# Patient Record
Sex: MALE | Race: WHITE | Employment: OTHER | URBAN - METROPOLITAN AREA
[De-identification: names, ages, dates, MRNs, and addresses within clinical notes are randomized per-mention and may not be internally consistent; named-entity substitution may affect disease eponyms.]

---

## 2017-03-27 ENCOUNTER — HOSPITAL ENCOUNTER (OUTPATIENT)
Dept: PULMONOLOGY | Age: 82
Discharge: HOME | End: 2017-03-27
Payer: MEDICARE

## 2017-03-27 PROCEDURE — 94060 EVALUATION OF WHEEZING: CPT

## 2017-03-27 PROCEDURE — 6360000002 HC RX W HCPCS: Performed by: FAMILY MEDICINE

## 2017-03-27 RX ORDER — ALBUTEROL SULFATE 2.5 MG/3ML
2.5 SOLUTION RESPIRATORY (INHALATION) ONCE
Status: COMPLETED | OUTPATIENT
Start: 2017-03-27 | End: 2017-03-27

## 2017-03-27 RX ADMIN — ALBUTEROL SULFATE 2.5 MG: 2.5 SOLUTION RESPIRATORY (INHALATION) at 16:45

## 2018-06-21 ENCOUNTER — OFFICE VISIT (OUTPATIENT)
Dept: FAMILY MEDICINE CLINIC | Age: 83
End: 2018-06-21
Payer: MEDICARE

## 2018-06-21 VITALS
WEIGHT: 183.4 LBS | OXYGEN SATURATION: 98 % | SYSTOLIC BLOOD PRESSURE: 136 MMHG | DIASTOLIC BLOOD PRESSURE: 70 MMHG | BODY MASS INDEX: 28.79 KG/M2 | HEIGHT: 67 IN | HEART RATE: 65 BPM

## 2018-06-21 DIAGNOSIS — I10 ESSENTIAL HYPERTENSION: ICD-10-CM

## 2018-06-21 DIAGNOSIS — E78.5 HYPERLIPIDEMIA, UNSPECIFIED HYPERLIPIDEMIA TYPE: ICD-10-CM

## 2018-06-21 DIAGNOSIS — J44.9 CHRONIC OBSTRUCTIVE PULMONARY DISEASE, UNSPECIFIED COPD TYPE (HCC): ICD-10-CM

## 2018-06-21 DIAGNOSIS — F41.9 ANXIETY: ICD-10-CM

## 2018-06-21 PROCEDURE — 4004F PT TOBACCO SCREEN RCVD TLK: CPT | Performed by: FAMILY MEDICINE

## 2018-06-21 PROCEDURE — G8926 SPIRO NO PERF OR DOC: HCPCS | Performed by: FAMILY MEDICINE

## 2018-06-21 PROCEDURE — 1123F ACP DISCUSS/DSCN MKR DOCD: CPT | Performed by: FAMILY MEDICINE

## 2018-06-21 PROCEDURE — 3023F SPIROM DOC REV: CPT | Performed by: FAMILY MEDICINE

## 2018-06-21 PROCEDURE — G8427 DOCREV CUR MEDS BY ELIG CLIN: HCPCS | Performed by: FAMILY MEDICINE

## 2018-06-21 PROCEDURE — 99203 OFFICE O/P NEW LOW 30 MIN: CPT | Performed by: FAMILY MEDICINE

## 2018-06-21 PROCEDURE — G8419 CALC BMI OUT NRM PARAM NOF/U: HCPCS | Performed by: FAMILY MEDICINE

## 2018-06-21 PROCEDURE — 4040F PNEUMOC VAC/ADMIN/RCVD: CPT | Performed by: FAMILY MEDICINE

## 2018-06-21 RX ORDER — SIMVASTATIN 20 MG
20 TABLET ORAL NIGHTLY
COMMUNITY
End: 2018-10-11 | Stop reason: SDUPTHER

## 2018-06-21 RX ORDER — ALPRAZOLAM 0.5 MG/1
0.5 TABLET ORAL 2 TIMES DAILY PRN
COMMUNITY
End: 2020-07-24

## 2018-06-21 RX ORDER — ATENOLOL 50 MG/1
50 TABLET ORAL DAILY
COMMUNITY
End: 2019-04-11 | Stop reason: SDUPTHER

## 2018-06-21 RX ORDER — TRAZODONE HYDROCHLORIDE 150 MG/1
150 TABLET ORAL NIGHTLY
COMMUNITY
End: 2019-06-20 | Stop reason: SDUPTHER

## 2018-06-21 ASSESSMENT — PATIENT HEALTH QUESTIONNAIRE - PHQ9
SUM OF ALL RESPONSES TO PHQ QUESTIONS 1-9: 1
1. LITTLE INTEREST OR PLEASURE IN DOING THINGS: 0
SUM OF ALL RESPONSES TO PHQ9 QUESTIONS 1 & 2: 1
2. FEELING DOWN, DEPRESSED OR HOPELESS: 1

## 2018-10-11 ENCOUNTER — OFFICE VISIT (OUTPATIENT)
Dept: FAMILY MEDICINE CLINIC | Age: 83
End: 2018-10-11
Payer: MEDICARE

## 2018-10-11 VITALS
HEART RATE: 70 BPM | OXYGEN SATURATION: 96 % | SYSTOLIC BLOOD PRESSURE: 130 MMHG | WEIGHT: 183 LBS | HEIGHT: 67 IN | BODY MASS INDEX: 28.72 KG/M2 | DIASTOLIC BLOOD PRESSURE: 72 MMHG

## 2018-10-11 DIAGNOSIS — J44.9 CHRONIC OBSTRUCTIVE PULMONARY DISEASE, UNSPECIFIED COPD TYPE (HCC): ICD-10-CM

## 2018-10-11 DIAGNOSIS — R10.9 RIGHT FLANK PAIN: ICD-10-CM

## 2018-10-11 DIAGNOSIS — I10 ESSENTIAL HYPERTENSION: ICD-10-CM

## 2018-10-11 DIAGNOSIS — M54.50 ACUTE LOW BACK PAIN WITHOUT SCIATICA, UNSPECIFIED BACK PAIN LATERALITY: Primary | ICD-10-CM

## 2018-10-11 DIAGNOSIS — E78.5 HYPERLIPIDEMIA, UNSPECIFIED HYPERLIPIDEMIA TYPE: ICD-10-CM

## 2018-10-11 DIAGNOSIS — N20.0 KIDNEY STONE: ICD-10-CM

## 2018-10-11 DIAGNOSIS — F41.9 ANXIETY: ICD-10-CM

## 2018-10-11 DIAGNOSIS — R31.9 HEMATURIA, UNSPECIFIED TYPE: ICD-10-CM

## 2018-10-11 LAB
ALBUMIN SERPL-MCNC: 4.6 G/DL (ref 3.9–4.9)
ALP BLD-CCNC: 74 U/L (ref 35–104)
ALT SERPL-CCNC: 15 U/L (ref 0–41)
ANION GAP SERPL CALCULATED.3IONS-SCNC: 12 MEQ/L (ref 7–13)
AST SERPL-CCNC: 21 U/L (ref 0–40)
BILIRUB SERPL-MCNC: 0.3 MG/DL (ref 0–1.2)
BILIRUBIN, POC: ABNORMAL
BLOOD URINE, POC: 80
BUN BLDV-MCNC: 19 MG/DL (ref 8–23)
CALCIUM SERPL-MCNC: 9.8 MG/DL (ref 8.6–10.2)
CHLORIDE BLD-SCNC: 103 MEQ/L (ref 98–107)
CHOLESTEROL, TOTAL: 128 MG/DL (ref 0–199)
CLARITY, POC: CLEAR
CO2: 29 MEQ/L (ref 22–29)
COLOR, POC: YELLOW
CREAT SERPL-MCNC: 0.81 MG/DL (ref 0.7–1.2)
GFR AFRICAN AMERICAN: >60
GFR NON-AFRICAN AMERICAN: >60
GLOBULIN: 2.7 G/DL (ref 2.3–3.5)
GLUCOSE BLD-MCNC: 117 MG/DL (ref 74–109)
GLUCOSE URINE, POC: ABNORMAL
HCT VFR BLD CALC: 44.6 % (ref 42–52)
HDLC SERPL-MCNC: 43 MG/DL (ref 40–59)
HEMOGLOBIN: 15.1 G/DL (ref 14–18)
KETONES, POC: ABNORMAL
LDL CHOLESTEROL CALCULATED: 69 MG/DL (ref 0–129)
LEUKOCYTE EST, POC: ABNORMAL
MCH RBC QN AUTO: 33 PG (ref 27–31.3)
MCHC RBC AUTO-ENTMCNC: 33.9 % (ref 33–37)
MCV RBC AUTO: 97.2 FL (ref 80–100)
NITRITE, POC: ABNORMAL
PDW BLD-RTO: 15.5 % (ref 11.5–14.5)
PH, POC: 5.5
PLATELET # BLD: 244 K/UL (ref 130–400)
POTASSIUM SERPL-SCNC: 4.6 MEQ/L (ref 3.5–5.1)
PROTEIN, POC: 100
RBC # BLD: 4.59 M/UL (ref 4.7–6.1)
SODIUM BLD-SCNC: 144 MEQ/L (ref 132–144)
SPECIFIC GRAVITY, POC: 1.03
TOTAL PROTEIN: 7.3 G/DL (ref 6.4–8.1)
TRIGL SERPL-MCNC: 81 MG/DL (ref 0–200)
UROBILINOGEN, POC: 0.2
WBC # BLD: 9.4 K/UL (ref 4.8–10.8)

## 2018-10-11 PROCEDURE — 99213 OFFICE O/P EST LOW 20 MIN: CPT | Performed by: FAMILY MEDICINE

## 2018-10-11 PROCEDURE — G8926 SPIRO NO PERF OR DOC: HCPCS | Performed by: FAMILY MEDICINE

## 2018-10-11 PROCEDURE — G8427 DOCREV CUR MEDS BY ELIG CLIN: HCPCS | Performed by: FAMILY MEDICINE

## 2018-10-11 PROCEDURE — 81002 URINALYSIS NONAUTO W/O SCOPE: CPT | Performed by: FAMILY MEDICINE

## 2018-10-11 PROCEDURE — 4040F PNEUMOC VAC/ADMIN/RCVD: CPT | Performed by: FAMILY MEDICINE

## 2018-10-11 PROCEDURE — 4004F PT TOBACCO SCREEN RCVD TLK: CPT | Performed by: FAMILY MEDICINE

## 2018-10-11 PROCEDURE — G8419 CALC BMI OUT NRM PARAM NOF/U: HCPCS | Performed by: FAMILY MEDICINE

## 2018-10-11 PROCEDURE — 3023F SPIROM DOC REV: CPT | Performed by: FAMILY MEDICINE

## 2018-10-11 PROCEDURE — 1101F PT FALLS ASSESS-DOCD LE1/YR: CPT | Performed by: FAMILY MEDICINE

## 2018-10-11 PROCEDURE — G8484 FLU IMMUNIZE NO ADMIN: HCPCS | Performed by: FAMILY MEDICINE

## 2018-10-11 PROCEDURE — 1123F ACP DISCUSS/DSCN MKR DOCD: CPT | Performed by: FAMILY MEDICINE

## 2018-10-11 RX ORDER — SIMVASTATIN 20 MG
20 TABLET ORAL NIGHTLY
Qty: 90 TABLET | Refills: 3 | Status: SHIPPED | OUTPATIENT
Start: 2018-10-11 | End: 2019-09-24 | Stop reason: SDUPTHER

## 2018-10-11 RX ORDER — CIPROFLOXACIN 500 MG/1
500 TABLET, FILM COATED ORAL 2 TIMES DAILY
Qty: 20 TABLET | Refills: 0 | Status: SHIPPED | OUTPATIENT
Start: 2018-10-11 | End: 2018-10-21

## 2018-10-11 NOTE — PROGRESS NOTES
Chief Complaint   Patient presents with    Back Pain     pulled muscle but getting better, brought urine     Other     wife getting worse, loosing eye sight, has to keep eye on her       HPI:  Royal Elkins is a 81 y.o. male     Follow up    In [place] the majority of the year  6-7 months  Does have PCP in [place]    Will be heading down soon, if wife able  Gets bloodwork there    Wife with macular degeneration  Losing sight, stressful    Reports pain in right flank  Finally easing up  Actually has had stone years ago    Trazodone for sleep/anxiety  Rare use of xanax for anxiety    PFTs from 2017 did show COPD, but he denies any significant SOB    Still smokes    Patient Active Problem List   Diagnosis    Hypertension    Hyperlipidemia    [facility]) [facility] [address]    Anxiety    Kidney stone       Current Outpatient Prescriptions   Medication Sig Dispense Refill    ciprofloxacin (CIPRO) 500 MG tablet Take 1 tablet by mouth 2 times daily for 10 days 20 tablet 0    ALPRAZolam (XANAX) 0.5 MG tablet Take 0.5 mg by mouth 2 times daily as needed for Sleep. Gagandeep Hernandez simvastatin (ZOCOR) 20 MG tablet Take 20 mg by mouth nightly      traZODone (DESYREL) 150 MG tablet Take 150 mg by mouth nightly      aspirin 81 MG tablet Take 81 mg by mouth daily      atenolol (TENORMIN) 50 MG tablet Take 50 mg by mouth daily      Alum Hydroxide-Mag Carbonate (GAVISCON EXTRA STRENGTH PO) Take by mouth       No current facility-administered medications for this visit. Past Medical History:   Diagnosis Date    Anxiety     COPD [facility]chronic obstructive pulmonary disease) ([facility] [address]     Hyperlipidemia     Hypertension     Kidney stone      History reviewed. No pertinent surgical history. History reviewed. No pertinent family history.   Social History     Social History    Marital status:      Spouse name: N/A    Number of children: N/A    Years of education: N/A     Social History Main Topics    Smoking status: Current Every Day Smoker     Packs/day: 0.50     Years: 60.00     Types: Cigarettes    Smokeless tobacco: Never Used    Alcohol use No    Drug use: No    Sexual activity: Not Asked     Other Topics Concern    None     Social History Narrative    None     No Known Allergies    Review of Systems:   General ROS: negative for - chills, fatigue, fever, malaise, weight gain or weight loss  Respiratory ROS: no cough, shortness of breath, or wheezing  Cardiovascular ROS: no chest pain or dyspnea on exertion  Gastrointestinal ROS: no abdominal pain, change in bowel habits, or black or bloody stools  Genito-Urinary ROS: no dysuria, trouble voiding  Musculoskeletal ROS: right flank pain  Neurological ROS: negative for - behavioral changes, memory loss, numbness/tingling, tremors or weakness    In general patient otherwise reports feeling well. Physical Exam:  /72 (Site: Right Upper Arm)   Pulse 70   Ht 5' 6.5\" (1.689 m)   Wt 183 lb (83 kg)   SpO2 96%   BMI 29.09 kg/m²     Gen: Well, NAD, Alert, Oriented x 3   HEENT: EOMI, eyes clear, MMM  Skin: without rash or jaundice  Neck: no significant lymphadenopathy or thyromegaly  Lungs: expiratory wheeze   Heart: RRR, S1S2, w/out M/R/G, non-displaced PMI   Ext: No C/C/E Bilaterally. Neuro: Neurovascularly intact w/ Sensory/Motor intact UE/LE Bilaterally. No results found for: WBC, HGB, HCT, PLT, CHOL, TRIG, HDL, LDLDIRECT, ALT, AST, NA, K, CL, CREATININE, BUN, CO2, TSH, PSA, INR, GLUF, LABA1C, LABMICR    Results for POC orders placed in visit on 10/11/18   POCT Urinalysis no Micro   Result Value Ref Range    Color, UA yellow     Clarity, UA clear     Glucose, UA POC neg     Bilirubin, UA neg     Ketones, UA neg     Spec Grav, UA 1.030     Blood, UA POC 80     pH, UA 5.5     Protein, UA      Urobilinogen, UA 0.2     Leukocytes, UA neg     Nitrite, UA neg          A&P   Diagnosis Orders   1.  Acute low back pain without sciatica, unspecified back pain laterality  POCT Urinalysis no Micro   2. Hematuria, unspecified type     3. Chronic obstructive pulmonary disease, unspecified COPD type ([facility] [address]     4. Essential hypertension     5. Hyperlipidemia, unspecified hyperlipidemia type  Lipid Panel   6. Anxiety     7. Kidney stone     8.  Right flank pain  Urine Culture    ciprofloxacin (CIPRO) 500 MG tablet    CBC    Comprehensive Metabolic Panel     Chronic conditions are stable  Continue current regimen  Follow up with appropriate specialists and here routinely for ongoing monitoring of chronic conditions    He really doesn't want to go ahead with full workup due to leaving for [place]  I had advised  Avi Mayen MD

## 2018-10-13 LAB — URINE CULTURE, ROUTINE: NORMAL

## 2019-04-08 ENCOUNTER — OFFICE VISIT (OUTPATIENT)
Dept: FAMILY MEDICINE CLINIC | Age: 84
End: 2019-04-08
Payer: MEDICARE

## 2019-04-08 VITALS
TEMPERATURE: 97.7 F | BODY MASS INDEX: 29.25 KG/M2 | WEIGHT: 182 LBS | SYSTOLIC BLOOD PRESSURE: 132 MMHG | DIASTOLIC BLOOD PRESSURE: 70 MMHG | HEART RATE: 72 BPM | OXYGEN SATURATION: 95 % | HEIGHT: 66 IN

## 2019-04-08 DIAGNOSIS — R31.9 HEMATURIA, UNSPECIFIED TYPE: ICD-10-CM

## 2019-04-08 DIAGNOSIS — R31.9 HEMATURIA, UNSPECIFIED TYPE: Primary | ICD-10-CM

## 2019-04-08 DIAGNOSIS — J44.9 CHRONIC OBSTRUCTIVE PULMONARY DISEASE, UNSPECIFIED COPD TYPE (HCC): ICD-10-CM

## 2019-04-08 DIAGNOSIS — N30.90 CYSTITIS: ICD-10-CM

## 2019-04-08 LAB
ALBUMIN SERPL-MCNC: 4.9 G/DL (ref 3.5–4.6)
ALP BLD-CCNC: 72 U/L (ref 35–104)
ALT SERPL-CCNC: 24 U/L (ref 0–41)
ANION GAP SERPL CALCULATED.3IONS-SCNC: 15 MEQ/L (ref 9–15)
AST SERPL-CCNC: 27 U/L (ref 0–40)
BILIRUB SERPL-MCNC: 0.4 MG/DL (ref 0.2–0.7)
BUN BLDV-MCNC: 17 MG/DL (ref 8–23)
CALCIUM SERPL-MCNC: 9.8 MG/DL (ref 8.5–9.9)
CHLORIDE BLD-SCNC: 99 MEQ/L (ref 95–107)
CO2: 29 MEQ/L (ref 20–31)
CREAT SERPL-MCNC: 0.85 MG/DL (ref 0.7–1.2)
GFR AFRICAN AMERICAN: >60
GFR NON-AFRICAN AMERICAN: >60
GLOBULIN: 2.6 G/DL (ref 2.3–3.5)
GLUCOSE BLD-MCNC: 67 MG/DL (ref 70–99)
HCT VFR BLD CALC: 46.3 % (ref 42–52)
HEMOGLOBIN: 15.4 G/DL (ref 14–18)
MCH RBC QN AUTO: 32.6 PG (ref 27–31.3)
MCHC RBC AUTO-ENTMCNC: 33.3 % (ref 33–37)
MCV RBC AUTO: 97.9 FL (ref 80–100)
PDW BLD-RTO: 15.1 % (ref 11.5–14.5)
PLATELET # BLD: 256 K/UL (ref 130–400)
POTASSIUM SERPL-SCNC: 4.7 MEQ/L (ref 3.4–4.9)
RBC # BLD: 4.73 M/UL (ref 4.7–6.1)
SODIUM BLD-SCNC: 143 MEQ/L (ref 135–144)
TOTAL PROTEIN: 7.5 G/DL (ref 6.3–8)
WBC # BLD: 10.1 K/UL (ref 4.8–10.8)

## 2019-04-08 PROCEDURE — G8427 DOCREV CUR MEDS BY ELIG CLIN: HCPCS | Performed by: FAMILY MEDICINE

## 2019-04-08 PROCEDURE — 99213 OFFICE O/P EST LOW 20 MIN: CPT | Performed by: FAMILY MEDICINE

## 2019-04-08 PROCEDURE — 4040F PNEUMOC VAC/ADMIN/RCVD: CPT | Performed by: FAMILY MEDICINE

## 2019-04-08 PROCEDURE — G8926 SPIRO NO PERF OR DOC: HCPCS | Performed by: FAMILY MEDICINE

## 2019-04-08 PROCEDURE — 1123F ACP DISCUSS/DSCN MKR DOCD: CPT | Performed by: FAMILY MEDICINE

## 2019-04-08 PROCEDURE — G8419 CALC BMI OUT NRM PARAM NOF/U: HCPCS | Performed by: FAMILY MEDICINE

## 2019-04-08 PROCEDURE — 4004F PT TOBACCO SCREEN RCVD TLK: CPT | Performed by: FAMILY MEDICINE

## 2019-04-08 PROCEDURE — 3023F SPIROM DOC REV: CPT | Performed by: FAMILY MEDICINE

## 2019-04-08 RX ORDER — CIPROFLOXACIN 500 MG/1
500 TABLET, FILM COATED ORAL 2 TIMES DAILY
Qty: 20 TABLET | Refills: 0 | Status: SHIPPED | OUTPATIENT
Start: 2019-04-08 | End: 2019-04-18

## 2019-04-08 NOTE — PROGRESS NOTES
None     Non-medical: None   Tobacco Use    Smoking status: Current Every Day Smoker     Packs/day: 0.50     Years: 60.00     Pack years: 30.00     Types: Cigarettes    Smokeless tobacco: Never Used   Substance and Sexual Activity    Alcohol use: No    Drug use: No    Sexual activity: None   Lifestyle    Physical activity:     Days per week: None     Minutes per session: None    Stress: None   Relationships    Social connections:     Talks on phone: None     Gets together: None     Attends Alevism service: None     Active member of club or organization: None     Attends meetings of clubs or organizations: None     Relationship status: None    Intimate partner violence:     Fear of current or ex partner: None     Emotionally abused: None     Physically abused: None     Forced sexual activity: None   Other Topics Concern    None   Social History Narrative    None     No Known Allergies    Review of Systems:   General ROS: negative for - chills, fatigue, fever, malaise, weight gain or weight loss  Respiratory ROS: no cough, shortness of breath, or wheezing  Cardiovascular ROS: no chest pain or dyspnea on exertion  Gastrointestinal ROS: no abdominal pain, change in bowel habits, or black or bloody stools  Genito-Urinary ROS: no dysuria, trouble voiding  Musculoskeletal ROS: right flank pain  Neurological ROS: negative for - behavioral changes, memory loss, numbness/tingling, tremors or weakness    In general patient otherwise reports feeling well. Physical Exam:  /70   Pulse 72   Temp 97.7 °F (36.5 °C)   Ht 5' 6\" (1.676 m)   Wt 182 lb (82.6 kg)   SpO2 95%   BMI 29.38 kg/m²     Gen: Well, NAD, Alert, Oriented x 3   HEENT: EOMI, eyes clear, MMM  Skin: without rash or jaundice  Neck: no significant lymphadenopathy or thyromegaly  Lungs: expiratory wheeze   Heart: RRR, S1S2, w/out M/R/G, non-displaced PMI   Ext: No C/C/E Bilaterally.    Neuro: Neurovascularly intact w/ Sensory/Motor intact UE/LE Bilaterally. Lab Results   Component Value Date    WBC 9.4 10/11/2018    HGB 15.1 10/11/2018    HCT 44.6 10/11/2018     10/11/2018    CHOL 128 10/11/2018    TRIG 81 10/11/2018    HDL 43 10/11/2018    ALT 15 10/11/2018    AST 21 10/11/2018     10/11/2018    K 4.6 10/11/2018     10/11/2018    CREATININE 0.81 10/11/2018    BUN 19 10/11/2018    CO2 29 10/11/2018       No results found for this visit on 04/08/19. U/a with blood and protein      A&P   Diagnosis Orders   1. Hematuria, unspecified type  CT ABDOMEN PELVIS WO CONTRAST Additional Contrast? None    Comprehensive Metabolic Panel    CBC   2. Cystitis  ciprofloxacin (CIPRO) 500 MG tablet    CT ABDOMEN PELVIS WO CONTRAST Additional Contrast? None   3.  Chronic obstructive pulmonary disease, unspecified COPD type ([facility] [address]       Chronic conditions are stable  Continue current regimen  Follow up with appropriate specialists and here routinely for ongoing monitoring of chronic conditions    He has to go back to [place] this week    Would like labs and CT    Wife is deteriorating  Her son is with her down in [place] MD

## 2019-04-11 RX ORDER — ATENOLOL 50 MG/1
50 TABLET ORAL DAILY
Qty: 30 TABLET | Refills: 5 | Status: SHIPPED | OUTPATIENT
Start: 2019-04-11 | End: 2019-06-20 | Stop reason: SDUPTHER

## 2019-06-20 ENCOUNTER — OFFICE VISIT (OUTPATIENT)
Dept: FAMILY MEDICINE CLINIC | Age: 84
End: 2019-06-20
Payer: MEDICARE

## 2019-06-20 VITALS
WEIGHT: 181 LBS | OXYGEN SATURATION: 97 % | BODY MASS INDEX: 29.09 KG/M2 | HEART RATE: 48 BPM | DIASTOLIC BLOOD PRESSURE: 78 MMHG | SYSTOLIC BLOOD PRESSURE: 122 MMHG | HEIGHT: 66 IN

## 2019-06-20 DIAGNOSIS — J44.9 CHRONIC OBSTRUCTIVE PULMONARY DISEASE, UNSPECIFIED COPD TYPE (HCC): ICD-10-CM

## 2019-06-20 DIAGNOSIS — I10 ESSENTIAL HYPERTENSION: Primary | ICD-10-CM

## 2019-06-20 DIAGNOSIS — E78.5 HYPERLIPIDEMIA, UNSPECIFIED HYPERLIPIDEMIA TYPE: ICD-10-CM

## 2019-06-20 DIAGNOSIS — F41.9 ANXIETY: ICD-10-CM

## 2019-06-20 PROCEDURE — 1123F ACP DISCUSS/DSCN MKR DOCD: CPT | Performed by: FAMILY MEDICINE

## 2019-06-20 PROCEDURE — G8510 SCR DEP NEG, NO PLAN REQD: HCPCS | Performed by: FAMILY MEDICINE

## 2019-06-20 PROCEDURE — 4004F PT TOBACCO SCREEN RCVD TLK: CPT | Performed by: FAMILY MEDICINE

## 2019-06-20 PROCEDURE — G8427 DOCREV CUR MEDS BY ELIG CLIN: HCPCS | Performed by: FAMILY MEDICINE

## 2019-06-20 PROCEDURE — G8419 CALC BMI OUT NRM PARAM NOF/U: HCPCS | Performed by: FAMILY MEDICINE

## 2019-06-20 PROCEDURE — 99213 OFFICE O/P EST LOW 20 MIN: CPT | Performed by: FAMILY MEDICINE

## 2019-06-20 PROCEDURE — G8926 SPIRO NO PERF OR DOC: HCPCS | Performed by: FAMILY MEDICINE

## 2019-06-20 PROCEDURE — 3023F SPIROM DOC REV: CPT | Performed by: FAMILY MEDICINE

## 2019-06-20 PROCEDURE — 4040F PNEUMOC VAC/ADMIN/RCVD: CPT | Performed by: FAMILY MEDICINE

## 2019-06-20 RX ORDER — TRAZODONE HYDROCHLORIDE 150 MG/1
150 TABLET ORAL NIGHTLY
Qty: 90 TABLET | Refills: 2 | Status: SHIPPED | OUTPATIENT
Start: 2019-06-20 | End: 2020-07-24 | Stop reason: SDUPTHER

## 2019-06-20 RX ORDER — ATENOLOL 100 MG/1
100 TABLET ORAL DAILY
Qty: 90 TABLET | Refills: 2 | Status: SHIPPED | OUTPATIENT
Start: 2019-06-20 | End: 2020-03-05 | Stop reason: SDUPTHER

## 2019-06-20 ASSESSMENT — PATIENT HEALTH QUESTIONNAIRE - PHQ9
SUM OF ALL RESPONSES TO PHQ QUESTIONS 1-9: 2
SUM OF ALL RESPONSES TO PHQ QUESTIONS 1-9: 2
2. FEELING DOWN, DEPRESSED OR HOPELESS: 1
SUM OF ALL RESPONSES TO PHQ9 QUESTIONS 1 & 2: 2
1. LITTLE INTEREST OR PLEASURE IN DOING THINGS: 1

## 2019-06-20 NOTE — PROGRESS NOTES
Chief Complaint   Patient presents with    Anxiety     bp has went up, a lot of anxiety moving up here from [place] for the summer, wife isn't doing well 140/100's    Discuss Medications       HPI:  Albania Palmer is a 81 y.o. male     Follow up    In [place] the majority of the year  6-7 months  Does have PCP in [address] bloodwork there    Wife with macular degeneration  Losing sight, stressful ongoing  Does bring this up at each visit, but understandable      Trazodone for sleep/anxiety  Rare use of xanax for anxiety    PFTs from 2017 did show COPD, but he denies any significant SOB    Still smokes    Patient Active Problem List   Diagnosis    Hypertension    Hyperlipidemia    COPD [facility]chronic obstructive [facility] disease) ([facility] [address]    Anxiety    Kidney stone       Current Outpatient Medications   Medication Sig Dispense Refill    traZODone (DESYREL) 150 MG tablet Take 1 tablet by mouth nightly 90 tablet 2    atenolol (TENORMIN) 100 MG tablet Take 1 tablet by mouth daily 90 tablet 2    simvastatin (ZOCOR) 20 MG tablet Take 1 tablet by mouth nightly 90 tablet 3    ALPRAZolam (XANAX) 0.5 MG tablet Take 0.5 mg by mouth 2 times daily as needed for Sleep. Omer Jackson aspirin 81 MG tablet Take 81 mg by mouth daily      Alum Hydroxide-Mag Carbonate (GAVISCON EXTRA STRENGTH PO) Take by mouth       No current facility-administered medications for this visit. Past Medical History:   Diagnosis Date    Anxiety     COPD (chronic obstructive pulmonary disease) ([facility] [address]     Hyperlipidemia     Hypertension     Kidney stone      History reviewed. No pertinent surgical history. History reviewed. No pertinent family history.   Social History     Socioeconomic History    Marital status:      Spouse name: None    Number of children: None    Years of education: None    Highest education level: None   Occupational History    None   Social Needs    Financial resource strain: None    Food insecurity:     Worry: None     Inability: None    Transportation needs:     Medical: None     Non-medical: None   Tobacco Use    Smoking status: Current Every Day Smoker     Packs/day: 0.50     Years: 60.00     Pack years: 30.00     Types: Cigarettes    Smokeless tobacco: Never Used   Substance and Sexual Activity    Alcohol use: No    Drug use: No    Sexual activity: None   Lifestyle    Physical activity:     Days per week: None     Minutes per session: None    Stress: None   Relationships    Social connections:     Talks on phone: None     Gets together: None     Attends Sikh service: None     Active member of club or organization: None     Attends meetings of clubs or organizations: None     Relationship status: None    Intimate partner violence:     Fear of current or ex partner: None     Emotionally abused: None     Physically abused: None     Forced sexual activity: None   Other Topics Concern    None   Social History Narrative    None     No Known Allergies    Review of Systems:   General ROS: negative for - chills, fatigue, fever, malaise, weight gain or weight loss  Respiratory ROS: no cough, shortness of breath, or wheezing  Cardiovascular ROS: no chest pain or dyspnea on exertion  Gastrointestinal ROS: no abdominal pain, change in bowel habits, or black or bloody stools  Genito-Urinary ROS: no dysuria, trouble voiding  Musculoskeletal ROS: right flank pain  Neurological ROS: negative for - behavioral changes, memory loss, numbness/tingling, tremors or weakness    In general patient otherwise reports feeling well.      Physical Exam:  /78 (Site: Left Upper Arm)   Pulse (!) 48   Ht 5' 6\" (1.676 m)   Wt 181 lb (82.1 kg)   SpO2 97%   BMI 29.21 kg/m²     Gen: Well, NAD, Alert, Oriented x 3   HEENT: EOMI, eyes clear, MMM  Skin: without rash or jaundice  Neck: no significant lymphadenopathy or thyromegaly  Lungs: expiratory wheeze   Heart: RRR, S1S2, w/out M/R/G, non-displaced PMI   Ext: No C/C/E Bilaterally. Neuro: Neurovascularly intact w/ Sensory/Motor intact UE/LE Bilaterally. Lab Results   Component Value Date    WBC 10.1 04/08/2019    HGB 15.4 04/08/2019    HCT 46.3 04/08/2019     04/08/2019    CHOL 128 10/11/2018    TRIG 81 10/11/2018    HDL 43 10/11/2018    ALT 24 04/08/2019    AST 27 04/08/2019     04/08/2019    K 4.7 04/08/2019    CL 99 04/08/2019    CREATININE 0.85 04/08/2019    BUN 17 04/08/2019    CO2 29 04/08/2019       No results found for this visit on 06/20/19. A&P   Diagnosis Orders   1. Essential hypertension  atenolol (TENORMIN) 100 MG tablet   2. Anxiety  traZODone (DESYREL) 150 MG tablet   3. Hyperlipidemia, unspecified hyperlipidemia type     4.  Chronic obstructive pulmonary disease, unspecified COPD type ([facility] [address]         Nerves/anxiety biggest issue    Atenolol to 100mg    Chronic conditions are stable  Continue current regimen  Follow up with appropriate specialists and here routinely for ongoing monitoring of chronic conditions        Mann Cummings MD

## 2019-09-24 ENCOUNTER — OFFICE VISIT (OUTPATIENT)
Dept: FAMILY MEDICINE CLINIC | Age: 84
End: 2019-09-24
Payer: MEDICARE

## 2019-09-24 VITALS
SYSTOLIC BLOOD PRESSURE: 138 MMHG | WEIGHT: 177.8 LBS | HEART RATE: 53 BPM | DIASTOLIC BLOOD PRESSURE: 78 MMHG | OXYGEN SATURATION: 96 % | BODY MASS INDEX: 28.57 KG/M2 | HEIGHT: 66 IN

## 2019-09-24 DIAGNOSIS — F41.9 ANXIETY: ICD-10-CM

## 2019-09-24 DIAGNOSIS — I10 ESSENTIAL HYPERTENSION: ICD-10-CM

## 2019-09-24 DIAGNOSIS — E78.5 HYPERLIPIDEMIA, UNSPECIFIED HYPERLIPIDEMIA TYPE: ICD-10-CM

## 2019-09-24 DIAGNOSIS — J44.9 CHRONIC OBSTRUCTIVE PULMONARY DISEASE, UNSPECIFIED COPD TYPE (HCC): Primary | ICD-10-CM

## 2019-09-24 LAB
ALBUMIN SERPL-MCNC: 4.7 G/DL (ref 3.5–4.6)
ALP BLD-CCNC: 65 U/L (ref 35–104)
ALT SERPL-CCNC: 21 U/L (ref 0–41)
ANION GAP SERPL CALCULATED.3IONS-SCNC: 13 MEQ/L (ref 9–15)
AST SERPL-CCNC: 25 U/L (ref 0–40)
BILIRUB SERPL-MCNC: 0.5 MG/DL (ref 0.2–0.7)
BUN BLDV-MCNC: 20 MG/DL (ref 8–23)
CALCIUM SERPL-MCNC: 9.7 MG/DL (ref 8.5–9.9)
CHLORIDE BLD-SCNC: 100 MEQ/L (ref 95–107)
CHOLESTEROL, TOTAL: 152 MG/DL (ref 0–199)
CO2: 27 MEQ/L (ref 20–31)
CREAT SERPL-MCNC: 0.95 MG/DL (ref 0.7–1.2)
GFR AFRICAN AMERICAN: >60
GFR NON-AFRICAN AMERICAN: >60
GLOBULIN: 3.1 G/DL (ref 2.3–3.5)
GLUCOSE BLD-MCNC: 88 MG/DL (ref 70–99)
HCT VFR BLD CALC: 45.5 % (ref 42–52)
HDLC SERPL-MCNC: 47 MG/DL (ref 40–59)
HEMOGLOBIN: 15.3 G/DL (ref 14–18)
LDL CHOLESTEROL CALCULATED: 79 MG/DL (ref 0–129)
MCH RBC QN AUTO: 32.6 PG (ref 27–31.3)
MCHC RBC AUTO-ENTMCNC: 33.7 % (ref 33–37)
MCV RBC AUTO: 96.7 FL (ref 80–100)
PDW BLD-RTO: 14.5 % (ref 11.5–14.5)
PLATELET # BLD: 245 K/UL (ref 130–400)
POTASSIUM SERPL-SCNC: 4.3 MEQ/L (ref 3.4–4.9)
RBC # BLD: 4.7 M/UL (ref 4.7–6.1)
SODIUM BLD-SCNC: 140 MEQ/L (ref 135–144)
TOTAL PROTEIN: 7.8 G/DL (ref 6.3–8)
TRIGL SERPL-MCNC: 128 MG/DL (ref 0–150)
TSH SERPL DL<=0.05 MIU/L-ACNC: 2.23 UIU/ML (ref 0.44–3.86)
WBC # BLD: 11.5 K/UL (ref 4.8–10.8)

## 2019-09-24 PROCEDURE — G8419 CALC BMI OUT NRM PARAM NOF/U: HCPCS | Performed by: FAMILY MEDICINE

## 2019-09-24 PROCEDURE — 3023F SPIROM DOC REV: CPT | Performed by: FAMILY MEDICINE

## 2019-09-24 PROCEDURE — G8427 DOCREV CUR MEDS BY ELIG CLIN: HCPCS | Performed by: FAMILY MEDICINE

## 2019-09-24 PROCEDURE — 3288F FALL RISK ASSESSMENT DOCD: CPT | Performed by: FAMILY MEDICINE

## 2019-09-24 PROCEDURE — 4004F PT TOBACCO SCREEN RCVD TLK: CPT | Performed by: FAMILY MEDICINE

## 2019-09-24 PROCEDURE — 99213 OFFICE O/P EST LOW 20 MIN: CPT | Performed by: FAMILY MEDICINE

## 2019-09-24 PROCEDURE — 4040F PNEUMOC VAC/ADMIN/RCVD: CPT | Performed by: FAMILY MEDICINE

## 2019-09-24 PROCEDURE — G8510 SCR DEP NEG, NO PLAN REQD: HCPCS | Performed by: FAMILY MEDICINE

## 2019-09-24 PROCEDURE — 1123F ACP DISCUSS/DSCN MKR DOCD: CPT | Performed by: FAMILY MEDICINE

## 2019-09-24 PROCEDURE — G8926 SPIRO NO PERF OR DOC: HCPCS | Performed by: FAMILY MEDICINE

## 2019-09-24 RX ORDER — ALPRAZOLAM 0.5 MG/1
0.5 TABLET ORAL 2 TIMES DAILY PRN
Qty: 60 TABLET | Refills: 0 | Status: SHIPPED | OUTPATIENT
Start: 2019-09-24 | End: 2020-07-24 | Stop reason: SDUPTHER

## 2019-09-24 RX ORDER — SIMVASTATIN 20 MG
20 TABLET ORAL NIGHTLY
Qty: 90 TABLET | Refills: 3 | Status: SHIPPED | OUTPATIENT
Start: 2019-09-24 | End: 2020-07-24 | Stop reason: SDUPTHER

## 2019-09-24 RX ORDER — MULTIVITAMIN WITH IRON
100 TABLET ORAL DAILY
COMMUNITY
End: 2021-01-19

## 2019-09-24 RX ORDER — ALPRAZOLAM 0.5 MG/1
0.5 TABLET ORAL 2 TIMES DAILY PRN
Status: CANCELLED | OUTPATIENT
Start: 2019-09-24

## 2019-09-24 ASSESSMENT — PATIENT HEALTH QUESTIONNAIRE - PHQ9
SUM OF ALL RESPONSES TO PHQ QUESTIONS 1-9: 2
SUM OF ALL RESPONSES TO PHQ9 QUESTIONS 1 & 2: 2
2. FEELING DOWN, DEPRESSED OR HOPELESS: 1
SUM OF ALL RESPONSES TO PHQ QUESTIONS 1-9: 2
1. LITTLE INTEREST OR PLEASURE IN DOING THINGS: 1

## 2019-09-24 NOTE — PROGRESS NOTES
Chief Complaint   Patient presents with    Hypertension     6 month, needs labs    Fatigue     tired and run down       HPI:  Aden Naik is a 81 y.o. male     Follow up    In [place] the majority of the year  6-7 months  Does have PCP in [address] bloodwork there    Needs refill of xanax for rare use    Wife with macular degeneration  Losing sight, stressful ongoing  Does bring this up at each visit, but understandable    Trazodone for sleep/anxiety    PFTs from 2017 did show COPD, but he denies any significant SOB    Still smokes    Tired, low motivation, low stamina    Wt Readings from Last 3 Encounters:   09/24/19 177 lb 12.8 oz (80.6 kg)   06/20/19 181 lb (82.1 kg)   04/08/19 182 lb (82.6 kg)         Patient Active Problem List   Diagnosis    Hypertension    Hyperlipidemia    COPD (chronic obstructive pulmonary disease) ([facility] [address]    Anxiety    Kidney stone       Current Outpatient Medications   Medication Sig Dispense Refill    vitamin B-6 (PYRIDOXINE) 100 MG tablet Take 100 mg by mouth daily      simvastatin (ZOCOR) 20 MG tablet Take 1 tablet by mouth nightly 90 tablet 3    ALPRAZolam (XANAX) 0.5 MG tablet Take 1 tablet by mouth 2 times daily as needed for Anxiety for up to 30 days. 60 tablet 0    traZODone (DESYREL) 150 MG tablet Take 1 tablet by mouth nightly 90 tablet 2    atenolol (TENORMIN) 100 MG tablet Take 1 tablet by mouth daily 90 tablet 2    ALPRAZolam (XANAX) 0.5 MG tablet Take 0.5 mg by mouth 2 times daily as needed for Sleep. Bassem Obando Alum Hydroxide-Mag Carbonate (GAVISCON EXTRA STRENGTH PO) Take by mouth      aspirin 81 MG tablet Take 81 mg by mouth daily       No current facility-administered medications for this visit. Past Medical History:   Diagnosis Date    Anxiety     COPD (chronic obstructive pulmonary disease) ([facility] [address]     Hyperlipidemia     Hypertension     Kidney stone      History reviewed. No pertinent surgical history. History reviewed.  No pertinent family history. Social History     Socioeconomic History    Marital status:      Spouse name: None    Number of children: None    Years of education: None    Highest education level: None   Occupational History    None   Social Needs    Financial resource strain: None    Food insecurity:     Worry: None     Inability: None    Transportation needs:     Medical: None     Non-medical: None   Tobacco Use    Smoking status: Current Every Day Smoker     Packs/day: 0.50     Years: 60.00     Pack years: 30.00     Types: Cigarettes    Smokeless tobacco: Never Used   Substance and Sexual Activity    Alcohol use: No    Drug use: No    Sexual activity: None   Lifestyle    Physical activity:     Days per week: None     Minutes per session: None    Stress: None   Relationships    Social connections:     Talks on phone: None     Gets together: None     Attends Scientologist service: None     Active member of club or organization: None     Attends meetings of clubs or organizations: None     Relationship status: None    Intimate partner violence:     Fear of current or ex partner: None     Emotionally abused: None     Physically abused: None     Forced sexual activity: None   Other Topics Concern    None   Social History Narrative    None     No Known Allergies    Review of Systems:   General ROS: fatigue  Respiratory ROS: no cough, shortness of breath, or wheezing  Cardiovascular ROS: no chest pain or dyspnea on exertion  Gastrointestinal ROS: no abdominal pain, change in bowel habits, or black or bloody stools  Genito-Urinary ROS: no dysuria, trouble voiding  Musculoskeletal ROS: right flank pain  Neurological ROS: negative for - behavioral changes, memory loss, numbness/tingling, tremors or weakness    In general patient otherwise reports feeling well.      Physical Exam:  /78 (Site: Right Upper Arm)   Pulse 53   Ht 5' 6\" (1.676 m)   Wt 177 lb 12.8 oz (80.6 kg)   SpO2 96%   BMI 28.70 kg/m²     Gen:

## 2020-03-05 RX ORDER — ATENOLOL 100 MG/1
100 TABLET ORAL DAILY
Qty: 90 TABLET | Refills: 2 | Status: SHIPPED | OUTPATIENT
Start: 2020-03-05 | End: 2020-07-24 | Stop reason: SDUPTHER

## 2020-06-05 ENCOUNTER — VIRTUAL VISIT (OUTPATIENT)
Dept: PRIMARY CARE CLINIC | Age: 85
End: 2020-06-05
Payer: MEDICARE

## 2020-06-05 PROCEDURE — 1123F ACP DISCUSS/DSCN MKR DOCD: CPT | Performed by: NURSE PRACTITIONER

## 2020-06-05 PROCEDURE — 4040F PNEUMOC VAC/ADMIN/RCVD: CPT | Performed by: NURSE PRACTITIONER

## 2020-06-05 PROCEDURE — G0438 PPPS, INITIAL VISIT: HCPCS | Performed by: NURSE PRACTITIONER

## 2020-06-05 ASSESSMENT — LIFESTYLE VARIABLES: HOW OFTEN DO YOU HAVE A DRINK CONTAINING ALCOHOL: 0

## 2020-06-05 ASSESSMENT — PATIENT HEALTH QUESTIONNAIRE - PHQ9
SUM OF ALL RESPONSES TO PHQ QUESTIONS 1-9: 0
SUM OF ALL RESPONSES TO PHQ QUESTIONS 1-9: 0

## 2020-06-05 NOTE — PROGRESS NOTES
Interventions:  · Patient declines any further evaluation/treatment for this issue    Personalized Preventive Plan   Current Health Maintenance Status    There is no immunization history on file for this patient. Health Maintenance   Topic Date Due    DTaP/Tdap/Td vaccine (1 - Tdap) 05/05/1954    Shingles Vaccine (1 of 2) 05/05/1986    Pneumococcal 65+ years Vaccine (1 of 1 - PPSV23) 05/05/2001    Annual Wellness Visit (AWV)  05/29/2019    Flu vaccine (Season Ended) 09/01/2020    Lipid screen  09/24/2020    Hepatitis A vaccine  Aged Out    Hepatitis B vaccine  Aged Out    Hib vaccine  Aged Out    Meningococcal (ACWY) vaccine  Aged Out     Recommendations for [place] Due: see orders and patient instructions/AVS.  . Recommended screening schedule for the next 5-10 years is provided to the patient in written form: see Patient Instructions/AVS.    Eliseo Ramirez was seen today for medicare awv. Diagnoses and all orders for this visit:    Routine general medical examination at a health care facility              Laura Funk is a 81 y.o. male being evaluated by a Virtual Visit (phone) encounter to address concerns as mentioned above. A caregiver was present when appropriate. Due to this being a TeleHealth encounter (During CJRIS-14 public health emergency), evaluation of the following organ systems was limited: Vitals/Constitutional/EENT/Resp/CV/GI//MS/Neuro/Skin/Heme-Lymph-Imm. Pursuant to the emergency declaration under the [facility], [address] [place] [address] authority and the [employer]it and [employer] Act, this Virtual Visit was conducted with patient's (and/or legal guardian's) consent, to reduce the patient's risk of exposure to COVID-19 and provide necessary medical care.   The patient (and/or legal guardian) has also been advised to contact this office for worsening conditions or problems, and seek emergency medical treatment

## 2020-07-24 ENCOUNTER — OFFICE VISIT (OUTPATIENT)
Dept: FAMILY MEDICINE CLINIC | Age: 85
End: 2020-07-24
Payer: MEDICARE

## 2020-07-24 VITALS
TEMPERATURE: 97.5 F | DIASTOLIC BLOOD PRESSURE: 82 MMHG | WEIGHT: 173.2 LBS | HEIGHT: 66 IN | SYSTOLIC BLOOD PRESSURE: 136 MMHG | OXYGEN SATURATION: 99 % | BODY MASS INDEX: 27.83 KG/M2 | HEART RATE: 66 BPM

## 2020-07-24 DIAGNOSIS — E55.9 VITAMIN D DEFICIENCY: ICD-10-CM

## 2020-07-24 DIAGNOSIS — I10 ESSENTIAL HYPERTENSION: ICD-10-CM

## 2020-07-24 DIAGNOSIS — F41.9 ANXIETY: ICD-10-CM

## 2020-07-24 LAB
ALBUMIN SERPL-MCNC: 4.7 G/DL (ref 3.5–4.6)
ALP BLD-CCNC: 69 U/L (ref 35–104)
ALT SERPL-CCNC: 15 U/L (ref 0–41)
ANION GAP SERPL CALCULATED.3IONS-SCNC: 13 MEQ/L (ref 9–15)
AST SERPL-CCNC: 18 U/L (ref 0–40)
BILIRUB SERPL-MCNC: 0.5 MG/DL (ref 0.2–0.7)
BUN BLDV-MCNC: 18 MG/DL (ref 8–23)
CALCIUM SERPL-MCNC: 9.8 MG/DL (ref 8.5–9.9)
CHLORIDE BLD-SCNC: 103 MEQ/L (ref 95–107)
CHOLESTEROL, TOTAL: 138 MG/DL (ref 0–199)
CO2: 26 MEQ/L (ref 20–31)
CREAT SERPL-MCNC: 0.86 MG/DL (ref 0.7–1.2)
GFR AFRICAN AMERICAN: >60
GFR NON-AFRICAN AMERICAN: >60
GLOBULIN: 2.6 G/DL (ref 2.3–3.5)
GLUCOSE BLD-MCNC: 106 MG/DL (ref 70–99)
HCT VFR BLD CALC: 45.9 % (ref 42–52)
HDLC SERPL-MCNC: 44 MG/DL (ref 40–59)
HEMOGLOBIN: 15.7 G/DL (ref 14–18)
LDL CHOLESTEROL CALCULATED: 70 MG/DL (ref 0–129)
MCH RBC QN AUTO: 33.2 PG (ref 27–31.3)
MCHC RBC AUTO-ENTMCNC: 34.1 % (ref 33–37)
MCV RBC AUTO: 97.2 FL (ref 80–100)
PDW BLD-RTO: 15 % (ref 11.5–14.5)
PLATELET # BLD: 229 K/UL (ref 130–400)
POTASSIUM SERPL-SCNC: 4.8 MEQ/L (ref 3.4–4.9)
RBC # BLD: 4.72 M/UL (ref 4.7–6.1)
SODIUM BLD-SCNC: 142 MEQ/L (ref 135–144)
TOTAL PROTEIN: 7.3 G/DL (ref 6.3–8)
TRIGL SERPL-MCNC: 122 MG/DL (ref 0–150)
TSH SERPL DL<=0.05 MIU/L-ACNC: 1.99 UIU/ML (ref 0.44–3.86)
VITAMIN D 25-HYDROXY: 41.6 NG/ML (ref 30–100)
WBC # BLD: 10.4 K/UL (ref 4.8–10.8)

## 2020-07-24 PROCEDURE — G8427 DOCREV CUR MEDS BY ELIG CLIN: HCPCS | Performed by: FAMILY MEDICINE

## 2020-07-24 PROCEDURE — G8926 SPIRO NO PERF OR DOC: HCPCS | Performed by: FAMILY MEDICINE

## 2020-07-24 PROCEDURE — 3023F SPIROM DOC REV: CPT | Performed by: FAMILY MEDICINE

## 2020-07-24 PROCEDURE — 99214 OFFICE O/P EST MOD 30 MIN: CPT | Performed by: FAMILY MEDICINE

## 2020-07-24 PROCEDURE — G8417 CALC BMI ABV UP PARAM F/U: HCPCS | Performed by: FAMILY MEDICINE

## 2020-07-24 PROCEDURE — 1123F ACP DISCUSS/DSCN MKR DOCD: CPT | Performed by: FAMILY MEDICINE

## 2020-07-24 PROCEDURE — 4040F PNEUMOC VAC/ADMIN/RCVD: CPT | Performed by: FAMILY MEDICINE

## 2020-07-24 PROCEDURE — 4004F PT TOBACCO SCREEN RCVD TLK: CPT | Performed by: FAMILY MEDICINE

## 2020-07-24 RX ORDER — SIMVASTATIN 20 MG
20 TABLET ORAL NIGHTLY
Qty: 90 TABLET | Refills: 3 | Status: SHIPPED | OUTPATIENT
Start: 2020-07-24 | End: 2021-07-19 | Stop reason: SDUPTHER

## 2020-07-24 RX ORDER — TRAZODONE HYDROCHLORIDE 150 MG/1
150 TABLET ORAL NIGHTLY
Qty: 90 TABLET | Refills: 2 | Status: SHIPPED | OUTPATIENT
Start: 2020-07-24 | End: 2021-07-19 | Stop reason: SDUPTHER

## 2020-07-24 RX ORDER — ALPRAZOLAM 0.5 MG/1
0.5 TABLET ORAL 2 TIMES DAILY PRN
Qty: 60 TABLET | Refills: 0 | Status: SHIPPED | OUTPATIENT
Start: 2020-07-24 | End: 2020-08-23

## 2020-07-24 RX ORDER — ALPRAZOLAM 0.5 MG/1
0.5 TABLET ORAL 2 TIMES DAILY PRN
Refills: 0 | Status: CANCELLED | OUTPATIENT
Start: 2020-07-24 | End: 2020-08-23

## 2020-07-24 RX ORDER — ATENOLOL 100 MG/1
100 TABLET ORAL DAILY
Qty: 90 TABLET | Refills: 2 | Status: SHIPPED | OUTPATIENT
Start: 2020-07-24 | End: 2021-07-19 | Stop reason: SDUPTHER

## 2020-07-24 NOTE — PROGRESS NOTES
Chief Complaint   Patient presents with    6 Month Follow-Up    Hypertension    Hyperlipidemia    COPD    Blood Work     would like blood work ordered.  Medication Refill     needs xanax refilled please advise. HPI:  Nelson Simeon is a 81 y.o. male     Follow up    In [place] the majority of the year  6-7 months  Does have PCP in [address] bloodwork there    Needs refill of xanax for rare use    Controlled Substance Monitoring:    Acute and Chronic Pain Monitoring:   RX Monitoring 7/24/2020   Periodic Controlled Substance Monitoring Possible medication side effects, risk of tolerance/dependence & alternative treatments discussed. ;No signs of potential drug abuse or diversion identified. Wife with macular degeneration  Losing sight, stressful ongoing  Does bring this up at each visit, but understandable    Trazodone for sleep/anxiety    PFTs from 2017 did show COPD, but he denies any significant SOB    Still smokes    Tired, low motivation, low stamina    Wt Readings from Last 3 Encounters:   07/24/20 173 lb 3.2 oz (78.6 kg)   09/24/19 177 lb 12.8 oz (80.6 kg)   06/20/19 181 lb (82.1 kg)         Patient Active Problem List   Diagnosis    Hypertension    Hyperlipidemia    COPD (chronic obstructive pulmonary disease) (HCC)    Anxiety    Kidney stone       Current Outpatient Medications   Medication Sig Dispense Refill    atenolol (TENORMIN) 100 MG tablet Take 1 tablet by mouth daily 90 tablet 2    simvastatin (ZOCOR) 20 MG tablet Take 1 tablet by mouth nightly 90 tablet 3    traZODone (DESYREL) 150 MG tablet Take 1 tablet by mouth nightly 90 tablet 2    ALPRAZolam (XANAX) 0.5 MG tablet Take 1 tablet by mouth 2 times daily as needed for Anxiety for up to 30 days. 60 tablet 0    vitamin B-6 (PYRIDOXINE) 100 MG tablet Take 100 mg by mouth daily      aspirin 81 MG tablet Take 81 mg by mouth daily       No current facility-administered medications for this visit.           Past Medical History:   Diagnosis Date    Anxiety     [facility] obstructive [facility] disease) [address] [address]     Hyperlipidemia     Hypertension     Kidney stone      No past surgical history on file. No family history on file.   Social History     Socioeconomic History    Marital status:      Spouse name: None    Number of children: None    Years of education: None    Highest education level: None   Occupational History    None   Social Needs    Financial resource strain: None    Food insecurity     Worry: None     Inability: None    Transportation needs     Medical: None     Non-medical: None   Tobacco Use    Smoking status: Current Every Day Smoker     Packs/day: 0.50     Years: 60.00     Pack years: 30.00     Types: Cigarettes    Smokeless tobacco: Never Used   Substance and Sexual Activity    Alcohol use: No    Drug use: No    Sexual activity: None   Lifestyle    Physical activity     Days per week: None     Minutes per session: None    Stress: None   Relationships    Social connections     Talks on phone: None     Gets together: None     Attends Restoration service: None     Active member of club or organization: None     Attends meetings of clubs or organizations: None     Relationship status: None    Intimate partner violence     Fear of current or ex partner: None     Emotionally abused: None     Physically abused: None     Forced sexual activity: None   Other Topics Concern    None   Social History Narrative    None     No Known Allergies    Review of Systems:   General ROS: fatigue  Respiratory ROS: no cough, shortness of breath, or wheezing  Cardiovascular ROS: no chest pain or dyspnea on exertion  Gastrointestinal ROS: no abdominal pain, change in bowel habits, or black or bloody stools  Genito-Urinary ROS: no dysuria, trouble voiding  Musculoskeletal ROS: right flank pain  Neurological ROS: negative for - behavioral changes, memory loss, numbness/tingling, tremors or weakness    In general patient otherwise reports feeling well. Physical Exam:  /82   Pulse 66   Temp 97.5 °F (36.4 °C)   Ht 5' 6\" (1.676 m)   Wt 173 lb 3.2 oz (78.6 kg)   SpO2 99%   BMI 27.96 kg/m²     Gen: Well, NAD, Alert, Oriented x 3   HEENT: EOMI, eyes clear, MMM  Skin: without rash or jaundice  Neck: no significant lymphadenopathy or thyromegaly  Lungs: expiratory wheeze   Heart: RRR, S1S2, w/out M/R/G, non-displaced PMI   Ext: No C/C/E Bilaterally. Neuro: Neurovascularly intact w/ Sensory/Motor intact UE/LE Bilaterally. Psych: troubled/dysthymic about wife's behavior/dementia    Lab Results   Component Value Date    WBC 11.5 (H) 09/24/2019    HGB 15.3 09/24/2019    HCT 45.5 09/24/2019     09/24/2019    CHOL 152 09/24/2019    TRIG 128 09/24/2019    HDL 47 09/24/2019    ALT 21 09/24/2019    AST 25 09/24/2019     09/24/2019    K 4.3 09/24/2019     09/24/2019    CREATININE 0.95 09/24/2019    BUN 20 09/24/2019    CO2 27 09/24/2019    TSH 2.230 09/24/2019       No results found for this visit on 07/24/20. A&P   Diagnosis Orders   1. Vitamin D deficiency  Vitamin D 25 Hydroxy   2. Essential hypertension  atenolol (TENORMIN) 100 MG tablet    CBC    Comprehensive Metabolic Panel    Lipid Panel   3. Hyperlipidemia, unspecified hyperlipidemia type  simvastatin (ZOCOR) 20 MG tablet   4. Anxiety  traZODone (DESYREL) 150 MG tablet    ALPRAZolam (XANAX) 0.5 MG tablet    TSH without Reflex   5.  Chronic obstructive pulmonary disease, unspecified COPD type ([address]       Declines immunizations    Nerves/anxiety biggest issue    I don't really have any further advice I can give him about his wife  She refuses to be helped with her conditions    He can only control his behavior/response  Trazodone for sleep    Chronic conditions are stable  Continue current regimen  Follow up with appropriate specialists and here routinely for ongoing monitoring of chronic conditions        Thee Chairez MD

## 2021-01-19 ENCOUNTER — OFFICE VISIT (OUTPATIENT)
Dept: FAMILY MEDICINE CLINIC | Age: 86
End: 2021-01-19
Payer: COMMERCIAL

## 2021-01-19 VITALS
DIASTOLIC BLOOD PRESSURE: 88 MMHG | HEIGHT: 66 IN | SYSTOLIC BLOOD PRESSURE: 122 MMHG | WEIGHT: 174 LBS | TEMPERATURE: 98.2 F | OXYGEN SATURATION: 97 % | BODY MASS INDEX: 27.97 KG/M2 | HEART RATE: 50 BPM

## 2021-01-19 DIAGNOSIS — F41.9 ANXIETY: ICD-10-CM

## 2021-01-19 DIAGNOSIS — E78.5 HYPERLIPIDEMIA, UNSPECIFIED HYPERLIPIDEMIA TYPE: ICD-10-CM

## 2021-01-19 DIAGNOSIS — E55.9 VITAMIN D DEFICIENCY: ICD-10-CM

## 2021-01-19 DIAGNOSIS — Z13.31 POSITIVE DEPRESSION SCREENING: ICD-10-CM

## 2021-01-19 DIAGNOSIS — I10 ESSENTIAL HYPERTENSION: Primary | ICD-10-CM

## 2021-01-19 DIAGNOSIS — J44.9 CHRONIC OBSTRUCTIVE PULMONARY DISEASE, UNSPECIFIED COPD TYPE (HCC): ICD-10-CM

## 2021-01-19 PROCEDURE — G8427 DOCREV CUR MEDS BY ELIG CLIN: HCPCS | Performed by: FAMILY MEDICINE

## 2021-01-19 PROCEDURE — 4004F PT TOBACCO SCREEN RCVD TLK: CPT | Performed by: FAMILY MEDICINE

## 2021-01-19 PROCEDURE — G8484 FLU IMMUNIZE NO ADMIN: HCPCS | Performed by: FAMILY MEDICINE

## 2021-01-19 PROCEDURE — 3023F SPIROM DOC REV: CPT | Performed by: FAMILY MEDICINE

## 2021-01-19 PROCEDURE — 4040F PNEUMOC VAC/ADMIN/RCVD: CPT | Performed by: FAMILY MEDICINE

## 2021-01-19 PROCEDURE — G8926 SPIRO NO PERF OR DOC: HCPCS | Performed by: FAMILY MEDICINE

## 2021-01-19 PROCEDURE — G8431 POS CLIN DEPRES SCRN F/U DOC: HCPCS | Performed by: FAMILY MEDICINE

## 2021-01-19 PROCEDURE — G8417 CALC BMI ABV UP PARAM F/U: HCPCS | Performed by: FAMILY MEDICINE

## 2021-01-19 PROCEDURE — 1123F ACP DISCUSS/DSCN MKR DOCD: CPT | Performed by: FAMILY MEDICINE

## 2021-01-19 PROCEDURE — 99213 OFFICE O/P EST LOW 20 MIN: CPT | Performed by: FAMILY MEDICINE

## 2021-01-19 ASSESSMENT — COLUMBIA-SUICIDE SEVERITY RATING SCALE - C-SSRS
6. HAVE YOU EVER DONE ANYTHING, STARTED TO DO ANYTHING, OR PREPARED TO DO ANYTHING TO END YOUR LIFE?: NO
2. HAVE YOU ACTUALLY HAD ANY THOUGHTS OF KILLING YOURSELF?: NO

## 2021-01-19 ASSESSMENT — PATIENT HEALTH QUESTIONNAIRE - PHQ9
5. POOR APPETITE OR OVEREATING: 0
SUM OF ALL RESPONSES TO PHQ QUESTIONS 1-9: 15
3. TROUBLE FALLING OR STAYING ASLEEP: 3
SUM OF ALL RESPONSES TO PHQ QUESTIONS 1-9: 15
9. THOUGHTS THAT YOU WOULD BE BETTER OFF DEAD, OR OF HURTING YOURSELF: 0
4. FEELING TIRED OR HAVING LITTLE ENERGY: 3

## 2021-01-19 NOTE — PROGRESS NOTES
Chief Complaint   Patient presents with    Hypertension     6 month, positive depression       HPI:  Sheila Allen is a 81 y.o. male     Follow up  6 mos    Answers positive to depression screening, mainly caregiver strain related to his wife  She is in the nursing home  Has been there for a month  Overwhelming for him    Needs refill of xanax for rare use    Controlled Substance Monitoring:    Acute and Chronic Pain Monitoring:   RX Monitoring 7/24/2020   Periodic Controlled Substance Monitoring Possible medication side effects, risk of tolerance/dependence & alternative treatments discussed. ;No signs of potential drug abuse or diversion identified. Wife with macular degeneration  Losing sight, stressful ongoing  Does bring this up at each visit, but understandable    Trazodone for sleep/anxiety    PFTs from 2017 did show COPD, but he denies any significant SOB    Still smokes    Tired, low motivation, low stamina    Wt Readings from Last 3 Encounters:   01/19/21 174 lb (78.9 kg)   07/24/20 173 lb 3.2 oz (78.6 kg)   09/24/19 177 lb 12.8 oz (80.6 kg)         Patient Active Problem List   Diagnosis    Hypertension    Hyperlipidemia    [facility]    Anxiety    Kidney stone       Current Outpatient Medications   Medication Sig Dispense Refill    atenolol (TENORMIN) 100 MG tablet Take 1 tablet by mouth daily 90 tablet 2    simvastatin (ZOCOR) 20 MG tablet Take 1 tablet by mouth nightly 90 tablet 3    traZODone (DESYREL) 150 MG tablet Take 1 tablet by mouth nightly 90 tablet 2     No current facility-administered medications for this visit. Past Medical History:   Diagnosis Date    Anxiety     [facility] (chronic obstructive pulmonary disease) [address] [address]     Hyperlipidemia     Hypertension     Kidney stone      History reviewed. No pertinent surgical history. History reviewed. No pertinent family history.   Social History     Socioeconomic History    Marital status:      Spouse name: None    Number of children: None    Years of education: None    Highest education level: None   Occupational History    None   Social Needs    Financial resource strain: None    Food insecurity     Worry: None     Inability: None    Transportation needs     Medical: None     Non-medical: None   Tobacco Use    Smoking status: Current Every Day Smoker     Packs/day: 0.50     Years: 60.00     Pack years: 30.00     Types: Cigarettes    Smokeless tobacco: Never Used   Substance and Sexual Activity    Alcohol use: No    Drug use: No    Sexual activity: None   Lifestyle    Physical activity     Days per week: None     Minutes per session: None    Stress: None   Relationships    Social connections     Talks on phone: None     Gets together: None     Attends Shinto service: None     Active member of club or organization: None     Attends meetings of clubs or organizations: None     Relationship status: None    Intimate partner violence     Fear of current or ex partner: None     Emotionally abused: None     Physically abused: None     Forced sexual activity: None   Other Topics Concern    None   Social History Narrative    None     No Known Allergies    Review of Systems:   General ROS: fatigue  Respiratory ROS: no cough, shortness of breath, or wheezing  Cardiovascular ROS: no chest pain or dyspnea on exertion  Gastrointestinal ROS: no abdominal pain, change in bowel habits, or black or bloody stools  Genito-Urinary ROS: no dysuria, trouble voiding  Musculoskeletal ROS: right flank pain  Neurological ROS: negative for - behavioral changes, memory loss, numbness/tingling, tremors or weakness    In general patient otherwise reports feeling well.      Physical Exam:  /88 (Site: Right Upper Arm)   Pulse 50   Temp 98.2 °F (36.8 °C)   Ht 5' 6\" (1.676 m)   Wt 174 lb (78.9 kg)   SpO2 97%   BMI 28.08 kg/m²     Gen: Well, NAD, Alert, Oriented x 3   HEENT: EOMI, eyes

## 2021-07-19 ENCOUNTER — OFFICE VISIT (OUTPATIENT)
Dept: FAMILY MEDICINE CLINIC | Age: 86
End: 2021-07-19
Payer: MEDICARE

## 2021-07-19 VITALS
OXYGEN SATURATION: 96 % | DIASTOLIC BLOOD PRESSURE: 80 MMHG | WEIGHT: 176.2 LBS | BODY MASS INDEX: 28.32 KG/M2 | HEART RATE: 52 BPM | SYSTOLIC BLOOD PRESSURE: 134 MMHG | TEMPERATURE: 98.4 F | HEIGHT: 66 IN

## 2021-07-19 DIAGNOSIS — Z63.6 CAREGIVER STRESS: ICD-10-CM

## 2021-07-19 DIAGNOSIS — E55.9 VITAMIN D DEFICIENCY: ICD-10-CM

## 2021-07-19 DIAGNOSIS — E78.5 HYPERLIPIDEMIA, UNSPECIFIED HYPERLIPIDEMIA TYPE: ICD-10-CM

## 2021-07-19 DIAGNOSIS — R53.83 FATIGUE, UNSPECIFIED TYPE: Primary | ICD-10-CM

## 2021-07-19 DIAGNOSIS — J44.9 CHRONIC OBSTRUCTIVE PULMONARY DISEASE, UNSPECIFIED COPD TYPE (HCC): ICD-10-CM

## 2021-07-19 DIAGNOSIS — F41.9 ANXIETY: ICD-10-CM

## 2021-07-19 DIAGNOSIS — I10 ESSENTIAL HYPERTENSION: ICD-10-CM

## 2021-07-19 DIAGNOSIS — R53.83 FATIGUE, UNSPECIFIED TYPE: ICD-10-CM

## 2021-07-19 LAB
ALBUMIN SERPL-MCNC: 4.7 G/DL (ref 3.5–4.6)
ALP BLD-CCNC: 81 U/L (ref 35–104)
ALT SERPL-CCNC: 15 U/L (ref 0–41)
ANION GAP SERPL CALCULATED.3IONS-SCNC: 16 MEQ/L (ref 9–15)
AST SERPL-CCNC: 21 U/L (ref 0–40)
BILIRUB SERPL-MCNC: 0.3 MG/DL (ref 0.2–0.7)
BUN BLDV-MCNC: 23 MG/DL (ref 8–23)
CALCIUM SERPL-MCNC: 9.9 MG/DL (ref 8.5–9.9)
CHLORIDE BLD-SCNC: 103 MEQ/L (ref 95–107)
CHOLESTEROL, TOTAL: 161 MG/DL (ref 0–199)
CO2: 25 MEQ/L (ref 20–31)
CREAT SERPL-MCNC: 0.92 MG/DL (ref 0.7–1.2)
GFR AFRICAN AMERICAN: >60
GFR NON-AFRICAN AMERICAN: >60
GLOBULIN: 2.6 G/DL (ref 2.3–3.5)
GLUCOSE BLD-MCNC: 108 MG/DL (ref 70–99)
HCT VFR BLD CALC: 44.6 % (ref 42–52)
HDLC SERPL-MCNC: 46 MG/DL (ref 40–59)
HEMOGLOBIN: 15 G/DL (ref 14–18)
LDL CHOLESTEROL CALCULATED: 94 MG/DL (ref 0–129)
MCH RBC QN AUTO: 32.3 PG (ref 27–31.3)
MCHC RBC AUTO-ENTMCNC: 33.8 % (ref 33–37)
MCV RBC AUTO: 95.7 FL (ref 80–100)
PDW BLD-RTO: 14.8 % (ref 11.5–14.5)
PLATELET # BLD: 254 K/UL (ref 130–400)
POTASSIUM SERPL-SCNC: 4.8 MEQ/L (ref 3.4–4.9)
RBC # BLD: 4.66 M/UL (ref 4.7–6.1)
SODIUM BLD-SCNC: 144 MEQ/L (ref 135–144)
TOTAL PROTEIN: 7.3 G/DL (ref 6.3–8)
TRIGL SERPL-MCNC: 104 MG/DL (ref 0–150)
TSH SERPL DL<=0.05 MIU/L-ACNC: 2.27 UIU/ML (ref 0.44–3.86)
VITAMIN D 25-HYDROXY: 43 NG/ML (ref 30–100)
WBC # BLD: 9.4 K/UL (ref 4.8–10.8)

## 2021-07-19 PROCEDURE — G8427 DOCREV CUR MEDS BY ELIG CLIN: HCPCS | Performed by: FAMILY MEDICINE

## 2021-07-19 PROCEDURE — 99214 OFFICE O/P EST MOD 30 MIN: CPT | Performed by: FAMILY MEDICINE

## 2021-07-19 PROCEDURE — 4004F PT TOBACCO SCREEN RCVD TLK: CPT | Performed by: FAMILY MEDICINE

## 2021-07-19 PROCEDURE — G8926 SPIRO NO PERF OR DOC: HCPCS | Performed by: FAMILY MEDICINE

## 2021-07-19 PROCEDURE — 3023F SPIROM DOC REV: CPT | Performed by: FAMILY MEDICINE

## 2021-07-19 PROCEDURE — G8417 CALC BMI ABV UP PARAM F/U: HCPCS | Performed by: FAMILY MEDICINE

## 2021-07-19 PROCEDURE — 1123F ACP DISCUSS/DSCN MKR DOCD: CPT | Performed by: FAMILY MEDICINE

## 2021-07-19 PROCEDURE — 4040F PNEUMOC VAC/ADMIN/RCVD: CPT | Performed by: FAMILY MEDICINE

## 2021-07-19 RX ORDER — ALPRAZOLAM 0.5 MG/1
0.5 TABLET ORAL 2 TIMES DAILY PRN
COMMUNITY

## 2021-07-19 RX ORDER — TRAZODONE HYDROCHLORIDE 150 MG/1
150 TABLET ORAL NIGHTLY
Qty: 90 TABLET | Refills: 3 | Status: SHIPPED | OUTPATIENT
Start: 2021-07-19 | End: 2022-09-06

## 2021-07-19 RX ORDER — SIMVASTATIN 20 MG
20 TABLET ORAL NIGHTLY
Qty: 90 TABLET | Refills: 3 | Status: SHIPPED | OUTPATIENT
Start: 2021-07-19 | End: 2022-09-06

## 2021-07-19 RX ORDER — ATENOLOL 100 MG/1
100 TABLET ORAL DAILY
Qty: 90 TABLET | Refills: 3 | Status: SHIPPED | OUTPATIENT
Start: 2021-07-19 | End: 2022-09-06

## 2021-07-19 SDOH — ECONOMIC STABILITY: TRANSPORTATION INSECURITY
IN THE PAST 12 MONTHS, HAS LACK OF TRANSPORTATION KEPT YOU FROM MEETINGS, WORK, OR FROM GETTING THINGS NEEDED FOR DAILY LIVING?: NO

## 2021-07-19 SDOH — SOCIAL STABILITY - SOCIAL INSECURITY: DEPENDENT RELATIVE NEEDING CARE AT HOME: Z63.6

## 2021-07-19 SDOH — ECONOMIC STABILITY: TRANSPORTATION INSECURITY
IN THE PAST 12 MONTHS, HAS THE LACK OF TRANSPORTATION KEPT YOU FROM MEDICAL APPOINTMENTS OR FROM GETTING MEDICATIONS?: NO

## 2021-07-19 SDOH — ECONOMIC STABILITY: FOOD INSECURITY: WITHIN THE PAST 12 MONTHS, YOU WORRIED THAT YOUR FOOD WOULD RUN OUT BEFORE YOU GOT MONEY TO BUY MORE.: NEVER TRUE

## 2021-07-19 SDOH — ECONOMIC STABILITY: FOOD INSECURITY: WITHIN THE PAST 12 MONTHS, THE FOOD YOU BOUGHT JUST DIDN'T LAST AND YOU DIDN'T HAVE MONEY TO GET MORE.: NEVER TRUE

## 2021-07-19 ASSESSMENT — SOCIAL DETERMINANTS OF HEALTH (SDOH): HOW HARD IS IT FOR YOU TO PAY FOR THE VERY BASICS LIKE FOOD, HOUSING, MEDICAL CARE, AND HEATING?: NOT HARD AT ALL

## 2021-07-19 NOTE — PROGRESS NOTES
Chief Complaint   Patient presents with    Hypertension     6 month     Fatigue     having low energy, taking a couple naps a day        HPI:  Isabel Patel is a 81 y.o. male     Follow up  6 mos    Answers positive to depression screening, mainly caregiver strain related to his wife  [facility] is in the memory care unit at [facility]         Controlled Substance Monitoring:    Acute and Chronic Pain Monitoring:   RX Monitoring 7/24/2020   Periodic Controlled Substance Monitoring Possible medication side effects, risk of tolerance/dependence & alternative treatments discussed. ;No signs of potential drug abuse or diversion identified. Trazodone for sleep/anxiety    PFTs from 2017 did show COPD, but he denies any significant SOB    Still smokes    Tired, low motivation, low stamina    Wt Readings from Last 3 Encounters:   07/19/21 176 lb 3.2 oz (79.9 kg)   01/19/21 174 lb (78.9 kg)   07/24/20 173 lb 3.2 oz (78.6 kg)         Patient Active Problem List   Diagnosis    Hypertension    Hyperlipidemia    [facility]    Anxiety    Kidney stone       Current Outpatient Medications   Medication Sig Dispense Refill    ALPRAZolam (XANAX) 0.5 MG tablet Take 0.5 mg by mouth 2 times daily as needed for Sleep.  atenolol (TENORMIN) 100 MG tablet Take 1 tablet by mouth daily 90 tablet 3    simvastatin (ZOCOR) 20 MG tablet Take 1 tablet by mouth nightly 90 tablet 3    traZODone (DESYREL) 150 MG tablet Take 1 tablet by mouth nightly 90 tablet 3     No current facility-administered medications for this visit. Past Medical History:   Diagnosis Date    Anxiety     [facility]chronic obstructive pulmonary disease) ([facility] [address]     Hyperlipidemia     Hypertension     Kidney stone      No past surgical history on file. No family history on file.   Social History     Socioeconomic History    Marital status:      Spouse name: None    Number of children: None    Years of education: None    Highest education level: None   Occupational History    None   Tobacco Use    Smoking status: Current Every Day Smoker     Packs/day: 0.50     Years: 60.00     Pack years: 30.00     Types: Cigarettes    Smokeless tobacco: Never Used   Substance and Sexual Activity    Alcohol use: No    Drug use: No    Sexual activity: None   Other Topics Concern    None   Social History Narrative    None     Social Determinants of Health     Financial Resource Strain: Low Risk     Difficulty of Paying Living Expenses: Not hard at all   Food Insecurity: No Food Insecurity    Worried About Running Out of Food in the Last Year: Never true    Brielle of Food in the Last Year: Never true   Transportation Needs: No Transportation Needs    Lack of Transportation (Medical): No    Lack of Transportation (Non-Medical): No   Physical Activity:     Days of Exercise per Week:     Minutes of Exercise per Session:    Stress:     Feeling of Stress :    Social Connections:     Frequency of Communication with Friends and Family:     Frequency of Social Gatherings with Friends and Family:     Attends [employer] Services:     Active Member of Clubs or Organizations:     Attends Club or Organization Meetings:     Marital Status:    Intimate Partner Violence:     Fear of Current or Ex-Partner:     Emotionally Abused:     Physically Abused:     Sexually Abused:      No Known Allergies    Review of Systems:   General ROS: fatigue  Respiratory ROS: no cough, shortness of breath, or wheezing  Cardiovascular ROS: no chest pain or dyspnea on exertion  Gastrointestinal ROS: no abdominal pain, change in bowel habits, or black or bloody stools  Genito-Urinary ROS: no dysuria, trouble voiding  Musculoskeletal ROS: right flank pain  Neurological ROS: negative for - behavioral changes, memory loss, numbness/tingling, tremors or weakness    In general patient otherwise reports feeling well.      Physical Exam:  /80 (Site: Left Upper Arm)   Pulse 52   Temp 98.4 °F (36.9 °C)   Ht 5' 6\" (1.676 m)   Wt 176 lb 3.2 oz (79.9 kg)   SpO2 96%   BMI 28.44 kg/m²     Gen: Well, NAD, Alert, Oriented x 3   HEENT: EOMI, eyes clear, MMM  Skin: without rash or jaundice  Neck: no significant lymphadenopathy or thyromegaly  Lungs: expiratory wheeze   Heart: RRR, S1S2, w/out M/R/G, non-displaced PMI   Ext: No C/C/E Bilaterally. Neuro: Neurovascularly intact w/ Sensory/Motor intact UE/LE Bilaterally. Psych: troubled/dysthymic about wife's behavior/dementia    Lab Results   Component Value Date    WBC 10.4 07/24/2020    HGB 15.7 07/24/2020    HCT 45.9 07/24/2020     07/24/2020    CHOL 138 07/24/2020    TRIG 122 07/24/2020    HDL 44 07/24/2020    ALT 15 07/24/2020    AST 18 07/24/2020     07/24/2020    K 4.8 07/24/2020     07/24/2020    CREATININE 0.86 07/24/2020    BUN 18 07/24/2020    CO2 26 07/24/2020    TSH 1.990 07/24/2020       No results found for this visit on 07/19/21. A&P   Diagnosis Orders   1. Fatigue, unspecified type  CBC    Comprehensive Metabolic Panel    TSH without Reflex   2. Essential hypertension  atenolol (TENORMIN) 100 MG tablet   3. Hyperlipidemia, unspecified hyperlipidemia type  simvastatin (ZOCOR) 20 MG tablet    Lipid Panel   4. Anxiety  traZODone (DESYREL) 150 MG tablet   5. Vitamin D deficiency  Vitamin D 25 Hydroxy   6.  Chronic obstructive pulmonary disease, unspecified COPD type ([facility] [address]     7. Caregiver stress         Nerves/anxiety biggest issue    Sees wife twice/week     Check labs as ordered     Trazodone for sleep    Chronic conditions are stable  Continue current regimen  Follow up with appropriate specialists and here routinely for ongoing monitoring of chronic conditions        MD Guero Mackay MD

## 2022-01-20 ENCOUNTER — OFFICE VISIT (OUTPATIENT)
Dept: FAMILY MEDICINE CLINIC | Age: 87
End: 2022-01-20
Payer: MEDICARE

## 2022-01-20 VITALS
TEMPERATURE: 97.8 F | HEART RATE: 50 BPM | WEIGHT: 181 LBS | HEIGHT: 66 IN | OXYGEN SATURATION: 95 % | BODY MASS INDEX: 29.09 KG/M2 | SYSTOLIC BLOOD PRESSURE: 120 MMHG | DIASTOLIC BLOOD PRESSURE: 76 MMHG

## 2022-01-20 DIAGNOSIS — Z63.6 CAREGIVER STRESS: ICD-10-CM

## 2022-01-20 DIAGNOSIS — E55.9 VITAMIN D DEFICIENCY: ICD-10-CM

## 2022-01-20 DIAGNOSIS — J44.9 CHRONIC OBSTRUCTIVE PULMONARY DISEASE, UNSPECIFIED COPD TYPE (HCC): ICD-10-CM

## 2022-01-20 DIAGNOSIS — F41.9 ANXIETY: ICD-10-CM

## 2022-01-20 DIAGNOSIS — E78.5 HYPERLIPIDEMIA, UNSPECIFIED HYPERLIPIDEMIA TYPE: ICD-10-CM

## 2022-01-20 DIAGNOSIS — Z00.00 ROUTINE GENERAL MEDICAL EXAMINATION AT A HEALTH CARE FACILITY: Primary | ICD-10-CM

## 2022-01-20 DIAGNOSIS — Z13.31 POSITIVE DEPRESSION SCREENING: ICD-10-CM

## 2022-01-20 PROCEDURE — 4040F PNEUMOC VAC/ADMIN/RCVD: CPT | Performed by: FAMILY MEDICINE

## 2022-01-20 PROCEDURE — G8484 FLU IMMUNIZE NO ADMIN: HCPCS | Performed by: FAMILY MEDICINE

## 2022-01-20 PROCEDURE — G0439 PPPS, SUBSEQ VISIT: HCPCS | Performed by: FAMILY MEDICINE

## 2022-01-20 PROCEDURE — 1123F ACP DISCUSS/DSCN MKR DOCD: CPT | Performed by: FAMILY MEDICINE

## 2022-01-20 RX ORDER — ALBUTEROL SULFATE 90 UG/1
2 AEROSOL, METERED RESPIRATORY (INHALATION) 4 TIMES DAILY PRN
Qty: 18 G | Refills: 2 | Status: SHIPPED | OUTPATIENT
Start: 2022-01-20 | End: 2022-10-25 | Stop reason: ALTCHOICE

## 2022-01-20 SDOH — SOCIAL STABILITY - SOCIAL INSECURITY: DEPENDENT RELATIVE NEEDING CARE AT HOME: Z63.6

## 2022-01-20 ASSESSMENT — PATIENT HEALTH QUESTIONNAIRE - PHQ9
SUM OF ALL RESPONSES TO PHQ QUESTIONS 1-9: 10
9. THOUGHTS THAT YOU WOULD BE BETTER OFF DEAD, OR OF HURTING YOURSELF: 0
SUM OF ALL RESPONSES TO PHQ9 QUESTIONS 1 & 2: 5
SUM OF ALL RESPONSES TO PHQ QUESTIONS 1-9: 10
4. FEELING TIRED OR HAVING LITTLE ENERGY: 3
7. TROUBLE CONCENTRATING ON THINGS, SUCH AS READING THE NEWSPAPER OR WATCHING TELEVISION: 0
SUM OF ALL RESPONSES TO PHQ QUESTIONS 1-9: 10
SUM OF ALL RESPONSES TO PHQ QUESTIONS 1-9: 10
5. POOR APPETITE OR OVEREATING: 0
6. FEELING BAD ABOUT YOURSELF - OR THAT YOU ARE A FAILURE OR HAVE LET YOURSELF OR YOUR FAMILY DOWN: 1
8. MOVING OR SPEAKING SO SLOWLY THAT OTHER PEOPLE COULD HAVE NOTICED. OR THE OPPOSITE, BEING SO FIGETY OR RESTLESS THAT YOU HAVE BEEN MOVING AROUND A LOT MORE THAN USUAL: 1
3. TROUBLE FALLING OR STAYING ASLEEP: 0
10. IF YOU CHECKED OFF ANY PROBLEMS, HOW DIFFICULT HAVE THESE PROBLEMS MADE IT FOR YOU TO DO YOUR WORK, TAKE CARE OF THINGS AT HOME, OR GET ALONG WITH OTHER PEOPLE: 1
1. LITTLE INTEREST OR PLEASURE IN DOING THINGS: 3
2. FEELING DOWN, DEPRESSED OR HOPELESS: 2

## 2022-01-20 ASSESSMENT — LIFESTYLE VARIABLES: HOW OFTEN DO YOU HAVE A DRINK CONTAINING ALCOHOL: 0

## 2022-01-20 NOTE — PATIENT INSTRUCTIONS
Personalized Preventive Plan for Kayleigh Schneider - 1/20/2022  Medicare offers a range of preventive health benefits. Some of the tests and screenings are paid in full while other may be subject to a deductible, co-insurance, and/or copay. Some of these benefits include a comprehensive review of your medical history including lifestyle, illnesses that may run in your family, and various assessments and screenings as appropriate. After reviewing your medical record and screening and assessments performed today your provider may have ordered immunizations, labs, imaging, and/or referrals for you. A list of these orders (if applicable) as well as your Preventive Care list are included within your After Visit Summary for your review. Other Preventive Recommendations:    · A preventive eye exam performed by an eye specialist is recommended every 1-2 years to screen for glaucoma; cataracts, macular degeneration, and other eye disorders. · A preventive dental visit is recommended every 6 months. · Try to get at least 150 minutes of exercise per week or 10,000 steps per day on a pedometer . · Order or download the FREE \"Exercise & Physical Activity: Your Everyday Guide\" from [employer]. Call 1-223.770.2160 or search [employer] online. · You need 7471-0369 mg of calcium and 3642-5585 IU of vitamin D per day. It is possible to meet your calcium requirement with diet alone, but a vitamin D supplement is usually necessary to meet this goal.  · When exposed to the sun, use a sunscreen that protects against both UVA and UVB radiation with an SPF of 30 or greater. Reapply every 2 to 3 hours or after sweating, drying off with a towel, or swimming. · Always wear a seat belt when traveling in a car. Always wear a helmet when riding a bicycle or motorcycle.

## 2022-01-20 NOTE — PROGRESS NOTES
Medicare Annual Wellness Visit  Name: Diego Parham Date: 2022   MRN: [id number] Sex: Male   Age: 81 y.o. Ethnicity: Non- / Non    : 1935 Race: White (non-)      Omid Leahy is here for Medicare AWV    Screenings for behavioral, psychosocial and functional/safety risks, and cognitive dysfunction are all negative except as indicated below. These results, as well as other patient data from the [address] [address] form, are documented in Flowsheets linked to this Encounter. Wife has been in [place] for 13 months     Visits her a lot     States she's physically healthy, but conversationally she's about \"50/50\"    Having some financial strains because of wife's cost of staying in the SNF    Selling condo in [place]     No Known Allergies      Prior to Visit Medications    Medication Sig Taking? Authorizing Provider   albuterol sulfate HFA (VENTOLIN HFA) 108 (90 Base) MCG/ACT inhaler Inhale 2 puffs into the lungs 4 times daily as needed for Wheezing Yes Jhon Ayala MD   ALPRAZolam Cyndi Bethea) 0.5 MG tablet Take 0.5 mg by mouth 2 times daily as needed for Sleep. Yes Historical Provider, MD   atenolol (TENORMIN) 100 MG tablet Take 1 tablet by mouth daily Yes Jhon Ayala MD   simvastatin (ZOCOR) 20 MG tablet Take 1 tablet by mouth nightly Yes Jhon Ayala MD   traZODone (DESYREL) 150 MG tablet Take 1 tablet by mouth nightly Yes Jhon Ayala MD         Past Medical History:   Diagnosis Date    Anxiety     COPD [facility]ch[facility] obstructive [facility] disease) [facility] [address]     Hyperlipidemia     Hypertension     Kidney stone        History reviewed. No pertinent surgical history. History reviewed. No pertinent family history.     CareTeam (Including outside providers/suppliers regularly involved in providing care):   Patient Care Team:  Jhon Ayala MD as PCP - General (Family Medicine)  Jhon Ayala MD as PCP - [place] [facility] [place] [facility]    Wt Readings from Last 3 Encounters:   22 181 lb (82.1 kg)   07/19/21 176 lb 3.2 oz (79.9 kg)   01/19/21 174 lb (78.9 kg)     Vitals:    01/20/22 1057   BP: 120/76   Site: Left Upper Arm   Pulse: 50   Temp: 97.8 °F (36.6 °C)   SpO2: 95%   Weight: 181 lb (82.1 kg)   Height: 5' 6\" (1.676 m)     Body mass index is 29.21 kg/m². Based upon direct observation of the patient, evaluation of cognition reveals recent and remote memory intact. Physical Exam:  /76 (Site: Left Upper Arm)   Pulse 50   Temp 97.8 °F (36.6 °C)   Ht 5' 6\" (1.676 m)   Wt 181 lb (82.1 kg)   SpO2 95%   BMI 29.21 kg/m²     Gen: Well, NAD, Alert, Oriented x 3   HEENT: EOMI, eyes clear, MMM  Skin: without rash or jaundice  Neck: no significant lymphadenopathy or thyromegaly  Lungs: end exp wheeze    Heart: RRR, S1S2, w/out M/R/G, non-displaced PMI   Ext: No C/C/E Bilaterally. Neuro: Neurovascularly intact w/ Sensory/Motor intact UE/LE Bilaterally. Patient's complete Health Risk Assessment and screening values have been reviewed and are found in Flowsheets. The following problems were reviewed today and where indicated follow up appointments were made and/or referrals ordered.       Positive Risk Factor Screenings with Interventions:       Depression:  PHQ-2 Score: 5  PHQ-9 Total Score: 10    Severity:1-4 = minimal depression, 5-9 = mild depression, 10-14 = moderate depression, 15-19 = moderately severe depression, 20-27 = severe depression  Depression Interventions:  · Patient declines any further evaluation/treatment for this issue     Substance History:  Social History     Tobacco History     Smoking Status  Current Every Day Smoker Smoking Frequency  0.5 packs/day for 60 years (30 pk yrs) Smoking Tobacco Type  Cigarettes    Smokeless Tobacco Use  Never Used          Alcohol History     Alcohol Use Status  No          Drug Use     Drug Use Status  No          Sexual Activity     Sexually Active  Not Asked               Alcohol Screening:       A score of 8 or more is associated with harmful or hazardous drinking. A score of 13 or more in women, and 15 or more in men, is likely to indicate alcohol dependence. Substance Abuse Interventions:  · encouraged to quit smoking        General Health and ACP:  General  In general, how would you say your health is?: Good  In the past 7 days, have you experienced any of the following?  New or Increased Pain, New or Increased Fatigue, Loneliness, Social Isolation, Stress or Anger?: (!) Loneliness,Stress  Do you get the social and emotional support that you need?: Yes  Do you have a Living Will?: (!) No  Advance Directives     Power of  Living Will ACP-Advance Directive ACP-Power of     Not on File Not on File Not on File Not on File      General Health Risk Interventions:  · No Living Will: Advance Care Planning addressed with patient today    Health Habits/Nutrition:  Health Habits/Nutrition  Do you exercise for at least 20 minutes 2-3 times per week?: (!) No  Have you lost any weight without trying in the past 3 months?: No  Do you eat only one meal per day?: No  Have you seen the dentist within the past year?: Yes  Body mass index: (!) 29.21  Health Habits/Nutrition Interventions:  · Inadequate physical activity:  educational materials provided to promote increased physical activity    Hearing/Vision:  No exam data present  Hearing/Vision  Do you or your family notice any trouble with your hearing that hasn't been managed with hearing aids?: (!) Yes  Do you have difficulty driving, watching TV, or doing any of your daily activities because of your eyesight?: No  Have you had an eye exam within the past year?: (!) No  Hearing/Vision Interventions:  · Hearing/vision evals suggested     Safety:  Safety  Do you have working smoke detectors?: Yes  Have all throw rugs been removed or fastened?: Yes  Do you have non-slip mats or surfaces in all bathtubs/showers?: (!) No  Do all of your stairways have a railing or banister?: Yes  Are your doorways, halls and stairs free of clutter?: Yes  Do you always fasten your seatbelt when you are in a car?: Yes  Safety Interventions:  · Home safety tips provided       Personalized Preventive Plan   Current Health Maintenance Status  Immunization History   Administered Date(s) Administered    COVID-19, [place], Primary or Immunocompromised, PF, 100mcg/0.5mL 01/24/2021, 02/21/2021, 11/16/2021        Health Maintenance   Topic Date Due    Depression Monitoring  Never done    DTaP/Tdap/Td vaccine (1 - Tdap) Never done    Shingles Vaccine (1 of 2) Never done    Flu vaccine (1) Never done    Pneumococcal 65+ years Vaccine (1 of 1 - PPSV23) 01/31/2022 (Originally 5/5/2001)    Lipid screen  07/19/2022    COVID-19 Vaccine  Completed    Hepatitis A vaccine  Aged Out    Hepatitis B vaccine  Aged Out    Hib vaccine  Aged Out    Meningococcal (ACWY) vaccine  Aged Out     Recommendations for [employer] Due: see orders and patient instructions/AVS.  . Recommended screening schedule for the next 5-10 years is provided to the patient in written form: see Patient Instructions/AVS.    Diana Manzanares was seen today for medicare awv. Diagnoses and all orders for this visit:    Routine general medical examination at a health care facility    Chronic obstructive pulmonary disease, unspecified COPD type ([address]  -     albuterol sulfate HFA (VENTOLIN HFA) 108 (90 Base) MCG/ACT inhaler;  Inhale 2 puffs into the lungs 4 times daily as needed for Wheezing    Anxiety    Caregiver stress    Positive depression screening    Hyperlipidemia, unspecified hyperlipidemia type    Vitamin D deficiency             Ongoing concern regarding wife's wellbeing  His motivation/energy are low overall    Albuterol inhaler     Diana Prescott MD

## 2022-07-20 ENCOUNTER — OFFICE VISIT (OUTPATIENT)
Dept: FAMILY MEDICINE CLINIC | Age: 87
End: 2022-07-20
Payer: MEDICARE

## 2022-07-20 VITALS
DIASTOLIC BLOOD PRESSURE: 74 MMHG | TEMPERATURE: 97.5 F | BODY MASS INDEX: 28.41 KG/M2 | OXYGEN SATURATION: 96 % | HEART RATE: 60 BPM | WEIGHT: 176 LBS | SYSTOLIC BLOOD PRESSURE: 122 MMHG

## 2022-07-20 DIAGNOSIS — E55.9 VITAMIN D DEFICIENCY: ICD-10-CM

## 2022-07-20 DIAGNOSIS — I10 ESSENTIAL HYPERTENSION: ICD-10-CM

## 2022-07-20 DIAGNOSIS — R53.83 FATIGUE, UNSPECIFIED TYPE: ICD-10-CM

## 2022-07-20 DIAGNOSIS — F41.9 ANXIETY: ICD-10-CM

## 2022-07-20 DIAGNOSIS — J30.9 ALLERGIC RHINITIS, UNSPECIFIED SEASONALITY, UNSPECIFIED TRIGGER: ICD-10-CM

## 2022-07-20 DIAGNOSIS — J44.9 CHRONIC OBSTRUCTIVE PULMONARY DISEASE, UNSPECIFIED COPD TYPE (HCC): Primary | ICD-10-CM

## 2022-07-20 DIAGNOSIS — E78.5 HYPERLIPIDEMIA, UNSPECIFIED HYPERLIPIDEMIA TYPE: ICD-10-CM

## 2022-07-20 LAB
ALBUMIN SERPL-MCNC: 4.5 G/DL (ref 3.5–4.6)
ALP BLD-CCNC: 76 U/L (ref 35–104)
ALT SERPL-CCNC: 15 U/L (ref 0–41)
ANION GAP SERPL CALCULATED.3IONS-SCNC: 10 MEQ/L (ref 9–15)
AST SERPL-CCNC: 18 U/L (ref 0–40)
BILIRUB SERPL-MCNC: 0.3 MG/DL (ref 0.2–0.7)
BUN BLDV-MCNC: 17 MG/DL (ref 8–23)
CALCIUM SERPL-MCNC: 9.9 MG/DL (ref 8.5–9.9)
CHLORIDE BLD-SCNC: 103 MEQ/L (ref 95–107)
CHOLESTEROL, TOTAL: 135 MG/DL (ref 0–199)
CO2: 28 MEQ/L (ref 20–31)
CREAT SERPL-MCNC: 0.97 MG/DL (ref 0.7–1.2)
GFR AFRICAN AMERICAN: >60
GFR NON-AFRICAN AMERICAN: >60
GLOBULIN: 3 G/DL (ref 2.3–3.5)
GLUCOSE BLD-MCNC: 101 MG/DL (ref 70–99)
HCT VFR BLD CALC: 41.5 % (ref 42–52)
HDLC SERPL-MCNC: 41 MG/DL (ref 40–59)
HEMOGLOBIN: 14.1 G/DL (ref 14–18)
LDL CHOLESTEROL CALCULATED: 68 MG/DL (ref 0–129)
MCH RBC QN AUTO: 32.2 PG (ref 27–31.3)
MCHC RBC AUTO-ENTMCNC: 34 % (ref 33–37)
MCV RBC AUTO: 94.8 FL (ref 80–100)
PDW BLD-RTO: 15 % (ref 11.5–14.5)
PLATELET # BLD: 241 K/UL (ref 130–400)
POTASSIUM SERPL-SCNC: 4.9 MEQ/L (ref 3.4–4.9)
RBC # BLD: 4.37 M/UL (ref 4.7–6.1)
SODIUM BLD-SCNC: 141 MEQ/L (ref 135–144)
TOTAL PROTEIN: 7.5 G/DL (ref 6.3–8)
TRIGL SERPL-MCNC: 132 MG/DL (ref 0–150)
TSH SERPL DL<=0.05 MIU/L-ACNC: 2.18 UIU/ML (ref 0.44–3.86)
WBC # BLD: 8.8 K/UL (ref 4.8–10.8)

## 2022-07-20 PROCEDURE — 1123F ACP DISCUSS/DSCN MKR DOCD: CPT | Performed by: FAMILY MEDICINE

## 2022-07-20 PROCEDURE — G8427 DOCREV CUR MEDS BY ELIG CLIN: HCPCS | Performed by: FAMILY MEDICINE

## 2022-07-20 PROCEDURE — G8417 CALC BMI ABV UP PARAM F/U: HCPCS | Performed by: FAMILY MEDICINE

## 2022-07-20 PROCEDURE — 3023F SPIROM DOC REV: CPT | Performed by: FAMILY MEDICINE

## 2022-07-20 PROCEDURE — 99214 OFFICE O/P EST MOD 30 MIN: CPT | Performed by: FAMILY MEDICINE

## 2022-07-20 PROCEDURE — 4004F PT TOBACCO SCREEN RCVD TLK: CPT | Performed by: FAMILY MEDICINE

## 2022-07-20 RX ORDER — FLUTICASONE PROPIONATE 50 MCG
2 SPRAY, SUSPENSION (ML) NASAL DAILY
Qty: 16 G | Refills: 5 | Status: SHIPPED | OUTPATIENT
Start: 2022-07-20 | End: 2022-10-25 | Stop reason: ALTCHOICE

## 2022-07-20 SDOH — ECONOMIC STABILITY: FOOD INSECURITY: WITHIN THE PAST 12 MONTHS, THE FOOD YOU BOUGHT JUST DIDN'T LAST AND YOU DIDN'T HAVE MONEY TO GET MORE.: NEVER TRUE

## 2022-07-20 SDOH — ECONOMIC STABILITY: FOOD INSECURITY: WITHIN THE PAST 12 MONTHS, YOU WORRIED THAT YOUR FOOD WOULD RUN OUT BEFORE YOU GOT MONEY TO BUY MORE.: NEVER TRUE

## 2022-07-20 ASSESSMENT — SOCIAL DETERMINANTS OF HEALTH (SDOH): HOW HARD IS IT FOR YOU TO PAY FOR THE VERY BASICS LIKE FOOD, HOUSING, MEDICAL CARE, AND HEATING?: NOT HARD AT ALL

## 2022-07-20 NOTE — PROGRESS NOTES
Chief Complaint   Patient presents with    Hypertension     6 month follow up       HPI:  Essie Martins is a 81 y.o. male     Follow up  6 mos    wife is in the memory care unit at [facility]     Visits her. Was previously twice/week, but drive to [facility] [address] [place] because of cataracts    He is \"slowing down\" he says    Care of his property is difficult    Frustrated that he can't do all he used to do     Controlled Substance Monitoring:    Acute and Chronic Pain Monitoring:   RX Monitoring 7/24/2020   Periodic Controlled Substance Monitoring Possible medication side effects, risk of tolerance/dependence & alternative treatments discussed. ;No signs of potential drug abuse or diversion identified. Trazodone for sleep/anxiety    PFTs from 2017 did show COPD, but he denies any significant SOB    Still smokes      Wt Readings from Last 3 Encounters:   07/20/22 176 lb (79.8 kg)   01/20/22 181 lb (82.1 kg)   07/19/21 176 lb 3.2 oz (79.9 kg)         Patient Active Problem List   Diagnosis    Hypertension    Hyperlipidemia    COPD (chronic obstructive pulmonary disease) ([facility])    Anxiety    Kidney stone       Current Outpatient Medications   Medication Sig Dispense Refill    fluticasone (FLONASE) 50 MCG/ACT nasal spray 2 sprays by Each Nostril route in the morning. 16 g 5    albuterol sulfate HFA (VENTOLIN HFA) 108 (90 Base) MCG/ACT inhaler Inhale 2 puffs into the lungs 4 times daily as needed for Wheezing 18 g 2    ALPRAZolam (XANAX) 0.5 MG tablet Take 0.5 mg by mouth 2 times daily as needed for Sleep. atenolol (TENORMIN) 100 MG tablet Take 1 tablet by mouth daily 90 tablet 3    simvastatin (ZOCOR) 20 MG tablet Take 1 tablet by mouth nightly 90 tablet 3    traZODone (DESYREL) 150 MG tablet Take 1 tablet by mouth nightly 90 tablet 3     No current facility-administered medications for this visit.          Past Medical History:   Diagnosis Date    Anxiety     COPD (chronic obstructive pulmonary disease) ([facility] [address] Hyperlipidemia     Hypertension     Kidney stone      History reviewed. No pertinent surgical history. History reviewed. No pertinent family history. Social History     Socioeconomic History    Marital status:      Spouse name: None    Number of children: None    Years of education: None    Highest education level: None   Tobacco Use    Smoking status: Every Day     Packs/day: 0.50     Years: 60.00     Pack years: 30.00     Types: Cigarettes    Smokeless tobacco: Never   Substance and Sexual Activity    Alcohol use: No    Drug use: No     Social Determinants of Health     Financial Resource Strain: Low Risk     Difficulty of Paying Living Expenses: Not hard at all   Food Insecurity: No Food Insecurity    Worried About Running Out of Food in the Last Year: Never true    Ran Out of Food in the Last Year: Never true     No Known Allergies    Review of Systems:   General ROS: fatigue  Respiratory ROS: no cough, shortness of breath, or wheezing  Cardiovascular ROS: no chest pain or dyspnea on exertion  Gastrointestinal ROS: no abdominal pain, change in bowel habits, or black or bloody stools  Genito-Urinary ROS: no dysuria, trouble voiding  Musculoskeletal ROS: right flank pain  Neurological ROS: negative for - behavioral changes, memory loss, numbness/tingling, tremors or weakness    In general patient otherwise reports feeling well. Physical Exam:  /74 (Site: Left Upper Arm, Position: Sitting, Cuff Size: Medium Adult)   Pulse 60   Temp 97.5 °F (36.4 °C) (Tympanic)   Wt 176 lb (79.8 kg)   SpO2 96%   BMI 28.41 kg/m²     Gen: Well, NAD, Alert, Oriented x 3   HEENT: EOMI, eyes clear, MMM  Skin: without rash or jaundice  Neck: no significant lymphadenopathy or thyromegaly  Lungs: expiratory wheeze   Heart: RRR, S1S2, w/out M/R/G, non-displaced PMI   Ext: No C/C/E Bilaterally. Neuro: Neurovascularly intact w/ Sensory/Motor intact UE/LE Bilaterally.   Psych: troubled/dysthymic about wife's behavior/dementia    Lab Results   Component Value Date    WBC 9.4 07/19/2021    HGB 15.0 07/19/2021    HCT 44.6 07/19/2021     07/19/2021    CHOL 161 07/19/2021    TRIG 104 07/19/2021    HDL 46 07/19/2021    ALT 15 07/19/2021    AST 21 07/19/2021     07/19/2021    K 4.8 07/19/2021     07/19/2021    CREATININE 0.92 07/19/2021    BUN 23 07/19/2021    CO2 25 07/19/2021    TSH 2.270 07/19/2021       No results found for this visit on 07/20/22. A&P   Diagnosis Orders   1. Chronic obstructive pulmonary disease, unspecified COPD type ([facility] [address]        2. Anxiety        3. Hyperlipidemia, unspecified hyperlipidemia type        4. Vitamin D deficiency  Vitamin D 25 Hydroxy      5. Fatigue, unspecified type  TSH      6. Essential hypertension  CBC    Comprehensive Metabolic Panel    Lipid Panel      7.  Allergic rhinitis, unspecified seasonality, unspecified trigger  fluticasone (FLONASE) 50 MCG/ACT nasal spray                Check labs as ordered     Trazodone for sleep    Chronic conditions are stable  Continue current regimen  Follow up with appropriate specialists and here routinely for ongoing monitoring of chronic conditions        Rhonda Torres MD

## 2022-07-21 LAB — VITAMIN D 25-HYDROXY: 37.4 NG/ML

## 2022-09-02 DIAGNOSIS — I10 ESSENTIAL HYPERTENSION: ICD-10-CM

## 2022-09-02 DIAGNOSIS — F41.9 ANXIETY: ICD-10-CM

## 2022-09-02 DIAGNOSIS — E78.5 HYPERLIPIDEMIA, UNSPECIFIED HYPERLIPIDEMIA TYPE: ICD-10-CM

## 2022-09-03 NOTE — TELEPHONE ENCOUNTER
Class: Normal    Non-formulary For: Anxiety    Last ordered: 1 year ago by Caroline Echevarria MD Last refill: 6/4/2022    Rx #: [id number]        Name from pharmacy: Simvastatin 20 MG Oral Tablet         Will file in chart as: simvastatin (ZOCOR) 20 MG tablet    Sig: Take 1 tablet by mouth nightly    Disp:  90 tablet    Refills:  0    Start: 9/2/2022    Class: Normal    Non-formulary For: Hyperlipidemia, unspecified hyperlipidemia type    Last ordered: 1 year ago by Caroline Echevarria MD Last refill: 6/4/2022    Rx #: [id number]    Hmg CoA Reductase Inhibitors Protocol Passed 09/02/2022 05:39 PM   Protocol Details  Last Lipid panel resulted within the past 12 months    Visit with authorizing provider in past 9 months or upcoming 90 days    Last AST and ALT levels < or = to 3x normal within the past 3 years      To be filled at: [address] [address] [address] [facility] [address]           Medication Refill  (Newest Message First)  Doron Feliciano In routed conversation to [facility] Pcp Clinical Staff 21 hours ago (5:39 PM)     Future Appointments    Encounter Information    Provider Department Appt Notes   1/23/2023 Caroline Echevarria MD [facility] Primary Care awv/six month follow up     Past Visits    Date Provider Specialty Visit Type Primary Dx   07/20/2022 Caroline Echevarria MD Family Medicine Office Visit Chronic obstructive pulmonary disease, unspecified COPD type ([facility] [address]   01/20/2022 Caroline Echevarria MD Family Medicine Office Visit Routine general medical examination at a health care facility   07/19/2021 Caroline Echevarria MD Family Medicine Office Visit Fatigue, unspecified type   01/19/2021 Caroline Echevarria MD Family Medicine Office Visit Essential hypertension   07/24/2020 Caroline Echevarria MD Family Medicine Office Visit Vitamin D deficiency

## 2022-09-06 RX ORDER — TRAZODONE HYDROCHLORIDE 150 MG/1
150 TABLET ORAL NIGHTLY
Qty: 90 TABLET | Refills: 0 | Status: SHIPPED | OUTPATIENT
Start: 2022-09-06 | End: 2022-10-25 | Stop reason: SDUPTHER

## 2022-09-06 RX ORDER — ATENOLOL 100 MG/1
TABLET ORAL
Qty: 90 TABLET | Refills: 0 | Status: SHIPPED | OUTPATIENT
Start: 2022-09-06 | End: 2022-10-25 | Stop reason: SDUPTHER

## 2022-09-06 RX ORDER — SIMVASTATIN 20 MG
20 TABLET ORAL NIGHTLY
Qty: 90 TABLET | Refills: 0 | Status: SHIPPED | OUTPATIENT
Start: 2022-09-06 | End: 2022-10-25 | Stop reason: SDUPTHER

## 2022-10-05 ENCOUNTER — NURSE ONLY (OUTPATIENT)
Dept: FAMILY MEDICINE CLINIC | Age: 87
End: 2022-10-05

## 2022-10-05 VITALS — SYSTOLIC BLOOD PRESSURE: 162 MMHG | DIASTOLIC BLOOD PRESSURE: 78 MMHG

## 2022-10-05 DIAGNOSIS — I10 ESSENTIAL HYPERTENSION: Primary | ICD-10-CM

## 2022-10-05 DIAGNOSIS — I10 HYPERTENSION, UNSPECIFIED TYPE: ICD-10-CM

## 2022-10-05 RX ORDER — AMLODIPINE BESYLATE 5 MG/1
5 TABLET ORAL DAILY
Qty: 30 TABLET | Refills: 5 | Status: SHIPPED | OUTPATIENT
Start: 2022-10-05 | End: 2022-10-25 | Stop reason: SDUPTHER

## 2022-10-25 ENCOUNTER — OFFICE VISIT (OUTPATIENT)
Dept: FAMILY MEDICINE CLINIC | Age: 87
End: 2022-10-25
Payer: MEDICARE

## 2022-10-25 VITALS
SYSTOLIC BLOOD PRESSURE: 122 MMHG | WEIGHT: 174 LBS | TEMPERATURE: 98 F | BODY MASS INDEX: 27.97 KG/M2 | HEIGHT: 66 IN | DIASTOLIC BLOOD PRESSURE: 80 MMHG | OXYGEN SATURATION: 96 % | HEART RATE: 60 BPM

## 2022-10-25 DIAGNOSIS — F41.9 ANXIETY: ICD-10-CM

## 2022-10-25 DIAGNOSIS — E55.9 VITAMIN D DEFICIENCY: ICD-10-CM

## 2022-10-25 DIAGNOSIS — I10 ESSENTIAL HYPERTENSION: ICD-10-CM

## 2022-10-25 DIAGNOSIS — J44.9 CHRONIC OBSTRUCTIVE PULMONARY DISEASE, UNSPECIFIED COPD TYPE (HCC): ICD-10-CM

## 2022-10-25 DIAGNOSIS — E78.5 HYPERLIPIDEMIA, UNSPECIFIED HYPERLIPIDEMIA TYPE: ICD-10-CM

## 2022-10-25 DIAGNOSIS — I10 HYPERTENSION, UNSPECIFIED TYPE: Primary | ICD-10-CM

## 2022-10-25 PROCEDURE — 4004F PT TOBACCO SCREEN RCVD TLK: CPT | Performed by: FAMILY MEDICINE

## 2022-10-25 PROCEDURE — 3023F SPIROM DOC REV: CPT | Performed by: FAMILY MEDICINE

## 2022-10-25 PROCEDURE — 1123F ACP DISCUSS/DSCN MKR DOCD: CPT | Performed by: FAMILY MEDICINE

## 2022-10-25 PROCEDURE — G8484 FLU IMMUNIZE NO ADMIN: HCPCS | Performed by: FAMILY MEDICINE

## 2022-10-25 PROCEDURE — G8427 DOCREV CUR MEDS BY ELIG CLIN: HCPCS | Performed by: FAMILY MEDICINE

## 2022-10-25 PROCEDURE — G8417 CALC BMI ABV UP PARAM F/U: HCPCS | Performed by: FAMILY MEDICINE

## 2022-10-25 PROCEDURE — 99214 OFFICE O/P EST MOD 30 MIN: CPT | Performed by: FAMILY MEDICINE

## 2022-10-25 RX ORDER — TRAZODONE HYDROCHLORIDE 150 MG/1
150 TABLET ORAL NIGHTLY
Qty: 90 TABLET | Refills: 2 | Status: SHIPPED | OUTPATIENT
Start: 2022-10-25

## 2022-10-25 RX ORDER — SIMVASTATIN 20 MG
20 TABLET ORAL NIGHTLY
Qty: 90 TABLET | Refills: 2 | Status: SHIPPED | OUTPATIENT
Start: 2022-10-25

## 2022-10-25 RX ORDER — ATENOLOL 100 MG/1
TABLET ORAL
Qty: 90 TABLET | Refills: 2 | Status: SHIPPED | OUTPATIENT
Start: 2022-10-25

## 2022-10-25 RX ORDER — AMLODIPINE BESYLATE 5 MG/1
5 TABLET ORAL DAILY
Qty: 90 TABLET | Refills: 2 | Status: SHIPPED | OUTPATIENT
Start: 2022-10-25

## 2022-10-25 NOTE — PROGRESS NOTES
Chief Complaint   Patient presents with    Hypertension     Was seen by walk in, added second bp        HPI:  Edmond Gayle is a 81 y.o. male     BP follow up     Bp was high     Better now with amlodipine    Had cataract surgery     He unfortunately had to deal with some bedbugs     wife is in the memory care unit at [facility]     Visits her. Feeling pretty well     Care of his property is difficult    Frustrated that he can't do all he used to do     Controlled Substance Monitoring:    Acute and Chronic Pain Monitoring:   RX Monitoring 7/24/2020   Periodic Controlled Substance Monitoring Possible medication side effects, risk of tolerance/dependence & alternative treatments discussed. ;No signs of potential drug abuse or diversion identified. Trazodone for sleep/anxiety    PFTs from 2017 did show COPD, but he denies any significant SOB    Still smokes      Wt Readings from Last 3 Encounters:   10/25/22 174 lb (78.9 kg)   07/20/22 176 lb (79.8 kg)   01/20/22 181 lb (82.1 kg)         Patient Active Problem List   Diagnosis    Hypertension    Hyperlipidemia    COPD (chronic obstructive pulmonary disease) ([facility])    Anxiety    Kidney stone       Current Outpatient Medications   Medication Sig Dispense Refill    amLODIPine (NORVASC) 5 MG tablet Take 1 tablet by mouth daily 90 tablet 2    atenolol (TENORMIN) 100 MG tablet Take 1 tablet by mouth once daily 90 tablet 2    traZODone (DESYREL) 150 MG tablet Take 1 tablet by mouth nightly 90 tablet 2    simvastatin (ZOCOR) 20 MG tablet Take 1 tablet by mouth nightly 90 tablet 2    ALPRAZolam (XANAX) 0.5 MG tablet Take 0.5 mg by mouth 2 times daily as needed for Sleep. No current facility-administered medications for this visit. Past Medical History:   Diagnosis Date    Anxiety     COPD (chronic obstructive pulmonary disease) ([facility] [address]     Hyperlipidemia     Hypertension     Kidney stone      History reviewed. No pertinent surgical history.   History reviewed. No pertinent family history. Social History     Socioeconomic History    Marital status:      Spouse name: None    Number of children: None    Years of education: None    Highest education level: None   Tobacco Use    Smoking status: Every Day     Packs/day: 0.50     Years: 60.00     Pack years: 30.00     Types: Cigarettes    Smokeless tobacco: Never   Substance and Sexual Activity    Alcohol use: No    Drug use: No     Social Determinants of Health     Financial Resource Strain: Low Risk     Difficulty of Paying Living Expenses: Not hard at all   Food Insecurity: No Food Insecurity    Worried About Running Out of Food in the Last Year: Never true    Ran Out of Food in the Last Year: Never true     No Known Allergies    Review of Systems:   General ROS: fatigue  Respiratory ROS: no cough, shortness of breath, or wheezing  Cardiovascular ROS: no chest pain or dyspnea on exertion  Gastrointestinal ROS: no abdominal pain, change in bowel habits, or black or bloody stools  Genito-Urinary ROS: no dysuria, trouble voiding  Musculoskeletal ROS: right flank pain  Neurological ROS: negative for - behavioral changes, memory loss, numbness/tingling, tremors or weakness    In general patient otherwise reports feeling well. Physical Exam:  /80 (Site: Left Upper Arm)   Pulse 60   Temp 98 °F (36.7 °C)   Ht 5' 6\" (1.676 m)   Wt 174 lb (78.9 kg)   SpO2 96%   BMI 28.08 kg/m²     Gen: Well, NAD, Alert, Oriented x 3   HEENT: EOMI, eyes clear, MMM  Skin: without rash or jaundice  Neck: no significant lymphadenopathy or thyromegaly  Lungs: expiratory wheeze   Heart: RRR, S1S2, w/out M/R/G, non-displaced PMI   Ext: No C/C/E Bilaterally. Neuro: Neurovascularly intact w/ Sensory/Motor intact UE/LE Bilaterally.   Psych: troubled/dysthymic about wife's behavior/dementia    Lab Results   Component Value Date    WBC 8.8 07/20/2022    HGB 14.1 07/20/2022    HCT 41.5 (L) 07/20/2022     07/20/2022    CHOL 135 07/20/2022    TRIG 132 07/20/2022    HDL 41 07/20/2022    ALT 15 07/20/2022    AST 18 07/20/2022     07/20/2022    K 4.9 07/20/2022     07/20/2022    CREATININE 0.97 07/20/2022    BUN 17 07/20/2022    CO2 28 07/20/2022    TSH 2.180 07/20/2022       No results found for this visit on 10/25/22. A&P   Diagnosis Orders   1. Hypertension, unspecified type        2. Essential hypertension  amLODIPine (NORVASC) 5 MG tablet    atenolol (TENORMIN) 100 MG tablet      3. Anxiety  traZODone (DESYREL) 150 MG tablet      4. Hyperlipidemia, unspecified hyperlipidemia type  simvastatin (ZOCOR) 20 MG tablet      5. Chronic obstructive pulmonary disease, unspecified COPD type ([address]        6.  Vitamin D deficiency              Trazodone for sleep    Chronic conditions are stable  Continue current regimen  Follow up with appropriate specialists and here routinely for ongoing monitoring of chronic conditions        Lizz Phan MD

## 2023-01-23 ENCOUNTER — OFFICE VISIT (OUTPATIENT)
Dept: FAMILY MEDICINE CLINIC | Age: 88
End: 2023-01-23
Payer: MEDICARE

## 2023-01-23 VITALS
BODY MASS INDEX: 29.09 KG/M2 | WEIGHT: 181 LBS | OXYGEN SATURATION: 96 % | HEART RATE: 50 BPM | TEMPERATURE: 98.2 F | SYSTOLIC BLOOD PRESSURE: 136 MMHG | DIASTOLIC BLOOD PRESSURE: 74 MMHG | HEIGHT: 66 IN

## 2023-01-23 DIAGNOSIS — F41.9 ANXIETY: ICD-10-CM

## 2023-01-23 DIAGNOSIS — Z13.31 POSITIVE DEPRESSION SCREENING: ICD-10-CM

## 2023-01-23 DIAGNOSIS — J44.9 CHRONIC OBSTRUCTIVE PULMONARY DISEASE, UNSPECIFIED COPD TYPE (HCC): ICD-10-CM

## 2023-01-23 DIAGNOSIS — E78.5 HYPERLIPIDEMIA, UNSPECIFIED HYPERLIPIDEMIA TYPE: ICD-10-CM

## 2023-01-23 DIAGNOSIS — E55.9 VITAMIN D DEFICIENCY: ICD-10-CM

## 2023-01-23 DIAGNOSIS — J01.90 ACUTE BACTERIAL SINUSITIS: ICD-10-CM

## 2023-01-23 DIAGNOSIS — I10 HYPERTENSION, UNSPECIFIED TYPE: ICD-10-CM

## 2023-01-23 DIAGNOSIS — Z00.00 MEDICARE ANNUAL WELLNESS VISIT, SUBSEQUENT: Primary | ICD-10-CM

## 2023-01-23 DIAGNOSIS — Z63.6 CAREGIVER STRESS: ICD-10-CM

## 2023-01-23 DIAGNOSIS — R53.83 FATIGUE, UNSPECIFIED TYPE: ICD-10-CM

## 2023-01-23 DIAGNOSIS — B96.89 ACUTE BACTERIAL SINUSITIS: ICD-10-CM

## 2023-01-23 DIAGNOSIS — I10 ESSENTIAL HYPERTENSION: ICD-10-CM

## 2023-01-23 PROCEDURE — 1123F ACP DISCUSS/DSCN MKR DOCD: CPT | Performed by: FAMILY MEDICINE

## 2023-01-23 PROCEDURE — G8484 FLU IMMUNIZE NO ADMIN: HCPCS | Performed by: FAMILY MEDICINE

## 2023-01-23 PROCEDURE — G0439 PPPS, SUBSEQ VISIT: HCPCS | Performed by: FAMILY MEDICINE

## 2023-01-23 RX ORDER — FLUTICASONE PROPIONATE 50 MCG
SPRAY, SUSPENSION (ML) NASAL
COMMUNITY
Start: 2023-01-18

## 2023-01-23 RX ORDER — AZITHROMYCIN 250 MG/1
TABLET, FILM COATED ORAL
Qty: 1 PACKET | Refills: 0 | Status: SHIPPED | OUTPATIENT
Start: 2023-01-23 | End: 2023-02-02

## 2023-01-23 RX ORDER — METHYLPREDNISOLONE 4 MG/1
TABLET ORAL
Qty: 1 KIT | Refills: 0 | Status: SHIPPED | OUTPATIENT
Start: 2023-01-23

## 2023-01-23 SDOH — SOCIAL STABILITY - SOCIAL INSECURITY: DEPENDENT RELATIVE NEEDING CARE AT HOME: Z63.6

## 2023-01-23 ASSESSMENT — PATIENT HEALTH QUESTIONNAIRE - PHQ9
1. LITTLE INTEREST OR PLEASURE IN DOING THINGS: 2
7. TROUBLE CONCENTRATING ON THINGS, SUCH AS READING THE NEWSPAPER OR WATCHING TELEVISION: 1
SUM OF ALL RESPONSES TO PHQ QUESTIONS 1-9: 12
SUM OF ALL RESPONSES TO PHQ9 QUESTIONS 1 & 2: 5
9. THOUGHTS THAT YOU WOULD BE BETTER OFF DEAD, OR OF HURTING YOURSELF: 0
SUM OF ALL RESPONSES TO PHQ QUESTIONS 1-9: 12
2. FEELING DOWN, DEPRESSED OR HOPELESS: 3
4. FEELING TIRED OR HAVING LITTLE ENERGY: 3
8. MOVING OR SPEAKING SO SLOWLY THAT OTHER PEOPLE COULD HAVE NOTICED. OR THE OPPOSITE, BEING SO FIGETY OR RESTLESS THAT YOU HAVE BEEN MOVING AROUND A LOT MORE THAN USUAL: 0
SUM OF ALL RESPONSES TO PHQ QUESTIONS 1-9: 12
6. FEELING BAD ABOUT YOURSELF - OR THAT YOU ARE A FAILURE OR HAVE LET YOURSELF OR YOUR FAMILY DOWN: 0
5. POOR APPETITE OR OVEREATING: 0
3. TROUBLE FALLING OR STAYING ASLEEP: 3
10. IF YOU CHECKED OFF ANY PROBLEMS, HOW DIFFICULT HAVE THESE PROBLEMS MADE IT FOR YOU TO DO YOUR WORK, TAKE CARE OF THINGS AT HOME, OR GET ALONG WITH OTHER PEOPLE: 2
SUM OF ALL RESPONSES TO PHQ QUESTIONS 1-9: 12

## 2023-01-23 ASSESSMENT — LIFESTYLE VARIABLES
HOW OFTEN DO YOU HAVE A DRINK CONTAINING ALCOHOL: NEVER
HOW MANY STANDARD DRINKS CONTAINING ALCOHOL DO YOU HAVE ON A TYPICAL DAY: PATIENT DOES NOT DRINK

## 2023-01-23 NOTE — PATIENT INSTRUCTIONS
Learning About Mindfulness for Stress  What are mindfulness and stress? Stress is what you feel when you have to handle more than you are used to. A lot of things can cause stress. You may feel stress when you go on a job interview, take a test, or run a race. This kind of short-term stress is normal and even useful. It can help you if you need to work hard or react quickly. Stress also can last a long time. Long-term stress is caused by stressful situations or events. Examples of long-term stress include long-term health problems, ongoing problems at work, and conflicts in your family. Long-term stress can harm your health. Mindfulness is a focus only on things happening in the present moment. It's a process of purposefully paying attention to and being aware of your surroundings, your emotions, your thoughts, and how your body feels. You are aware of these things, but you aren't judging these experiences as \"good\" or \"bad. \" Mindfulness can help you learn to calm your mind and body to help you cope with illness, pain, and stress. How does mindfulness help to relieve stress? Mindfulness can help quiet your mind and relax your body. Studies show that it can help some people sleep better, feel less anxious, and bring their blood pressure down. And it's been shown to help some people live and cope better with certain health problems like heart disease, depression, chronic pain, and cancer. How do you practice mindfulness? To be mindful is to pay attention, to be present, and to be accepting. When you're mindful, you do just one thing and you pay close attention to that one thing. For example, you may sit quietly and notice your emotions or how your food tastes and smells. When you're present, you focus on the things that are happening right now. You let go of your thoughts about the past and the future. When you dwell on the past or the future, you miss moments that can heal and strengthen you.  You may miss moments like hearing a child laugh or seeing a friendly face when you think you're all alone. When you're accepting, you don't  the present moment. Instead you accept your thoughts and feelings as they come. You can practice anytime, anywhere, and in any way you choose. You can practice in many ways. Here are a few ideas:  While doing your chores, like washing the dishes, let your mind focus on what's in your hand. What does the dish feel like? Is the water warm or cold? Go outside and take a few deep breaths. What is the air like? Is it warm or cold? When you can, take some time at the start of your day to sit alone and think. Take a slow walk by yourself. Count your steps while you breathe in and out. Try yoga breathing exercises, stretches, and poses to strengthen and relax your muscles. At work, if you can, try to stop for a few moments each hour. Note how your body feels. Let yourself regroup and let your mind settle before you return to what you were doing. If you struggle with anxiety or \"worry thoughts,\" imagine your mind as a blue wild and your worry thoughts as clouds. Now imagine those worry thoughts floating across your mind's wild. Just let them pass by as you watch. Follow-up care is a key part of your treatment and safety. Be sure to make and go to all appointments, and call your doctor if you are having problems. It's also a good idea to know your test results and keep a list of the medicines you take. Where can you learn more? Go to http://www.kendall.com/ and enter M676 to learn more about \"Learning About Mindfulness for Stress. \"  Current as of: February 9, 2022               Content Version: 13.5  © 2888-0032 Healthwise, Incorporated. Care instructions adapted under license by [address]  If you have questions about a medical condition or this instruction, always ask your healthcare professional. [address]  any warranty or liability for your use of this information. Learning About Emotional Support  When do you need emotional support? You might find getting support from others helpful when you have a long-term health problem. Often people feel alone, confused, or scared when coping with an illness. But you aren't alone. Other people are going through the same thing you are and know how you feel. Talking with others about your feelings can help you feel better. Your family and friends can give you support. So can your doctor, a support group, or a Nondenominational. If you have a support network, you will not feel as alone. You will learn new ways to deal with your situation, and you may try harder to overcome it. Where you can get support  Family and friends: They can help you cope by giving you comfort and encouragement. Counseling: Professional counseling can help you cope with situations that interfere with your life and cause stress. Counseling can help you understand and deal with your illness. Your doctor: Find a doctor you trust and feel comfortable with. Be open and honest about your fears and concerns. Your doctor can help you get the right medical treatments, including counseling. Spiritual or Gnosticism groups: They can provide comfort and may be able to help you find counseling or other social support services. Social groups: They can help you meet new people and get involved in activities you enjoy. Community support groups: In a support group, you can talk to others who have dealt with the same problems or illness as you. You can encourage one another and learn ways to cope with tough emotions. How to find a support group  Ask your doctor, counselor, or other health professional for suggestions. Contact your local Nondenominational, Church, Jew, or other Gnosticism group. Ask your family and friends. Ask people who have the same health concerns. Go online.  Forums and blogs let you read messages from others and leave your own messages. You can exchange stories, vent your frustrations, and ask and answer questions. Contact a city, state, or national group that provides support for your health concerns. Your library or community center may have a list of these groups. Or you can look for information online. Look for a support group that works for you. Ask yourself if you prefer structure and would like a , or if you would like a less formal group. Do you prefer face-to-face meetings? Or do you feel more secure in online chat rooms or forums? Supportive relationships  A supportive relationship includes emotional support such as love, trust, and understanding, as well as advice and concrete help, such as help managing your time. Reach out to others  Family and friends can help you. Ask them to:  Listen to you and give you encouragement. This can keep you from feeling hopeless or alone. Help with small daily tasks or with bigger problems. A helping hand can keep you from feeling overwhelmed. Help you manage a health problem. For example, ask them to go to doctor visits with you. Your loved ones can offer support by being involved in your medical care. Respect your relationships  A good relationship is also a two-way street. You count on help from others, but they also count on you. Know your friends' limits. You don't have to see or call your friends every day. If you are going through a rough patch, ask friends if you can contact them outside of the usual boundaries. Don't always complain or talk about yourself. Know when it's time to stop talking and listen or just enjoy your friend's company. Know that good friends can be a bad influence. For example, if a friend encourages you to drink when you know it will harm you, you may want to end the friendship. Where can you learn more? Go to http://www.woods.com/ and enter G092 to learn more about \"Learning About Emotional Support. \"  Current as of: February 9, 2022               Content Version: 13.5  © 2006-2022 Healthwise, [employer]. Care instructions adapted under license by [facility] ([facility]). If you have questions about a medical condition or this instruction, always ask your healthcare professional. [address] any warranty or liability for your use of this information. For more information on your local Area Agency on Aging or [place] on Aging please visit the appropriate web site below:    [place]: MobileCycles.pl    [facility]: https://aging. [place].gov/    [facility]: https://aging.[place].gov/    [place]: InsuranceSquad.es           Learning About Stress  What is stress? Stress is what you feel when you have to handle more than you are used to. Stress is a fact of life for most people, and it affects everyone differently. What causes stress for you may not be stressful for someone else. A lot of things can cause stress. You may feel stress when you go on a job interview, take a test, or run a race. This kind of short-term stress is normal and even useful. It can help you if you need to work hard or react quickly. For example, stress can help you finish an important job on time. Stress also can last a long time. Long-term stress is caused by stressful situations or events. Examples of long-term stress include long-term health problems, ongoing problems at work, or conflicts in your family. Long-term stress can harm your health. How does stress affect your health? When you are stressed, your body responds as though you are in danger. It makes hormones that speed up your heart, make you breathe faster, and give you a burst of energy. This is called the fight-or-flight stress response. If the stress is over quickly, your body goes back to normal and no harm is done. But if stress happens too often or lasts too long, it can have bad effects.  Long-term stress can make you more likely to get sick, and it can make symptoms of some diseases worse. If you tense up when you are stressed, you may develop neck, shoulder, or low back pain. Stress is linked to high blood pressure and heart disease. Stress also harms your emotional health. It can make you coffey, tense, or depressed. Your relationships may suffer, and you may not do well at work or school. What can you do to manage stress? How to relax your mind   Write. It may help to write about things that are bothering you. This helps you find out how much stress you feel and what is causing it. When you know this, you can find better ways to cope. Let your feelings out. Talk, laugh, cry, and express anger when you need to. Talking with friends, family, a counselor, or a member of the clergy about your feelings is a healthy way to relieve stress. Do something you enjoy. For example, listen to music or go to a movie. Practice your hobby or do volunteer work. Meditate. This can help you relax, because you are not worrying about what happened before or what may happen in the future. Do guided imagery. Imagine yourself in any setting that helps you feel calm. You can use audiotapes, books, or a teacher to guide you. How to relax your body   Do something active. Exercise or activity can help reduce stress. Walking is a great way to get started. Even everyday activities such as housecleaning or yard work can help. Do breathing exercises. For example:  From a standing position, bend forward from the waist with your knees slightly bent. Let your arms dangle close to the floor. Breathe in slowly and deeply as you return to a standing position. Roll up slowly and lift your head last.  Hold your breath for just a few seconds in the standing position. Breathe out slowly and bend forward from the waist.  Try yoga or sina chi. These techniques combine exercise and meditation. You may need some training at first to learn them.   What can you do to prevent stress? Manage your time. This helps you find time to do the things you want and need to do. Get enough sleep. Your body recovers from the stresses of the day while you are sleeping. Get support. Your family, friends, and community can make a difference in how you experience stress. Where can you learn more? Go to http://www.kendall.com/ and enter N032 to learn more about \"Learning About Stress. \"  Current as of: October 6, 2021               Content Version: 13.5  © 2006-2022 [employer]. Care instructions adapted under license by [facility] ([facility]). If you have questions about a medical condition or this instruction, always ask your healthcare professional. [address] any warranty or liability for your use of this information. Learning About Being Active as an Older Adult  Why is being active important as you get older? Being active is one of the best things you can do for your health. And it's never too late to start. Being active--or getting active, if you aren't already--has definite benefits. It can:  Give you more energy,  Keep your mind sharp. Improve balance to reduce your risk of falls. Help you manage chronic illness with fewer medicines. No matter how old you are, how fit you are, or what health problems you have, there is a form of activity that will work for you. And the more physical activity you can do, the better your overall health will be. What kinds of activity can help you stay healthy? Being more active will make your daily activities easier. Physical activity includes planned exercise and things you do in daily life. There are four types of activity:  Aerobic. Doing aerobic activity makes your heart and lungs strong. Includes walking, dancing, and gardening. Aim for at least 2½ hours spread throughout the week. It improves your energy and can help you sleep better. Muscle-strengthening.   This type of activity can help maintain muscle and strengthen bones. Includes climbing stairs, using resistance bands, and lifting or carrying heavy loads. Aim for at least twice a week. It can help protect the knees and other joints. Stretching. Stretching gives you better range of motion in joints and muscles. Includes upper arm stretches, calf stretches, and gentle yoga. Aim for at least twice a week, preferably after your muscles are warmed up from other activities. It can help you function better in daily life. Balancing. This helps you stay coordinated and have good posture. Includes heel-to-toe walking, sina chi, and certain types of yoga. Aim for at least 3 days a week. It can reduce your risk of falling. Even if you have a hard time meeting the recommendations, it's better to be more active than less active. All activity done in each category counts toward your weekly total. You'd be surprised how daily things like carrying groceries, keeping up with grandchildren, and taking the stairs can add up. What keeps you from being active? If you've had a hard time being more active, you're not alone. Maybe you remember being able to do more. Or maybe you've never thought of yourself as being active. It's frustrating when you can't do the things you want. Being more active can help. What's holding you back? Getting started. Have a goal, but break it into easy tasks. Small steps build into big accomplishments. Staying motivated. If you feel like skipping your activity, remember your goal. Maybe you want to move better and stay independent. Every activity gets you one step closer. Not feeling your best.  Start with 5 minutes of an activity you enjoy. Prove to yourself you can do it. As you get comfortable, increase your time. You may not be where you want to be. But you're in the process of getting there. Everyone starts somewhere. How can you find safe ways to stay active?   Talk with your doctor about any physical challenges you're facing. Make a plan with your doctor if you have a health problem or aren't sure how to get started with activity. If you're already active, ask your doctor if there is anything you should change to stay safe as your body and health change. If you tend to feel dizzy after you take medicine, avoid activity at that time. Try being active before you take your medicine. This will reduce your risk of falls. If you plan to be active at home, make sure to clear your space before you get started. Remove things like TV cords, coffee tables, and throw rugs. It's safest to have plenty of space to move freely. The key to getting more active is to take it slow and steady. Try to improve only a little bit at a time. Pick just one area to improve on at first. And if an activity hurts, stop and talk to your doctor. Where can you learn more? Go to http://www.kendall.com/ and enter P600 to learn more about \"Learning About Being Active as an Older Adult. \"  Current as of: October 10, 2022               Content Version: 13.5  © 0063-0744 Healthwise, Incorporated. Care instructions adapted under license by [facility] ([facility]). If you have questions about a medical condition or this instruction, always ask your healthcare professional. [address] any warranty or liability for your use of this information. Learning About Vision Tests  What are vision tests? The four most common vision tests are visual acuity tests, refraction, visual field tests, and color vision tests. Visual acuity (sharpness) tests  These tests are used: To see if you need glasses or contact lenses. To monitor an eye problem. To check an eye injury. Visual acuity tests are done as part of routine exams. You may also have this test when you get your 's license or apply for some types of jobs. Visual field tests  These tests are used: To check for vision loss in any area of your range of vision.   To screen for certain eye diseases. To look for nerve damage after a stroke, head injury, or other problem that could reduce blood flow to the brain. Refraction and color tests  A refraction test is done to find the right prescription for glasses and contact lenses. A color vision test is done to check for color blindness. Color vision is often tested as part of a routine exam. You may also have this test when you apply for a job where recognizing different colors is important, such as , electronics, or the [employer]. How are vision tests done? Visual acuity test   You cover one eye at a time. You read aloud from a wall chart across the room. You read aloud from a small card that you hold in your hand. Refraction   You look into a special device. The device puts lenses of different strengths in front of each eye to see how strong your glasses or contact lenses need to be. Visual field tests   Your doctor may have you look through special machines. Or your doctor may simply have you stare straight ahead while they move a finger into and out of your field of vision. Color vision test   You look at pieces of printed test patterns in various colors. You say what number or symbol you see. Your doctor may have you trace the number or symbol using a pointer. How do these tests feel? There is very little chance of having a problem from this test. If dilating drops are used for a vision test, they may make the eyes sting and cause a medicine taste in the mouth. Follow-up care is a key part of your treatment and safety. Be sure to make and go to all appointments, and call your doctor if you are having problems. It's also a good idea to know your test results and keep a list of the medicines you take. Where can you learn more? Go to http://www.kendall.com/ and enter G551 to learn more about \"Learning About Vision Tests. \"  Current as of: October 12, 2022               Content Version: 13.5  © 2006-2022 Healthwise, Incorporated. Care instructions adapted under license by [facility] ([facility]). If you have questions about a medical condition or this instruction, always ask your healthcare professional. [place]ic[place] any warranty or liability for your use of this information. Advance Directives: Care Instructions  Overview  An advance directive is a legal way to state your wishes at the end of your life. It tells your family and your doctor what to do if you can't say what you want. There are two main types of advance directives. You can change them any time your wishes change. Living will. This form tells your family and your doctor your wishes about life support and other treatment. The form is also called a declaration. Medical power of . This form lets you name a person to make treatment decisions for you when you can't speak for yourself. This person is called a health care agent (health care proxy, health care surrogate). The form is also called a durable power of  for health care. If you do not have an advance directive, decisions about your medical care may be made by a family member, or by a doctor or a  who doesn't know you. It may help to think of an advance directive as a gift to the people who care for you. If you have one, they won't have to make tough decisions by themselves. For more information, including forms for your state, see the [address] website (www.caringinfo.org/planning/advance-directives/). Follow-up care is a key part of your treatment and safety. Be sure to make and go to all appointments, and call your doctor if you are having problems. It's also a good idea to know your test results and keep a list of the medicines you take. What should you include in an advance directive? Many states have a unique advance directive form.  (It may ask you to address specific issues.) Or you might use a universal form that's approved by many states. If your form doesn't tell you what to address, it may be hard to know what to include in your advance directive. Use the questions below to help you get started. Who do you want to make decisions about your medical care if you are not able to? What life-support measures do you want if you have a serious illness that gets worse over time or can't be cured? What are you most afraid of that might happen? (Maybe you're afraid of having pain, losing your independence, or being kept alive by machines.)  Where would you prefer to die? (Your home? A hospital? A nursing home?)  Do you want to donate your organs when you die? Do you want certain Bahai practices performed before you die? When should you call for help? Be sure to contact your doctor if you have any questions. Where can you learn more? Go to http://www.kendall.com/ and enter R264 to learn more about \"Advance Directives: Care Instructions. \"  Current as of: June 16, 2022               Content Version: 13.5  © 6452-4505 Healthwise, Incorporated. Care instructions adapted under license by [facility] ([facility]). If you have questions about a medical condition or this instruction, always ask your healthcare professional. [place] [address] any warranty or liability for your use of this information. Personalized Preventive Plan for Madie Iglesias - 1/23/2023  Medicare offers a range of preventive health benefits. Some of the tests and screenings are paid in full while other may be subject to a deductible, co-insurance, and/or copay. Some of these benefits include a comprehensive review of your medical history including lifestyle, illnesses that may run in your family, and various assessments and screenings as appropriate. After reviewing your medical record and screening and assessments performed today your provider may have ordered immunizations, labs, imaging, and/or referrals for you.   A list of these orders (if applicable) as well as your Preventive Care list are included within your After Visit Summary for your review. Other Preventive Recommendations:    A preventive eye exam performed by an eye specialist is recommended every 1-2 years to screen for glaucoma; cataracts, macular degeneration, and other eye disorders. A preventive dental visit is recommended every 6 months. Try to get at least 150 minutes of exercise per week or 10,000 steps per day on a pedometer . Order or download the FREE \"Exercise & Physical Activity: Your Everyday Guide\" from [employer] [place] Aging. Call 2-384.618.4193 or search [employer] on Aging online. You need 5615-8893 mg of calcium and 5355-9835 IU of vitamin D per day. It is possible to meet your calcium requirement with diet alone, but a vitamin D supplement is usually necessary to meet this goal.  When exposed to the sun, use a sunscreen that protects against both UVA and UVB radiation with an SPF of 30 or greater. Reapply every 2 to 3 hours or after sweating, drying off with a towel, or swimming. Always wear a seat belt when traveling in a car. Always wear a helmet when riding a bicycle or motorcycle.

## 2023-01-23 NOTE — PROGRESS NOTES
Medicare Annual Wellness Visit    Wesley Sanders is here for Medicare AWV and Sinus Problem (X 2 weeks )    Assessment & Plan   Medicare annual wellness visit, subsequent  Chronic obstructive pulmonary disease, unspecified COPD type ([facility] [address]  Acute bacterial sinusitis  -     methylPREDNISolone (MEDROL DOSEPACK) 4 MG tablet; Take by mouth., Disp-1 kit, R-0Normal  -     azithromycin (ZITHROMAX) 250 MG tablet; 2 tabs orally on first day, then one tab daily for four days, Disp-1 packet, R-0Normal  Anxiety  Positive depression screening  Caregiver stress  Essential hypertension  Hypertension, unspecified type  Fatigue, unspecified type  Hyperlipidemia, unspecified hyperlipidemia type  Vitamin D deficiency      Recommendations for Preventive Services Due: see orders and patient instructions/AVS.  Recommended screening schedule for the next 5-10 years is provided to the patient in written form: see Patient Instructions/AVS.    He is lonely/down ongoing because of wife in long-term care     Speaks to daughter every day on phone    Son visits    He goes to visit daughter every other week    Encouraged to quit smoking    Suggest care coordination get on board   He is pretty lonely      Return for [facility] Wellness Visit in 1 year. Subjective   Chief Complaint   Patient presents with    Medicare AWV    Sinus Problem     X 2 weeks          Patient's complete Health Risk Assessment and screening values have been reviewed and are found in Flowsheets. The following problems were reviewed today and where indicated follow up appointments were made and/or referrals ordered.     Positive Risk Factor Screenings with Interventions:        Depression:  PHQ-2 Score: 5  PHQ-9 Total Score: 12    Interpretation:   1-4 = minimal  5-9 = mild  10-14 = moderate  15-19 = moderately severe  20-27 = severe  Interventions:  Patient declines any further evaluation or treatment          General HRA Questions:  Select all that apply: (!) Social Isolation, Loneliness, New or Increased Pain, Stress    Pain Interventions:  See AVS for additional education material  See A/P for plan and any pertinent orders    Loneliness Interventions:  See AVS for additional education material  See A/P for plan and any pertinent orders    Social Isolation Interventions:  See AVS for additional education material  See A/P for plan and any pertinent orders    Stress Interventions:  See AVS for additional education material  See A/P for plan and any pertinent orders      Social and Emotional Support:  Do you get the social and emotional support that you need?: (!) No  Interventions:  See AVS for additional education material  See A/P for plan and any pertinent orders    Weight and Activity:  Physical Activity: Inactive    Days of Exercise per Week: 0 days    Minutes of Exercise per Session: 0 min     On average, how many days per week do you engage in moderate to strenuous exercise (like a brisk walk)?: 0 days  Have you lost any weight without trying in the past 3 months?: No  Body mass index: (!) 29.21      Inactivity Interventions:  See AVS for additional education material  See A/P for plan and any pertinent orders       Hearing Screen:  Do you or your family notice any trouble with your hearing that hasn't been managed with hearing aids?: (!) Yes    Interventions:  See AVS for additional education material  See A/P for any pertinent orders    Vision Screen:  Do you have difficulty driving, watching TV, or doing any of your daily activities because of your eyesight?: (!) Yes  Have you had an eye exam within the past year?: Yes  No results found.     Interventions:   See AVS for additional education material  See A/P for any pertinent orders    Safety:  Do you have either shower bars, grab bars, non-slip mats or non-slip surfaces in your shower or bathtub?: (!) No  Interventions:  See AVS for additional education material  See A/P for plan and any pertinent orders     Advanced Directives:  Do you have a Living Will?: (!) No    Intervention:  has NO advanced directive - information provided        Tobacco Use:  Tobacco Use: High Risk    Smoking Tobacco Use: Every Day    Smokeless Tobacco Use: Never    Passive Exposure: Not on file     E-cigarette/Vaping       Questions Responses    E-cigarette/Vaping Use     Start Date     Passive Exposure     Quit Date     Counseling Given     Comments           Interventions:  Patient declined any further intervention or treatment  Encouraged to quit             Objective   Vitals:    01/23/23 1056   BP: 136/74   Site: Left Upper Arm   Pulse: 50   Temp: 98.2 °F (36.8 °C)   SpO2: 96%   Weight: 181 lb (82.1 kg)   Height: 5' 6\" (1.676 m)      Body mass index is 29.21 kg/m². Physical Exam:  /74 (Site: Left Upper Arm)   Pulse 50   Temp 98.2 °F (36.8 °C)   Ht 5' 6\" (1.676 m)   Wt 181 lb (82.1 kg)   SpO2 96%   BMI 29.21 kg/m²     Gen: Well, NAD, Alert, Oriented x 3   HEENT: EOMI, eyes clear, MMM  Skin: without rash or jaundice  Neck: no significant lymphadenopathy or thyromegaly  Lungs: CTA B w/out Rales/Wheezes/Rhonchi, Good respiratory effort   Heart: RRR, S1S2, w/out M/R/G, non-displaced PMI   Ext: No C/C/E Bilaterally. Neuro: Neurovascularly intact w/ Sensory/Motor intact UE/LE Bilaterally. No Known Allergies  Prior to Visit Medications    Medication Sig Taking? Authorizing Provider   methylPREDNISolone (MEDROL DOSEPACK) 4 MG tablet Take by mouth.  Yes Avi Servin MD   azithromycin (ZITHROMAX) 250 MG tablet 2 tabs orally on first day, then one tab daily for four days Yes Avi Servin MD   amLODIPine (NORVASC) 5 MG tablet Take 1 tablet by mouth daily Yes Avi Servin MD   atenolol (TENORMIN) 100 MG tablet Take 1 tablet by mouth once daily Yes Avi Servin MD   traZODone (DESYREL) 150 MG tablet Take 1 tablet by mouth nightly Yes Avi Servin MD   simvastatin (ZOCOR) 20 MG tablet Take 1 tablet by mouth nightly Yes Avi Servin MD ALPRAZolam (XANAX) 0.5 MG tablet Take 0.5 mg by mouth 2 times daily as needed for Sleep.  Yes Historical Provider, MD   fluticasone (FLONASE) 50 MCG/ACT nasal spray USE 2 SPRAY(S) IN EACH NOSTRIL IN THE MORNING  Historical Provider, MD Guevara (Including outside providers/suppliers regularly involved in providing care):   Patient Care Team:  Radha Hogue MD as PCP - General (Family Medicine)  Radha Hogue MD as PCP - [facility] Empaneled Provider     Reviewed and updated this visit:  Tobacco  Allergies  Meds  Problems  Med Hx  Surg Hx  Soc Hx  Fam Hx

## 2023-04-07 ENCOUNTER — OFFICE VISIT (OUTPATIENT)
Dept: FAMILY MEDICINE CLINIC | Age: 88
End: 2023-04-07

## 2023-04-07 VITALS
OXYGEN SATURATION: 96 % | WEIGHT: 182 LBS | BODY MASS INDEX: 29.25 KG/M2 | HEIGHT: 66 IN | RESPIRATION RATE: 18 BRPM | TEMPERATURE: 97.7 F | HEART RATE: 57 BPM | DIASTOLIC BLOOD PRESSURE: 80 MMHG | SYSTOLIC BLOOD PRESSURE: 120 MMHG

## 2023-04-07 DIAGNOSIS — M79.10 MUSCULAR PAIN: ICD-10-CM

## 2023-04-07 DIAGNOSIS — I45.10 BUNDLE BRANCH BLOCK, RIGHT: ICD-10-CM

## 2023-04-07 DIAGNOSIS — M54.9 UPPER BACK PAIN ON LEFT SIDE: Primary | ICD-10-CM

## 2023-04-07 RX ORDER — LIDOCAINE 4 G/G
1 PATCH TOPICAL DAILY
Qty: 7 EACH | Refills: 0 | Status: SHIPPED | OUTPATIENT
Start: 2023-04-07 | End: 2023-04-14

## 2023-04-07 SDOH — ECONOMIC STABILITY: INCOME INSECURITY: HOW HARD IS IT FOR YOU TO PAY FOR THE VERY BASICS LIKE FOOD, HOUSING, MEDICAL CARE, AND HEATING?: NOT HARD AT ALL

## 2023-04-07 SDOH — ECONOMIC STABILITY: FOOD INSECURITY: WITHIN THE PAST 12 MONTHS, THE FOOD YOU BOUGHT JUST DIDN'T LAST AND YOU DIDN'T HAVE MONEY TO GET MORE.: NEVER TRUE

## 2023-04-07 SDOH — ECONOMIC STABILITY: HOUSING INSECURITY
IN THE LAST 12 MONTHS, WAS THERE A TIME WHEN YOU DID NOT HAVE A STEADY PLACE TO SLEEP OR SLEPT IN A SHELTER (INCLUDING NOW)?: NO

## 2023-04-07 SDOH — ECONOMIC STABILITY: FOOD INSECURITY: WITHIN THE PAST 12 MONTHS, YOU WORRIED THAT YOUR FOOD WOULD RUN OUT BEFORE YOU GOT MONEY TO BUY MORE.: NEVER TRUE

## 2023-04-07 ASSESSMENT — ENCOUNTER SYMPTOMS
WHEEZING: 0
NAUSEA: 0
VOMITING: 0
BACK PAIN: 1
COUGH: 0
SHORTNESS OF BREATH: 0

## 2023-04-07 NOTE — PATIENT INSTRUCTIONS
See cardiology regarding EKG today  ER for chest pain palpitations shortness of breath or other symptoms    Lidocaine patch to area on back   Tylenol for pain control

## 2023-04-07 NOTE — PROGRESS NOTES
Social History Narrative    Not on file     Social Determinants of Health     Financial Resource Strain: Low Risk     Difficulty of Paying Living Expenses: Not hard at all   Food Insecurity: No Food Insecurity    Worried About [address] in the Last Year: Never true    [facility] in the Last Year: Never true   Transportation Needs: Unknown    Lack of Transportation (Medical): Not on file    Lack of Transportation (Non-Medical): No   Physical Activity: Inactive    Days of Exercise per Week: 0 days    Minutes of Exercise per Session: 0 min   Stress: Not on file   Social Connections: Not on file   Intimate Partner Violence: Not on file   Housing Stability: Unknown    Unable to Pay for Housing in the Last Year: Not on file    Number of Places Lived in the Last Year: Not on file    Unstable Housing in the Last Year: No     No family history on file. No Known Allergies  Current Outpatient Medications   Medication Sig Dispense Refill    lidocaine 4 % external patch Place 1 patch onto the skin daily for 7 days 7 each 0    fluticasone (FLONASE) 50 MCG/ACT nasal spray USE 2 SPRAY(S) IN EACH NOSTRIL IN THE MORNING      methylPREDNISolone (MEDROL DOSEPACK) 4 MG tablet Take by mouth. 1 kit 0    amLODIPine (NORVASC) 5 MG tablet Take 1 tablet by mouth daily 90 tablet 2    atenolol (TENORMIN) 100 MG tablet Take 1 tablet by mouth once daily 90 tablet 2    traZODone (DESYREL) 150 MG tablet Take 1 tablet by mouth nightly 90 tablet 2    simvastatin (ZOCOR) 20 MG tablet Take 1 tablet by mouth nightly 90 tablet 2    ALPRAZolam (XANAX) 0.5 MG tablet Take 1 tablet by mouth 2 times daily as needed for Sleep. No current facility-administered medications for this visit. PMH, Surgical Hx, Family Hx, and Social Hx reviewed and updated. Health Maintenance reviewed.     Objective  Vitals:    04/07/23 1221   BP: 120/80   Site: Left Upper Arm   Position: Sitting   Cuff Size: Medium Adult   Pulse: 57   Resp: 18   Temp:

## 2023-05-03 ENCOUNTER — OFFICE VISIT (OUTPATIENT)
Dept: CARDIOLOGY CLINIC | Age: 88
End: 2023-05-03
Payer: MEDICARE

## 2023-05-03 VITALS
WEIGHT: 176.4 LBS | RESPIRATION RATE: 15 BRPM | HEIGHT: 66 IN | HEART RATE: 63 BPM | SYSTOLIC BLOOD PRESSURE: 124 MMHG | BODY MASS INDEX: 28.35 KG/M2 | DIASTOLIC BLOOD PRESSURE: 82 MMHG | OXYGEN SATURATION: 93 %

## 2023-05-03 DIAGNOSIS — E78.5 HYPERLIPIDEMIA, UNSPECIFIED HYPERLIPIDEMIA TYPE: ICD-10-CM

## 2023-05-03 DIAGNOSIS — R94.31 ABNORMAL EKG: ICD-10-CM

## 2023-05-03 DIAGNOSIS — I15.9 SECONDARY HYPERTENSION: Primary | ICD-10-CM

## 2023-05-03 PROCEDURE — G8427 DOCREV CUR MEDS BY ELIG CLIN: HCPCS | Performed by: INTERNAL MEDICINE

## 2023-05-03 PROCEDURE — 99203 OFFICE O/P NEW LOW 30 MIN: CPT | Performed by: INTERNAL MEDICINE

## 2023-05-03 PROCEDURE — 4004F PT TOBACCO SCREEN RCVD TLK: CPT | Performed by: INTERNAL MEDICINE

## 2023-05-03 PROCEDURE — 1123F ACP DISCUSS/DSCN MKR DOCD: CPT | Performed by: INTERNAL MEDICINE

## 2023-05-03 PROCEDURE — G8417 CALC BMI ABV UP PARAM F/U: HCPCS | Performed by: INTERNAL MEDICINE

## 2023-05-03 RX ORDER — LOSARTAN POTASSIUM 25 MG/1
25 TABLET ORAL DAILY
Qty: 90 TABLET | Refills: 5 | Status: SHIPPED | OUTPATIENT
Start: 2023-05-03

## 2023-05-03 ASSESSMENT — ENCOUNTER SYMPTOMS
CONSTIPATION: 0
VOMITING: 0
COUGH: 0
CHEST TIGHTNESS: 0
ABDOMINAL PAIN: 0
APNEA: 0
SHORTNESS OF BREATH: 0
RHINORRHEA: 0
NAUSEA: 0
EYE REDNESS: 0
DIARRHEA: 0
COLOR CHANGE: 0
WHEEZING: 0

## 2023-05-03 NOTE — PROGRESS NOTES
mouth nightly 90 tablet 2    simvastatin (ZOCOR) 20 MG tablet Take 1 tablet by mouth nightly 90 tablet 2    ALPRAZolam (XANAX) 0.5 MG tablet Take 1 tablet by mouth 2 times daily as needed for Sleep. fluticasone (FLONASE) 50 MCG/ACT nasal spray USE 2 SPRAY(S) IN EACH NOSTRIL IN THE MORNING (Patient not taking: Reported on 5/3/2023)       No current facility-administered medications for this visit. Patient has no known allergies. Review of Systems:  Review of Systems   Constitutional:  Negative for activity change, chills, diaphoresis and fever. HENT:  Negative for congestion, ear pain, nosebleeds and rhinorrhea. Eyes:  Negative for redness and visual disturbance. Respiratory:  Negative for apnea, cough, chest tightness, shortness of breath and wheezing. Cardiovascular:  Negative for chest pain, palpitations and leg swelling. Gastrointestinal:  Negative for abdominal pain, constipation, diarrhea, nausea and vomiting. Genitourinary:  Negative for difficulty urinating and dysuria. Musculoskeletal: Negative. Negative for joint swelling. Skin:  Negative for color change, rash and wound. Neurological:  Negative for dizziness, syncope, weakness, numbness and headaches. Psychiatric/Behavioral: Negative. VITALS:  Blood pressure 124/82, pulse 63, resp. rate 15, height 5' 6\" (1.676 m), weight 176 lb 6.4 oz (80 kg), SpO2 93 %. Body mass index is 28.47 kg/m². Physical Examination:  Physical Exam  Vitals and nursing note reviewed. Constitutional:       Appearance: Normal appearance. HENT:      Head: Normocephalic and atraumatic. Mouth/Throat:      Mouth: Mucous membranes are moist.      Pharynx: Oropharynx is clear. Eyes:      Extraocular Movements: Extraocular movements intact. Conjunctiva/sclera: Conjunctivae normal.      Pupils: Pupils are equal, round, and reactive to light. Cardiovascular:      Rate and Rhythm: Normal rate and regular rhythm.       Pulses:

## 2023-05-08 ENCOUNTER — TELEPHONE (OUTPATIENT)
Dept: CARDIOLOGY CLINIC | Age: 88
End: 2023-05-08

## 2023-05-08 NOTE — TELEPHONE ENCOUNTER
Patient is calling in regards of his medications clarified to take losartan and discontinue atenolol. Patient is aware.

## 2023-05-12 ENCOUNTER — OFFICE VISIT (OUTPATIENT)
Dept: FAMILY MEDICINE CLINIC | Age: 88
End: 2023-05-12

## 2023-05-12 VITALS
DIASTOLIC BLOOD PRESSURE: 78 MMHG | OXYGEN SATURATION: 95 % | BODY MASS INDEX: 28.61 KG/M2 | SYSTOLIC BLOOD PRESSURE: 142 MMHG | WEIGHT: 178 LBS | HEIGHT: 66 IN | HEART RATE: 105 BPM | TEMPERATURE: 98 F

## 2023-05-12 DIAGNOSIS — R00.0 TACHYCARDIA: ICD-10-CM

## 2023-05-12 DIAGNOSIS — M89.8X1 PAIN OF LEFT SCAPULA: Primary | ICD-10-CM

## 2023-05-12 DIAGNOSIS — I10 ESSENTIAL HYPERTENSION: ICD-10-CM

## 2023-05-12 RX ORDER — BACLOFEN 5 MG/1
5 TABLET ORAL 2 TIMES DAILY PRN
Qty: 20 TABLET | Refills: 0 | Status: SHIPPED | OUTPATIENT
Start: 2023-05-12

## 2023-05-12 RX ORDER — ATENOLOL 25 MG/1
25 TABLET ORAL DAILY
Qty: 30 TABLET | Refills: 0 | Status: SHIPPED | OUTPATIENT
Start: 2023-05-12

## 2023-05-12 RX ORDER — PREDNISONE 20 MG/1
40 TABLET ORAL DAILY
Qty: 10 TABLET | Refills: 0 | Status: SHIPPED | OUTPATIENT
Start: 2023-05-12 | End: 2023-05-17

## 2023-05-12 ASSESSMENT — ENCOUNTER SYMPTOMS
TROUBLE SWALLOWING: 0
VOMITING: 0
SORE THROAT: 0
DIARRHEA: 0
COUGH: 0
SHORTNESS OF BREATH: 0
NAUSEA: 0
WHEEZING: 0
RHINORRHEA: 0
BACK PAIN: 1

## 2023-05-12 NOTE — PROGRESS NOTES
Subjective:      Patient ID: Rachel Florence is a 81 y.o. male who presents today for:  Chief Complaint   Patient presents with    Hypertension    Back Pain     Was seen for this in the past but was prescribed lidocaine patch but does not have anyone to put on for him. Pt has been taking tylenol. Near the left shoulder blade. HPI        Was her with a back ache in April took an EKG   HR was 48 and RBBB  Saw Dr. Dennise Urena   Added  losartan   Stopped atenolol (which hes been on forever) he states   sTill taking norvasc  HR now coming up   His paper shows documented 60s 70s 80s 90s even 100s all continually rising over the last week now   He also feels his BP is a little up   He feels fine, did a lot of stuff yesterday and felt fine     Prescribed a lidocaine pacth   The trouble is he cannot reach the area to put the patch on   Didn't seem to help much   Shoulder blade pain is flared up again in the last 1-1.5 weeks   Taking tylenol   He does stupid things   Lifts heavy things   Washes his car   Then hurts his back again   The pain waking him up last night         Past Medical History:   Diagnosis Date    Anxiety     COPD (chronic obstructive pulmonary disease) ([facility] [address]     Hyperlipidemia     Hypertension     Kidney stone      History reviewed. No pertinent surgical history.   Social History     Socioeconomic History    Marital status:      Spouse name: Not on file    Number of children: Not on file    Years of education: Not on file    Highest education level: Not on file   Occupational History    Not on file   Tobacco Use    Smoking status: Every Day     Packs/day: 0.50     Years: 60.00     Pack years: 30.00     Types: Cigarettes    Smokeless tobacco: Never   Substance and Sexual Activity    Alcohol use: No    Drug use: No    Sexual activity: Not on file   Other Topics Concern    Not on file   Social History Narrative    Not on file     Social Determinants of Health     Financial Resource Strain: Low Risk

## 2023-05-19 ENCOUNTER — OFFICE VISIT (OUTPATIENT)
Dept: FAMILY MEDICINE CLINIC | Age: 88
End: 2023-05-19
Payer: MEDICARE

## 2023-05-19 ENCOUNTER — TELEPHONE (OUTPATIENT)
Dept: FAMILY MEDICINE CLINIC | Age: 88
End: 2023-05-19

## 2023-05-19 VITALS
TEMPERATURE: 97.8 F | DIASTOLIC BLOOD PRESSURE: 68 MMHG | HEIGHT: 66 IN | HEART RATE: 76 BPM | BODY MASS INDEX: 28.28 KG/M2 | SYSTOLIC BLOOD PRESSURE: 102 MMHG | WEIGHT: 176 LBS | OXYGEN SATURATION: 97 % | RESPIRATION RATE: 14 BRPM

## 2023-05-19 DIAGNOSIS — G89.29 CHRONIC BILATERAL LOW BACK PAIN WITHOUT SCIATICA: ICD-10-CM

## 2023-05-19 DIAGNOSIS — M54.50 LOW BACK PAIN, UNSPECIFIED BACK PAIN LATERALITY, UNSPECIFIED CHRONICITY, UNSPECIFIED WHETHER SCIATICA PRESENT: ICD-10-CM

## 2023-05-19 DIAGNOSIS — F17.200 SMOKER: ICD-10-CM

## 2023-05-19 DIAGNOSIS — M79.10 MUSCULAR PAIN: ICD-10-CM

## 2023-05-19 DIAGNOSIS — M54.50 CHRONIC BILATERAL LOW BACK PAIN WITHOUT SCIATICA: ICD-10-CM

## 2023-05-19 DIAGNOSIS — R31.29 HEMATURIA, MICROSCOPIC: ICD-10-CM

## 2023-05-19 DIAGNOSIS — M54.9 UPPER BACK PAIN ON LEFT SIDE: ICD-10-CM

## 2023-05-19 DIAGNOSIS — M89.8X1 PAIN OF LEFT SCAPULA: Primary | ICD-10-CM

## 2023-05-19 LAB
BILIRUBIN, POC: ABNORMAL
BLOOD URINE, POC: ABNORMAL
CLARITY, POC: ABNORMAL
COLOR, POC: ABNORMAL
GLUCOSE URINE, POC: ABNORMAL
KETONES, POC: ABNORMAL
LEUKOCYTE EST, POC: ABNORMAL
NITRITE, POC: ABNORMAL
PH, POC: 6
PROTEIN, POC: ABNORMAL
SPECIFIC GRAVITY, POC: 1.02
UROBILINOGEN, POC: ABNORMAL

## 2023-05-19 PROCEDURE — 1123F ACP DISCUSS/DSCN MKR DOCD: CPT | Performed by: NURSE PRACTITIONER

## 2023-05-19 PROCEDURE — G8427 DOCREV CUR MEDS BY ELIG CLIN: HCPCS | Performed by: NURSE PRACTITIONER

## 2023-05-19 PROCEDURE — G8417 CALC BMI ABV UP PARAM F/U: HCPCS | Performed by: NURSE PRACTITIONER

## 2023-05-19 PROCEDURE — 81003 URINALYSIS AUTO W/O SCOPE: CPT | Performed by: NURSE PRACTITIONER

## 2023-05-19 PROCEDURE — 4004F PT TOBACCO SCREEN RCVD TLK: CPT | Performed by: NURSE PRACTITIONER

## 2023-05-19 PROCEDURE — 99214 OFFICE O/P EST MOD 30 MIN: CPT | Performed by: NURSE PRACTITIONER

## 2023-05-19 RX ORDER — PREDNISONE 20 MG/1
20 TABLET ORAL 2 TIMES DAILY
Qty: 10 TABLET | Refills: 0 | Status: SHIPPED | OUTPATIENT
Start: 2023-05-19 | End: 2023-05-24

## 2023-05-19 ASSESSMENT — ENCOUNTER SYMPTOMS
SINUS PAIN: 0
CONSTIPATION: 0
WHEEZING: 0
SHORTNESS OF BREATH: 0
APNEA: 0
EYE ITCHING: 0
EYE REDNESS: 0
BACK PAIN: 1

## 2023-05-19 NOTE — TELEPHONE ENCOUNTER
Called and advised pt that his x rays show arthritis and disc space narrowing that happens with age but also to see cardiologist as his abd aorta has calcifications and to make sure he gets his testing completed from   Dr. Martinez Foster to rule out heart problems.

## 2023-05-20 LAB — BACTERIA UR CULT: NORMAL

## 2023-05-21 ENCOUNTER — TELEPHONE (OUTPATIENT)
Dept: FAMILY MEDICINE CLINIC | Age: 88
End: 2023-05-21

## 2023-05-24 ASSESSMENT — ENCOUNTER SYMPTOMS
VOMITING: 0
SINUS PRESSURE: 0
RHINORRHEA: 0
CHEST TIGHTNESS: 0
COUGH: 0
BOWEL INCONTINENCE: 0
NAUSEA: 0
ABDOMINAL PAIN: 0
SORE THROAT: 0
DIARRHEA: 0
ABDOMINAL DISTENTION: 0
TROUBLE SWALLOWING: 0

## 2023-05-25 ENCOUNTER — HOSPITAL ENCOUNTER (OUTPATIENT)
Dept: NON INVASIVE DIAGNOSTICS | Age: 88
Discharge: HOME | End: 2023-05-25
Payer: MEDICARE

## 2023-05-25 ENCOUNTER — HOSPITAL ENCOUNTER (OUTPATIENT)
Dept: NUCLEAR MEDICINE | Age: 88
Discharge: HOME | End: 2023-05-27
Payer: MEDICARE

## 2023-05-25 DIAGNOSIS — R94.31 ABNORMAL EKG: ICD-10-CM

## 2023-05-25 LAB
LV EF: 60 %
LVEF MODALITY: NORMAL

## 2023-05-25 PROCEDURE — 78452 HT MUSCLE IMAGE SPECT MULT: CPT

## 2023-05-25 PROCEDURE — 2580000003 HC RX 258: Performed by: INTERNAL MEDICINE

## 2023-05-25 PROCEDURE — 93306 TTE W/DOPPLER COMPLETE: CPT

## 2023-05-25 PROCEDURE — 3430000000 HC RX DIAGNOSTIC RADIOPHARMACEUTICAL: Performed by: INTERNAL MEDICINE

## 2023-05-25 PROCEDURE — 93017 CV STRESS TEST TRACING ONLY: CPT

## 2023-05-25 PROCEDURE — 6360000002 HC RX W HCPCS: Performed by: INTERNAL MEDICINE

## 2023-05-25 PROCEDURE — A9502 TC99M TETROFOSMIN: HCPCS | Performed by: INTERNAL MEDICINE

## 2023-05-25 RX ORDER — SODIUM CHLORIDE 0.9 % (FLUSH) 0.9 %
10 SYRINGE (ML) INJECTION PRN
Status: COMPLETED | OUTPATIENT
Start: 2023-05-25 | End: 2023-05-25

## 2023-05-25 RX ADMIN — Medication 10 ML: at 12:37

## 2023-05-25 RX ADMIN — REGADENOSON 0.4 MG: 0.08 INJECTION, SOLUTION INTRAVENOUS at 12:36

## 2023-05-25 RX ADMIN — TETROFOSMIN 11.8 MILLICURIE: 1.38 INJECTION, POWDER, LYOPHILIZED, FOR SOLUTION INTRAVENOUS at 10:30

## 2023-05-25 RX ADMIN — Medication 10 ML: at 12:36

## 2023-05-25 RX ADMIN — TETROFOSMIN 34.6 MILLICURIE: 1.38 INJECTION, POWDER, LYOPHILIZED, FOR SOLUTION INTRAVENOUS at 12:36

## 2023-05-25 RX ADMIN — Medication 10 ML: at 10:30

## 2023-05-25 NOTE — PROGRESS NOTES
Reviewed history, allergies, and medications. Patient held his home medications prior to testing. Consent confirmed. Lexiscan exam explained. Placed patient on monitor. @ [address] here to inject [place]. SOB noted during recovery phase. Denied chest pain. No ectopy noted. Doctor to further review and interpret results. Patient off monitor and instructed to eat, will have last part of exam in 1 hour.

## 2023-05-26 ENCOUNTER — OFFICE VISIT (OUTPATIENT)
Dept: FAMILY MEDICINE CLINIC | Age: 88
End: 2023-05-26

## 2023-05-26 VITALS
HEART RATE: 70 BPM | WEIGHT: 175.2 LBS | SYSTOLIC BLOOD PRESSURE: 120 MMHG | BODY MASS INDEX: 28.16 KG/M2 | HEIGHT: 66 IN | TEMPERATURE: 97.8 F | OXYGEN SATURATION: 95 % | DIASTOLIC BLOOD PRESSURE: 76 MMHG

## 2023-05-26 DIAGNOSIS — M54.50 LOW BACK PAIN, UNSPECIFIED BACK PAIN LATERALITY, UNSPECIFIED CHRONICITY, UNSPECIFIED WHETHER SCIATICA PRESENT: ICD-10-CM

## 2023-05-26 DIAGNOSIS — M79.10 MUSCULAR PAIN: Primary | ICD-10-CM

## 2023-05-26 DIAGNOSIS — F41.9 ANXIETY: ICD-10-CM

## 2023-05-26 DIAGNOSIS — M89.8X1 PAIN OF LEFT SCAPULA: ICD-10-CM

## 2023-05-26 DIAGNOSIS — R00.0 TACHYCARDIA: ICD-10-CM

## 2023-05-26 DIAGNOSIS — M54.9 UPPER BACK PAIN ON LEFT SIDE: ICD-10-CM

## 2023-05-26 RX ORDER — BUSPIRONE HYDROCHLORIDE 5 MG/1
5 TABLET ORAL 2 TIMES DAILY PRN
Qty: 60 TABLET | Refills: 3 | Status: SHIPPED | OUTPATIENT
Start: 2023-05-26

## 2023-05-26 RX ORDER — ATENOLOL 25 MG/1
25 TABLET ORAL DAILY
Qty: 30 TABLET | Refills: 5 | Status: SHIPPED | OUTPATIENT
Start: 2023-05-26

## 2023-05-26 RX ORDER — MELOXICAM 7.5 MG/1
7.5 TABLET ORAL DAILY PRN
Qty: 30 TABLET | Refills: 5 | Status: SHIPPED | OUTPATIENT
Start: 2023-05-26

## 2023-05-26 NOTE — PROGRESS NOTES
tablet 2    ALPRAZolam (XANAX) 0.5 MG tablet Take 1 tablet by mouth 2 times daily as needed for Sleep. No current facility-administered medications for this visit. Past Medical History:   Diagnosis Date    Anxiety     COPD (chronic obstructive pulmonary disease) ([facility] [address]     Hyperlipidemia     Hypertension     Kidney stone      History reviewed. No pertinent surgical history. History reviewed. No pertinent family history. Social History     Socioeconomic History    Marital status:      Spouse name: None    Number of children: None    Years of education: None    Highest education level: None   Tobacco Use    Smoking status: Every Day     Packs/day: 0.50     Years: 60.00     Pack years: 30.00     Types: Cigarettes    Smokeless tobacco: Never   Substance and Sexual Activity    Alcohol use: No    Drug use: No     Social Determinants of Health     Financial Resource Strain: Low Risk     Difficulty of Paying Living Expenses: Not hard at all   Food Insecurity: No Food Insecurity    Worried About Running Out of Food in the Last Year: Never true    Ran Out of Food in the Last Year: Never true   Transportation Needs: Unknown    Lack of Transportation (Non-Medical): No   Physical Activity: Inactive    Days of Exercise per Week: 0 days    Minutes of Exercise per Session: 0 min   Housing Stability: Unknown    Unstable Housing in the Last Year: No     No Known Allergies    Review of Systems:   General ROS: fatigue  Respiratory ROS: no cough, shortness of breath, or wheezing  Cardiovascular ROS: no chest pain or dyspnea on exertion  Gastrointestinal ROS: no abdominal pain, change in bowel habits, or black or bloody stools  Genito-Urinary ROS: no dysuria, trouble voiding  Musculoskeletal ROS: back pain  Neurological ROS: negative for - behavioral changes, memory loss, numbness/tingling, tremors or weakness    In general patient otherwise reports feeling well.      Physical Exam:  /76 (Site: Left Upper Arm)

## 2023-05-30 ENCOUNTER — HOSPITAL ENCOUNTER (OUTPATIENT)
Dept: PHYSICAL THERAPY | Age: 88
Setting detail: THERAPIES SERIES
Discharge: HOME | End: 2023-05-30
Payer: MEDICARE

## 2023-05-30 PROCEDURE — 97162 PT EVAL MOD COMPLEX 30 MIN: CPT

## 2023-05-30 PROCEDURE — 97110 THERAPEUTIC EXERCISES: CPT

## 2023-05-30 ASSESSMENT — PAIN DESCRIPTION - ORIENTATION: ORIENTATION: MID;UPPER

## 2023-05-30 ASSESSMENT — PAIN DESCRIPTION - PAIN TYPE: TYPE: CHRONIC PAIN

## 2023-05-30 ASSESSMENT — PAIN DESCRIPTION - LOCATION: LOCATION: BACK

## 2023-05-30 ASSESSMENT — PAIN SCALES - GENERAL: PAINLEVEL_OUTOF10: 5

## 2023-05-30 ASSESSMENT — PAIN DESCRIPTION - DESCRIPTORS: DESCRIPTORS: ACHING;SHARP

## 2023-05-30 NOTE — PROGRESS NOTES
[address] [facility]                                       Ph: [phone]  Fax: [phone]      [x] Certification  [] Recertification [x]  Plan of Care  [] Progress Note [] Discharge      Referring Provider: JORDAN Flores - RUBÉN     From:  Willam Irvin, PT  Patient: Mayelin Davis (51 y.o. male) : 1935 Date: 2023  Medical Diagnosis: Muscular pain [M79.10] muscular pain Diagnosis: muscular pain   Treatment Diagnosis: decreased core strength, decreased posture, and increased pain with lifting and with prolonged rest    Plan of Care/Certification Expiration Date: : 23   Progress Report Period from:  2023  to 2023    Visits to Date: 1 No Show: 0 Cancelled Appts: 0    OBJECTIVE:   Short Term Goals - Time Frame for Short Term Goals: 3 wks    Goals Current/Discharge status  Status   Short Term Goal 1: Pt will demonstrate improved abdominal and trunk extensor strength >/= 3+/5 for carryover to decreased pain. L UE Strength Comment: WFL    R UE Strength Comment: WFL    Strength Other  Other: abdominal strength 2/5; trunk extensor strength 2/5         New   Short Term Goal 2: Pt will demonstrate improved seated and standing posture with ability to maintain posture >/= 5 min for carryover to decreased back pain. Unable to maintain upright posture New     Long Term Goals - Time Frame for Long Term Goals : 5 wks  Goals Current/ Discharge status Status   Long Term Goal 1: Pt will demonstrate safe lifting technique with indep for carryover to return to PLOF. Continued tx required to address lifting technique New   Long Term Goal 2: Pt will demonstrate indep and 100% compliance with HEP for self management of pain. HEP initiated New   Long Term Goal 3: Pt will demonstrate improed score on STEFAN </= 10/50 for carryover to improved quality of life. STEFAN: 14/50   New   Long Term Goal 4: Pt will demonstrate decreased throacic pain </= 2/10 after lifting for return to PLOF.

## 2023-05-30 NOTE — PROGRESS NOTES
[facility]) Physical Therapy-  [facility]   PHYSICAL THERAPY EVALUATION      Physical Therapy: Initial Evaluation    Patient: Gerard Escalante (36 y.o.     male)   Examination Date:   Plan of Care Certification Period: 2023 to 23  Progress Note Counter:  (PN 23)   :  1935 ;    Confirmed: Yes MRN: [id number]  CSN: [id number]   Insurance: Payor: MEDICARE / Plan: MEDICARE PART A AND B / Product Type: *No Product type* /   Insurance ID: [id number] - (Medicare)  PT Insurance Information: medicare  Secondary Insurance (if applicable): [address]   Referring Physician: JORDAN Harrington*     PCP: Mukesh Sutherland MD Visits to Date/Visits Approved:   (BMN)    No Show/Cancelled Appts: 0 / 0     Medical Diagnosis: Muscular pain [M79.10] muscular pain  Treatment Diagnosis: decreased core strength, decreased posture, and increased pain with lifting and with prolonged rest     [facility]   Patient Assessed for Rehabilitation Services: Yes       Medical History:     Past Medical History:   Diagnosis Date    Anxiety     COPD (chronic obstructive pulmonary disease) ([facility] [address]     Hyperlipidemia     Hypertension     Kidney stone      Surgical History: No past surgical history on file.     Medications:   Current Outpatient Medications:     atenolol (TENORMIN) 25 MG tablet, Take 1 tablet by mouth daily, Disp: 30 tablet, Rfl: 5    busPIRone (BUSPAR) 5 MG tablet, Take 1 tablet by mouth 2 times daily as needed (anxiety), Disp: 60 tablet, Rfl: 3    meloxicam (MOBIC) 7.5 MG tablet, Take 1 tablet by mouth daily as needed for Pain, Disp: 30 tablet, Rfl: 5    Baclofen (LIORESAL) 5 MG tablet, Take 1 tablet by mouth 2 times daily as needed (back pain), Disp: 20 tablet, Rfl: 0    losartan (COZAAR) 25 MG tablet, Take 1 tablet by mouth daily, Disp: 90 tablet, Rfl: 5    fluticasone (FLONASE) 50 MCG/ACT nasal spray, , Disp: , Rfl:     amLODIPine (NORVASC) 5 MG tablet, Take 1 tablet

## 2023-06-05 DIAGNOSIS — R00.0 TACHYCARDIA: ICD-10-CM

## 2023-06-05 RX ORDER — ATENOLOL 25 MG/1
25 TABLET ORAL DAILY
Qty: 30 TABLET | Refills: 5 | Status: SHIPPED | OUTPATIENT
Start: 2023-06-05

## 2023-06-05 NOTE — TELEPHONE ENCOUNTER
Comments:     Last Office Visit (last PCP visit):   5/26/2023    Next Visit Date:  Future Appointments   Date Time Provider Stan Chavesi   6/6/2023  9:30 AM MLOZ VERMILION PT COVER ONE [facility] VM PT [facility]   6/13/2023  9:30 AM Purvi Lizarraga, PT [facility]   6/20/2023  9:30 AM MLOZ VERMILION PT COVER TWO [facility]LOZ VM PT [facility]   6/27/2023  9:30 AM Juan Peña PTA [address]   7/24/2023 11:00 AM Candido Cifuentes MD [facility] EMERGENCY [facility]   8/2/2023 11:30 AM Annie Zhang MD [facility]       **If hasn't been seen in over a year OR hasn't followed up according to last diabetes/ADHD visit, make appointment for patient before sending refill to provider.     Rx requested:  Requested Prescriptions     Pending Prescriptions Disp Refills    atenolol (TENORMIN) 25 MG tablet 30 tablet 5     Sig: Take 1 tablet by mouth daily

## 2023-06-06 ENCOUNTER — HOSPITAL ENCOUNTER (OUTPATIENT)
Dept: PHYSICAL THERAPY | Age: 88
Setting detail: THERAPIES SERIES
Discharge: HOME | End: 2023-06-06
Payer: MEDICARE

## 2023-06-06 PROCEDURE — 97110 THERAPEUTIC EXERCISES: CPT

## 2023-06-06 ASSESSMENT — PAIN DESCRIPTION - ORIENTATION: ORIENTATION: MID

## 2023-06-06 ASSESSMENT — PAIN SCALES - GENERAL: PAINLEVEL_OUTOF10: 4

## 2023-06-06 ASSESSMENT — PAIN DESCRIPTION - PAIN TYPE: TYPE: CHRONIC PAIN

## 2023-06-06 ASSESSMENT — PAIN DESCRIPTION - LOCATION: LOCATION: BACK

## 2023-06-06 ASSESSMENT — PAIN DESCRIPTION - DESCRIPTORS: DESCRIPTORS: ACHING

## 2023-06-06 NOTE — PROGRESS NOTES
carryover to return to PLOF. In progress   LTG 2 Pt will demonstrate indep and 100% compliance with HEP for self management of pain. In progress   LTG 3 Pt will demonstrate improed score on STEFAN </= 10/50 for carryover to improved quality of life. In progress   LTG 4 Pt will demonstrate decreased throacic pain </= 2/10 after lifting for return to PLOF. In progress            Plan:  Frequency/Duration:  Plan  Plan Frequency: 1x  Plan weeks: 5 wks  Current Treatment Recommendations: Strengthening, ROM, Functional mobility training, Endurance training, Neuromuscular re-education, Manual, Pain management, Home exercise program, Safety education & training, Patient/Caregiver education & training, Equipment evaluation, education, & procurement, Positioning, Modalities, Therapeutic activities  Additional Comments: pt to be supervised by Karla Wilson, PT  Pt to continue current HEP. See objective section for any therapeutic exercise changes, additions or modifications this date.     Therapy Time:      PT Individual Minutes  Time In: 4865  Time Out: 1015  Minutes: 50  Timed Code Treatment Minutes: 40 Minutes  Procedure Minutes: MH 10 min  Timed Activity Minutes Units   Ther Ex 35 3   Manual  5 0     Electronically signed by Jett Reyes PTA on 6/6/23 at 10:10 AM EDT

## 2023-06-20 ENCOUNTER — HOSPITAL ENCOUNTER (OUTPATIENT)
Dept: PHYSICAL THERAPY | Age: 88
Setting detail: THERAPIES SERIES
Discharge: HOME | End: 2023-06-20
Payer: MEDICARE

## 2023-06-20 PROCEDURE — 97110 THERAPEUTIC EXERCISES: CPT

## 2023-06-20 ASSESSMENT — PAIN DESCRIPTION - LOCATION: LOCATION: BACK

## 2023-06-20 ASSESSMENT — PAIN DESCRIPTION - DESCRIPTORS: DESCRIPTORS: ACHING

## 2023-06-20 ASSESSMENT — PAIN DESCRIPTION - ORIENTATION: ORIENTATION: MID

## 2023-06-20 ASSESSMENT — PAIN SCALES - GENERAL: PAINLEVEL_OUTOF10: 4

## 2023-06-20 NOTE — PROGRESS NOTES
[address] [facility]  Outpatient Physical Therapy    Treatment Note        Date: 2023  Patient: Theresa Willson  : 1935   Confirmed: Yes  MRN: [id number]  Referring Provider: JORDAN Sales*   Secondary Referring Provider (If applicable):     Medical Diagnosis: Muscular pain [M79.10]    Treatment Diagnosis: decreased core strength, decreased posture, and increased pain with lifting and with prolonged rest    Visit Information:  Insurance: Payor: Zeb Parrishge / Plan: MEDICARE PART A AND B / Product Type: *No Product type* /   PT Visit Information  PT Insurance Information: medicare  Total # of Visits to Date: 4  Plan of Care/Certification Expiration Date: 23  No Show: 0  Progress Note Due Date: 23  Canceled Appointment: 0  Progress Note Counter:  (PN 23)    Subjective Information:  Subjective: Pt reports continued pain inbetween left shoulder blade with relief using Tylenol, but comes back  HEP Compliance:  [x] Good [] Fair [] Poor [] Reports not doing due to:    Pain Screening  Patient Currently in Pain: Yes  Pain Assessment: 0-10  Pain Level: 4  Pain Location: Back  Pain Orientation: Mid  Pain Descriptors: Aching    Treatment:  Exercises:  Exercises  Exercise 1: scapular retraction X 10  Exercise 2: seated SBing X 10 R/L/ *progress to standing with TB*  Exercise 3: trunk ext stretch seated 3x10 sec  Exercise 4: LTR 5\"/10  Exercise 5: abd strength: TA 5\"/10  Exercise 6: rows/lats seated w/ YTB x10  Exercise 7: SKTC 20 sec/3 b'ly  Exercise 8: SLR x10 B'ly  Exercise 9: shoulder strength with TB*  Exercise 10: cervical AROM 6-way x10  Exercise 11: UT/LS stretch \"  Exercise 12: pball seated flexion 3-way \"  Exercise 13: core on pball*  Exercise 14: correct lifting technique*  Exercise 15: side stepping*  Exercise 20: HEP: LS/UE stretch, cervical ROM       Manual:   Manual Therapy  Soft Tissue Mobilizaton: STM w/ TB to left thoracic spine  Other:

## 2023-06-27 ENCOUNTER — HOSPITAL ENCOUNTER (OUTPATIENT)
Dept: PHYSICAL THERAPY | Age: 88
Setting detail: THERAPIES SERIES
Discharge: HOME | End: 2023-06-27
Payer: MEDICARE

## 2023-06-27 ENCOUNTER — OFFICE VISIT (OUTPATIENT)
Dept: FAMILY MEDICINE CLINIC | Age: 88
End: 2023-06-27
Payer: MEDICARE

## 2023-06-27 VITALS
TEMPERATURE: 98.5 F | HEIGHT: 66 IN | SYSTOLIC BLOOD PRESSURE: 122 MMHG | BODY MASS INDEX: 27.97 KG/M2 | DIASTOLIC BLOOD PRESSURE: 70 MMHG | HEART RATE: 70 BPM | OXYGEN SATURATION: 95 % | WEIGHT: 174 LBS

## 2023-06-27 DIAGNOSIS — M54.50 LOW BACK PAIN, UNSPECIFIED BACK PAIN LATERALITY, UNSPECIFIED CHRONICITY, UNSPECIFIED WHETHER SCIATICA PRESENT: ICD-10-CM

## 2023-06-27 DIAGNOSIS — M89.8X1 PAIN OF LEFT SCAPULA: ICD-10-CM

## 2023-06-27 DIAGNOSIS — F41.9 ANXIETY: ICD-10-CM

## 2023-06-27 DIAGNOSIS — M54.9 CHRONIC BACK PAIN, UNSPECIFIED BACK LOCATION, UNSPECIFIED BACK PAIN LATERALITY: ICD-10-CM

## 2023-06-27 DIAGNOSIS — G89.29 CHRONIC BACK PAIN, UNSPECIFIED BACK LOCATION, UNSPECIFIED BACK PAIN LATERALITY: ICD-10-CM

## 2023-06-27 DIAGNOSIS — M79.10 MUSCULAR PAIN: Primary | ICD-10-CM

## 2023-06-27 PROCEDURE — G8417 CALC BMI ABV UP PARAM F/U: HCPCS | Performed by: NURSE PRACTITIONER

## 2023-06-27 PROCEDURE — 1123F ACP DISCUSS/DSCN MKR DOCD: CPT | Performed by: NURSE PRACTITIONER

## 2023-06-27 PROCEDURE — 97110 THERAPEUTIC EXERCISES: CPT

## 2023-06-27 PROCEDURE — G0283 ELEC STIM OTHER THAN WOUND: HCPCS

## 2023-06-27 PROCEDURE — 99213 OFFICE O/P EST LOW 20 MIN: CPT | Performed by: NURSE PRACTITIONER

## 2023-06-27 PROCEDURE — G8427 DOCREV CUR MEDS BY ELIG CLIN: HCPCS | Performed by: NURSE PRACTITIONER

## 2023-06-27 PROCEDURE — 4004F PT TOBACCO SCREEN RCVD TLK: CPT | Performed by: NURSE PRACTITIONER

## 2023-06-27 PROCEDURE — 97535 SELF CARE MNGMENT TRAINING: CPT

## 2023-06-27 RX ORDER — BACLOFEN 5 MG/1
5 TABLET ORAL 2 TIMES DAILY PRN
Qty: 20 TABLET | Refills: 0 | Status: SHIPPED | OUTPATIENT
Start: 2023-06-27

## 2023-06-27 ASSESSMENT — ENCOUNTER SYMPTOMS
TROUBLE SWALLOWING: 0
CONSTIPATION: 0
VOMITING: 0
ABDOMINAL PAIN: 0
SINUS PAIN: 0
COUGH: 0
COLOR CHANGE: 0
WHEEZING: 0
SORE THROAT: 0
APNEA: 0
ABDOMINAL DISTENTION: 0
NAUSEA: 0
SHORTNESS OF BREATH: 0
SINUS PRESSURE: 0
BACK PAIN: 1
DIARRHEA: 0
EYE REDNESS: 0
EYE ITCHING: 0

## 2023-06-27 ASSESSMENT — PAIN SCALES - GENERAL: PAINLEVEL_OUTOF10: 2

## 2023-07-24 ENCOUNTER — OFFICE VISIT (OUTPATIENT)
Dept: FAMILY MEDICINE CLINIC | Age: 88
End: 2023-07-24
Payer: MEDICARE

## 2023-07-24 VITALS
TEMPERATURE: 98.2 F | OXYGEN SATURATION: 94 % | DIASTOLIC BLOOD PRESSURE: 76 MMHG | BODY MASS INDEX: 27.32 KG/M2 | HEART RATE: 68 BPM | SYSTOLIC BLOOD PRESSURE: 120 MMHG | HEIGHT: 66 IN | WEIGHT: 170 LBS

## 2023-07-24 DIAGNOSIS — F41.9 ANXIETY: ICD-10-CM

## 2023-07-24 DIAGNOSIS — E55.9 VITAMIN D DEFICIENCY: ICD-10-CM

## 2023-07-24 DIAGNOSIS — E78.5 HYPERLIPIDEMIA, UNSPECIFIED HYPERLIPIDEMIA TYPE: ICD-10-CM

## 2023-07-24 DIAGNOSIS — M54.50 LOW BACK PAIN, UNSPECIFIED BACK PAIN LATERALITY, UNSPECIFIED CHRONICITY, UNSPECIFIED WHETHER SCIATICA PRESENT: ICD-10-CM

## 2023-07-24 DIAGNOSIS — M54.9 CHRONIC BACK PAIN, UNSPECIFIED BACK LOCATION, UNSPECIFIED BACK PAIN LATERALITY: ICD-10-CM

## 2023-07-24 DIAGNOSIS — J44.9 CHRONIC OBSTRUCTIVE PULMONARY DISEASE, UNSPECIFIED COPD TYPE (HCC): ICD-10-CM

## 2023-07-24 DIAGNOSIS — M89.8X1 PAIN OF LEFT SCAPULA: ICD-10-CM

## 2023-07-24 DIAGNOSIS — G89.29 CHRONIC BACK PAIN, UNSPECIFIED BACK LOCATION, UNSPECIFIED BACK PAIN LATERALITY: ICD-10-CM

## 2023-07-24 DIAGNOSIS — M79.10 MUSCULAR PAIN: ICD-10-CM

## 2023-07-24 DIAGNOSIS — F41.9 ANXIETY: Primary | ICD-10-CM

## 2023-07-24 DIAGNOSIS — I10 ESSENTIAL HYPERTENSION: ICD-10-CM

## 2023-07-24 LAB
ALBUMIN SERPL-MCNC: 4.6 G/DL (ref 3.5–4.6)
ALP SERPL-CCNC: 187 U/L (ref 35–104)
ALT SERPL-CCNC: 13 U/L (ref 0–41)
ANION GAP SERPL CALCULATED.3IONS-SCNC: 13 MEQ/L (ref 9–15)
AST SERPL-CCNC: 23 U/L (ref 0–40)
BILIRUB SERPL-MCNC: 0.4 MG/DL (ref 0.2–0.7)
BUN SERPL-MCNC: 29 MG/DL (ref 8–23)
CALCIUM SERPL-MCNC: 9.8 MG/DL (ref 8.5–9.9)
CHLORIDE SERPL-SCNC: 105 MEQ/L (ref 95–107)
CHOLEST SERPL-MCNC: 142 MG/DL (ref 0–199)
CO2 SERPL-SCNC: 25 MEQ/L (ref 20–31)
CREAT SERPL-MCNC: 1.1 MG/DL (ref 0.7–1.2)
ERYTHROCYTE [DISTWIDTH] IN BLOOD BY AUTOMATED COUNT: 15.4 % (ref 11.5–14.5)
GLOBULIN SER CALC-MCNC: 2.8 G/DL (ref 2.3–3.5)
GLUCOSE SERPL-MCNC: 107 MG/DL (ref 70–99)
HCT VFR BLD AUTO: 41.5 % (ref 42–52)
HDLC SERPL-MCNC: 42 MG/DL (ref 40–59)
HGB BLD-MCNC: 13.9 G/DL (ref 14–18)
LDLC SERPL CALC-MCNC: 81 MG/DL (ref 0–129)
MCH RBC QN AUTO: 31.8 PG (ref 27–31.3)
MCHC RBC AUTO-ENTMCNC: 33.5 % (ref 33–37)
MCV RBC AUTO: 94.7 FL (ref 79–92.2)
PLATELET # BLD AUTO: 282 K/UL (ref 130–400)
POTASSIUM SERPL-SCNC: 5.5 MEQ/L (ref 3.4–4.9)
PROT SERPL-MCNC: 7.4 G/DL (ref 6.3–8)
RBC # BLD AUTO: 4.38 M/UL (ref 4.7–6.1)
SODIUM SERPL-SCNC: 143 MEQ/L (ref 135–144)
TRIGL SERPL-MCNC: 94 MG/DL (ref 0–150)
TSH SERPL-MCNC: 1.57 UIU/ML (ref 0.44–3.86)
WBC # BLD AUTO: 8.9 K/UL (ref 4.8–10.8)

## 2023-07-24 PROCEDURE — G8427 DOCREV CUR MEDS BY ELIG CLIN: HCPCS | Performed by: FAMILY MEDICINE

## 2023-07-24 PROCEDURE — G8417 CALC BMI ABV UP PARAM F/U: HCPCS | Performed by: FAMILY MEDICINE

## 2023-07-24 PROCEDURE — 99214 OFFICE O/P EST MOD 30 MIN: CPT | Performed by: FAMILY MEDICINE

## 2023-07-24 PROCEDURE — 3023F SPIROM DOC REV: CPT | Performed by: FAMILY MEDICINE

## 2023-07-24 PROCEDURE — 4004F PT TOBACCO SCREEN RCVD TLK: CPT | Performed by: FAMILY MEDICINE

## 2023-07-24 PROCEDURE — 1123F ACP DISCUSS/DSCN MKR DOCD: CPT | Performed by: FAMILY MEDICINE

## 2023-07-24 RX ORDER — FLUTICASONE PROPIONATE 50 MCG
2 SPRAY, SUSPENSION (ML) NASAL DAILY
Qty: 16 G | Refills: 5 | Status: SHIPPED | OUTPATIENT
Start: 2023-07-24

## 2023-07-24 RX ORDER — BACLOFEN 5 MG/1
5 TABLET ORAL 2 TIMES DAILY PRN
Qty: 20 TABLET | Refills: 0 | Status: SHIPPED | OUTPATIENT
Start: 2023-07-24

## 2023-07-24 RX ORDER — ALBUTEROL SULFATE 90 UG/1
2 AEROSOL, METERED RESPIRATORY (INHALATION) 4 TIMES DAILY PRN
Qty: 18 G | Refills: 2 | Status: SHIPPED | OUTPATIENT
Start: 2023-07-24

## 2023-07-24 NOTE — PROGRESS NOTES
Chief Complaint   Patient presents with    Hypertension     6 month        HPI:  Yas Arriaga is a 81 y.o. male     Follow up back pain    Recent normal stress test     Controlled Substance Monitoring:    Acute and Chronic Pain Monitoring:   RX Monitoring 7/24/2020   Periodic Controlled Substance Monitoring Possible medication side effects, risk of tolerance/dependence & alternative treatments discussed. ;No signs of potential drug abuse or diversion identified. Trazodone for sleep/anxiety    PFTs from 2017 did show COPD, but he denies any significant SOB    Still smokes    Ongoing back pain  Meloxicam and baclofen are helpful     Wt Readings from Last 3 Encounters:   07/24/23 170 lb (77.1 kg)   06/27/23 174 lb (78.9 kg)   05/26/23 175 lb 3.2 oz (79.5 kg)         Patient Active Problem List   Diagnosis    Secondary hypertension    Hyperlipidemia    COPD (chronic obstructive pulmonary disease) (HCC)    Anxiety    Kidney stone       Current Outpatient Medications   Medication Sig Dispense Refill    fluticasone (FLONASE) 50 MCG/ACT nasal spray 2 sprays by Nasal route daily 16 g 5    Baclofen (LIORESAL) 5 MG tablet Take 1 tablet by mouth 2 times daily as needed (back pain) 20 tablet 0    atenolol (TENORMIN) 25 MG tablet Take 1 tablet by mouth daily 30 tablet 5    busPIRone (BUSPAR) 5 MG tablet Take 1 tablet by mouth 2 times daily as needed (anxiety) 60 tablet 3    meloxicam (MOBIC) 7.5 MG tablet Take 1 tablet by mouth daily as needed for Pain 30 tablet 5    losartan (COZAAR) 25 MG tablet Take 1 tablet by mouth daily 90 tablet 5    amLODIPine (NORVASC) 5 MG tablet Take 1 tablet by mouth daily 90 tablet 2    traZODone (DESYREL) 150 MG tablet Take 1 tablet by mouth nightly 90 tablet 2    simvastatin (ZOCOR) 20 MG tablet Take 1 tablet by mouth nightly 90 tablet 2    ALPRAZolam (XANAX) 0.5 MG tablet Take 1 tablet by mouth 2 times daily as needed for Sleep.  (Patient not taking: Reported on 7/24/2023)       No

## 2023-07-25 LAB — VITAMIN D 25-HYDROXY: 45.2 NG/ML

## 2023-08-04 ENCOUNTER — APPOINTMENT (OUTPATIENT)
Dept: GENERAL RADIOLOGY | Age: 88
End: 2023-08-04
Payer: MEDICARE

## 2023-08-04 ENCOUNTER — OFFICE VISIT (OUTPATIENT)
Dept: FAMILY MEDICINE CLINIC | Age: 88
End: 2023-08-04

## 2023-08-04 ENCOUNTER — HOSPITAL ENCOUNTER (EMERGENCY)
Age: 88
Discharge: HOME | End: 2023-08-04
Payer: MEDICARE

## 2023-08-04 VITALS
RESPIRATION RATE: 20 BRPM | HEART RATE: 75 BPM | HEIGHT: 66 IN | BODY MASS INDEX: 28.12 KG/M2 | TEMPERATURE: 97.8 F | DIASTOLIC BLOOD PRESSURE: 75 MMHG | WEIGHT: 175 LBS | OXYGEN SATURATION: 95 % | SYSTOLIC BLOOD PRESSURE: 114 MMHG

## 2023-08-04 DIAGNOSIS — M54.6 ACUTE MIDLINE THORACIC BACK PAIN: Primary | ICD-10-CM

## 2023-08-04 DIAGNOSIS — M54.50 LOW BACK PAIN, UNSPECIFIED BACK PAIN LATERALITY, UNSPECIFIED CHRONICITY, UNSPECIFIED WHETHER SCIATICA PRESENT: Primary | ICD-10-CM

## 2023-08-04 PROCEDURE — 99283 EMERGENCY DEPT VISIT LOW MDM: CPT

## 2023-08-04 PROCEDURE — 72074 X-RAY EXAM THORAC SPINE4/>VW: CPT

## 2023-08-04 RX ORDER — HYDROCODONE BITARTRATE AND ACETAMINOPHEN 5; 325 MG/1; MG/1
1 TABLET ORAL EVERY 8 HOURS PRN
Qty: 9 TABLET | Refills: 0 | Status: SHIPPED | OUTPATIENT
Start: 2023-08-04 | End: 2023-08-07

## 2023-08-04 ASSESSMENT — ENCOUNTER SYMPTOMS: BACK PAIN: 1

## 2023-08-04 ASSESSMENT — PAIN DESCRIPTION - LOCATION: LOCATION: BACK

## 2023-08-04 ASSESSMENT — PAIN SCALES - GENERAL: PAINLEVEL_OUTOF10: 5

## 2023-08-04 ASSESSMENT — PAIN DESCRIPTION - ORIENTATION: ORIENTATION: MID

## 2023-08-04 ASSESSMENT — PAIN - FUNCTIONAL ASSESSMENT: PAIN_FUNCTIONAL_ASSESSMENT: 0-10

## 2023-08-04 NOTE — ED PROVIDER NOTES
[facility] ED  EMERGENCY DEPARTMENT ENCOUNTER      Pt Name: Pepper Galvan  MRN: [id number]  [address] [facility] [place] 1935  Date of evaluation: 8/4/2023  Provider: Corrin Gowers, NP-C    CHIEF COMPLAINT       Chief Complaint   Patient presents with    Back Pain     Intermittent since April, no new injury or trauma, reports \"it flared up when I was getting out of the car\"         HISTORY OF PRESENT ILLNESS   (Location/Symptom, Timing/Onset, Context/Setting, Quality, Duration, Modifying Factors, Severity)  Note limiting factors. Pepper Galvan is a 81 y.o. male whom per chart review has a PMHx of hypertension, hyperlipidemia, COPD, anxiety, nephrolithiasis presents to ED for evaluation of back pain. Patient reports that back pain has been present intermittently since April. States that today he was going to his 's office and when he got out of his car, he \"tweaked\" his back and has had pain intermittently since. Additionally, patient does report that he has been doing a lot of yard work. Reports that he has been outside mowing his grass, caring for his pool, weed whacking and states that he believes that this exacerbated his typical back pain as well. Patient reports that he did attempt to go to his PCP, then the [facility] walk-in clinic and states that he was advised to come to the emergency department due to the \"severity\" of his pain. Patient reports that he did take 1000 mg of Tylenol PTA in the emergency department and states that pain has improved. Patient denies injury, trauma to back. States that he has been ambulatory, however does report increased discomfort with ambulation. Patient was no additional complaints. Patient denies chest pain, shortness of breath, N/V/D, fever, chills, hematuria, dysuria, urinary frequency/urgency, saddle anesthesia, incontinence of bowel and bladder, numbness/tingling/weakness of BUE and BLE. HPI    Nursing Notes were reviewed.     REVIEW OF SYSTEMS    (2-9 systems for level 4, 10 or more for level 5)     Review of Systems   Musculoskeletal:  Positive for back pain. All other systems reviewed and are negative. Except as noted above the remainder of the review of systems was reviewed and negative. PAST MEDICAL HISTORY     Past Medical History:   Diagnosis Date    Anxiety     COPD (chronic obstructive pulmonary disease) ([address])     Hyperlipidemia     Hypertension     Kidney stone          SURGICAL HISTORY     History reviewed. No pertinent surgical history. CURRENT MEDICATIONS       Previous Medications    ALBUTEROL SULFATE HFA (VENTOLIN HFA) 108 (90 BASE) MCG/ACT INHALER    Inhale 2 puffs into the lungs 4 times daily as needed for Wheezing    AMLODIPINE (NORVASC) 5 MG TABLET    Take 1 tablet by mouth daily    ATENOLOL (TENORMIN) 25 MG TABLET    Take 1 tablet by mouth daily    BACLOFEN (LIORESAL) 5 MG TABLET    Take 1 tablet by mouth 2 times daily as needed (back pain)    BUSPIRONE (BUSPAR) 5 MG TABLET    Take 1 tablet by mouth 2 times daily as needed (anxiety)    FLUTICASONE (FLONASE) 50 MCG/ACT NASAL SPRAY    2 sprays by Nasal route daily    LOSARTAN (COZAAR) 25 MG TABLET    Take 1 tablet by mouth daily    MELOXICAM (MOBIC) 7.5 MG TABLET    Take 1 tablet by mouth daily as needed for Pain    SIMVASTATIN (ZOCOR) 20 MG TABLET    Take 1 tablet by mouth nightly    TRAZODONE (DESYREL) 150 MG TABLET    Take 1 tablet by mouth nightly       ALLERGIES     Patient has no known allergies. FAMILY HISTORY     History reviewed. No pertinent family history. SOCIAL HISTORY       Social History     Socioeconomic History    Marital status:      Spouse name: None    Number of children: None    Years of education: None    Highest education level: None   Tobacco Use    Smoking status: Every Day     Packs/day: 0.50     Years: 60.00     Pack years: 30.00     Types: Cigarettes    Smokeless tobacco: Never   Substance and Sexual Activity    Alcohol use: No    Drug use:  No

## 2023-08-05 NOTE — PROGRESS NOTES
Pt arrived to walk in clinic & at check in stated in severe pain   Having difficulty ambulating d/t discomfort   States took Tylenol at home & no relief   He is stating in [facility] room he wants pain medication administered in appt & for it to work immediately   He reports any movement causes his pain to worsen & that deep breath makes pain worse   Pt reports called [id number] office & they were going to call him a squad to take to hospital d/t severity of pain he was describing & that he felt he would be unable to drive himself to ER d/t his level of pain. He does have chronic pain.  Was doing yard work that exacerbated his pain   He is alert and oriented throughout visit  States he can not even walk to ER d/t level of pain he is in   Pt taken by wheelchair to ER for pain control & for possible stat imaging to r/o fracture

## 2023-08-07 ENCOUNTER — OFFICE VISIT (OUTPATIENT)
Dept: FAMILY MEDICINE CLINIC | Age: 88
End: 2023-08-07
Payer: MEDICARE

## 2023-08-07 ENCOUNTER — TELEPHONE (OUTPATIENT)
Dept: FAMILY MEDICINE CLINIC | Age: 88
End: 2023-08-07

## 2023-08-07 VITALS
TEMPERATURE: 98.6 F | BODY MASS INDEX: 27.97 KG/M2 | DIASTOLIC BLOOD PRESSURE: 72 MMHG | WEIGHT: 174 LBS | HEIGHT: 66 IN | SYSTOLIC BLOOD PRESSURE: 132 MMHG

## 2023-08-07 DIAGNOSIS — M79.10 MUSCULAR PAIN: ICD-10-CM

## 2023-08-07 DIAGNOSIS — G89.29 CHRONIC BACK PAIN, UNSPECIFIED BACK LOCATION, UNSPECIFIED BACK PAIN LATERALITY: ICD-10-CM

## 2023-08-07 DIAGNOSIS — M54.9 CHRONIC BACK PAIN, UNSPECIFIED BACK LOCATION, UNSPECIFIED BACK PAIN LATERALITY: ICD-10-CM

## 2023-08-07 DIAGNOSIS — M54.9 UPPER BACK PAIN: Primary | ICD-10-CM

## 2023-08-07 PROCEDURE — 99213 OFFICE O/P EST LOW 20 MIN: CPT | Performed by: NURSE PRACTITIONER

## 2023-08-07 PROCEDURE — G8417 CALC BMI ABV UP PARAM F/U: HCPCS | Performed by: NURSE PRACTITIONER

## 2023-08-07 PROCEDURE — 4004F PT TOBACCO SCREEN RCVD TLK: CPT | Performed by: NURSE PRACTITIONER

## 2023-08-07 PROCEDURE — G8427 DOCREV CUR MEDS BY ELIG CLIN: HCPCS | Performed by: NURSE PRACTITIONER

## 2023-08-07 PROCEDURE — 1123F ACP DISCUSS/DSCN MKR DOCD: CPT | Performed by: NURSE PRACTITIONER

## 2023-08-07 RX ORDER — PREDNISONE 10 MG/1
TABLET ORAL
Qty: 10 TABLET | Refills: 0 | Status: SHIPPED | OUTPATIENT
Start: 2023-08-07 | End: 2023-08-07

## 2023-08-07 RX ORDER — BACLOFEN 5 MG/1
5 TABLET ORAL 2 TIMES DAILY PRN
Qty: 20 TABLET | Refills: 0 | Status: SHIPPED | OUTPATIENT
Start: 2023-08-07

## 2023-08-07 RX ORDER — PREDNISONE 10 MG/1
TABLET ORAL
Qty: 30 TABLET | Refills: 0 | Status: SHIPPED | OUTPATIENT
Start: 2023-08-07

## 2023-08-07 ASSESSMENT — ENCOUNTER SYMPTOMS
DIARRHEA: 0
WHEEZING: 0
TROUBLE SWALLOWING: 0
SHORTNESS OF BREATH: 0
NAUSEA: 0
VOMITING: 0
BACK PAIN: 1
RHINORRHEA: 0
SORE THROAT: 0
COUGH: 0

## 2023-08-07 NOTE — TELEPHONE ENCOUNTER
Pt calling with concerns about medications. Was seen in the walk in 8/7/23 States they were advised to stop taking meloxicam but it still remains on his list of medications to take please advise . Wanted to double check. Pt is aware of instructions.  I advised per CS

## 2023-08-07 NOTE — PROGRESS NOTES
Subjective:      Patient ID: Raheem Rubin is a 81 y.o. male who presents today for:  Chief Complaint   Patient presents with    Back Pain       HPI        Hes had back pain from long drives in the past   Usually goes away fast   This back pain Wont go away now  He cannot do anything   He cut the grass with a rider   Then he hedged the yard Friday   Then flaired up something awful with the weed minerva  Then yesterday woke up better   Then did laundry and couldn't put away the laundry   He has no help   His wife is in a memory care unit   Taking Tylenol   Simone back pain-NSIAD  Xray thoracic spine 8/4  Went to the ER 8/4  Gave him norco, not really helping   Did therapy for his back for 1 month at one point   Lidocain patch not helping   Hes on meloxicam `  Hes taking baclofen   When he moves it hurts bad   Good appetite   Really not pain on palpation on the spine       Past Medical History:   Diagnosis Date    Anxiety     COPD (chronic obstructive pulmonary disease) ([address])     Hyperlipidemia     Hypertension     Kidney stone      History reviewed. No pertinent surgical history.   Social History     Socioeconomic History    Marital status:      Spouse name: Not on file    Number of children: Not on file    Years of education: Not on file    Highest education level: Not on file   Occupational History    Not on file   Tobacco Use    Smoking status: Every Day     Packs/day: 0.50     Years: 60.00     Pack years: 30.00     Types: Cigarettes    Smokeless tobacco: Never   Substance and Sexual Activity    Alcohol use: No    Drug use: No    Sexual activity: Not on file   Other Topics Concern    Not on file   Social History Narrative    Not on file     Social Determinants of Health     Financial Resource Strain: Low Risk     Difficulty of Paying Living Expenses: Not hard at all   Food Insecurity: No Food Insecurity    Worried About [place] in the Last Year: Never true    [address] in the Last Year: Never

## 2023-08-08 ENCOUNTER — OFFICE VISIT (OUTPATIENT)
Dept: FAMILY MEDICINE CLINIC | Age: 88
End: 2023-08-08
Payer: MEDICARE

## 2023-08-08 VITALS
TEMPERATURE: 98.4 F | HEIGHT: 66 IN | OXYGEN SATURATION: 97 % | DIASTOLIC BLOOD PRESSURE: 72 MMHG | SYSTOLIC BLOOD PRESSURE: 138 MMHG | WEIGHT: 174 LBS | BODY MASS INDEX: 27.97 KG/M2 | HEART RATE: 78 BPM

## 2023-08-08 DIAGNOSIS — M79.10 MUSCULAR PAIN: ICD-10-CM

## 2023-08-08 DIAGNOSIS — M54.9 CHRONIC BACK PAIN, UNSPECIFIED BACK LOCATION, UNSPECIFIED BACK PAIN LATERALITY: ICD-10-CM

## 2023-08-08 DIAGNOSIS — G89.29 CHRONIC BACK PAIN, UNSPECIFIED BACK LOCATION, UNSPECIFIED BACK PAIN LATERALITY: ICD-10-CM

## 2023-08-08 DIAGNOSIS — M54.9 UPPER BACK PAIN: Primary | ICD-10-CM

## 2023-08-08 PROCEDURE — 1123F ACP DISCUSS/DSCN MKR DOCD: CPT | Performed by: FAMILY MEDICINE

## 2023-08-08 PROCEDURE — G8417 CALC BMI ABV UP PARAM F/U: HCPCS | Performed by: FAMILY MEDICINE

## 2023-08-08 PROCEDURE — G8427 DOCREV CUR MEDS BY ELIG CLIN: HCPCS | Performed by: FAMILY MEDICINE

## 2023-08-08 PROCEDURE — 99213 OFFICE O/P EST LOW 20 MIN: CPT | Performed by: FAMILY MEDICINE

## 2023-08-08 PROCEDURE — 4004F PT TOBACCO SCREEN RCVD TLK: CPT | Performed by: FAMILY MEDICINE

## 2023-08-08 RX ORDER — TRAMADOL HYDROCHLORIDE 50 MG/1
50 TABLET ORAL EVERY 6 HOURS PRN
Qty: 20 TABLET | Refills: 0 | Status: SHIPPED | OUTPATIENT
Start: 2023-08-08 | End: 2023-08-13

## 2023-08-08 NOTE — PROGRESS NOTES
Chief Complaint   Patient presents with    Back Pain     X May, has flared up this week, went to hospital ER, did Xray, saw Adrianna Hayes and given prednisone, feeling better, took second dose at 9 this morning        HPI:  Bryn Case is a 81 y.o. male     Follow up back pain    Several visits recently  [facility], ER, [facility] again yesterday  Now on baclofen and prednisone  Finally feeling better    Did get a few norco from ER  I guess those really weren't much help      Controlled Substance Monitoring:    Acute and Chronic Pain Monitoring:   RX Monitoring 7/24/2020   Periodic Controlled Substance Monitoring Possible medication side effects, risk of tolerance/dependence & alternative treatments discussed. ;No signs of potential drug abuse or diversion identified. Wt Readings from Last 3 Encounters:   08/08/23 174 lb (78.9 kg)   08/07/23 174 lb (78.9 kg)   08/04/23 175 lb (79.4 kg)         Patient Active Problem List   Diagnosis    Secondary hypertension    Hyperlipidemia    COPD (chronic obstructive pulmonary disease) (HCC)    Anxiety    Kidney stone       Current Outpatient Medications   Medication Sig Dispense Refill    traMADol (ULTRAM) 50 MG tablet Take 1 tablet by mouth every 6 hours as needed for Pain for up to 5 days. Intended supply: 5 days.  Take lowest dose possible to manage pain Max Daily Amount: 200 mg 20 tablet 0    Baclofen (LIORESAL) 5 MG tablet Take 1 tablet by mouth 2 times daily as needed (back pain) 20 tablet 0    predniSONE (DELTASONE) 10 MG tablet Take 4 tabs daily for 4 days then 3 tabs daily for 3 days then 2 tabs daily for 2 days then 1 tab daily once 30 tablet 0    fluticasone (FLONASE) 50 MCG/ACT nasal spray 2 sprays by Nasal route daily 16 g 5    albuterol sulfate HFA (VENTOLIN HFA) 108 (90 Base) MCG/ACT inhaler Inhale 2 puffs into the lungs 4 times daily as needed for Wheezing 18 g 2    atenolol (TENORMIN) 25 MG tablet Take 1 tablet by mouth daily 30 tablet 5    busPIRone (BUSPAR) 5 MG tablet

## 2023-08-15 ENCOUNTER — HOSPITAL ENCOUNTER (EMERGENCY)
Age: 88
Discharge: HOME | DRG: 543 | End: 2023-08-15
Attending: STUDENT IN AN ORGANIZED HEALTH CARE EDUCATION/TRAINING PROGRAM
Payer: MEDICARE

## 2023-08-15 ENCOUNTER — APPOINTMENT (OUTPATIENT)
Dept: CT IMAGING | Age: 88
DRG: 543 | End: 2023-08-15
Payer: MEDICARE

## 2023-08-15 ENCOUNTER — APPOINTMENT (OUTPATIENT)
Dept: GENERAL RADIOLOGY | Age: 88
DRG: 543 | End: 2023-08-15
Payer: MEDICARE

## 2023-08-15 VITALS
WEIGHT: 174 LBS | OXYGEN SATURATION: 93 % | TEMPERATURE: 97.7 F | BODY MASS INDEX: 27.97 KG/M2 | SYSTOLIC BLOOD PRESSURE: 152 MMHG | HEART RATE: 79 BPM | HEIGHT: 66 IN | RESPIRATION RATE: 18 BRPM | DIASTOLIC BLOOD PRESSURE: 83 MMHG

## 2023-08-15 DIAGNOSIS — M47.814 OSTEOARTHRITIS OF THORACIC SPINE, UNSPECIFIED SPINAL OSTEOARTHRITIS COMPLICATION STATUS: ICD-10-CM

## 2023-08-15 DIAGNOSIS — S22.000A COMPRESSION FRACTURE OF THORACIC VERTEBRA, UNSPECIFIED THORACIC VERTEBRAL LEVEL, INITIAL ENCOUNTER (HCC): ICD-10-CM

## 2023-08-15 DIAGNOSIS — M89.9 BONE LESION: ICD-10-CM

## 2023-08-15 DIAGNOSIS — R91.8 PULMONARY NODULES: ICD-10-CM

## 2023-08-15 DIAGNOSIS — M54.6 MIDLINE THORACIC BACK PAIN, UNSPECIFIED CHRONICITY: Primary | ICD-10-CM

## 2023-08-15 LAB
ALBUMIN SERPL-MCNC: 4.7 G/DL (ref 3.5–4.6)
ALP SERPL-CCNC: 254 U/L (ref 35–104)
ALT SERPL-CCNC: 25 U/L (ref 0–41)
ANION GAP SERPL CALCULATED.3IONS-SCNC: 8 MEQ/L (ref 9–15)
ANISOCYTOSIS BLD QL SMEAR: ABNORMAL
APTT PPP: 24.1 SEC (ref 24.4–36.8)
AST SERPL-CCNC: 18 U/L (ref 0–40)
BASOPHILS # BLD: 0 K/UL (ref 0–0.2)
BASOPHILS NFR BLD: 0 %
BILIRUB SERPL-MCNC: 0.4 MG/DL (ref 0.2–0.7)
BNP BLD-MCNC: 488 PG/ML
BUN SERPL-MCNC: 27 MG/DL (ref 8–23)
CALCIUM SERPL-MCNC: 9.7 MG/DL (ref 8.5–9.9)
CHLORIDE SERPL-SCNC: 97 MEQ/L (ref 95–107)
CO2 SERPL-SCNC: 31 MEQ/L (ref 20–31)
CREAT SERPL-MCNC: 0.96 MG/DL (ref 0.7–1.2)
EOSINOPHIL # BLD: 0.1 K/UL (ref 0–0.7)
EOSINOPHIL NFR BLD: 1 %
ERYTHROCYTE [DISTWIDTH] IN BLOOD BY AUTOMATED COUNT: 15.3 % (ref 11.5–14.5)
GLOBULIN SER CALC-MCNC: 2.3 G/DL (ref 2.3–3.5)
GLUCOSE SERPL-MCNC: 106 MG/DL (ref 70–99)
HCT VFR BLD AUTO: 44.5 % (ref 42–52)
HGB BLD-MCNC: 14.9 G/DL (ref 14–18)
INR PPP: 1
LACTATE BLDV-SCNC: 1.9 MMOL/L (ref 0.5–2.2)
LIPASE SERPL-CCNC: 44 U/L (ref 12–95)
LYMPHOCYTES # BLD: 2 K/UL (ref 1–4.8)
LYMPHOCYTES NFR BLD: 14 %
MAGNESIUM SERPL-MCNC: 2.4 MG/DL (ref 1.7–2.4)
MCH RBC QN AUTO: 32.3 PG (ref 27–31.3)
MCHC RBC AUTO-ENTMCNC: 33.4 % (ref 33–37)
MCV RBC AUTO: 96.6 FL (ref 79–92.2)
MONOCYTES # BLD: 0.6 K/UL (ref 0.2–0.8)
MONOCYTES NFR BLD: 4 %
NEUTROPHILS # BLD: 11.4 K/UL (ref 1.4–6.5)
NEUTS SEG NFR BLD: 81 %
PLATELET # BLD AUTO: 286 K/UL (ref 130–400)
POC CREATININE WHOLE BLOOD: 1.2
POTASSIUM SERPL-SCNC: 4.9 MEQ/L (ref 3.4–4.9)
PROT SERPL-MCNC: 7 G/DL (ref 6.3–8)
PROTHROMBIN TIME: 13.7 SEC (ref 12.3–14.9)
RBC # BLD AUTO: 4.61 M/UL (ref 4.7–6.1)
SODIUM SERPL-SCNC: 136 MEQ/L (ref 135–144)
TROPONIN T SERPL-MCNC: <0.01 NG/ML (ref 0–0.01)
WBC # BLD AUTO: 14.1 K/UL (ref 4.8–10.8)

## 2023-08-15 PROCEDURE — 83880 ASSAY OF NATRIURETIC PEPTIDE: CPT

## 2023-08-15 PROCEDURE — 72128 CT CHEST SPINE W/O DYE: CPT

## 2023-08-15 PROCEDURE — 99285 EMERGENCY DEPT VISIT HI MDM: CPT

## 2023-08-15 PROCEDURE — 83690 ASSAY OF LIPASE: CPT

## 2023-08-15 PROCEDURE — 85610 PROTHROMBIN TIME: CPT

## 2023-08-15 PROCEDURE — 85025 COMPLETE CBC W/AUTO DIFF WBC: CPT

## 2023-08-15 PROCEDURE — 83605 ASSAY OF LACTIC ACID: CPT

## 2023-08-15 PROCEDURE — 93005 ELECTROCARDIOGRAM TRACING: CPT | Performed by: STUDENT IN AN ORGANIZED HEALTH CARE EDUCATION/TRAINING PROGRAM

## 2023-08-15 PROCEDURE — 84484 ASSAY OF TROPONIN QUANT: CPT

## 2023-08-15 PROCEDURE — 71275 CT ANGIOGRAPHY CHEST: CPT

## 2023-08-15 PROCEDURE — 6360000004 HC RX CONTRAST MEDICATION: Performed by: STUDENT IN AN ORGANIZED HEALTH CARE EDUCATION/TRAINING PROGRAM

## 2023-08-15 PROCEDURE — 71045 X-RAY EXAM CHEST 1 VIEW: CPT

## 2023-08-15 PROCEDURE — 80053 COMPREHEN METABOLIC PANEL: CPT

## 2023-08-15 PROCEDURE — 36415 COLL VENOUS BLD VENIPUNCTURE: CPT

## 2023-08-15 PROCEDURE — 96375 TX/PRO/DX INJ NEW DRUG ADDON: CPT

## 2023-08-15 PROCEDURE — 83735 ASSAY OF MAGNESIUM: CPT

## 2023-08-15 PROCEDURE — 85730 THROMBOPLASTIN TIME PARTIAL: CPT

## 2023-08-15 PROCEDURE — 6360000002 HC RX W HCPCS: Performed by: STUDENT IN AN ORGANIZED HEALTH CARE EDUCATION/TRAINING PROGRAM

## 2023-08-15 PROCEDURE — 6370000000 HC RX 637 (ALT 250 FOR IP): Performed by: STUDENT IN AN ORGANIZED HEALTH CARE EDUCATION/TRAINING PROGRAM

## 2023-08-15 PROCEDURE — 96376 TX/PRO/DX INJ SAME DRUG ADON: CPT

## 2023-08-15 PROCEDURE — 2580000003 HC RX 258: Performed by: STUDENT IN AN ORGANIZED HEALTH CARE EDUCATION/TRAINING PROGRAM

## 2023-08-15 PROCEDURE — 96374 THER/PROPH/DIAG INJ IV PUSH: CPT

## 2023-08-15 RX ORDER — KETOROLAC TROMETHAMINE 30 MG/ML
15 INJECTION, SOLUTION INTRAMUSCULAR; INTRAVENOUS ONCE
Status: COMPLETED | OUTPATIENT
Start: 2023-08-15 | End: 2023-08-15

## 2023-08-15 RX ORDER — HYDROCODONE BITARTRATE AND ACETAMINOPHEN 5; 325 MG/1; MG/1
1 TABLET ORAL EVERY 6 HOURS PRN
Qty: 10 TABLET | Refills: 0 | Status: SHIPPED | OUTPATIENT
Start: 2023-08-15 | End: 2023-08-20

## 2023-08-15 RX ORDER — ONDANSETRON 4 MG/1
4 TABLET, ORALLY DISINTEGRATING ORAL EVERY 8 HOURS PRN
Qty: 21 TABLET | Refills: 0 | Status: ON HOLD | OUTPATIENT
Start: 2023-08-15 | End: 2023-08-22

## 2023-08-15 RX ORDER — ASPIRIN 325 MG
325 TABLET ORAL ONCE
Status: COMPLETED | OUTPATIENT
Start: 2023-08-15 | End: 2023-08-15

## 2023-08-15 RX ORDER — MORPHINE SULFATE 2 MG/ML
2 INJECTION, SOLUTION INTRAMUSCULAR; INTRAVENOUS ONCE
Status: COMPLETED | OUTPATIENT
Start: 2023-08-15 | End: 2023-08-15

## 2023-08-15 RX ORDER — ONDANSETRON 2 MG/ML
4 INJECTION INTRAMUSCULAR; INTRAVENOUS ONCE
Status: COMPLETED | OUTPATIENT
Start: 2023-08-15 | End: 2023-08-15

## 2023-08-15 RX ORDER — MORPHINE SULFATE 2 MG/ML
4 INJECTION, SOLUTION INTRAMUSCULAR; INTRAVENOUS ONCE
Status: COMPLETED | OUTPATIENT
Start: 2023-08-15 | End: 2023-08-15

## 2023-08-15 RX ORDER — POLYETHYLENE GLYCOL 3350 17 G/17G
17 POWDER, FOR SOLUTION ORAL DAILY PRN
Qty: 510 G | Refills: 0 | Status: ON HOLD | OUTPATIENT
Start: 2023-08-15 | End: 2023-09-14

## 2023-08-15 RX ORDER — 0.9 % SODIUM CHLORIDE 0.9 %
1000 INTRAVENOUS SOLUTION INTRAVENOUS ONCE
Status: COMPLETED | OUTPATIENT
Start: 2023-08-15 | End: 2023-08-15

## 2023-08-15 RX ADMIN — MORPHINE SULFATE 2 MG: 2 INJECTION, SOLUTION INTRAMUSCULAR; INTRAVENOUS at 13:16

## 2023-08-15 RX ADMIN — ONDANSETRON 4 MG: 2 INJECTION INTRAMUSCULAR; INTRAVENOUS at 11:03

## 2023-08-15 RX ADMIN — SODIUM CHLORIDE 1000 ML: 9 INJECTION, SOLUTION INTRAVENOUS at 10:52

## 2023-08-15 RX ADMIN — KETOROLAC TROMETHAMINE 15 MG: 30 INJECTION, SOLUTION INTRAMUSCULAR; INTRAVENOUS at 12:07

## 2023-08-15 RX ADMIN — ASPIRIN 325 MG: 325 TABLET ORAL at 10:53

## 2023-08-15 RX ADMIN — IOPAMIDOL 100 ML: 612 INJECTION, SOLUTION INTRAVENOUS at 11:39

## 2023-08-15 RX ADMIN — MORPHINE SULFATE 4 MG: 2 INJECTION, SOLUTION INTRAMUSCULAR; INTRAVENOUS at 11:02

## 2023-08-15 ASSESSMENT — PAIN SCALES - GENERAL
PAINLEVEL_OUTOF10: 10
PAINLEVEL_OUTOF10: 5
PAINLEVEL_OUTOF10: 5
PAINLEVEL_OUTOF10: 7

## 2023-08-15 ASSESSMENT — ENCOUNTER SYMPTOMS
DIARRHEA: 0
CONSTIPATION: 0
VOMITING: 0
NAUSEA: 0
ABDOMINAL PAIN: 0
SHORTNESS OF BREATH: 0
BACK PAIN: 1
COUGH: 0
PHOTOPHOBIA: 0
WHEEZING: 0

## 2023-08-15 ASSESSMENT — PAIN DESCRIPTION - PAIN TYPE
TYPE: ACUTE PAIN
TYPE: ACUTE PAIN

## 2023-08-15 ASSESSMENT — PAIN DESCRIPTION - DESCRIPTORS
DESCRIPTORS: SHARP
DESCRIPTORS: ACHING
DESCRIPTORS: DISCOMFORT
DESCRIPTORS: ACHING

## 2023-08-15 ASSESSMENT — PAIN DESCRIPTION - ORIENTATION
ORIENTATION: UPPER
ORIENTATION: UPPER
ORIENTATION: LOWER

## 2023-08-15 ASSESSMENT — PAIN DESCRIPTION - LOCATION
LOCATION: BACK

## 2023-08-15 ASSESSMENT — LIFESTYLE VARIABLES
HOW MANY STANDARD DRINKS CONTAINING ALCOHOL DO YOU HAVE ON A TYPICAL DAY: 1 OR 2
HOW OFTEN DO YOU HAVE A DRINK CONTAINING ALCOHOL: 2-3 TIMES A WEEK

## 2023-08-15 ASSESSMENT — PAIN - FUNCTIONAL ASSESSMENT
PAIN_FUNCTIONAL_ASSESSMENT: ACTIVITIES ARE NOT PREVENTED
PAIN_FUNCTIONAL_ASSESSMENT: 0-10

## 2023-08-15 ASSESSMENT — PAIN DESCRIPTION - FREQUENCY: FREQUENCY: CONTINUOUS

## 2023-08-15 NOTE — ED PROVIDER NOTES
[facility] ED  EMERGENCY DEPARTMENT ENCOUNTER      Pt Name: Klaus Granger  MRN: [id number]  [address] [facility] 1935  Date of evaluation: 8/15/2023  Provider: Carol Ho [facility]       Chief Complaint   Patient presents with    Back Pain     Back pain since May         HISTORY OF PRESENT ILLNESS   (Location/Symptom, Timing/Onset, Context/Setting, Quality, Duration, Modifying Factors, Severity)  Note limiting factors. Klaus Granger is a 81 y.o. male who per chart review has a past medical history of hypertension hyperlipidemia anxiety COPD kidney stone presents to the emergency department for evaluation of back pain. Symptoms have been present since May (3 months). Pain is located in the mid/thoracic back, midline. He states at first the pain was only present if he was moving around, doing activity. The last 3 days the pain has been constant. It is described as sharp. It does worsen with movement and bending over. He has been seen multiple times for this complaint by primary care physician as well as emergency department. X-ray of the thoracic spine was done in the emergency department about 2 weeks ago which showed moderate multilevel disc space narrowing with osteophytes and mild osteopenia. Patient was referred to pain management by his primary care physician and he has an appointment September 8th 2023. He has been prescribed tramadol and also using Tylenol, topical pain patches which does not help his pain. Today he notes that the pain radiates into the chest when moving. He believes that his feet are swollen because a few pairs of shoes did not fit today. States he has been a bit constipated, still passing gas.  He denies fever, chills, sob, cough, abd pain, dizziness, lightheadedness, headache, syncope, flank pain, urinary sx, blood in the stool or urine, nvd, numbness tingling weakness of upper or lower extremities bowel or bladder incontinence saddle anesthesia, leg pain or

## 2023-08-15 NOTE — ED TRIAGE NOTES
Patient c/o back pain since May, states pain has gotten worse the last few days. Patient has been seen several times for same complaint, states was referred to pain management but has not gone. Patient denies any direct injury.

## 2023-08-16 LAB
EKG ATRIAL RATE: 71 BPM
EKG P AXIS: 45 DEGREES
EKG P-R INTERVAL: 148 MS
EKG Q-T INTERVAL: 434 MS
EKG QRS DURATION: 138 MS
EKG QTC CALCULATION (BAZETT): 471 MS
EKG R AXIS: -44 DEGREES
EKG T AXIS: 47 DEGREES
EKG VENTRICULAR RATE: 71 BPM
PERFORMED ON: ABNORMAL
POC CREATININE: 1.2 MG/DL (ref 0.8–1.3)
POC SAMPLE TYPE: ABNORMAL

## 2023-08-16 PROCEDURE — 93010 ELECTROCARDIOGRAM REPORT: CPT | Performed by: INTERNAL MEDICINE

## 2023-08-17 ENCOUNTER — APPOINTMENT (OUTPATIENT)
Dept: MRI IMAGING | Age: 88
DRG: 543 | End: 2023-08-17
Payer: MEDICARE

## 2023-08-17 ENCOUNTER — HOSPITAL ENCOUNTER (INPATIENT)
Age: 88
LOS: 5 days | Discharge: NURSING, REHAB OR LONG-TERM CARE | DRG: 543 | End: 2023-08-22
Attending: STUDENT IN AN ORGANIZED HEALTH CARE EDUCATION/TRAINING PROGRAM
Payer: MEDICARE

## 2023-08-17 DIAGNOSIS — S22.000A COMPRESSION FRACTURE OF BODY OF THORACIC VERTEBRA (HCC): Primary | ICD-10-CM

## 2023-08-17 DIAGNOSIS — R97.20 ELEVATED PSA: ICD-10-CM

## 2023-08-17 DIAGNOSIS — M89.9 LYTIC BONE LESIONS ON XRAY: ICD-10-CM

## 2023-08-17 DIAGNOSIS — R33.9 URINARY RETENTION: ICD-10-CM

## 2023-08-17 PROBLEM — M54.9 INTRACTABLE BACK PAIN: Status: ACTIVE | Noted: 2023-08-17

## 2023-08-17 LAB
ALBUMIN SERPL-MCNC: 4.4 G/DL (ref 3.5–4.6)
ALP SERPL-CCNC: 229 U/L (ref 35–104)
ALT SERPL-CCNC: 16 U/L (ref 0–41)
ANION GAP SERPL CALCULATED.3IONS-SCNC: 10 MEQ/L (ref 9–15)
AST SERPL-CCNC: 20 U/L (ref 0–40)
BACTERIA URNS QL MICRO: NEGATIVE /HPF
BASOPHILS # BLD: 0 K/UL (ref 0–0.2)
BASOPHILS NFR BLD: 0.2 %
BILIRUB SERPL-MCNC: 0.5 MG/DL (ref 0.2–0.7)
BILIRUB UR QL STRIP: NEGATIVE
BNP BLD-MCNC: 254 PG/ML
BUN SERPL-MCNC: 21 MG/DL (ref 8–23)
CALCIUM SERPL-MCNC: 9.2 MG/DL (ref 8.5–9.9)
CHLORIDE SERPL-SCNC: 94 MEQ/L (ref 95–107)
CLARITY UR: CLEAR
CO2 SERPL-SCNC: 27 MEQ/L (ref 20–31)
COLOR UR: YELLOW
CREAT SERPL-MCNC: 0.82 MG/DL (ref 0.7–1.2)
EOSINOPHIL # BLD: 0 K/UL (ref 0–0.7)
EOSINOPHIL NFR BLD: 0.1 %
EPI CELLS #/AREA URNS AUTO: ABNORMAL /HPF (ref 0–5)
ERYTHROCYTE [DISTWIDTH] IN BLOOD BY AUTOMATED COUNT: 15.3 % (ref 11.5–14.5)
GLOBULIN SER CALC-MCNC: 2.6 G/DL (ref 2.3–3.5)
GLUCOSE SERPL-MCNC: 114 MG/DL (ref 70–99)
GLUCOSE UR STRIP-MCNC: NEGATIVE MG/DL
HCT VFR BLD AUTO: 41 % (ref 42–52)
HGB BLD-MCNC: 13.7 G/DL (ref 14–18)
HGB UR QL STRIP: ABNORMAL
HYALINE CASTS #/AREA URNS AUTO: ABNORMAL /HPF (ref 0–5)
KETONES UR STRIP-MCNC: ABNORMAL MG/DL
LEUKOCYTE ESTERASE UR QL STRIP: NEGATIVE
LYMPHOCYTES # BLD: 1 K/UL (ref 1–4.8)
LYMPHOCYTES NFR BLD: 6.4 %
MCH RBC QN AUTO: 31.7 PG (ref 27–31.3)
MCHC RBC AUTO-ENTMCNC: 33.4 % (ref 33–37)
MCV RBC AUTO: 94.7 FL (ref 79–92.2)
MONOCYTES # BLD: 1.1 K/UL (ref 0.2–0.8)
MONOCYTES NFR BLD: 7 %
NEUTROPHILS # BLD: 13.3 K/UL (ref 1.4–6.5)
NEUTS SEG NFR BLD: 86.3 %
NITRITE UR QL STRIP: NEGATIVE
PH UR STRIP: 5 [PH] (ref 5–9)
PHOSPHATE SERPL-MCNC: 3.3 MG/DL (ref 2.3–4.8)
PLATELET # BLD AUTO: 241 K/UL (ref 130–400)
POTASSIUM SERPL-SCNC: 4.3 MEQ/L (ref 3.4–4.9)
PROT SERPL-MCNC: 7 G/DL (ref 6.3–8)
PROT UR STRIP-MCNC: ABNORMAL MG/DL
PSA SERPL-MCNC: 628.1 NG/ML (ref 0–4)
RBC # BLD AUTO: 4.33 M/UL (ref 4.7–6.1)
RBC #/AREA URNS AUTO: >100 /HPF (ref 0–5)
SODIUM SERPL-SCNC: 131 MEQ/L (ref 135–144)
SP GR UR STRIP: 1.02 (ref 1–1.03)
URINE REFLEX TO CULTURE: ABNORMAL
UROBILINOGEN UR STRIP-ACNC: 0.2 E.U./DL
WBC # BLD AUTO: 15.4 K/UL (ref 4.8–10.8)
WBC #/AREA URNS AUTO: ABNORMAL /HPF (ref 0–5)

## 2023-08-17 PROCEDURE — 36415 COLL VENOUS BLD VENIPUNCTURE: CPT

## 2023-08-17 PROCEDURE — 96374 THER/PROPH/DIAG INJ IV PUSH: CPT

## 2023-08-17 PROCEDURE — 51701 INSERT BLADDER CATHETER: CPT

## 2023-08-17 PROCEDURE — 80053 COMPREHEN METABOLIC PANEL: CPT

## 2023-08-17 PROCEDURE — 85025 COMPLETE CBC W/AUTO DIFF WBC: CPT

## 2023-08-17 PROCEDURE — 6370000000 HC RX 637 (ALT 250 FOR IP): Performed by: INTERNAL MEDICINE

## 2023-08-17 PROCEDURE — A9577 INJ MULTIHANCE: HCPCS | Performed by: INTERNAL MEDICINE

## 2023-08-17 PROCEDURE — 6360000002 HC RX W HCPCS: Performed by: INTERNAL MEDICINE

## 2023-08-17 PROCEDURE — 94761 N-INVAS EAR/PLS OXIMETRY MLT: CPT

## 2023-08-17 PROCEDURE — 51798 US URINE CAPACITY MEASURE: CPT

## 2023-08-17 PROCEDURE — 84100 ASSAY OF PHOSPHORUS: CPT

## 2023-08-17 PROCEDURE — 84155 ASSAY OF PROTEIN SERUM: CPT

## 2023-08-17 PROCEDURE — 72158 MRI LUMBAR SPINE W/O & W/DYE: CPT

## 2023-08-17 PROCEDURE — 6360000002 HC RX W HCPCS: Performed by: STUDENT IN AN ORGANIZED HEALTH CARE EDUCATION/TRAINING PROGRAM

## 2023-08-17 PROCEDURE — 99285 EMERGENCY DEPT VISIT HI MDM: CPT

## 2023-08-17 PROCEDURE — 81001 URINALYSIS AUTO W/SCOPE: CPT

## 2023-08-17 PROCEDURE — 51702 INSERT TEMP BLADDER CATH: CPT | Performed by: PHYSICIAN ASSISTANT

## 2023-08-17 PROCEDURE — 6360000004 HC RX CONTRAST MEDICATION: Performed by: INTERNAL MEDICINE

## 2023-08-17 PROCEDURE — 2580000003 HC RX 258: Performed by: INTERNAL MEDICINE

## 2023-08-17 PROCEDURE — 1210000000 HC MED SURG R&B

## 2023-08-17 PROCEDURE — 83880 ASSAY OF NATRIURETIC PEPTIDE: CPT

## 2023-08-17 PROCEDURE — 94664 DEMO&/EVAL PT USE INHALER: CPT

## 2023-08-17 PROCEDURE — 72157 MRI CHEST SPINE W/O & W/DYE: CPT

## 2023-08-17 PROCEDURE — 99221 1ST HOSP IP/OBS SF/LOW 40: CPT | Performed by: PHYSICIAN ASSISTANT

## 2023-08-17 PROCEDURE — 84153 ASSAY OF PSA TOTAL: CPT

## 2023-08-17 PROCEDURE — 94640 AIRWAY INHALATION TREATMENT: CPT

## 2023-08-17 PROCEDURE — 84165 PROTEIN E-PHORESIS SERUM: CPT

## 2023-08-17 RX ORDER — SODIUM CHLORIDE 0.9 % (FLUSH) 0.9 %
5-40 SYRINGE (ML) INJECTION EVERY 12 HOURS SCHEDULED
Status: DISCONTINUED | OUTPATIENT
Start: 2023-08-17 | End: 2023-08-22 | Stop reason: HOSPADM

## 2023-08-17 RX ORDER — ONDANSETRON 4 MG/1
4 TABLET, ORALLY DISINTEGRATING ORAL EVERY 8 HOURS PRN
Status: DISCONTINUED | OUTPATIENT
Start: 2023-08-17 | End: 2023-08-22 | Stop reason: HOSPADM

## 2023-08-17 RX ORDER — KETOROLAC TROMETHAMINE 15 MG/ML
15 INJECTION, SOLUTION INTRAMUSCULAR; INTRAVENOUS EVERY 8 HOURS
Status: DISCONTINUED | OUTPATIENT
Start: 2023-08-17 | End: 2023-08-17

## 2023-08-17 RX ORDER — TAMSULOSIN HYDROCHLORIDE 0.4 MG/1
0.4 CAPSULE ORAL DAILY
Status: DISCONTINUED | OUTPATIENT
Start: 2023-08-17 | End: 2023-08-22 | Stop reason: HOSPADM

## 2023-08-17 RX ORDER — LOSARTAN POTASSIUM 25 MG/1
25 TABLET ORAL DAILY
Status: DISCONTINUED | OUTPATIENT
Start: 2023-08-18 | End: 2023-08-22 | Stop reason: HOSPADM

## 2023-08-17 RX ORDER — KETOROLAC TROMETHAMINE 15 MG/ML
15 INJECTION, SOLUTION INTRAMUSCULAR; INTRAVENOUS EVERY 8 HOURS
Status: DISPENSED | OUTPATIENT
Start: 2023-08-17 | End: 2023-08-19

## 2023-08-17 RX ORDER — AMLODIPINE BESYLATE 5 MG/1
5 TABLET ORAL DAILY
Status: DISCONTINUED | OUTPATIENT
Start: 2023-08-18 | End: 2023-08-22 | Stop reason: HOSPADM

## 2023-08-17 RX ORDER — BACLOFEN 10 MG/1
5 TABLET ORAL 2 TIMES DAILY PRN
Status: DISCONTINUED | OUTPATIENT
Start: 2023-08-17 | End: 2023-08-21

## 2023-08-17 RX ORDER — MORPHINE SULFATE 4 MG/ML
4 INJECTION, SOLUTION INTRAMUSCULAR; INTRAVENOUS
Status: DISCONTINUED | OUTPATIENT
Start: 2023-08-17 | End: 2023-08-17

## 2023-08-17 RX ORDER — SODIUM CHLORIDE 9 MG/ML
INJECTION, SOLUTION INTRAVENOUS PRN
Status: DISCONTINUED | OUTPATIENT
Start: 2023-08-17 | End: 2023-08-22 | Stop reason: HOSPADM

## 2023-08-17 RX ORDER — ACETAMINOPHEN 325 MG/1
650 TABLET ORAL EVERY 6 HOURS PRN
Status: DISCONTINUED | OUTPATIENT
Start: 2023-08-17 | End: 2023-08-21

## 2023-08-17 RX ORDER — ENOXAPARIN SODIUM 100 MG/ML
40 INJECTION SUBCUTANEOUS DAILY
Status: DISCONTINUED | OUTPATIENT
Start: 2023-08-17 | End: 2023-08-17

## 2023-08-17 RX ORDER — ALBUTEROL SULFATE 2.5 MG/3ML
2.5 SOLUTION RESPIRATORY (INHALATION)
Status: DISCONTINUED | OUTPATIENT
Start: 2023-08-17 | End: 2023-08-20

## 2023-08-17 RX ORDER — SODIUM CHLORIDE 0.9 % (FLUSH) 0.9 %
5-40 SYRINGE (ML) INJECTION PRN
Status: DISCONTINUED | OUTPATIENT
Start: 2023-08-17 | End: 2023-08-22 | Stop reason: HOSPADM

## 2023-08-17 RX ORDER — LIDOCAINE 4 G/G
1 PATCH TOPICAL DAILY
Status: DISCONTINUED | OUTPATIENT
Start: 2023-08-17 | End: 2023-08-22 | Stop reason: HOSPADM

## 2023-08-17 RX ORDER — DEXAMETHASONE SODIUM PHOSPHATE 10 MG/ML
4 INJECTION INTRAMUSCULAR; INTRAVENOUS EVERY 6 HOURS
Status: DISCONTINUED | OUTPATIENT
Start: 2023-08-17 | End: 2023-08-22

## 2023-08-17 RX ORDER — POLYETHYLENE GLYCOL 3350 17 G/17G
17 POWDER, FOR SOLUTION ORAL DAILY PRN
Status: DISCONTINUED | OUTPATIENT
Start: 2023-08-17 | End: 2023-08-22 | Stop reason: HOSPADM

## 2023-08-17 RX ORDER — ONDANSETRON 2 MG/ML
4 INJECTION INTRAMUSCULAR; INTRAVENOUS EVERY 6 HOURS PRN
Status: DISCONTINUED | OUTPATIENT
Start: 2023-08-17 | End: 2023-08-22 | Stop reason: HOSPADM

## 2023-08-17 RX ORDER — ATENOLOL 25 MG/1
25 TABLET ORAL DAILY
Status: DISCONTINUED | OUTPATIENT
Start: 2023-08-18 | End: 2023-08-22 | Stop reason: HOSPADM

## 2023-08-17 RX ORDER — TRAZODONE HYDROCHLORIDE 150 MG/1
150 TABLET ORAL NIGHTLY
Status: DISCONTINUED | OUTPATIENT
Start: 2023-08-17 | End: 2023-08-22 | Stop reason: HOSPADM

## 2023-08-17 RX ORDER — OXYCODONE HYDROCHLORIDE AND ACETAMINOPHEN 5; 325 MG/1; MG/1
1 TABLET ORAL EVERY 4 HOURS PRN
Status: DISCONTINUED | OUTPATIENT
Start: 2023-08-17 | End: 2023-08-21

## 2023-08-17 RX ORDER — FLUTICASONE PROPIONATE 50 MCG
2 SPRAY, SUSPENSION (ML) NASAL DAILY
Status: DISCONTINUED | OUTPATIENT
Start: 2023-08-18 | End: 2023-08-22 | Stop reason: HOSPADM

## 2023-08-17 RX ORDER — TAMSULOSIN HYDROCHLORIDE 0.4 MG/1
0.4 CAPSULE ORAL DAILY
Status: DISCONTINUED | OUTPATIENT
Start: 2023-08-17 | End: 2023-08-17 | Stop reason: SDUPTHER

## 2023-08-17 RX ORDER — DEXAMETHASONE SODIUM PHOSPHATE 10 MG/ML
10 INJECTION INTRAMUSCULAR; INTRAVENOUS ONCE
Status: COMPLETED | OUTPATIENT
Start: 2023-08-17 | End: 2023-08-17

## 2023-08-17 RX ORDER — BUSPIRONE HYDROCHLORIDE 5 MG/1
5 TABLET ORAL 2 TIMES DAILY PRN
Status: DISCONTINUED | OUTPATIENT
Start: 2023-08-17 | End: 2023-08-21

## 2023-08-17 RX ORDER — MORPHINE SULFATE 2 MG/ML
2 INJECTION, SOLUTION INTRAMUSCULAR; INTRAVENOUS EVERY 4 HOURS PRN
Status: DISCONTINUED | OUTPATIENT
Start: 2023-08-17 | End: 2023-08-22

## 2023-08-17 RX ORDER — ACETAMINOPHEN 650 MG/1
650 SUPPOSITORY RECTAL EVERY 6 HOURS PRN
Status: DISCONTINUED | OUTPATIENT
Start: 2023-08-17 | End: 2023-08-21

## 2023-08-17 RX ORDER — IPRATROPIUM BROMIDE AND ALBUTEROL SULFATE 2.5; .5 MG/3ML; MG/3ML
1 SOLUTION RESPIRATORY (INHALATION) 3 TIMES DAILY
Status: DISCONTINUED | OUTPATIENT
Start: 2023-08-17 | End: 2023-08-20

## 2023-08-17 RX ORDER — ATORVASTATIN CALCIUM 10 MG/1
10 TABLET, FILM COATED ORAL NIGHTLY
Status: DISCONTINUED | OUTPATIENT
Start: 2023-08-17 | End: 2023-08-22 | Stop reason: HOSPADM

## 2023-08-17 RX ADMIN — MORPHINE SULFATE 4 MG: 4 INJECTION INTRAVENOUS at 11:16

## 2023-08-17 RX ADMIN — DEXAMETHASONE SODIUM PHOSPHATE 10 MG: 10 INJECTION INTRAMUSCULAR; INTRAVENOUS at 16:15

## 2023-08-17 RX ADMIN — ATORVASTATIN CALCIUM 10 MG: 10 TABLET, FILM COATED ORAL at 21:58

## 2023-08-17 RX ADMIN — IPRATROPIUM BROMIDE AND ALBUTEROL SULFATE 1 DOSE: 2.5; .5 SOLUTION RESPIRATORY (INHALATION) at 20:12

## 2023-08-17 RX ADMIN — TAMSULOSIN HYDROCHLORIDE 0.4 MG: 0.4 CAPSULE ORAL at 16:13

## 2023-08-17 RX ADMIN — MORPHINE SULFATE 2 MG: 2 INJECTION, SOLUTION INTRAMUSCULAR; INTRAVENOUS at 15:26

## 2023-08-17 RX ADMIN — KETOROLAC TROMETHAMINE 15 MG: 15 INJECTION, SOLUTION INTRAMUSCULAR; INTRAVENOUS at 21:58

## 2023-08-17 RX ADMIN — GADOBENATE DIMEGLUMINE 15 ML: 529 INJECTION, SOLUTION INTRAVENOUS at 16:51

## 2023-08-17 RX ADMIN — KETOROLAC TROMETHAMINE 15 MG: 15 INJECTION, SOLUTION INTRAMUSCULAR; INTRAVENOUS at 16:13

## 2023-08-17 RX ADMIN — DEXAMETHASONE SODIUM PHOSPHATE 4 MG: 10 INJECTION INTRAMUSCULAR; INTRAVENOUS at 21:58

## 2023-08-17 RX ADMIN — ENOXAPARIN SODIUM 40 MG: 40 INJECTION SUBCUTANEOUS at 16:14

## 2023-08-17 RX ADMIN — Medication 10 ML: at 22:04

## 2023-08-17 RX ADMIN — TRAZODONE HYDROCHLORIDE 150 MG: 150 TABLET ORAL at 22:08

## 2023-08-17 ASSESSMENT — ENCOUNTER SYMPTOMS
SHORTNESS OF BREATH: 0
COUGH: 1
VOMITING: 0
BACK PAIN: 1
ABDOMINAL PAIN: 0
COLOR CHANGE: 0
APNEA: 0
CONSTIPATION: 1
NAUSEA: 0

## 2023-08-17 ASSESSMENT — PAIN - FUNCTIONAL ASSESSMENT: PAIN_FUNCTIONAL_ASSESSMENT: 0-10

## 2023-08-17 ASSESSMENT — PAIN DESCRIPTION - ONSET: ONSET: ON-GOING

## 2023-08-17 ASSESSMENT — PAIN DESCRIPTION - PAIN TYPE: TYPE: ACUTE PAIN

## 2023-08-17 ASSESSMENT — PAIN DESCRIPTION - LOCATION
LOCATION: BACK

## 2023-08-17 ASSESSMENT — PAIN SCALES - GENERAL
PAINLEVEL_OUTOF10: 8
PAINLEVEL_OUTOF10: 0
PAINLEVEL_OUTOF10: 8

## 2023-08-17 ASSESSMENT — LIFESTYLE VARIABLES
HOW MANY STANDARD DRINKS CONTAINING ALCOHOL DO YOU HAVE ON A TYPICAL DAY: PATIENT DOES NOT DRINK
HOW OFTEN DO YOU HAVE A DRINK CONTAINING ALCOHOL: NEVER

## 2023-08-17 ASSESSMENT — PAIN DESCRIPTION - DESCRIPTORS
DESCRIPTORS: ACHING

## 2023-08-17 ASSESSMENT — PAIN DESCRIPTION - ORIENTATION: ORIENTATION: LOWER

## 2023-08-17 ASSESSMENT — PAIN DESCRIPTION - FREQUENCY: FREQUENCY: CONTINUOUS

## 2023-08-17 NOTE — ED NOTES
While inserting the straight cath there was some resistance at the area of the prostate, a small amount of pressure was required but I was able to reach the bladder. Approximately 400 mL's of christiano color urine was removed as well as a small amount of blood prior to the urine.       Jefry Segura RN  08/17/23 [id number]

## 2023-08-17 NOTE — CONSULTS
Urology Consult      Jcarlos Craig  1935  [id number]    Date of Admission:  8/17/2023 10:22 AM  Date of Consultation:  8/17/2023    Consultant: Preeti Anaya PA-C  SupervisingPhysician: Dr. Tanner Chaudhari  PCP:  Tory Gallo MD       Reason for Consultation: urinary retention      C/C:   Chief Complaint   Patient presents with    Back Pain       History of Present Illness: Urinary Retention  Patient complains of urinary retention. Onset of retention was 1 day ago and was unknown in onset. Patient currently does have a urinary catheter in place. >800 ml of urine were drained when catheter was placed. Prior to this event voiding symptoms consisted of slow stream. Prior treatments include n/a. Recent medications that may have affected his voiding include none. Allergies:No Known Allergies    PMH: Patient has a past medical history of Anxiety, COPD (chronic obstructive pulmonary disease) ([address]), Hyperlipidemia, Hypertension, and Kidney stone. PSH: Patient has no past surgical history on file. Social History: Patient reports that he has been smoking cigarettes. He has a 60.00 pack-year smoking history. He has never used smokeless tobacco. He reports current alcohol use. He reports that he does not use drugs. Family History: Patientsfamily history is not on file. ROS:  Review of Systems   Constitutional:  Negative for fatigue and fever. HENT:  Negative for congestion. Respiratory:  Negative for apnea. Cardiovascular:  Negative for chest pain. Genitourinary:  Positive for difficulty urinating. Negative for hematuria. Neurological:  Negative for speech difficulty.      Current Meds: morphine (PF) injection 2 mg, Q4H PRN  oxyCODONE-acetaminophen (PERCOCET) 5-325 MG per tablet 1 tablet, Q4H PRN  sodium chloride flush 0.9 % injection 5-40 mL, 2 times per day  sodium chloride flush 0.9 % injection 5-40 mL, PRN  0.9 % sodium chloride infusion, PRN  enoxaparin (LOVENOX) injection 40 mg,

## 2023-08-17 NOTE — H&P
Hospital Medicine  History and Physical    Patient:  Wagner Mobley  MRN: [id number]    CHIEF COMPLAINT:    Chief Complaint   Patient presents with    Back Pain       History Obtained From:  Patient, EMR  Primary Care Physician: Tyshawn Mars MD    HISTORY OF PRESENT ILLNESS:   52-year-old male with history of tobacco use, COPD, esophageal issues, hypertension presents with uncontrolled pain in his mid, upper back. He has been dealing with this for the last 2 to 3 months however it has been severe in the last week. He was seen in the ED a few days ago and was discharged with Toni Anderson however his pain continued to worsen. He lives alone and his wife is in a memory care unit. 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 He was given referral to oncology however had to return to the ED prior to this appointment. He was also found to have urinary retention in the ED requiring straight catheterization. This case was discussed with neurosurgery who recommended medical and oncologic work-up. They plan for eventual MRI. He otherwise devise any recent fever, chills, weight loss. He will intermittently get chest pain which she feels is radiation of his pain from the back. No abdominal pain or change in bowels. He has been smoking cigarettes since age 17 averaging about half a pack per day. He denies alcohol or illicit drug use. Past Medical History:      Diagnosis Date    Anxiety     COPD (chronic obstructive pulmonary disease) ([address])     Hyperlipidemia     Hypertension     Kidney stone        Past Surgical History:  No past surgical history on file. Medications Prior to Admission:    Prior to Admission medications    Medication Sig Start Date End Date Taking?  Authorizing Provider   HYDROcodone-acetaminophen (NORCO) 5-325 MG per tablet Take 1 tablet by mouth every 6 hours as needed for Pain for up to 5 days. Intended supply: 3 days. Take lowest dose possible to manage pain Max Daily Amount: 4 tablets 8/15/23 8/20/23  Daly Reddy PA-C   ondansetron (ZOFRAN-ODT) 4 MG disintegrating tablet Place 1 tablet under the tongue every 8 hours as needed for Nausea or Vomiting 8/15/23 8/22/23  Daly Reddy PA-C   polyethylene glycol [facility]) 17 GM/SCOOP powder Take 17 g by mouth daily as needed (constipation) 8/15/23 9/14/23  Daly Reddy PA-C   Baclofen (LIORESAL) 5 MG tablet Take 1 tablet by mouth 2 times daily as needed (back pain) 8/7/23   JORDAN Segura CNP   predniSONE (DELTASONE) 10 MG tablet Take 4 tabs daily for 4 days then 3 tabs daily for 3 days then 2 tabs daily for 2 days then 1 tab daily once 8/7/23   Charlene M. Kary Scheuermann, APRN - CNP   fluticasone (FLONASE) 50 MCG/ACT nasal spray 2 sprays by Nasal route daily 7/24/23   Sam Murguia MD   albuterol sulfate HFA (VENTOLIN HFA) 108 (90 Base) MCG/ACT inhaler Inhale 2 puffs into the lungs 4 times daily as needed for Wheezing 7/24/23   Sam Murguia MD   atenolol (TENORMIN) 25 MG tablet Take 1 tablet by mouth daily 6/5/23   Sam Murguia MD   busPIRone (BUSPAR) 5 MG tablet Take 1 tablet by mouth 2 times daily as needed (anxiety) 5/26/23   Sam Murguia MD   meloxicam [facility]) 7.5 MG tablet Take 1 tablet by mouth daily as needed for Pain 5/26/23   Sam Murguia MD   losartan (COZAAR) 25 MG tablet Take 1 tablet by mouth daily 5/3/23   Nidia Washburn MD   amLODIPine [facility]) 5 MG tablet Take 1 tablet by mouth daily 10/25/22   Sam Murguia MD   traZODone (DESYREL) 150 MG tablet Take 1 tablet by mouth nightly 10/25/22   Sam Murguia MD   simvastatin (ZOCOR) 20 MG tablet Take 1 tablet by mouth nightly 10/25/22   Sam Murguia MD       Allergies:  Patient has no known allergies. Social History:   TOBACCO:   reports that he has been smoking cigarettes. He has a 60.00 pack-year smoking history.  He has never used smokeless tobacco.  ETOH:   reports

## 2023-08-17 NOTE — ED TRIAGE NOTES
Pt arrived via EMS from home with c/o lower back pain, pt was recently here for the same and discharged with f/u plan. Pt had a doctors appointment for this morning but stated the pain is too severe.

## 2023-08-17 NOTE — ED NOTES
Bladder scan showed >640 mls Dr. Tejinder Matos made aware      Deyvi Hernandes  08/17/23 [phone]

## 2023-08-17 NOTE — PROGRESS NOTES
Called about 81year-old from ED who has back pain and has had difficulty urinating. He was straight cathed and catheter had difficulty passing through prostate. He has no motor or sensory symptoms. CT of the thoracic spine 2 days ago revealed diffuse sclerotic lesions concerning for metastatic disease, no significant stenosis noted, there are several levels with slight loss of height but no significant compression fractures. Patient's clinical picture is more consistent with urology problem or other cause of voiding difficulty given the lack of any motor or sensory complaints. My recommendation would be for medical and oncology work-up for determining  primary malignancy, I would recommend urology consultation. No urgent neurosurgical spine indications at this time. Follow-up MRI of spine would be recommended with and without contrast to further evaluate the spine, although has not emergent at this time.   Singh Key MD

## 2023-08-17 NOTE — CONSULTS
COMPARISON: None. HISTORY: ORDERING SYSTEM PROVIDED HISTORY: chest pain, upper back pain; evaluate for dissection TECHNOLOGIST PROVIDED HISTORY: Reason for exam:->chest pain, upper back pain; evaluate for dissection Decision Support Exception - unselect if not a suspected or confirmed emergency medical condition->Emergency Medical Condition (MA) What reading provider will be dictating this exam?->CRC FINDINGS: CTA CHEST: Thoracic aorta: No evidence of thoracic aortic aneurysm or dissection. Ectasia of the ascending thoracic aorta at 3.7 cm. The descending thoracic aorta measures 2.8 cm. No acute abnormality of the aorta. Mediastinum: No evidence of mediastinal lymphadenopathy. Coronary artery calcification is identified with mild enlargement of the cardiac chambers. No pericardial effusion. No bulky hilar or axillary lymphadenopathy. No mediastinal mass. The heart and pericardium demonstrate no acute abnormality. Lungs/Pleura: The lungs are without acute process. Smooth 1.4 cm soft tissue nodule identified in the added within the right lower lobe pulmonary arteries. 3 month follow-up is recommended for stability. There is an additional a 5 mm noncalcified nodule seen in the periphery of the right lower lobe. Atelectatic changes seen in the lingula. No evidence of pleural effusion or pneumothorax. Soft Tissues/Bones: Multilevel degenerative changes identified throughout the spine. No acute chest wall abnormality. Scattered areas of sclerosis seen throughout the thoracic vertebral body levels as well as within several of the ribs to suggest possibly a diffuse bony metastatic process. Whole body bone scan is recommended for further evaluation. CTA ABDOMEN: Abdominal aorta/Branches: There is minimal atherosclerotic disease seen within the abdominal aorta. Mesenteric vessels are patent. No significant atherosclerotic disease or significant stenosis of the origin of the mesenteric vessels.   There is an accessory renal artery identified of the kidneys bilaterally. The LYDIA is patent. Organs: The liver is homogeneous in appearance. No underlying mass or lesion. No stones in the gallbladder. The pancreas is homogeneous. The spleen is unremarkable. Small splenule identified. Cyst identified on the left kidney. Nonobstructing stones seen in the left kidney. No distension identified of the renal collecting systems bilaterally. GI/Bowel: The stomach is unremarkable. Some fluid-filled loops of small bowel to suggest possibly a mild enteritis. No wall thickening. The appendix is normal.  Stool seen scattered diffusely throughout the colon with multiple diverticulum identified predominantly within the sigmoid colon. No signs of inflammation. Peritoneum/Retroperitoneum: No abdominal or retroperitoneal lymphadenopathy. No free fluid or free air. No abnormal mass or fluid collections identified. Bones/Soft Tissues: Bony structures reveal degenerative changes seen within the spine and pelvis. There is a focal area of sclerosis involving the sacroiliac joint on the right to suggest possible prior healed sacroiliitis. There are faint areas of sclerosis identified throughout the bony pelvis. Sclerotic disease cannot be completely excluded. Faint area of sclerosis seen within the femoral neck on the right and left femoral head. Consider whole-body bone scan for further evaluation. CTA PELVIS: Aorta/Iliacs: The distal abdominal aorta is normal in caliber with atherosclerotic disease present. Atherosclerotic disease of the iliac vessels with no evidence of dissection or intimal flap. No significant stenosis. Other: Large stone in the bladder. There is heterogeneity identified within the prostate. No adenopathy identified within the pelvis. Bones/Soft Tissues: Bony structures reveal again sclerotic disease throughout the bony pelvis and lower lumbar spine concerning for metastatic process.  Consider whole-body bone

## 2023-08-17 NOTE — ED NOTES
Pt was ambulated while on a pulse ox by tech Jonnathanxochitl Josue advised the pt's heart rate fluctuated between 88 and 110 during the approximate 200' feet walk.       Jessy Whitaker RN  08/17/23 1300

## 2023-08-17 NOTE — ACP (ADVANCE CARE PLANNING)
Advance Care Planning     Advance Care Planning Inpatient Note  Spiritual Care Department    Today's Date: 8/17/2023  Unit: [facility] 1W TELEMETRY    Received request from [place]. Upon review of chart and communication with care team, patient's decision making abilities are not in question. . Patient and Child/Children was/were present in the room during visit. Goals of ACP Conversation:  Facilitate a discussion related to patient's goals of care as they align with the patient's values and beliefs. Health Care Decision Makers:       Primary Decision Maker: Chanell Hill - Child - [phone]  Summary:  Verified Healthcare Decision Maker    Advance Care Planning Documents (Patient Wishes):  Healthcare Power of /Advance Directive Appointment of Health Care Agent     Assessment:  Pt hospitalized with back pain, lesions in spine found in recent hospitalization. Pt's wife resides in a memory care unit. Pt's son Sameer Hess present through admission. Spiritual care available for support in the face of a new diagnosis. Pt has an AD in his EMR. It is current with his wishes. Outcomes/Plan:  Existing advance directive reviewed with patient; no changes to patient's previously recorded wishes.     Electronically signed by Yenny Hewitt [facility] on 8/17/2023 at 5:15 PM

## 2023-08-18 ENCOUNTER — TELEPHONE (OUTPATIENT)
Dept: MEDSURG UNIT | Age: 88
End: 2023-08-18

## 2023-08-18 ENCOUNTER — APPOINTMENT (OUTPATIENT)
Dept: RADIATION ONCOLOGY | Age: 88
End: 2023-08-18

## 2023-08-18 PROBLEM — R33.9 URINARY RETENTION: Status: ACTIVE | Noted: 2023-08-18

## 2023-08-18 PROBLEM — Z71.89 GOALS OF CARE, COUNSELING/DISCUSSION: Status: ACTIVE | Noted: 2023-08-18

## 2023-08-18 PROBLEM — M89.8X9 LYTIC BONE LESIONS ON XRAY: Status: ACTIVE | Noted: 2023-08-18

## 2023-08-18 PROBLEM — Z71.89 ADVANCED CARE PLANNING/COUNSELING DISCUSSION: Status: ACTIVE | Noted: 2023-08-18

## 2023-08-18 PROBLEM — M54.9 SEVERE BACK PAIN: Status: ACTIVE | Noted: 2023-08-18

## 2023-08-18 PROBLEM — C79.51 SECONDARY MALIGNANT NEOPLASM OF BONE (HCC): Status: ACTIVE | Noted: 2023-08-18

## 2023-08-18 PROBLEM — M89.9 LYTIC BONE LESIONS ON XRAY: Status: ACTIVE | Noted: 2023-08-18

## 2023-08-18 PROBLEM — Z51.5 PALLIATIVE CARE ENCOUNTER: Status: ACTIVE | Noted: 2023-08-18

## 2023-08-18 PROBLEM — R97.20 ELEVATED PSA: Status: ACTIVE | Noted: 2023-08-18

## 2023-08-18 LAB
ANION GAP SERPL CALCULATED.3IONS-SCNC: 12 MEQ/L (ref 9–15)
BASOPHILS # BLD: 0 K/UL (ref 0–0.2)
BASOPHILS NFR BLD: 0.2 %
BUN SERPL-MCNC: 24 MG/DL (ref 8–23)
CALCIUM SERPL-MCNC: 9 MG/DL (ref 8.5–9.9)
CHLORIDE SERPL-SCNC: 98 MEQ/L (ref 95–107)
CO2 SERPL-SCNC: 25 MEQ/L (ref 20–31)
CREAT SERPL-MCNC: 0.85 MG/DL (ref 0.7–1.2)
EOSINOPHIL # BLD: 0 K/UL (ref 0–0.7)
EOSINOPHIL NFR BLD: 0 %
ERYTHROCYTE [DISTWIDTH] IN BLOOD BY AUTOMATED COUNT: 14.7 % (ref 11.5–14.5)
GLUCOSE SERPL-MCNC: 156 MG/DL (ref 70–99)
HCT VFR BLD AUTO: 38.7 % (ref 42–52)
HGB BLD-MCNC: 13.4 G/DL (ref 14–18)
LYMPHOCYTES # BLD: 0.7 K/UL (ref 1–4.8)
LYMPHOCYTES NFR BLD: 4.6 %
MCH RBC QN AUTO: 32.9 PG (ref 27–31.3)
MCHC RBC AUTO-ENTMCNC: 34.7 % (ref 33–37)
MCV RBC AUTO: 94.8 FL (ref 79–92.2)
MONOCYTES # BLD: 0.2 K/UL (ref 0.2–0.8)
MONOCYTES NFR BLD: 1.2 %
NEUTROPHILS # BLD: 14.3 K/UL (ref 1.4–6.5)
NEUTS SEG NFR BLD: 94 %
PLATELET # BLD AUTO: 213 K/UL (ref 130–400)
POTASSIUM SERPL-SCNC: 4.6 MEQ/L (ref 3.4–4.9)
PSA SERPL-MCNC: 574.5 NG/ML (ref 0–4)
RBC # BLD AUTO: 4.08 M/UL (ref 4.7–6.1)
SODIUM SERPL-SCNC: 135 MEQ/L (ref 135–144)
WBC # BLD AUTO: 15.2 K/UL (ref 4.8–10.8)

## 2023-08-18 PROCEDURE — 84153 ASSAY OF PSA TOTAL: CPT

## 2023-08-18 PROCEDURE — 77295 3-D RADIOTHERAPY PLAN: CPT | Performed by: RADIOLOGY

## 2023-08-18 PROCEDURE — 85025 COMPLETE CBC W/AUTO DIFF WBC: CPT

## 2023-08-18 PROCEDURE — 2580000003 HC RX 258: Performed by: INTERNAL MEDICINE

## 2023-08-18 PROCEDURE — 6370000000 HC RX 637 (ALT 250 FOR IP): Performed by: INTERNAL MEDICINE

## 2023-08-18 PROCEDURE — 77334 RADIATION TREATMENT AID(S): CPT | Performed by: RADIOLOGY

## 2023-08-18 PROCEDURE — 1210000000 HC MED SURG R&B

## 2023-08-18 PROCEDURE — DP0C1ZZ BEAM RADIATION OF OTHER BONE USING PHOTONS 1 - 10 MEV: ICD-10-PCS | Performed by: RADIOLOGY

## 2023-08-18 PROCEDURE — 6360000002 HC RX W HCPCS: Performed by: INTERNAL MEDICINE

## 2023-08-18 PROCEDURE — 99214 OFFICE O/P EST MOD 30 MIN: CPT | Performed by: RADIOLOGY

## 2023-08-18 PROCEDURE — 99222 1ST HOSP IP/OBS MODERATE 55: CPT | Performed by: NEUROLOGICAL SURGERY

## 2023-08-18 PROCEDURE — 94640 AIRWAY INHALATION TREATMENT: CPT

## 2023-08-18 PROCEDURE — 99233 SBSQ HOSP IP/OBS HIGH 50: CPT | Performed by: UROLOGY

## 2023-08-18 PROCEDURE — 94761 N-INVAS EAR/PLS OXIMETRY MLT: CPT

## 2023-08-18 PROCEDURE — 80048 BASIC METABOLIC PNL TOTAL CA: CPT

## 2023-08-18 PROCEDURE — 99222 1ST HOSP IP/OBS MODERATE 55: CPT | Performed by: NURSE PRACTITIONER

## 2023-08-18 PROCEDURE — 77300 RADIATION THERAPY DOSE PLAN: CPT | Performed by: RADIOLOGY

## 2023-08-18 PROCEDURE — 77290 THER RAD SIMULAJ FIELD CPLX: CPT | Performed by: RADIOLOGY

## 2023-08-18 PROCEDURE — 36415 COLL VENOUS BLD VENIPUNCTURE: CPT

## 2023-08-18 RX ORDER — ENEMA 19; 7 G/133ML; G/133ML
1 ENEMA RECTAL ONCE
Status: COMPLETED | OUTPATIENT
Start: 2023-08-21 | End: 2023-08-21

## 2023-08-18 RX ORDER — ENEMA 19; 7 G/133ML; G/133ML
1 ENEMA RECTAL
Status: DISCONTINUED | OUTPATIENT
Start: 2023-08-21 | End: 2023-08-18

## 2023-08-18 RX ORDER — LEVOFLOXACIN 5 MG/ML
500 INJECTION, SOLUTION INTRAVENOUS ONCE
Status: DISCONTINUED | OUTPATIENT
Start: 2023-08-18 | End: 2023-08-18

## 2023-08-18 RX ORDER — ENEMA 19; 7 G/133ML; G/133ML
1 ENEMA RECTAL ONCE
Status: COMPLETED | OUTPATIENT
Start: 2023-08-20 | End: 2023-08-20

## 2023-08-18 RX ORDER — ENEMA 19; 7 G/133ML; G/133ML
1 ENEMA RECTAL ONCE
Status: DISCONTINUED | OUTPATIENT
Start: 2023-08-18 | End: 2023-08-18

## 2023-08-18 RX ORDER — ENEMA 19; 7 G/133ML; G/133ML
1 ENEMA RECTAL
Status: DISCONTINUED | OUTPATIENT
Start: 2023-08-20 | End: 2023-08-18

## 2023-08-18 RX ORDER — PANTOPRAZOLE SODIUM 40 MG/1
40 TABLET, DELAYED RELEASE ORAL
Status: DISCONTINUED | OUTPATIENT
Start: 2023-08-18 | End: 2023-08-22 | Stop reason: HOSPADM

## 2023-08-18 RX ORDER — LEVOFLOXACIN 5 MG/ML
500 INJECTION, SOLUTION INTRAVENOUS DAILY
Status: COMPLETED | OUTPATIENT
Start: 2023-08-20 | End: 2023-08-21

## 2023-08-18 RX ADMIN — OXYCODONE AND ACETAMINOPHEN 1 TABLET: 5; 325 TABLET ORAL at 15:34

## 2023-08-18 RX ADMIN — TRAZODONE HYDROCHLORIDE 150 MG: 150 TABLET ORAL at 22:55

## 2023-08-18 RX ADMIN — TAMSULOSIN HYDROCHLORIDE 0.4 MG: 0.4 CAPSULE ORAL at 09:09

## 2023-08-18 RX ADMIN — DEXAMETHASONE SODIUM PHOSPHATE 4 MG: 10 INJECTION INTRAMUSCULAR; INTRAVENOUS at 06:11

## 2023-08-18 RX ADMIN — DEXAMETHASONE SODIUM PHOSPHATE 4 MG: 10 INJECTION INTRAMUSCULAR; INTRAVENOUS at 09:08

## 2023-08-18 RX ADMIN — AMLODIPINE BESYLATE 5 MG: 5 TABLET ORAL at 09:09

## 2023-08-18 RX ADMIN — ATORVASTATIN CALCIUM 10 MG: 10 TABLET, FILM COATED ORAL at 22:55

## 2023-08-18 RX ADMIN — LOSARTAN POTASSIUM 25 MG: 25 TABLET, FILM COATED ORAL at 09:09

## 2023-08-18 RX ADMIN — IPRATROPIUM BROMIDE AND ALBUTEROL SULFATE 1 DOSE: 2.5; .5 SOLUTION RESPIRATORY (INHALATION) at 07:23

## 2023-08-18 RX ADMIN — DEXAMETHASONE SODIUM PHOSPHATE 4 MG: 10 INJECTION INTRAMUSCULAR; INTRAVENOUS at 23:04

## 2023-08-18 RX ADMIN — DEXAMETHASONE SODIUM PHOSPHATE 4 MG: 10 INJECTION INTRAMUSCULAR; INTRAVENOUS at 15:34

## 2023-08-18 RX ADMIN — ATENOLOL 25 MG: 25 TABLET ORAL at 09:09

## 2023-08-18 RX ADMIN — MORPHINE SULFATE 2 MG: 2 INJECTION, SOLUTION INTRAMUSCULAR; INTRAVENOUS at 18:39

## 2023-08-18 RX ADMIN — Medication 10 ML: at 09:11

## 2023-08-18 RX ADMIN — IPRATROPIUM BROMIDE AND ALBUTEROL SULFATE 1 DOSE: 2.5; .5 SOLUTION RESPIRATORY (INHALATION) at 19:50

## 2023-08-18 RX ADMIN — KETOROLAC TROMETHAMINE 15 MG: 15 INJECTION, SOLUTION INTRAMUSCULAR; INTRAVENOUS at 06:11

## 2023-08-18 RX ADMIN — PANTOPRAZOLE SODIUM 40 MG: 40 TABLET, DELAYED RELEASE ORAL at 16:43

## 2023-08-18 RX ADMIN — BUSPIRONE HYDROCHLORIDE 5 MG: 5 TABLET ORAL at 10:44

## 2023-08-18 ASSESSMENT — PAIN DESCRIPTION - DESCRIPTORS
DESCRIPTORS: ACHING
DESCRIPTORS: ACHING;SHARP
DESCRIPTORS: ACHING;SHARP
DESCRIPTORS: SHARP

## 2023-08-18 ASSESSMENT — ENCOUNTER SYMPTOMS
VOMITING: 0
COUGH: 0
SHORTNESS OF BREATH: 0
TROUBLE SWALLOWING: 0
EYE PAIN: 0
ABDOMINAL DISTENTION: 1
BACK PAIN: 1
NAUSEA: 0
COUGH: 1
ABDOMINAL PAIN: 0
CONSTIPATION: 0
ABDOMINAL DISTENTION: 0
TROUBLE SWALLOWING: 1
DIARRHEA: 0

## 2023-08-18 ASSESSMENT — PAIN DESCRIPTION - FREQUENCY
FREQUENCY: CONTINUOUS

## 2023-08-18 ASSESSMENT — PAIN - FUNCTIONAL ASSESSMENT
PAIN_FUNCTIONAL_ASSESSMENT: PREVENTS OR INTERFERES SOME ACTIVE ACTIVITIES AND ADLS

## 2023-08-18 ASSESSMENT — PAIN DESCRIPTION - LOCATION
LOCATION: BACK

## 2023-08-18 ASSESSMENT — PAIN DESCRIPTION - ORIENTATION
ORIENTATION: LOWER

## 2023-08-18 ASSESSMENT — PAIN SCALES - GENERAL
PAINLEVEL_OUTOF10: 8
PAINLEVEL_OUTOF10: 5
PAINLEVEL_OUTOF10: 7

## 2023-08-18 ASSESSMENT — PAIN DESCRIPTION - PAIN TYPE
TYPE: ACUTE PAIN

## 2023-08-18 NOTE — CONSULTS
NEUROSURGERY CONSULT NOTE      Patient Name: Marcus Moran  Patient : 1935  MRN: [id number]       PCP: Manav Walsh MD        History of Present Ilness: 81 y.o. presents in neurosurgical consultation from Dr. Polo Valadez with evaluation of spinal stenosis. Patient has HTN, hypercholesterolemia and has had 2 to 3-month history of mid to upper back pain. He has had several days of weakness in the legs and has not been able to walk. He came yesterday to ED with inability to void and had Núñez placed with difficulty passing through prostate. He also describes numbness in his legs. CT of spine and MRI of thoracic and lumbar spine revealed evidence of diffuse metastatic disease as well as epidural tumor and cord compression at T3-4. Patient was found to have a PSA of 628. Chief Complaint   Patient presents with    Back Pain              Past Medical History:        Diagnosis Date    Anxiety     COPD (chronic obstructive pulmonary disease) ([address])     Hyperlipidemia     Hypertension     Kidney stone        Past Surgical History:    No past surgical history on file. Home Medications:   Prior to Admission medications    Medication Sig Start Date End Date Taking? Authorizing Provider   HYDROcodone-acetaminophen (NORCO) 5-325 MG per tablet Take 1 tablet by mouth every 6 hours as needed for Pain for up to 5 days. Intended supply: 3 days. Take lowest dose possible to manage pain Max Daily Amount: 4 tablets 8/15/23 8/20/23  Daly Reddy PA-C   ondansetron (ZOFRAN-ODT) 4 MG disintegrating tablet Place 1 tablet under the tongue every 8 hours as needed for Nausea or Vomiting 8/15/23 8/22/23  Daly Reddy PA-C   polyethylene glycol [facility]) 17 GM/SCOOP powder Take 17 g by mouth daily as needed (constipation) 8/15/23 9/14/23  Daly Reddy PA-C   Baclofen (LIORESAL) 5 MG tablet Take 1 tablet by mouth 2 times daily as needed (back pain) 23   JORDAN Segura - CNP   predniSONE (DELTASONE) 10 MG

## 2023-08-18 NOTE — CONSULTS
[facility]           Radiation Oncology      [address] [facility], [address] [facility] Part: [phone]        F: [phone]       [employer]                   Dr. Jennifer Gregorio MD PhD    CONSULT NOTE     Date of Service: 2023  Patient ID: Elio Hernandez   : 1935  MRN: [id number]   Acct Number: [de-identified]       Elio Hernandez  81 y.o.   1935    REFERRING PROVIDER: No ref. provider found    PCP:  Jeremy Villalobos MD    DIAGNOSIS:  1. Compression fracture of body of thoracic vertebra (HCC)    2. Lytic bone lesions on xray    3. Urinary retention        STAGING: Cancer Staging   No matching staging information was found for the patient. HISTORY OF PRESENT ILLNESS: Mr. Elio Hernandez  is a 81y.o. year old male with history of anxiety, COPD, hyperlipidemia, hypertension, kidney stone, elevated PSA, and recent diagnosis of a malignant lesion in the spine causing compression significant pain and rapidly progressive neurologic symptoms. The patient notes that he has had upper to mid back pain for the past several months but became acutely worse over the past 2 days. He does note that he was able to go to the grocery store and do his activities of daily living a few days ago. Yesterday his pain came severe and he noted bilateral lower EXTR weakness and difficulty urinating. He presented to [facility] in [place] and CT of the chest abdomen pelvis on 8/15/2023 revealed sclerotic lesions diffusely throughout the bony skeleton suggestive of metastatic process. There are several noncalcified nodules largest in the central portion of the right lower lobe. On 8/15/2023 CT of the spine revealed diffuse osteoblastic and osteolytic lesions in the spine with mild compression fracture of T11 and T12 and moderate diffuse degenerative changes in thoracic spine with fibrosis as discussed.   MRI of the T and L-spine on 2023 revealed innumerable bone metastases and connective tissue disease:  None  - Nutritional support/ PEG:  Not applicable   - Dental evaluation:  Not applicable  -  requested:   met with the patient, please refer to the note from that encounter.  - Transportation for daily treatment:  No issues    TUMOR MARKERS:   Lab Results   Component Value Date/Time    .50 08/18/2023 05:16 AM       IMAGING REVIEWED:  Per HPI    PATHOLOGY REVIEWED:  Per HPI    IMPRESSION:  This is an 81year old male with an elevated PSA and multiple osseous lesions concerning for metastatic disease from prostate cancer primary. He has an area of cord compression at T4 causing significant pain and progressive weakness. He is dispositioned to receive urgent palliative radiation therapy to help with his pain and limit progression of neurologic symptoms. DISCUSSION/PLAN:   I reviewed with the patient's that he likely has metastatic cancer from prostate primary. Given his malignant cord compression I am recommending urgent initiation of single course of palliative radiation therapy to the thoracic spine with the areas of cord impingement to help provide further neurologic compromise and improve pain. The patient does understand that his current neurologic status may not improve. He also understands that the goal of radiation therapy is primarily to achieve some local regional control, improve pain control, and thus help enhance quality of life. The patient understands that this treatment is not curable however it may help with prolongation of survival in the short-term. I then shifted my discussion to potential side effects of treatment which include but not limited to acute side effects of fatigue and dermatitis of the skin overlying the target at the beam entry and exit points. Longer-term side effects include potential for arthritis/joint inflammation near the area of treatment and remote potential for a secondary malignancy.   There may also be

## 2023-08-18 NOTE — PROGRESS NOTES
Referral to [facility] regarding high distress d/t new cancer diagnosis and physical condition.

## 2023-08-18 NOTE — FLOWSHEET NOTE
[facility] here to take patient to the cancer center. Pt granddaughter at bedside and aware of plan. SSKI Counseling:  I discussed with the patient the risks of SSKI including but not limited to thyroid abnormalities, metallic taste, GI upset, fever, headache, acne, arthralgias, paraesthesias, lymphadenopathy, easy bleeding, arrhythmias, and allergic reaction.

## 2023-08-18 NOTE — PLAN OF CARE
Dr. Kyler Murillo informed me about the MRI results showing numerous metastasis with cord compression at the level of T3-4. Patient is having lower extremity weakness bilaterally upon my assessment. No urine or bowel incontinence and no sensory deficit. .  I contacted neurosurgery and talk to Dr. Francisco Javier Conroy. He said he will review imaging and talk to the patient tomorrow. We both agree that the patient is not a good surgical candidate given his age. He recommended steroids which patient is already on and also radiation oncology which Dr. Kyler Murillo already contacted.     Electronically signed by Isabelle Garcia MD on 8/17/2023 at 9:02 PM

## 2023-08-18 NOTE — PROGRESS NOTES
Physical Therapy Missed Treatment   Facility/Department: [facility] MED SURG [id number]/[id number]    NAME: Marcus Moran    : 1935 (81 y.o.)  MRN: [id number]    Account: [de-identified]  Gender: male      Pt off unit for testing. Nursing staff notified. Will follow and attempt PT evaluation again at earliest availability.        Crow White, PT, 23 at 2:11 PM

## 2023-08-18 NOTE — PROGRESS NOTES
Hematology/Oncology  Attending Progress Note    I discussed with the pt and hospitalist Dr. Zoila Vinson about the Spine MRI results which showed  the  spinal cord compression and the need  for Neurosurgical consultation    -pt was already started  on IV steroid tx prior to the  MRI today    Note: the pt said that the  back pain  started around 2-3 weeks ago and then worsened and  followed by onset of bilateral leg weakness around 1 week ago with subsequent worsening  --pt also had intermittent  urinary retention starting  a few days ago. MRI THORACIC SPINE W WO CONTRAST    Result Date: 8/17/2023  EXAMINATION: MRI OF THE LUMBAR SPINE WITHOUT AND WITH CONTRAST; MRI OF THE THORACIC SPINE WITHOUT AND WITH CONTRAST  8/17/2023 4:38 pm TECHNIQUE: Multiplanar multisequence MRI of the lumbar spine was performed without and with the administration of intravenous contrast.; Multiplanar multisequence MRI of the thoracic spine was performed without and with the administration of intravenous contrast. COMPARISON: None. HISTORY: ORDERING SYSTEM PROVIDED HISTORY: back pain with bilateral leg weakness; bone lesions  on  CT of spine r/o spinal cord compression;, call Dr. Shelly Cerna with spine MRI results ASAP [phone] TECHNOLOGIST PROVIDED HISTORY: Reason for exam:->back pain with bilateral leg weakness; bone lesions  on  CT of spine r/o spinal cord compression; Reason for exam:->call Dr. Shelly Cerna with spine MRI results ASA [phone] What reading provider will be dictating this exam?->CRC FINDINGS: MRI thoracic spine: Numerous marrow replacing, enhancing lesions are seen throughout the thoracic spine and sternum. The axial images are degraded by motion artifact. No acute fracture or traumatic subluxation is identified. Extraosseous extension of disease into the spinal canal is noted from T3-T4, resulting in severe narrowing of the thecal sac with mass-effect on the cord.  There is also associated cord expansion and edema, best

## 2023-08-18 NOTE — PROGRESS NOTES
None  Dyspnea: Normal  Mucous Quantity/Quality: . Additional Comments: Leighann Gamez     MEDICATIONS:     Current Facility-Administered Medications   Medication Dose Route Frequency Provider Last Rate Last Admin    [START ON 8/20/2023] levoFLOXacin (LEVAQUIN) 500 MG/100ML infusion 500 mg  500 mg IntraVENous Daily Gage Rutherford MD        morphine (PF) injection 2 mg  2 mg IntraVENous Q4H PRN Alexandra Ortega, DO   2 mg at 08/17/23 1526    oxyCODONE-acetaminophen (PERCOCET) 5-325 MG per tablet 1 tablet  1 tablet Oral Q4H PRN Alexandra Ortega, DO        sodium chloride flush 0.9 % injection 5-40 mL  5-40 mL IntraVENous 2 times per day Alexandra Otrega, DO   10 mL at 08/18/23 0911    sodium chloride flush 0.9 % injection 5-40 mL  5-40 mL IntraVENous PRN Alexandra Ortega, DO        0.9 % sodium chloride infusion   IntraVENous PRN Alexandra Ortega, DO        ondansetron (ZOFRAN-ODT) disintegrating tablet 4 mg  4 mg Oral Q8H PRN Alexandra Ortega, DO        Or    ondansetron [facility]) injection 4 mg  4 mg IntraVENous Q6H PRN Alexandra Ortega, DO        polyethylene glycol (GLYCOLAX) packet 17 g  17 g Oral Daily PRN Alexandra Ortega, DO        acetaminophen (TYLENOL) tablet 650 mg  650 mg Oral Q6H PRN Alexandra Ortega, DO        Or    acetaminophen (TYLENOL) suppository 650 mg  650 mg Rectal Q6H PRN Alexandra Ortega, DO        [Held by provider] ketorolac (TORADOL) injection 15 mg  15 mg IntraVENous q8h Alexandra Ortega, DO   15 mg at 08/18/23 0611    lidocaine 4 % external patch 1 patch  1 patch TransDERmal Daily Alexandra Ortega, DO   1 patch at 08/17/23 1752    tamsulosin (FLOMAX) capsule 0.4 mg  0.4 mg Oral Daily Alexandra Ortega, DO   0.4 mg at 08/18/23 0909    ipratropium 0.5 mg-albuterol 2.5 mg (DUONEB) nebulizer solution 1 Dose  1 Dose Inhalation TID Alexandra Ortega, DO   1 Dose at 08/18/23 0723    dexamethasone (DECADRON) injection 4 mg  4 mg IntraVENous Q6H Carl Watson MD   4 mg at 08/18/23 0908    albuterol (PROVENTIL) (2.5 MG/3ML) 0.083% nebulizer solution 2.5 mg  2.5 mg Nebulization Q2H PRN Darreld Elders, DO        amLODIPine (NORVASC) tablet 5 mg  5 mg Oral Daily Darreld Elders, DO   5 mg at 08/18/23 0909    atenolol (TENORMIN) tablet 25 mg  25 mg Oral Daily Darreld Elders, DO   25 mg at 08/18/23 4278    baclofen (LIORESAL) tablet 5 mg  5 mg Oral BID PRN Darreld Elders, DO        busPIRone (BUSPAR) tablet 5 mg  5 mg Oral BID PRN Darreld Elders, DO   5 mg at 08/18/23 1044    fluticasone (FLONASE) 50 MCG/ACT nasal spray 2 spray  2 spray Nasal Daily Darreld Elders, DO        losartan (COZAAR) tablet 25 mg  25 mg Oral Daily Darreld Elders, DO   25 mg at 08/18/23 0909    atorvastatin (LIPITOR) tablet 10 mg  10 mg Oral Nightly Darreld Elders, DO   10 mg at 08/17/23 2158    traZODone (DESYREL) tablet 150 mg  150 mg Oral Nightly Darreld Elders, DO   150 mg at 08/17/23 2208     * New    PHYSICAL EXAM:       ECOG: 3 - Symptomatic, >50% in bed, but not bedbound (Capable of only limited self-care, confined to bed or chair 50% or more of waking hours)     General: NAD, AO x 3, Mentation is clear with appropriate affect. HEENT: Normocephalic, atraumatic  Thorax:  Unlabored  Abdomen:  Non-distended    Chemotherapy Update: None    Treatment Imaging: Kv Pair    ASSESSMENT: No significant radiation side effects. Responding appropriately to symptomatic management. New medications, diagnostic results: Continue treatment as planned    PLAN: Again reviewed potential side effects of radiation for the patient's treatment. Continue local/topical care. He completed 6 fraction of high-dose radiation therapy and tolerated well. He will return possible today and I will defer to the inpatient team and the palliative care team regarding disposition and long-term care planning. Patient knows that we remain available should he have additional questions or concerns. Continue current radiation course as prescribed.

## 2023-08-18 NOTE — CARE COORDINATION
Case Management Assessment  Initial Evaluation    Date/Time of Evaluation: 8/18/2023 11:21 AM  Assessment Completed by: Auyr Marie RN    If patient is discharged prior to next notation, then this note serves as note for discharge by case management. Patient Name: Echo Manzanares                   YOB: 1935  Diagnosis: Urinary retention [R33.9]  Intractable back pain [M54.9]  Lytic bone lesions on xray [M89.9]  Compression fracture of body of thoracic vertebra ([address]) [S22.000A]                   Date / Time: 8/17/2023 10:22 AM    Patient Admission Status: Inpatient   Readmission Risk (Low < 19, Mod (19-27), High > 27): Readmission Risk Score: 15.2    Current PCP: Denys Pacheco MD  PCP verified by CM? Yes    Chart Reviewed: Yes      History Provided by: Patient  Patient Orientation: Alert and Oriented, Person, Place, Situation, Self    Patient Cognition: Alert    Hospitalization in the last 30 days (Readmission):  No    If yes, Readmission Assessment in  Navigator will be completed. Advance Directives:      Code Status: Full Code   Patient's Primary Decision Maker is: Legal Next of Kin    Primary Decision Maker: Yamini Canas - Child - [phone]    Discharge Planning:    Patient lives with: Alone Type of Home: House  Primary Care Giver: Self  Patient Support Systems include: Family Members, Children   Current Financial resources:    Current community resources:    Current services prior to admission: None            Current DME:              Type of Home Care services:  None    ADLS  Prior functional level: Independent in ADLs/IADLs  Current functional level: Assistance with the following: (TBD)    PT AM-PAC:   /24  OT AM-PAC:   /24    Family can provide assistance at DC: Yes  Would you like Case Management to discuss the discharge plan with any other family members/significant others, and if so, who?  No  Plans to Return to Present Housing: Unknown at present  Other Identified Issues/Barriers to RETURNING to current housing: PT/OT  Potential Assistance needed at discharge: N/A            Potential DME:    Patient expects to discharge to: [address] [facility] [place] for transportation at discharge:      Financial    Payor: [address] / Plan: MEDICARE PART A AND B / Product Type: *No Product type* /     Does insurance require precert for SNF: No    Potential assistance Purchasing Medications:    Meds-to-Beds request: Yes      [address], [place] - [address] [facility] [phone]  [address]  [address]  Phone: [phone] Fax: [phone], [address]  [address] [facility] [id number]  [address] [place] [address]  Phone: [phone] Fax: [phone]      Notes:    Factors facilitating achievement of predicted outcomes: Family support, Cooperative, and Pleasant    Barriers to discharge: Pain and Medical complications    Additional Case Management Notes: FROM HOME ALONE, WIFE IS IN MEMORY CARE AT [facility] BUT IS BEING MOVED TO [place] ON SEPT 1ST. PT HAS MULTIPLE FAMILY MEMBERS TO HELP. HAS NO DME AND NO 02 USE. PT DOES HAVE A BACK BRACE. PT WAS DOING OP PT AT [facility]. PT DRIVES. PT GOING TO RADIATION APPT TODAY. WILL NEED BONE SCAN AND BIOPSY Monday. DISCUSSED MULTIPLE DC OPTIONS WITH PT AND GRAND DAUGHTER SUCH AS SNF, REHAB, [address] AND OP. DC PLANS WILL BE PENDING MEDICAL OUTCOMES AND WHEN PATIENT CAN WORK WITH PT/OT. The Plan for Transition of Care is related to the following treatment goals of Urinary retention [R33.9]  Intractable back pain [M54.9]  Lytic bone lesions on xray [M89.9]  Compression fracture of body of thoracic vertebra ([address]) [F68.340L]    IF APPLICABLE: The Patient and/or patient representative Kartik Saldana and his family were provided with a choice of provider and agrees with the discharge plan.  Freedom of choice list with basic dialogue that supports the patient's individualized plan of

## 2023-08-18 NOTE — PROGRESS NOTES
--I was informed  by Dr. Danielito Khan that the Neurosurgeon felt that the pt was not a surgical candidate.     --I discussed the pt's case with Radiation Oncologist Dr. Marinus Ganser and he said he will see the pt  early tomorrow AM    -- IV steroid tx  will be continued

## 2023-08-18 NOTE — FLOWSHEET NOTE
[phone]  spoke with Radha Yip RN regarding 8/21 1000 US guided prostate biopsy with conscious sedation procedure in specials. Pt to be NPO after MN.

## 2023-08-18 NOTE — PROGRESS NOTES
NURSING ASSESSMENT     Date: 8/18/2023        Patient Name: Genevieve Garcia     YOB: 1935      Age:  81 y.o. MRN: [id number]       Chaperone [] Yes   [x] No      Advance Directives:   Do you currently have completed advance directives (living will)? [x] Yes   [] No         *If yes, please bring us a copy for your records. *If no, would you like info or assistance in completing advance directives (living will)? [] Yes   [x] No    Pain Score:   Pain Score (1-10): 5   Pain Location: upper back   Pain Duration: daily for the past 2-2 1/2 weeks  Pain Management/Control: dexamethasone, morphine at hospital.  Pt has been sleeping in a recliner and sitting in a plastic chair at home for comfort      Is pain affecting your ability to take care of yourself or move throughout your home? [x] Yes   [] No    General: alert and oriented, distress of 8, new diagnosis    Eyes (Ophthalmic): No Problem, doesn't have his glasses     Skin (Dermatological): No Problems     ENT: No problems     Respiratory: Oxygen 4l nc continuously     Cardiovascular: No Problems      Device   [] Yes   [x] No   Copy of Card Obtained [] Yes   [x] No    Gastrointestinal: pt states occasional dysphagia, had trouble eating applesauce in the past week    Genito-Urinary: burnham in place draining light red urine, had urinary retention    Breast: No Problems     Musculoskeletal: pt on bedrest, legs are weak    Neurological: describes numbness in his lower legs      Hematological and Lymphatic: blood in urine     Endocrine: No Problems     A 10-point review of systems  has been conducted and pertinent positives have been   recorded. All other review of systems are negative    Was the patient admitted during the course of treatment OR within 30 days of treatment?  N/a    If yes:  Date of Admission: 8/15/23  Hospital: [address] [place] [address]    Patient Scenarios               (Score 1 Point Each)  The Patient

## 2023-08-18 NOTE — PROGRESS NOTES
PT arrived via ambulance to the cancer center. Per EMS patient required 4 L of O2 during transport. PT without distress. VSS  DR. Crow Lopez is aware.

## 2023-08-18 NOTE — PROGRESS NOTES
Hand off of care report to [place], 1wt. Relayed Dr Daphney Rdz request that pt be started on protonix while in the hospital.   Pt left unit with physician's ambulance.

## 2023-08-18 NOTE — PROGRESS NOTES
Discussed patient care plans with Dr Yashira Walls and Dr Pilo Barker and Dr Mary Miles. Also reviewed these plans with patient his grand daughter and the nurse. Radiation today and plan for prostate biopsy on Monday with Dr Ben Stevens. Will start Levaquin on Sunday. No anticoagulants. Risks and benefits of prostate biopsy were discussed. Per Dr. Gregorio, patient notified of pathology results from 9/18/20 and for recommendation for 3 mos spot check of Bx site right ear helix.  Patient expressed understanding and had no further questions.  FV scheduled 12/18/20 with JENNIFER Mejía.    Pathology report scanned to media

## 2023-08-18 NOTE — PROGRESS NOTES
Progress Note    8/18/2023   8:46 AM    Name:  Dae Nathan  MRN:    [id number]     Acct:     [de-identified]   Room:  [address] Day: 1     Admit Date: 8/17/2023 10:22 AM  PCP: Maya White MD    Subjective:     C/C:   Chief Complaint   Patient presents with    Back Pain       Interval History: Status: This is an 80 yo male with h/o , Anxiety, HTN, and admitted with progressive back pain and now lower ext weakness and MRI findings suggestive of thoracic and lumbar mets and has a PSA of 628 ng/ml and a firm prostate on exam christiano on Rt. He developed acute urinary symptoms over the last few days and now has urinary retention with a burnham catheter. He had no prior  complaints. Past Medical History:   Diagnosis Date    Anxiety     COPD (chronic obstructive pulmonary disease) ([address])     Hyperlipidemia     Hypertension     Kidney stone        ROS:  Review of Systems   Gastrointestinal:  Positive for abdominal distention. Negative for abdominal pain. Genitourinary:  Positive for hematuria. Musculoskeletal:  Positive for back pain. Medications:      Allergies: No Known Allergies    Current Meds: levoFLOXacin (LEVAQUIN) injection 500 mg, Once  morphine (PF) injection 2 mg, Q4H PRN  oxyCODONE-acetaminophen (PERCOCET) 5-325 MG per tablet 1 tablet, Q4H PRN  sodium chloride flush 0.9 % injection 5-40 mL, 2 times per day  sodium chloride flush 0.9 % injection 5-40 mL, PRN  0.9 % sodium chloride infusion, PRN  ondansetron (ZOFRAN-ODT) disintegrating tablet 4 mg, Q8H PRN   Or  ondansetron (ZOFRAN) injection 4 mg, Q6H PRN  polyethylene glycol (GLYCOLAX) packet 17 g, Daily PRN  acetaminophen (TYLENOL) tablet 650 mg, Q6H PRN   Or  acetaminophen (TYLENOL) suppository 650 mg, Q6H PRN  [Held by provider] ketorolac (TORADOL) injection 15 mg, q8h  lidocaine 4 % external patch 1 patch, Daily  tamsulosin (FLOMAX) capsule 0.4 mg, Daily  ipratropium 0.5 mg-albuterol 2.5 mg (DUONEB) nebulizer solution 1 Dose,

## 2023-08-18 NOTE — CONSULTS
Palliative Care Consult Note  Patient: Pepper Galvan  Gender: male  YOB: 1935  Unit/Bed: Y160/Z792-75  CodeStatus: Full Code  Inpatient Treatment Team: Treatment Team: Attending Provider: Omer Harris MD; Consulting Physician: Concha Edouard MD; Consulting Physician: Kaiden Fraser MD; LPN: Danielle Rodarte LPN; Consulting Physician: Artem Cox MD; Consulting Physician: Erika Duffy MD; : Sy Cohen, RN; Registered Nurse: Addison Zapien RN; : Allegra Cerna, RN; Utilization Reviewer: Kely Jade, RN; Consulting Physician: Norma Saba MD; Patient Care Tech: Margarito Do  Admit Date:  8/17/2023    Chief Complaint: Back pain    History of Presenting Illness:      Pepper Galvan is a 81 y.o. male on hospital day 1 with a history of tobacco use, copd, esophageal issues, HTN. Patient was brought to the emergency room with uncontrolled back pain. He reports it has been ongoing for 2-3 months but has worsened in the last week. 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 Patient was admitted for intractable back pain secondary to bony metastasis and urinary retention. Palliative care was consulted for goals of care, CODE STATUS discussion, family support, and symptom management. Patient is from home alone. He is up independently. Over the last week he has not increased weakness, fatigue and inability to walk due to pain. Patient reports his appetite is usually great for the last couple days due to the pain. He denies any weight loss. Upon entering room patient is sitting up in bed. He is alert and oriented x4. Patient reports pain 5 out of 10 in his back. He reports the pain is sharp and is sometimes worse with movement. He denies numbness and tingling. Patient has morphine as needed and Pepcid as needed ordered.   He had issues results ASAP [phone] What reading provider will be dictating this exam?->CRC FINDINGS: MRI thoracic spine: Numerous marrow replacing, enhancing lesions are seen throughout the thoracic spine and sternum. The axial images are degraded by motion artifact. No acute fracture or traumatic subluxation is identified. Extraosseous extension of disease into the spinal canal is noted from T3-T4, resulting in severe narrowing of the thecal sac with mass-effect on the cord. There is also associated cord expansion and edema, best appreciated on image number 8 of series 7 consistent with cord compression. Severe left and moderate right-sided neural foraminal stenosis is noted at T3-T4 and T4-T5 due to encroachment by tumor. MRI lumbar spine: There is a normal lumbar lordosis. No acute fracture or traumatic subluxation is identified. Innumerable marrow replacing, enhancing lesions are seen throughout the L-spine and pelvic bones consistent with metastasis. At L3-4 and L4-5 there are disc bulges and facet hypertrophy resulting in mild bilateral neural foraminal stenosis at L3-4 and mild-to-moderate neural foraminal stenosis bilaterally at L4-5. At L5-S1 there is a disc bulge as well as facet hypertrophy resulting in moderate to severe narrowing of the right neural foramen and mild-to-moderate narrowing of the left neural foramen with mild focal impingement of the exiting nerve root, on the right-hand side. Innumerable bony metastases with significant extraosseous extension of disease into the spinal canal at the T3 and T4 levels resulting in cord compression. Recommend immediate neurosurgical consultation. Degenerative changes of the lower L-spine resulting in impingement of the exiting nerve root at L5-S1, on the right-hand side. Findings were discussed with Giovanna Clayton at 7:21 pm on 8/17/2023.      MRI LUMBAR SPINE W WO CONTRAST    Result Date: 8/17/2023  EXAMINATION: MRI OF THE LUMBAR SPINE WITHOUT AND WITH

## 2023-08-19 LAB
ANION GAP SERPL CALCULATED.3IONS-SCNC: 14 MEQ/L (ref 9–15)
BASOPHILS # BLD: 0 K/UL (ref 0–0.2)
BASOPHILS NFR BLD: 0.1 %
BUN SERPL-MCNC: 30 MG/DL (ref 8–23)
CALCIUM SERPL-MCNC: 9.2 MG/DL (ref 8.5–9.9)
CHLORIDE SERPL-SCNC: 99 MEQ/L (ref 95–107)
CO2 SERPL-SCNC: 24 MEQ/L (ref 20–31)
CREAT SERPL-MCNC: 0.88 MG/DL (ref 0.7–1.2)
EOSINOPHIL # BLD: 0 K/UL (ref 0–0.7)
EOSINOPHIL NFR BLD: 0 %
ERYTHROCYTE [DISTWIDTH] IN BLOOD BY AUTOMATED COUNT: 15 % (ref 11.5–14.5)
GLUCOSE SERPL-MCNC: 151 MG/DL (ref 70–99)
HCT VFR BLD AUTO: 42.4 % (ref 42–52)
HGB BLD-MCNC: 14.1 G/DL (ref 14–18)
LYMPHOCYTES # BLD: 0.7 K/UL (ref 1–4.8)
LYMPHOCYTES NFR BLD: 3.3 %
MCH RBC QN AUTO: 31.5 PG (ref 27–31.3)
MCHC RBC AUTO-ENTMCNC: 33.3 % (ref 33–37)
MCV RBC AUTO: 94.6 FL (ref 79–92.2)
MONOCYTES # BLD: 0.6 K/UL (ref 0.2–0.8)
MONOCYTES NFR BLD: 3 %
NEUTROPHILS # BLD: 19.9 K/UL (ref 1.4–6.5)
NEUTS SEG NFR BLD: 93.6 %
PLATELET # BLD AUTO: 247 K/UL (ref 130–400)
POTASSIUM SERPL-SCNC: 4.6 MEQ/L (ref 3.4–4.9)
RBC # BLD AUTO: 4.48 M/UL (ref 4.7–6.1)
SODIUM SERPL-SCNC: 137 MEQ/L (ref 135–144)
WBC # BLD AUTO: 21.2 K/UL (ref 4.8–10.8)

## 2023-08-19 PROCEDURE — 97163 PT EVAL HIGH COMPLEX 45 MIN: CPT

## 2023-08-19 PROCEDURE — 6360000002 HC RX W HCPCS: Performed by: INTERNAL MEDICINE

## 2023-08-19 PROCEDURE — 94761 N-INVAS EAR/PLS OXIMETRY MLT: CPT

## 2023-08-19 PROCEDURE — 6370000000 HC RX 637 (ALT 250 FOR IP): Performed by: INTERNAL MEDICINE

## 2023-08-19 PROCEDURE — 2700000000 HC OXYGEN THERAPY PER DAY

## 2023-08-19 PROCEDURE — 85025 COMPLETE CBC W/AUTO DIFF WBC: CPT

## 2023-08-19 PROCEDURE — 2580000003 HC RX 258: Performed by: INTERNAL MEDICINE

## 2023-08-19 PROCEDURE — 94640 AIRWAY INHALATION TREATMENT: CPT

## 2023-08-19 PROCEDURE — 1210000000 HC MED SURG R&B

## 2023-08-19 PROCEDURE — 80048 BASIC METABOLIC PNL TOTAL CA: CPT

## 2023-08-19 PROCEDURE — 36415 COLL VENOUS BLD VENIPUNCTURE: CPT

## 2023-08-19 PROCEDURE — 97110 THERAPEUTIC EXERCISES: CPT

## 2023-08-19 RX ADMIN — BUSPIRONE HYDROCHLORIDE 5 MG: 5 TABLET ORAL at 17:19

## 2023-08-19 RX ADMIN — TAMSULOSIN HYDROCHLORIDE 0.4 MG: 0.4 CAPSULE ORAL at 09:26

## 2023-08-19 RX ADMIN — DEXAMETHASONE SODIUM PHOSPHATE 4 MG: 10 INJECTION INTRAMUSCULAR; INTRAVENOUS at 21:44

## 2023-08-19 RX ADMIN — TRAZODONE HYDROCHLORIDE 150 MG: 150 TABLET ORAL at 21:44

## 2023-08-19 RX ADMIN — LOSARTAN POTASSIUM 25 MG: 25 TABLET, FILM COATED ORAL at 09:26

## 2023-08-19 RX ADMIN — DEXAMETHASONE SODIUM PHOSPHATE 4 MG: 10 INJECTION INTRAMUSCULAR; INTRAVENOUS at 06:03

## 2023-08-19 RX ADMIN — AMLODIPINE BESYLATE 5 MG: 5 TABLET ORAL at 09:26

## 2023-08-19 RX ADMIN — DEXAMETHASONE SODIUM PHOSPHATE 4 MG: 10 INJECTION INTRAMUSCULAR; INTRAVENOUS at 17:19

## 2023-08-19 RX ADMIN — IPRATROPIUM BROMIDE AND ALBUTEROL SULFATE 1 DOSE: 2.5; .5 SOLUTION RESPIRATORY (INHALATION) at 17:38

## 2023-08-19 RX ADMIN — Medication 10 ML: at 23:52

## 2023-08-19 RX ADMIN — PANTOPRAZOLE SODIUM 40 MG: 40 TABLET, DELAYED RELEASE ORAL at 06:02

## 2023-08-19 RX ADMIN — Medication 10 ML: at 09:00

## 2023-08-19 RX ADMIN — ATORVASTATIN CALCIUM 10 MG: 10 TABLET, FILM COATED ORAL at 21:44

## 2023-08-19 RX ADMIN — IPRATROPIUM BROMIDE AND ALBUTEROL SULFATE 1 DOSE: 2.5; .5 SOLUTION RESPIRATORY (INHALATION) at 13:12

## 2023-08-19 RX ADMIN — DEXAMETHASONE SODIUM PHOSPHATE 4 MG: 10 INJECTION INTRAMUSCULAR; INTRAVENOUS at 09:27

## 2023-08-19 RX ADMIN — Medication 10 ML: at 00:48

## 2023-08-19 RX ADMIN — ATENOLOL 25 MG: 25 TABLET ORAL at 09:26

## 2023-08-19 RX ADMIN — FLUTICASONE PROPIONATE 2 SPRAY: 50 SPRAY, METERED NASAL at 11:13

## 2023-08-19 RX ADMIN — IPRATROPIUM BROMIDE AND ALBUTEROL SULFATE 1 DOSE: 2.5; .5 SOLUTION RESPIRATORY (INHALATION) at 05:45

## 2023-08-19 ASSESSMENT — ENCOUNTER SYMPTOMS
NAUSEA: 0
COUGH: 0
SHORTNESS OF BREATH: 0
VOMITING: 0
DIARRHEA: 0

## 2023-08-19 NOTE — FLOWSHEET NOTE
Received patient handoff from [place], [place]. Pt came from home alone with c/o back pain and urinary retention, pt had 400mL out from a straight cath, a indwelling burnham catheter was inserted with 500 ml out during the day today. Patient has a 20 in the L anti cubital. 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 Neurology is on and patient has told him that he does not want an invasive procedure on his spine. Patient says he takes tramadol nightly for pain. RN states patient recieves decadron, baclofen and percocet. Palliative measures are being applied. Will be NPO Sunday night after 2359. Jinny Hay Patient will need an Injection prior to the Bone Scan/Prostate Biopsy on Monday.

## 2023-08-19 NOTE — PROGRESS NOTES
Physical Therapy Med Surg Initial Assessment  Facility/Department: [facility]  Room: Z572/X056-66       NAME: Janusz Gaitan  : 1935 (26 y.o.)  MRN: [id number]  CODE STATUS: Full Code    Date of Service: 2023    Patient Diagnosis(es): Urinary retention [R33.9]  Intractable back pain [M54.9]  Lytic bone lesions on xray [M89.9]  Compression fracture of body of thoracic vertebra 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 Chart Reviewed: Yes    Restrictions:  Restrictions/Precautions: Fall Risk     SUBJECTIVE:   Subjective: Pt reports walking on his own prior to admission. Started having back pain that was worsening causing difficulty moving. No pain at the moment.     Pain   No pre or post pain    Prior Level of Function:  Social/Functional History  Lives With: Alone (Wife moving to a nursing home from a memory care unit)  Type of Home: House  Home Layout: One level  Home Access: Stairs to enter without rails  Entrance Stairs - Number of Steps: 2  Home Equipment: None  Has the patient had two or more falls in the past year or any fall with injury in the DISCHARGE RECOMMENDATIONS:  Discharge Recommendations: Continue to assess pending progress    Assessment: Pt evaluated for mobility needs while in the inpatient setting. Pt demonstrates decreased veronica LE strength as seen by difficulty with transfers and bed mobility. Pt with increased shaking and ataxic movements upon standing. Pt with decreased standing balance and is unable to safely ambulate forward. Pt is able to side step along bed with PT educating pt on [employer] use. Pt would benefit from further skilled PT to improve his strength, balance and safety with all mobility. Requires PT Follow-Up: Yes       PLAN OF CARE:  Physcial Therapy Plan  General Plan: 1 time a day 3-6 times a week  Current Treatment Recommendations: Strengthening, ROM, Balance training, Functional mobility training, Transfer training, Gait training, Stair training, Neuromuscular re-education, Manual, Home exercise program, Safety education & training, Patient/Caregiver education & training, Equipment evaluation, education, & procurement    Safety Devices  Type of Devices: All fall risk precautions in place, Bed alarm in place, Call light within reach    Goals:  Short Term Goals  Short Term Goal 1: Pt will complete all bed mobility and transfers safely and independently. Short Term Goal 2: Pt will demonstrate good static and dynamic standing balance to reduce pt's risk for falls at home. Short Term Goal 3: Pt will ambulate with LRD >/= 150' to allow him to safely return home. Short Term Goal 4: Improve veronica LE strength to increase ease with transfers and mobility.     [facility] (6 CLICK) BASIC MOBILITY  AM-PAC Inpatient Mobility Raw Score : 14     Therapy Time:   Individual   Time In 0821   Time Out 0845   Minutes 24   Timed Code Treatment Minutes: 10 Minutes   There ex 8', Neuro monico 2'    Mayelin Barrios PT, 08/19/23 at 8:55 AM         Definitions for assistance levels  Independent = pt does not require any physical supervision or

## 2023-08-20 ENCOUNTER — APPOINTMENT (OUTPATIENT)
Dept: NUCLEAR MEDICINE | Age: 88
DRG: 543 | End: 2023-08-20
Payer: MEDICARE

## 2023-08-20 LAB
ANION GAP SERPL CALCULATED.3IONS-SCNC: 11 MEQ/L (ref 9–15)
BASOPHILS # BLD: 0 K/UL (ref 0–0.2)
BASOPHILS NFR BLD: 0 %
BUN SERPL-MCNC: 32 MG/DL (ref 8–23)
CALCIUM SERPL-MCNC: 9 MG/DL (ref 8.5–9.9)
CHLORIDE SERPL-SCNC: 101 MEQ/L (ref 95–107)
CO2 SERPL-SCNC: 27 MEQ/L (ref 20–31)
CREAT SERPL-MCNC: 0.95 MG/DL (ref 0.7–1.2)
EOSINOPHIL # BLD: 0 K/UL (ref 0–0.7)
EOSINOPHIL NFR BLD: 0 %
ERYTHROCYTE [DISTWIDTH] IN BLOOD BY AUTOMATED COUNT: 14.9 % (ref 11.5–14.5)
GLUCOSE SERPL-MCNC: 137 MG/DL (ref 70–99)
HCT VFR BLD AUTO: 40.3 % (ref 42–52)
HGB BLD-MCNC: 13.7 G/DL (ref 14–18)
LYMPHOCYTES # BLD: 0.5 K/UL (ref 1–4.8)
LYMPHOCYTES NFR BLD: 2.7 %
MCH RBC QN AUTO: 31.8 PG (ref 27–31.3)
MCHC RBC AUTO-ENTMCNC: 34 % (ref 33–37)
MCV RBC AUTO: 93.7 FL (ref 79–92.2)
MONOCYTES # BLD: 0.7 K/UL (ref 0.2–0.8)
MONOCYTES NFR BLD: 4 %
NEUTROPHILS # BLD: 17.4 K/UL (ref 1.4–6.5)
NEUTS SEG NFR BLD: 93.3 %
PLATELET # BLD AUTO: 243 K/UL (ref 130–400)
POTASSIUM SERPL-SCNC: 4.5 MEQ/L (ref 3.4–4.9)
RBC # BLD AUTO: 4.3 M/UL (ref 4.7–6.1)
SODIUM SERPL-SCNC: 139 MEQ/L (ref 135–144)
TROPONIN T SERPL-MCNC: <0.01 NG/ML (ref 0–0.01)
WBC # BLD AUTO: 18.6 K/UL (ref 4.8–10.8)

## 2023-08-20 PROCEDURE — 2580000003 HC RX 258: Performed by: INTERNAL MEDICINE

## 2023-08-20 PROCEDURE — 85025 COMPLETE CBC W/AUTO DIFF WBC: CPT

## 2023-08-20 PROCEDURE — 6360000002 HC RX W HCPCS: Performed by: UROLOGY

## 2023-08-20 PROCEDURE — 84484 ASSAY OF TROPONIN QUANT: CPT

## 2023-08-20 PROCEDURE — 6360000002 HC RX W HCPCS: Performed by: INTERNAL MEDICINE

## 2023-08-20 PROCEDURE — 97166 OT EVAL MOD COMPLEX 45 MIN: CPT

## 2023-08-20 PROCEDURE — 6370000000 HC RX 637 (ALT 250 FOR IP): Performed by: RADIOLOGY

## 2023-08-20 PROCEDURE — 1210000000 HC MED SURG R&B

## 2023-08-20 PROCEDURE — 93005 ELECTROCARDIOGRAM TRACING: CPT | Performed by: INTERNAL MEDICINE

## 2023-08-20 PROCEDURE — 6370000000 HC RX 637 (ALT 250 FOR IP): Performed by: INTERNAL MEDICINE

## 2023-08-20 PROCEDURE — 97110 THERAPEUTIC EXERCISES: CPT

## 2023-08-20 PROCEDURE — 36415 COLL VENOUS BLD VENIPUNCTURE: CPT

## 2023-08-20 PROCEDURE — 80048 BASIC METABOLIC PNL TOTAL CA: CPT

## 2023-08-20 RX ORDER — CALCIUM CARBONATE 500 MG/1
750 TABLET, CHEWABLE ORAL 3 TIMES DAILY PRN
Status: DISCONTINUED | OUTPATIENT
Start: 2023-08-20 | End: 2023-08-22 | Stop reason: HOSPADM

## 2023-08-20 RX ORDER — CALCIUM CARBONATE 500 MG/1
500 TABLET, CHEWABLE ORAL 3 TIMES DAILY PRN
Status: DISCONTINUED | OUTPATIENT
Start: 2023-08-20 | End: 2023-08-20

## 2023-08-20 RX ORDER — IPRATROPIUM BROMIDE AND ALBUTEROL SULFATE 2.5; .5 MG/3ML; MG/3ML
1 SOLUTION RESPIRATORY (INHALATION) EVERY 4 HOURS PRN
Status: DISCONTINUED | OUTPATIENT
Start: 2023-08-20 | End: 2023-08-22 | Stop reason: HOSPADM

## 2023-08-20 RX ADMIN — DEXAMETHASONE SODIUM PHOSPHATE 4 MG: 10 INJECTION INTRAMUSCULAR; INTRAVENOUS at 21:32

## 2023-08-20 RX ADMIN — ANTACID TABLETS 750 MG: 500 TABLET, CHEWABLE ORAL at 13:56

## 2023-08-20 RX ADMIN — AMLODIPINE BESYLATE 5 MG: 5 TABLET ORAL at 08:00

## 2023-08-20 RX ADMIN — ATENOLOL 25 MG: 25 TABLET ORAL at 08:00

## 2023-08-20 RX ADMIN — FLUTICASONE PROPIONATE 2 SPRAY: 50 SPRAY, METERED NASAL at 08:01

## 2023-08-20 RX ADMIN — DEXAMETHASONE SODIUM PHOSPHATE 4 MG: 10 INJECTION INTRAMUSCULAR; INTRAVENOUS at 16:30

## 2023-08-20 RX ADMIN — TRAZODONE HYDROCHLORIDE 150 MG: 150 TABLET ORAL at 21:31

## 2023-08-20 RX ADMIN — PANTOPRAZOLE SODIUM 40 MG: 40 TABLET, DELAYED RELEASE ORAL at 06:19

## 2023-08-20 RX ADMIN — ATORVASTATIN CALCIUM 10 MG: 10 TABLET, FILM COATED ORAL at 21:31

## 2023-08-20 RX ADMIN — DEXAMETHASONE SODIUM PHOSPHATE 4 MG: 10 INJECTION INTRAMUSCULAR; INTRAVENOUS at 06:20

## 2023-08-20 RX ADMIN — LEVOFLOXACIN 500 MG: 5 INJECTION, SOLUTION INTRAVENOUS at 08:36

## 2023-08-20 RX ADMIN — Medication 10 ML: at 21:45

## 2023-08-20 RX ADMIN — Medication 1 ENEMA: at 21:26

## 2023-08-20 RX ADMIN — LOSARTAN POTASSIUM 25 MG: 25 TABLET, FILM COATED ORAL at 08:00

## 2023-08-20 RX ADMIN — TAMSULOSIN HYDROCHLORIDE 0.4 MG: 0.4 CAPSULE ORAL at 08:00

## 2023-08-20 RX ADMIN — Medication 10 ML: at 08:03

## 2023-08-20 RX ADMIN — OXYCODONE AND ACETAMINOPHEN 1 TABLET: 5; 325 TABLET ORAL at 21:37

## 2023-08-20 RX ADMIN — DEXAMETHASONE SODIUM PHOSPHATE 4 MG: 10 INJECTION INTRAMUSCULAR; INTRAVENOUS at 08:00

## 2023-08-20 ASSESSMENT — ENCOUNTER SYMPTOMS
COUGH: 0
SHORTNESS OF BREATH: 0
VOMITING: 0
DIARRHEA: 0
NAUSEA: 0

## 2023-08-20 NOTE — PROGRESS NOTES
[facility] OCCUPATIONAL THERAPY EVALUATION - ACUTE     NAME: Felicity Valdivia  : 1935 (81 y.o.)  MRN: [id number]  CODE STATUS: Full Code  Room: A348/Q118-96    Date of Service: 2023    Patient Diagnosis(es): Urinary retention [R33.9]  Intractable back pain [M54.9]  Lytic bone lesions on xray [M89.9]  Compression fracture of body of thoracic vertebra [facility]) [S22.000A]   Patient Active Problem List    Diagnosis Date Noted    Elevated PSA 2023    Urinary retention 2023    Lytic bone lesions on xray 2023    Palliative care encounter 2023    Goals of care, counseling/discussion 2023    Advanced care planning/counseling discussion 2023    Severe back pain 2023    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 Restrictions  Restrictions/Precautions: Fall Risk              Safety Devices: Safety Devices  Type of Devices:  All fall risk precautions in place     Patient's date of birth confirmed: Yes    General:  Chart Reviewed: Yes    Subjective          Pain at start of treatment: Yes: 7/10 in chest (acid reflux per nsg), back pain 5/10    Pain at end of treatment: Yes: 7/10 in chest (acid reflux per nsg), back pain 5/10    Nursing notified: Yes  RN: Coleman Rowe  Intervention: Other: nsg stated put a request in for orders for meds    Prior Level of Function:  Social/Functional History  Lives With: Alone (Wife moving to a nursing home from a memory care unit)  Type of Home: House  Home Layout: One level  Home Access: Stairs to enter without

## 2023-08-20 NOTE — PROGRESS NOTES
Physical Therapy Med Surg Daily Treatment Note  Facility/Department[facility]  [facility]       NAME: Kristine House  : 1935 (2 y.o.)  MRN: [id number]  CODE STATUS: Full Code    Date of Service: 2023    Patient Diagnosis(es): Urinary retention [R33.9]  Intractable back pain [M54.9]  Lytic bone lesions on xray [M89.9]  Compression fracture of body of thoracic vertebra [facility]) [S22.000A]   Chief Complaint   Patient presents with    Back Pain     Patient Active Problem List    Diagnosis Date Noted    Elevated PSA 2023    Urinary retention 2023    Lytic bone lesions on xray 2023    Palliative care encounter 2023    Goals of care, counseling/discussion 2023    Advanced care planning/counseling discussion 2023    Severe back pain 2023    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 Chart Reviewed: Yes    Restrictions:  Restrictions/Precautions: Fall Risk    SUBJECTIVE:   Subjective: \"I couldn't stand up\" Patient reports ambulating in the grocery store 2 days ago Independently PTA. Pain   Denies    OBJECTIVE:        Bed mobility  Supine to Sit: Stand by assistance  Bed Mobility Comments: VCs for log roll technique    Transfers  Sit to Stand: Minimal Assistance  Stand to Sit: Minimal Assistance  Comment: Decreased control, Min lifting assistance. VCs for safe hand placements    Ambulation  Surface: Level tile  Device: Rolling Walker  Assistance:  Moderate assistance  Quality of Gait: ataxic with decreased LE physical supervision or assistance from another person for activity completion. Device may be needed.   Stand by assistance = pt requires verbal cues or instructions from another person, close to but not touching, to perform the activity  Minimal assistance= pt performs 75% or more of the activity; assistance is required to complete the activity  Moderate assistance= pt performs 50% of the activity; assistance is required to complete the activity  Maximal assistance = pt performs 25% of the activity; assistance is required to complete the activity  Dependent = pt requires total physical assistance to accomplish the task

## 2023-08-21 ENCOUNTER — APPOINTMENT (OUTPATIENT)
Dept: ULTRASOUND IMAGING | Age: 88
DRG: 543 | End: 2023-08-21
Payer: MEDICARE

## 2023-08-21 ENCOUNTER — APPOINTMENT (OUTPATIENT)
Dept: NUCLEAR MEDICINE | Age: 88
DRG: 543 | End: 2023-08-21
Payer: MEDICARE

## 2023-08-21 PROBLEM — Z63.6 CAREGIVER STRESS: Status: ACTIVE | Noted: 2023-08-21

## 2023-08-21 PROBLEM — H52.01 HYPEROPIA WITH ASTIGMATISM AND PRESBYOPIA, RIGHT: Status: ACTIVE | Noted: 2018-08-28

## 2023-08-21 PROBLEM — Z74.09 IMPAIRED MOBILITY AND ACTIVITIES OF DAILY LIVING: Status: ACTIVE | Noted: 2023-08-21

## 2023-08-21 PROBLEM — K59.2 NEUROGENIC BOWEL: Status: ACTIVE | Noted: 2023-08-21

## 2023-08-21 PROBLEM — G47.00 INSOMNIA: Status: ACTIVE | Noted: 2018-03-07

## 2023-08-21 PROBLEM — H52.201 HYPEROPIA WITH ASTIGMATISM AND PRESBYOPIA, RIGHT: Status: ACTIVE | Noted: 2018-08-28

## 2023-08-21 PROBLEM — J32.9 SINUSITIS: Status: ACTIVE | Noted: 2023-08-21

## 2023-08-21 PROBLEM — H52.4 HYPEROPIA WITH ASTIGMATISM AND PRESBYOPIA, RIGHT: Status: ACTIVE | Noted: 2018-08-28

## 2023-08-21 PROBLEM — Z78.9 IMPAIRED MOBILITY AND ACTIVITIES OF DAILY LIVING: Status: ACTIVE | Noted: 2023-08-21

## 2023-08-21 PROBLEM — N40.0 BENIGN PROSTATIC HYPERPLASIA WITHOUT URINARY OBSTRUCTION: Status: ACTIVE | Noted: 2023-08-21

## 2023-08-21 PROBLEM — F17.200 CURRENT SMOKER: Status: ACTIVE | Noted: 2022-08-02

## 2023-08-21 PROBLEM — N31.9 NEUROGENIC BLADDER: Status: ACTIVE | Noted: 2023-08-21

## 2023-08-21 LAB
ALBUMIN SERPL-MCNC: 3.93 G/DL (ref 3.75–5.01)
ALPHA1 GLOB SERPL ELPH-MCNC: 0.48 G/DL (ref 0.19–0.46)
ALPHA2 GLOB SERPL ELPH-MCNC: 1.06 G/DL (ref 0.48–1.05)
B-GLOBULIN SERPL ELPH-MCNC: 0.81 G/DL (ref 0.48–1.1)
EKG ATRIAL RATE: 82 BPM
EKG P AXIS: 89 DEGREES
EKG P-R INTERVAL: 138 MS
EKG Q-T INTERVAL: 438 MS
EKG QRS DURATION: 136 MS
EKG QTC CALCULATION (BAZETT): 511 MS
EKG R AXIS: -58 DEGREES
EKG T AXIS: 86 DEGREES
EKG VENTRICULAR RATE: 82 BPM
GAMMA GLOB SERPL ELPH-MCNC: 0.61 G/DL (ref 0.62–1.51)
INTERPRETATION SERPL IFE-IMP: ABNORMAL
PROT SERPL-MCNC: 6.9 G/DL (ref 6.3–8.2)
PROTEIN ELECTROPHORESIS, SERUM: ABNORMAL

## 2023-08-21 PROCEDURE — 3430000000 HC RX DIAGNOSTIC RADIOPHARMACEUTICAL: Performed by: INTERNAL MEDICINE

## 2023-08-21 PROCEDURE — 97535 SELF CARE MNGMENT TRAINING: CPT

## 2023-08-21 PROCEDURE — 55700 US BIOPSY PROSTATE NEEDLE/PUNCH: CPT | Performed by: RADIOLOGY

## 2023-08-21 PROCEDURE — 1210000000 HC MED SURG R&B

## 2023-08-21 PROCEDURE — 6360000002 HC RX W HCPCS: Performed by: RADIOLOGY

## 2023-08-21 PROCEDURE — 93010 ELECTROCARDIOGRAM REPORT: CPT | Performed by: INTERNAL MEDICINE

## 2023-08-21 PROCEDURE — 76942 ECHO GUIDE FOR BIOPSY: CPT | Performed by: RADIOLOGY

## 2023-08-21 PROCEDURE — 6370000000 HC RX 637 (ALT 250 FOR IP): Performed by: RADIOLOGY

## 2023-08-21 PROCEDURE — 88305 TISSUE EXAM BY PATHOLOGIST: CPT

## 2023-08-21 PROCEDURE — 6360000002 HC RX W HCPCS: Performed by: INTERNAL MEDICINE

## 2023-08-21 PROCEDURE — 2580000003 HC RX 258: Performed by: INTERNAL MEDICINE

## 2023-08-21 PROCEDURE — 6370000000 HC RX 637 (ALT 250 FOR IP)

## 2023-08-21 PROCEDURE — 2500000003 HC RX 250 WO HCPCS: Performed by: RADIOLOGY

## 2023-08-21 PROCEDURE — 78306 BONE IMAGING WHOLE BODY: CPT | Performed by: INTERNAL MEDICINE

## 2023-08-21 PROCEDURE — A9503 TC99M MEDRONATE: HCPCS | Performed by: INTERNAL MEDICINE

## 2023-08-21 PROCEDURE — 6360000002 HC RX W HCPCS: Performed by: UROLOGY

## 2023-08-21 PROCEDURE — 88342 IMHCHEM/IMCYTCHM 1ST ANTB: CPT

## 2023-08-21 PROCEDURE — 99222 1ST HOSP IP/OBS MODERATE 55: CPT | Performed by: PHYSICAL MEDICINE & REHABILITATION

## 2023-08-21 PROCEDURE — 99232 SBSQ HOSP IP/OBS MODERATE 35: CPT | Performed by: UROLOGY

## 2023-08-21 PROCEDURE — 6370000000 HC RX 637 (ALT 250 FOR IP): Performed by: INTERNAL MEDICINE

## 2023-08-21 PROCEDURE — 99233 SBSQ HOSP IP/OBS HIGH 50: CPT

## 2023-08-21 PROCEDURE — 76942 ECHO GUIDE FOR BIOPSY: CPT

## 2023-08-21 PROCEDURE — 2709999900 US BIOPSY PROSTATE NEEDLE/PUNCH

## 2023-08-21 RX ORDER — OXYCODONE HYDROCHLORIDE 5 MG/1
5 CAPSULE ORAL EVERY 4 HOURS PRN
Status: DISCONTINUED | OUTPATIENT
Start: 2023-08-21 | End: 2023-08-22 | Stop reason: HOSPADM

## 2023-08-21 RX ORDER — ACETAMINOPHEN 650 MG/1
650 SUPPOSITORY RECTAL 2 TIMES DAILY
Status: DISCONTINUED | OUTPATIENT
Start: 2023-08-21 | End: 2023-08-22 | Stop reason: HOSPADM

## 2023-08-21 RX ORDER — TC 99M MEDRONATE 20 MG/10ML
25 INJECTION, POWDER, LYOPHILIZED, FOR SOLUTION INTRAVENOUS
Status: COMPLETED | OUTPATIENT
Start: 2023-08-21 | End: 2023-08-21

## 2023-08-21 RX ORDER — MIDAZOLAM HYDROCHLORIDE 1 MG/ML
INJECTION INTRAMUSCULAR; INTRAVENOUS PRN
Status: COMPLETED | OUTPATIENT
Start: 2023-08-21 | End: 2023-08-21

## 2023-08-21 RX ORDER — BUSPIRONE HYDROCHLORIDE 5 MG/1
5 TABLET ORAL 2 TIMES DAILY
Status: DISCONTINUED | OUTPATIENT
Start: 2023-08-21 | End: 2023-08-22 | Stop reason: HOSPADM

## 2023-08-21 RX ORDER — BACLOFEN 10 MG/1
5 TABLET ORAL 2 TIMES DAILY
Status: DISCONTINUED | OUTPATIENT
Start: 2023-08-21 | End: 2023-08-22 | Stop reason: HOSPADM

## 2023-08-21 RX ORDER — ACETAMINOPHEN 325 MG/1
650 TABLET ORAL 2 TIMES DAILY
Status: DISCONTINUED | OUTPATIENT
Start: 2023-08-21 | End: 2023-08-22 | Stop reason: HOSPADM

## 2023-08-21 RX ORDER — LIDOCAINE HYDROCHLORIDE 20 MG/ML
INJECTION, SOLUTION INFILTRATION; PERINEURAL PRN
Status: COMPLETED | OUTPATIENT
Start: 2023-08-21 | End: 2023-08-21

## 2023-08-21 RX ADMIN — FLUTICASONE PROPIONATE 2 SPRAY: 50 SPRAY, METERED NASAL at 09:36

## 2023-08-21 RX ADMIN — BACLOFEN 5 MG: 10 TABLET ORAL at 13:19

## 2023-08-21 RX ADMIN — DEXAMETHASONE SODIUM PHOSPHATE 4 MG: 10 INJECTION INTRAMUSCULAR; INTRAVENOUS at 15:53

## 2023-08-21 RX ADMIN — LEVOFLOXACIN 500 MG: 5 INJECTION, SOLUTION INTRAVENOUS at 09:35

## 2023-08-21 RX ADMIN — ACETAMINOPHEN 650 MG: 325 TABLET ORAL at 13:19

## 2023-08-21 RX ADMIN — TAMSULOSIN HYDROCHLORIDE 0.4 MG: 0.4 CAPSULE ORAL at 09:23

## 2023-08-21 RX ADMIN — DEXAMETHASONE SODIUM PHOSPHATE 4 MG: 10 INJECTION INTRAMUSCULAR; INTRAVENOUS at 20:42

## 2023-08-21 RX ADMIN — BACLOFEN 5 MG: 10 TABLET ORAL at 20:42

## 2023-08-21 RX ADMIN — Medication 10 ML: at 20:43

## 2023-08-21 RX ADMIN — AMLODIPINE BESYLATE 5 MG: 5 TABLET ORAL at 09:23

## 2023-08-21 RX ADMIN — DEXAMETHASONE SODIUM PHOSPHATE 4 MG: 10 INJECTION INTRAMUSCULAR; INTRAVENOUS at 09:23

## 2023-08-21 RX ADMIN — ACETAMINOPHEN 650 MG: 325 TABLET ORAL at 20:42

## 2023-08-21 RX ADMIN — PANTOPRAZOLE SODIUM 40 MG: 40 TABLET, DELAYED RELEASE ORAL at 06:08

## 2023-08-21 RX ADMIN — OXYCODONE HYDROCHLORIDE 5 MG: 5 CAPSULE ORAL at 17:48

## 2023-08-21 RX ADMIN — LIDOCAINE HYDROCHLORIDE 10 ML: 20 INJECTION, SOLUTION INFILTRATION; PERINEURAL at 10:39

## 2023-08-21 RX ADMIN — TRAZODONE HYDROCHLORIDE 150 MG: 150 TABLET ORAL at 20:42

## 2023-08-21 RX ADMIN — DEXAMETHASONE SODIUM PHOSPHATE 4 MG: 10 INJECTION INTRAMUSCULAR; INTRAVENOUS at 04:17

## 2023-08-21 RX ADMIN — Medication 1 ENEMA: at 06:07

## 2023-08-21 RX ADMIN — MIDAZOLAM 0.5 MG: 1 INJECTION, SOLUTION INTRAMUSCULAR; INTRAVENOUS at 10:28

## 2023-08-21 RX ADMIN — Medication 10 ML: at 09:27

## 2023-08-21 RX ADMIN — LOSARTAN POTASSIUM 25 MG: 25 TABLET, FILM COATED ORAL at 09:23

## 2023-08-21 RX ADMIN — TC 99M MEDRONATE 29.8 MILLICURIE: 20 INJECTION, POWDER, LYOPHILIZED, FOR SOLUTION INTRAVENOUS at 08:44

## 2023-08-21 RX ADMIN — ATENOLOL 25 MG: 25 TABLET ORAL at 09:23

## 2023-08-21 RX ADMIN — ATORVASTATIN CALCIUM 10 MG: 10 TABLET, FILM COATED ORAL at 20:42

## 2023-08-21 RX ADMIN — BUSPIRONE HYDROCHLORIDE 5 MG: 5 TABLET ORAL at 20:42

## 2023-08-21 ASSESSMENT — PAIN DESCRIPTION - ORIENTATION
ORIENTATION: MID
ORIENTATION: MID
ORIENTATION: LOWER
ORIENTATION: MID

## 2023-08-21 ASSESSMENT — ENCOUNTER SYMPTOMS
DIARRHEA: 0
CONSTIPATION: 1
SHORTNESS OF BREATH: 0
ABDOMINAL PAIN: 0
VOMITING: 0
BACK PAIN: 1
COUGH: 0
NAUSEA: 0
TROUBLE SWALLOWING: 1
EYES NEGATIVE: 1
COUGH: 1
CONSTIPATION: 0

## 2023-08-21 ASSESSMENT — PAIN DESCRIPTION - FREQUENCY
FREQUENCY: CONTINUOUS
FREQUENCY: CONTINUOUS

## 2023-08-21 ASSESSMENT — PAIN SCALES - GENERAL
PAINLEVEL_OUTOF10: 5
PAINLEVEL_OUTOF10: 4
PAINLEVEL_OUTOF10: 3
PAINLEVEL_OUTOF10: 2
PAINLEVEL_OUTOF10: 5

## 2023-08-21 ASSESSMENT — PAIN DESCRIPTION - ONSET: ONSET: ON-GOING

## 2023-08-21 ASSESSMENT — PAIN DESCRIPTION - LOCATION
LOCATION: BACK;CHEST
LOCATION: BACK
LOCATION: ABDOMEN;BACK
LOCATION: BACK

## 2023-08-21 ASSESSMENT — PAIN DESCRIPTION - DESCRIPTORS
DESCRIPTORS: DULL
DESCRIPTORS: ACHING;DULL
DESCRIPTORS: ACHING
DESCRIPTORS: ACHING

## 2023-08-21 ASSESSMENT — PAIN DESCRIPTION - PAIN TYPE
TYPE: CHRONIC PAIN
TYPE: CHRONIC PAIN

## 2023-08-21 ASSESSMENT — PAIN - FUNCTIONAL ASSESSMENT: PAIN_FUNCTIONAL_ASSESSMENT: PREVENTS OR INTERFERES SOME ACTIVE ACTIVITIES AND ADLS

## 2023-08-21 NOTE — CONSULTS
Physical Medicine & Rehabilitation  Consult Note      Admitting Physician: Viola Sheriff MD    Primary Care Provider: Victor M Calderon MD     Reason for Consult:  Asses rehab needs, promote physical and mental function, analyze level of care to determine rehab needs, improve ability to actively participate in the rehabilitation process, and decrease likelihood of re-admit to the hospital after discharge. History of Present Illness:    Almaz Hickey is a 81 y.o. male admitted to [facility] on 8/17/2023. Patient was admitted through the emergency room [facility] in 8/17/2023 for evaluation of acute on chronic low back pain. Patient has been seen 3 times in the past 3 weeks in the emergency room. 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 He was found to have malignant lesion of the spine causing compression with significant pain and with progressive neurologic findings. He was admitted under the care of the hospitalist with hematology oncology, respiratory care, radiation therapy, neuro spine consulting. Back Pain  This is a chronic problem. The current episode started more than 1 month ago. The problem occurs constantly. The problem is unchanged. The pain is present in the lumbar spine and thoracic spine. The quality of the pain is described as aching and burning. The pain is at a severity of 9/10. Associated symptoms include numbness and weakness. Pertinent negatives include no chest pain. Risk factors include sedentary lifestyle and history of cancer. He has tried heat, analgesics, bed rest, ice, NSAIDs and home exercises for the symptoms. The treatment provided mild relief.    Neurologic Problem  The patient's primary symptoms 1409)  Transfers:  Transfers  Sit to Stand: Minimal Assistance (08/20/23 0829)  Stand to Sit: Minimal Assistance (08/20/23 0829)  Comment: Decreased control, Min lifting assistance. VCs for safe hand placements (08/20/23 0829)  Gait:   Ambulation  Surface: Level tile (08/20/23 0829)  Device: Crow Gallon (08/20/23 3543)  Assistance: Moderate assistance (08/20/23 0829)  Quality of Gait: ataxic with decreased LE control, varying Viktor knee stability (08/20/23 0829)  Gait Deviations: Slow Alesha;Decreased step length;Decreased step height;Staggers (08/19/23 0847)  Distance: 5ft to chair (08/20/23 0829)  Comments: 2nd person used as SBA (08/20/23 0829)  Stairs:     W/C mobility:         Occupational therapy:   Hand Dominance: Right  ADL  Feeding: Independent (08/20/23 1448)  Grooming: Setup (08/20/23 1448)  UE Bathing: Setup (08/20/23 1448)  LE Bathing: Maximum assistance (08/20/23 1448)  UE Dressing: Setup (08/20/23 1448)  LE Dressing: Maximum assistance (08/20/23 1448)  Toileting: Unable to assess(comment) (08/20/23 1448)  Toileting Skilled Clinical Factors: burnham in place, declined BM (08/20/23 1448)  Toilet Transfers  Toilet Transfer: Unable to assess (08/20/23 1447)  Toilet Transfers Comments: burnham cath in place (08/20/23 1447)            Speech therapy:            Diet/Swallow:                         COGNITION  OT:    SP: Re-evals pending      Past Medical History:        Diagnosis Date    Anxiety     COPD (chronic obstructive pulmonary disease) ([address])     Hyperlipidemia     Hypertension     Kidney stone          PastSurgical History:    No past surgical history on file.       Allergies:  No Known Allergies       CurrentMedications:   Current Facility-Administered Medications: ipratropium 0.5 mg-albuterol 2.5 mg (DUONEB) nebulizer solution 1 Dose, 1 Dose, Inhalation, Q4H PRN  calcium carbonate (TUMS) chewable tablet 750 mg, 750 mg, Oral, TID PRN  levoFLOXacin (LEVAQUIN) 500 MG/100ML infusion 500 mg,

## 2023-08-21 NOTE — CARE COORDINATION
PT OFF UNIT. REHAB REFERRAL ORDER WAS PLACED. WILL F/U WITH PATIENT ONCE RETURN TO ROOM. KAY FROM [facility] REHAB FOLLOWING.

## 2023-08-21 NOTE — PROGRESS NOTES
FIB  24 Hour Urine for Protein:  No components found for: Roxanne Patel, UTV3  PSA:   Lab Results   Component Value Date/Time    .50 08/18/2023 05:16 AM           RADIOLOGY RESULTS:  CT THORACIC SPINE WO CONTRAST    Result Date: 8/15/2023  EXAMINATION: CT OF THE THORACIC SPINE WITHOUT CONTRAST  8/15/2023 11:31 am: TECHNIQUE: CT of the thoracic spine was performed without the administration of intravenous contrast. Multiplanar reformatted images are provided for review. Automated exposure control, iterative reconstruction, and/or weight based adjustment of the mA/kV was utilized to reduce the radiation dose to as low as reasonably achievable. COMPARISON: None. HISTORY: ORDERING SYSTEM PROVIDED HISTORY: mid back pain TECHNOLOGIST PROVIDED HISTORY: Reason for exam:->mid back pain What reading provider will be dictating this exam?->CRC FINDINGS: Vertebral bodies normal in alignment. Kyphosis with apex T5-T6. Diffuse disc space narrowing thoracic spine. Diffuse foci of osteoblastic change, T1 and T4, with smaller foci of osteoblastic and osteolytic change scattered throughout the thoracic spine. In addition, osteoblastic change may be found within imaged ribs bilaterally throughout the thoracic spine. Mild anterior wedge compression T11 and T12. Limited imaging bilateral lung zones without anomaly. Diffuse osteoblastic and osteolytic lesions, thoracic spine. Malignancy diagnosis of exclusion. Mild compression fractures T11 and T12. Given above findings, pathologic fracture not excluded. Moderate diffuse degenerative changes thorax is spine with kyphosis as discussed.      MRI THORACIC SPINE W WO CONTRAST    Result Date: 8/17/2023  EXAMINATION: MRI OF THE LUMBAR SPINE WITHOUT AND WITH CONTRAST; MRI OF THE THORACIC SPINE WITHOUT AND WITH CONTRAST  8/17/2023 4:38 pm TECHNIQUE: Multiplanar multisequence MRI of the lumbar spine was performed without and with the administration of intravenous contrast.; pulmonary arteries. 3 month follow-up is recommended for stability. There is an additional a 5 mm noncalcified nodule seen in the periphery of the right lower lobe. Atelectatic changes seen in the lingula. No evidence of pleural effusion or pneumothorax. Soft Tissues/Bones: Multilevel degenerative changes identified throughout the spine. No acute chest wall abnormality. Scattered areas of sclerosis seen throughout the thoracic vertebral body levels as well as within several of the ribs to suggest possibly a diffuse bony metastatic process. Whole body bone scan is recommended for further evaluation. CTA ABDOMEN: Abdominal aorta/Branches: There is minimal atherosclerotic disease seen within the abdominal aorta. Mesenteric vessels are patent. No significant atherosclerotic disease or significant stenosis of the origin of the mesenteric vessels. There is an accessory renal artery identified of the kidneys bilaterally. The LYDIA is patent. Organs: The liver is homogeneous in appearance. No underlying mass or lesion. No stones in the gallbladder. The pancreas is homogeneous. The spleen is unremarkable. Small splenule identified. Cyst identified on the left kidney. Nonobstructing stones seen in the left kidney. No distension identified of the renal collecting systems bilaterally. GI/Bowel: The stomach is unremarkable. Some fluid-filled loops of small bowel to suggest possibly a mild enteritis. No wall thickening. The appendix is normal.  Stool seen scattered diffusely throughout the colon with multiple diverticulum identified predominantly within the sigmoid colon. No signs of inflammation. Peritoneum/Retroperitoneum: No abdominal or retroperitoneal lymphadenopathy. No free fluid or free air. No abnormal mass or fluid collections identified. Bones/Soft Tissues: Bony structures reveal degenerative changes seen within the spine and pelvis.   There is a focal area of sclerosis involving the sacroiliac

## 2023-08-21 NOTE — CARE COORDINATION
Attempted to meet with patient to discuss acute inpatient rehab. Patient off unit. Will meet with patient when he returns to room.   Electronically signed by Alena Nowak on 8/21/2023 at 10:22 AM

## 2023-08-21 NOTE — CARE COORDINATION
[facility] Pre-Admission Screening Document      Patient Name: Echo Manzanares       MRN: [id number]    : 1935    Age: 81 y.o. Gender: male   Payor: Payor: MEDICARE / Plan: MEDICARE PART A AND B / Product Type: *No Product type* /   MSSP: Yes    Admitted from: [facility] Floor: 1W  Attending Care Provider: Brenda Dunlap MD  Inpatient Rehab Referring Care Provider: Dr. Lacey Quesada  Primary Care Provider: Denys Pacheco MD  Inpatient Treatment Team including Consults: Treatment Team: Attending Provider: Brenda Dunlap MD; Consulting Physician: Grover Hernandes MD; Consulting Physician: Vee Duncan MD; Consulting Physician: Ganga Epstein MD; Consulting Physician: Juan Carlos Solis MD; Consulting Physician: Megan Yadav MD; : Diego Moser RN; Utilization Reviewer: Wilfredo Trejo RN; Registered Nurse: Alek Sparks RN; : Aury Marie RN    Reason for Hospitalization:   1. Compression fracture of body of thoracic vertebra (HCC)    2. Lytic bone lesions on xray    3. Urinary retention    4. Elevated PSA      Chief Complaint   Patient presents with    Back Pain     Isolation:No active isolations    Hospital Course:  Admit Date: 2023 10:22 AM  Inpatient Rehab Referral Date: 23  Narrative of hospital course/history of present illness: Patient to ER 8/15 with uncontrolled pain in his mid, upper back. He has been dealing with this for the last 2 to 3 months however it has been severe in the last week. He was seen in the ED a few days ago and was discharged with Shira Mcwilliams however his pain continued to worsen. He lives alone and his wife is in a memory care unit. 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 He was given referral to oncology however had to return to the ED prior to this appointment.  He was also found bed, x3 from chair. Requires Mod A to stand during first attempt, ability improves with practice. (08/21/23 1534)  Gait:   Ambulation  Surface: Level tile (08/21/23 1535)  Device: Rolling Walker (08/21/23 1535)  Assistance:  Moderate assistance;Minimal assistance (08/21/23 1535)  Quality of Gait: ataxic with decreased LE control, inconsistent step length/ foot clearance, pelvic instability, absent heel strike, unsteady (08/21/23 1535)  Gait Deviations: Slow Alesha;Decreased step length;Decreased step height;Staggers (08/19/23 0847)  Distance: 5ft to chair (08/21/23 1535)  Comments: 2nd person used as SBA (08/20/23 0829)  Stairs:     W/C mobility:         Occupational Therapy  Hand Dominance: Right  ADL  Feeding: Independent (08/20/23 1448)  Grooming: Setup (08/20/23 1448)  UE Bathing: Setup (08/20/23 1448)  LE Bathing: Maximum assistance (08/20/23 1448)  UE Dressing: Setup (08/20/23 1448)  LE Dressing: Maximum assistance (08/20/23 1448)  Toileting: Unable to assess(comment) (08/20/23 1448)  Toileting Skilled Clinical Factors: burnham in place, declined BM (08/20/23 1448)  Toilet Transfers  Toilet Transfer: Unable to assess (08/20/23 1447)  Toilet Transfers Comments: burnham cath in place (08/20/23 1447)            Speech Language Pathology            Diet/Swallow:                     Current Conditions Requiring Inpatient Rehabilitation  Bowel/Bladder Dysfunction: Yes  Intervention Required = Frequent toileting, Wean Burnham, and Check post void residual  Risk for Medical/Clinical Complications = moderate  Skin Healing/Breakdown Risk: Yes  Intervention Required = Side to side turns  Risk for Medical/Clinical Complications = moderate  Nutrition/Hydration Deficiency: Yes  Intervention Required = Monitor I&Os and Dietary Eval  Risk for Medical/Clinical Complications = moderate  Medical Comorbidities: Yes  Intervention Required = DVT risk, CAD, and Renal disease  Risk for Medical/Clinical Complications =

## 2023-08-21 NOTE — PROGRESS NOTES
Physical Therapy Med Surg Daily Treatment Note  Facility/Department: [facility]  Room: [place] [address]       NAME: Dae Nathan  : 1935 (81 y.o.)  MRN: [id number]  CODE STATUS: Full Code    Date of Service: 2023    Patient Diagnosis(es): Urinary retention [R33.9]  Intractable back pain [M54.9]  Lytic bone lesions on xray [M89.9]  Compression fracture of body of thoracic vertebra [facility]) [S22.000A]   Chief Complaint   Patient presents with    Back Pain     Patient Active Problem List    Diagnosis Date Noted    Benign prostatic hyperplasia without urinary obstruction 2023    Sinusitis 2023    Impaired mobility and activities of daily living dt 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  .  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[address]) 2023    Intractable back pain 2023    Compression fracture of body of thoracic vertebra ([address]) 2023    Current smoker 2022    Kidney stone     Hyperopia with astigmatism and presbyopia, right 2018    Secondary hypertension     Hyperlipidemia     COPD (chronic obstructive pulmonary disease) ([address])     Anxiety     Insomnia 2018        Past Medical History:   Diagnosis Date    Anxiety     COPD (chronic obstructive pulmonary disease) ([address])     Hyperlipidemia     Hypertension     Kidney stone      Past Surgical History:   Procedure Laterality Date    PROSTATE BIOPSY  2023    U/S guided prostate biopsy completed by Dr. Perlita Hewitt Reviewed: Yes  Family / Caregiver Present:

## 2023-08-21 NOTE — CARE COORDINATION
Inpatient Rehab referral received. Met with patient and explained [facility] Acute Inpatient Rehab program and requirements, including 3 hours of intense therapy daily, anticipated length of stay and goal of discharge to home. All questions answered and patient verbalized understanding. Patient from home and was independent prior to hospitalization. Patient wishes to return home once stronger. Freedom of choice provided and patient requests admit to [facility] pending medical/ consulted physicians clearance. PM&R completed.     Electronically signed by Kike Cee on 8/21/2023 at 1:56 PM

## 2023-08-21 NOTE — DISCHARGE INSTRUCTIONS
HOME GOING INSTRUCTIONS FOLLOWING PROSTATE BIOPSY    1. ACTIVITY   Rest and avoid strenuous activity, such as bending or lifting heavy objects. 2. DIET   Resume usual diet    3. MEDICATIONS   A. Resume home medications unless otherwise indicated by your physician. B. May take non-aspirin pain reliever as needed. C.  Continue antibiotic per physician's prescription. Comments:    A small amount of pain after the biopsy should stay the same or begin to    lessen and should be easily controlled with mild medication. 4. DRAINAGE   Some blood in the urine, semen, or from the rectum is expected and may last for   three days after the biopsy. 5. IF YOU DEVELOP ANY OF THE FOLLOWING SYMPTOMS, CONTACT EITHER       YOUR FAMILY DOCTOR OR REPORT TO THE EMERGENCY ROOM FOR       FURTHER EVALUATION:   A. Extreme pain   B. Active bleeding   C. Temperature of 100 degrees F and above   D. Pus in the urine    6. OTHER INSTRUCTIONS   If your doctor has not notified you in one week regarding the results of your   biopsy, please call his office.

## 2023-08-21 NOTE — OR NURSING
1011 - Pt arrived to [facility] via cart. Ultrasound Guided Prostate Biopsy procedure explained, consent confirmed. VSS, on RA. Pt transferred to procedure table and placed on his left side, right side up. U/S tech obtained prostate images. Dr. Wilfredo Quintero arrived. 1024 - Time-out completed for Ultrasound Guided Prostate Biopsy. 1028 - Versed 0.5mg administered per verbal order Dr. Wilfredo Quintero. Patient placed on oxygen at 2L NC with ETCO2 and tele monitoring. 1039 - 2% Lidocaine administered by Dr. Wilfredo Quintero, see eMAR. At procedure start, Dr. Wilfredo Quintero inserted probe into rectum. Pt tolerating well with support. Using aseptic technique, Dr. Wilfredo Quintero extracted each specimen using F[employer]S 18g x 20cm Disposable Core Biopsy Instrument Kit. 1041 - Lesion in Right Peripheral Base x2 specimens extracted. 1045 - Right Base Lateral x1 specimen extracted. 1046 - Right Base Medial x1 specimen extracted. 1047 - Right Mid Lateral x1 specimen extracted. 1049 - Right Mid Medial x1 specimen extracted. 1050 - Right [place] x2 specimen extracted. 1053 - Left Base Lateral x1 specimen extracted. 1054 - Left Base Medial x1 specimen extracted. 1055 - Left Mid Lateral x1 specimen extracted. 1056 - Left Mid Medial x1 specimen extracted. 1057 - Left [place] x2 specimens extracted. 1101 - 3rd sample of Lesion in Right Peripheral Base obtained and placed into formulin cup with other 2 samples. 1103 -All 15 specimens removed were placed into individual formulin cups. Probe remained in place to maintain pressure. 1108 - Probe discontinued. Procedure complete. Pt tolerated procedure well.  Transferred back onto cart and report called to [facility], RN on [address]. Patient taken to nuclear medicine to complete bone scan test. Electronically signed by Governor RICHARD Elise on 8/21/2023 at 11:19 AM

## 2023-08-21 NOTE — FLOWSHEET NOTE
Pt arrived to CT holding. Pt already in gown. Pt hooked up to vitals sign machine. VSS. Medical history, allergies, and home medications reviewed with patient. Consents reviewed with patient and signed. Pt resting on cart in stable condition. Dr. Ivan Valentin notified patient ready to be seen. Pt states he ate breakfast this am, coffee, eggs, and pancakes. Electronically signed by Kelechi Alvarado RN on 8/21/2023 at 9:57 AM       Pt taken to special procedures.  Electronically signed by Kelechi Alvarado RN on 8/21/2023 at 10:09 AM

## 2023-08-21 NOTE — PROGRESS NOTES
Hematology/Oncology   Progress Note        CHIEF COMPLAINT/HPI:  Follow up of suspected prostate cancer. Prostate biopsy done today. Had XRT to T3-4 last Friday. Pain is improved. On decadron. Leucocytosis is from decadron. REVIEW OF SYSTEMS:    Unremarkable except for symptoms mentioned in HPI.     Current Inpatient Medications:    Current Facility-Administered Medications   Medication Dose Route Frequency Provider Last Rate Last Admin    busPIRone (BUSPAR) tablet 5 mg  5 mg Oral BID JORDAN Abel - CNP        oxyCODONE capsule 5 mg  5 mg Oral Q4H PRN JORDAN Abel - CNP        baclofen (LIORESAL) tablet 5 mg  5 mg Oral BID JORDAN Abel - RUBÉN        acetaminophen (TYLENOL) tablet 650 mg  650 mg Oral BID JORDAN Abel CNP        Or    acetaminophen (TYLENOL) suppository 650 mg  650 mg Rectal BID JORDAN Abel - RUBÉN        ipratropium 0.5 mg-albuterol 2.5 mg (DUONEB) nebulizer solution 1 Dose  1 Dose Inhalation Q4H PRN Panda Vasquez MD        calcium carbonate (TUMS) chewable tablet 750 mg  750 mg Oral TID PRN Panda Vasquez MD   750 mg at 08/20/23 1356    pantoprazole (PROTONIX) tablet 40 mg  40 mg Oral QAM AC Panda Vasquez MD   40 mg at 08/21/23 3244    morphine (PF) injection 2 mg  2 mg IntraVENous Q4H PRN Baljit Baing, DO   2 mg at 08/18/23 1839    sodium chloride flush 0.9 % injection 5-40 mL  5-40 mL IntraVENous 2 times per day Smiley Valeriano, DO   10 mL at 08/21/23 0021    sodium chloride flush 0.9 % injection 5-40 mL  5-40 mL IntraVENous PRN Baljit Baing, DO        0.9 % sodium chloride infusion   IntraVENous PRN Smiley Valeriano, DO        ondansetron (ZOFRAN-ODT) disintegrating tablet 4 mg  4 mg Oral Q8H PRN Smiley Valeriano, DO        Or    ondansetron [facility]) injection 4 mg  4 mg IntraVENous Q6H PRN Smiley Valeriano, DO        polyethylene glycol (GLYCOLAX) packet 17 g  17 g Oral Daily PRN [facility],         lidocaine 4 % external patch 1 patch  1 patch pulmonary disease) ([address])    Anxiety    Kidney stone    Intractable back pain    Compression fracture of body of thoracic vertebra (HCC)    Elevated PSA    Urinary retention    Lytic bone lesions on xray    Palliative care encounter    Goals of care, counseling/discussion    Advanced care planning/counseling discussion    Severe back pain    Secondary malignant neoplasm of bone (HCC)    Benign prostatic hyperplasia without urinary obstruction    Current smoker    Hyperopia with astigmatism and presbyopia, right    Insomnia    Sinusitis    Impaired mobility and activities of daily living dt 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 Diego Krishnan Neurogenic bladder    Neurogenic bowel    Caregiver stress       PLAN:  Await pathology report.            Electronically signed by Uziel Arenas MD on 8/21/23 at 4:57 PM EDT

## 2023-08-22 ENCOUNTER — HOSPITAL ENCOUNTER (INPATIENT)
Age: 88
LOS: 18 days | Discharge: HOME HEALTH | End: 2023-09-09
Attending: PHYSICAL MEDICINE & REHABILITATION | Admitting: PHYSICAL MEDICINE & REHABILITATION
Payer: MEDICARE

## 2023-08-22 VITALS
TEMPERATURE: 97.5 F | SYSTOLIC BLOOD PRESSURE: 110 MMHG | HEART RATE: 80 BPM | HEIGHT: 66 IN | DIASTOLIC BLOOD PRESSURE: 54 MMHG | WEIGHT: 162.9 LBS | BODY MASS INDEX: 26.18 KG/M2 | OXYGEN SATURATION: 100 % | RESPIRATION RATE: 18 BRPM

## 2023-08-22 DIAGNOSIS — M89.9 LYTIC BONE LESIONS ON XRAY: ICD-10-CM

## 2023-08-22 DIAGNOSIS — Z78.9 IMPAIRED MOBILITY AND ACTIVITIES OF DAILY LIVING: ICD-10-CM

## 2023-08-22 DIAGNOSIS — M54.9 SEVERE BACK PAIN: ICD-10-CM

## 2023-08-22 DIAGNOSIS — C79.51 PAIN FROM BONE METASTASES (HCC): ICD-10-CM

## 2023-08-22 DIAGNOSIS — S22.000A COMPRESSION FRACTURE OF BODY OF THORACIC VERTEBRA (HCC): Primary | ICD-10-CM

## 2023-08-22 DIAGNOSIS — G89.3 PAIN FROM BONE METASTASES (HCC): ICD-10-CM

## 2023-08-22 DIAGNOSIS — Z74.09 IMPAIRED MOBILITY AND ACTIVITIES OF DAILY LIVING: ICD-10-CM

## 2023-08-22 LAB — SARS-COV-2 RDRP RESP QL NAA+PROBE: NOT DETECTED

## 2023-08-22 PROCEDURE — 6360000002 HC RX W HCPCS: Performed by: INTERNAL MEDICINE

## 2023-08-22 PROCEDURE — 6370000000 HC RX 637 (ALT 250 FOR IP): Performed by: INTERNAL MEDICINE

## 2023-08-22 PROCEDURE — 87635 SARS-COV-2 COVID-19 AMP PRB: CPT

## 2023-08-22 PROCEDURE — 99232 SBSQ HOSP IP/OBS MODERATE 35: CPT | Performed by: PHYSICAL MEDICINE & REHABILITATION

## 2023-08-22 PROCEDURE — 2580000003 HC RX 258: Performed by: INTERNAL MEDICINE

## 2023-08-22 PROCEDURE — 6370000000 HC RX 637 (ALT 250 FOR IP)

## 2023-08-22 PROCEDURE — 6370000000 HC RX 637 (ALT 250 FOR IP): Performed by: PHYSICAL MEDICINE & REHABILITATION

## 2023-08-22 PROCEDURE — 51702 INSERT TEMP BLADDER CATH: CPT

## 2023-08-22 PROCEDURE — 99231 SBSQ HOSP IP/OBS SF/LOW 25: CPT | Performed by: PHYSICIAN ASSISTANT

## 2023-08-22 PROCEDURE — 99232 SBSQ HOSP IP/OBS MODERATE 35: CPT | Performed by: NURSE PRACTITIONER

## 2023-08-22 PROCEDURE — 1180000000 HC REHAB R&B

## 2023-08-22 PROCEDURE — 6360000002 HC RX W HCPCS: Performed by: PHYSICAL MEDICINE & REHABILITATION

## 2023-08-22 RX ORDER — DEXAMETHASONE SODIUM PHOSPHATE 10 MG/ML
4 INJECTION INTRAMUSCULAR; INTRAVENOUS EVERY 8 HOURS
Status: DISCONTINUED | OUTPATIENT
Start: 2023-08-22 | End: 2023-08-22 | Stop reason: HOSPADM

## 2023-08-22 RX ORDER — IPRATROPIUM BROMIDE AND ALBUTEROL SULFATE 2.5; .5 MG/3ML; MG/3ML
1 SOLUTION RESPIRATORY (INHALATION) EVERY 4 HOURS PRN
Status: CANCELLED | OUTPATIENT
Start: 2023-08-22

## 2023-08-22 RX ORDER — DEXAMETHASONE SODIUM PHOSPHATE 10 MG/ML
4 INJECTION, SOLUTION INTRAMUSCULAR; INTRAVENOUS EVERY 8 HOURS
Status: DISCONTINUED | OUTPATIENT
Start: 2023-08-22 | End: 2023-08-24

## 2023-08-22 RX ORDER — TAMSULOSIN HYDROCHLORIDE 0.4 MG/1
0.4 CAPSULE ORAL
Status: DISCONTINUED | OUTPATIENT
Start: 2023-08-22 | End: 2023-09-09 | Stop reason: HOSPADM

## 2023-08-22 RX ORDER — FLUTICASONE PROPIONATE 50 MCG
2 SPRAY, SUSPENSION (ML) NASAL DAILY
Status: DISCONTINUED | OUTPATIENT
Start: 2023-08-23 | End: 2023-09-09 | Stop reason: HOSPADM

## 2023-08-22 RX ORDER — SODIUM CHLORIDE 0.9 % (FLUSH) 0.9 %
5-40 SYRINGE (ML) INJECTION EVERY 12 HOURS SCHEDULED
Status: CANCELLED | OUTPATIENT
Start: 2023-08-22

## 2023-08-22 RX ORDER — TAMSULOSIN HYDROCHLORIDE 0.4 MG/1
0.4 CAPSULE ORAL DAILY
Status: CANCELLED | OUTPATIENT
Start: 2023-08-23

## 2023-08-22 RX ORDER — PANTOPRAZOLE SODIUM 40 MG/1
40 TABLET, DELAYED RELEASE ORAL
Status: CANCELLED | OUTPATIENT
Start: 2023-08-23

## 2023-08-22 RX ORDER — ACETAMINOPHEN 650 MG/1
650 SUPPOSITORY RECTAL 2 TIMES DAILY
Status: CANCELLED | OUTPATIENT
Start: 2023-08-22

## 2023-08-22 RX ORDER — CALCIUM CARBONATE 500 MG/1
750 TABLET, CHEWABLE ORAL 3 TIMES DAILY PRN
Status: CANCELLED | OUTPATIENT
Start: 2023-08-22

## 2023-08-22 RX ORDER — LOSARTAN POTASSIUM 25 MG/1
25 TABLET ORAL DAILY
Status: DISCONTINUED | OUTPATIENT
Start: 2023-08-23 | End: 2023-08-26

## 2023-08-22 RX ORDER — UBIDECARENONE 100 MG
100 CAPSULE ORAL DAILY
Status: DISCONTINUED | OUTPATIENT
Start: 2023-08-22 | End: 2023-09-09 | Stop reason: HOSPADM

## 2023-08-22 RX ORDER — IPRATROPIUM BROMIDE AND ALBUTEROL SULFATE 2.5; .5 MG/3ML; MG/3ML
1 SOLUTION RESPIRATORY (INHALATION) EVERY 4 HOURS PRN
Status: DISCONTINUED | OUTPATIENT
Start: 2023-08-22 | End: 2023-09-09 | Stop reason: HOSPADM

## 2023-08-22 RX ORDER — OXYCODONE HYDROCHLORIDE 5 MG/1
5 CAPSULE ORAL EVERY 4 HOURS PRN
Status: DISCONTINUED | OUTPATIENT
Start: 2023-08-22 | End: 2023-08-25

## 2023-08-22 RX ORDER — TRAZODONE HYDROCHLORIDE 150 MG/1
150 TABLET ORAL NIGHTLY
Status: CANCELLED | OUTPATIENT
Start: 2023-08-22

## 2023-08-22 RX ORDER — POLYETHYLENE GLYCOL 3350 17 G/17G
17 POWDER, FOR SOLUTION ORAL DAILY PRN
Status: DISCONTINUED | OUTPATIENT
Start: 2023-08-22 | End: 2023-09-09 | Stop reason: HOSPADM

## 2023-08-22 RX ORDER — ATENOLOL 25 MG/1
25 TABLET ORAL DAILY
Status: CANCELLED | OUTPATIENT
Start: 2023-08-23

## 2023-08-22 RX ORDER — DEXAMETHASONE SODIUM PHOSPHATE 10 MG/ML
4 INJECTION INTRAMUSCULAR; INTRAVENOUS EVERY 8 HOURS
Status: CANCELLED | OUTPATIENT
Start: 2023-08-22

## 2023-08-22 RX ORDER — OXYCODONE HYDROCHLORIDE 5 MG/1
5 CAPSULE ORAL EVERY 4 HOURS PRN
Status: CANCELLED | OUTPATIENT
Start: 2023-08-22

## 2023-08-22 RX ORDER — BUSPIRONE HYDROCHLORIDE 5 MG/1
5 TABLET ORAL 2 TIMES DAILY
Status: CANCELLED | OUTPATIENT
Start: 2023-08-22

## 2023-08-22 RX ORDER — VITAMIN B COMPLEX
2000 TABLET ORAL
Status: DISCONTINUED | OUTPATIENT
Start: 2023-08-22 | End: 2023-09-09 | Stop reason: HOSPADM

## 2023-08-22 RX ORDER — ACETAMINOPHEN 325 MG/1
650 TABLET ORAL 2 TIMES DAILY
Status: DISCONTINUED | OUTPATIENT
Start: 2023-08-22 | End: 2023-08-22

## 2023-08-22 RX ORDER — ACETAMINOPHEN 325 MG/1
650 TABLET ORAL EVERY 4 HOURS PRN
Status: DISCONTINUED | OUTPATIENT
Start: 2023-08-22 | End: 2023-09-09 | Stop reason: HOSPADM

## 2023-08-22 RX ORDER — ONDANSETRON 4 MG/1
4 TABLET, ORALLY DISINTEGRATING ORAL EVERY 8 HOURS PRN
Status: CANCELLED | OUTPATIENT
Start: 2023-08-22

## 2023-08-22 RX ORDER — SODIUM CHLORIDE 9 MG/ML
INJECTION, SOLUTION INTRAVENOUS PRN
Status: CANCELLED | OUTPATIENT
Start: 2023-08-22

## 2023-08-22 RX ORDER — LIDOCAINE 4 G/G
1 PATCH TOPICAL DAILY
Status: DISCONTINUED | OUTPATIENT
Start: 2023-08-23 | End: 2023-08-22

## 2023-08-22 RX ORDER — CALCIUM CARBONATE 500 MG/1
750 TABLET, CHEWABLE ORAL 3 TIMES DAILY PRN
Status: DISCONTINUED | OUTPATIENT
Start: 2023-08-22 | End: 2023-09-09 | Stop reason: HOSPADM

## 2023-08-22 RX ORDER — AMLODIPINE BESYLATE 5 MG/1
5 TABLET ORAL DAILY
Status: CANCELLED | OUTPATIENT
Start: 2023-08-23

## 2023-08-22 RX ORDER — ACETAMINOPHEN 650 MG/1
650 SUPPOSITORY RECTAL 2 TIMES DAILY
Status: DISCONTINUED | OUTPATIENT
Start: 2023-08-22 | End: 2023-08-22

## 2023-08-22 RX ORDER — CYANOCOBALAMIN 1000 UG/ML
1000 INJECTION, SOLUTION INTRAMUSCULAR; SUBCUTANEOUS WEEKLY
Status: DISCONTINUED | OUTPATIENT
Start: 2023-08-22 | End: 2023-09-09 | Stop reason: HOSPADM

## 2023-08-22 RX ORDER — ATORVASTATIN CALCIUM 10 MG/1
10 TABLET, FILM COATED ORAL NIGHTLY
Status: CANCELLED | OUTPATIENT
Start: 2023-08-22

## 2023-08-22 RX ORDER — BACLOFEN 10 MG/1
5 TABLET ORAL 2 TIMES DAILY
Status: CANCELLED | OUTPATIENT
Start: 2023-08-22

## 2023-08-22 RX ORDER — TAMSULOSIN HYDROCHLORIDE 0.4 MG/1
0.4 CAPSULE ORAL DAILY
Status: DISCONTINUED | OUTPATIENT
Start: 2023-08-23 | End: 2023-08-22

## 2023-08-22 RX ORDER — BUSPIRONE HYDROCHLORIDE 10 MG/1
5 TABLET ORAL 2 TIMES DAILY
Status: DISCONTINUED | OUTPATIENT
Start: 2023-08-22 | End: 2023-09-09 | Stop reason: HOSPADM

## 2023-08-22 RX ORDER — ENEMA 19; 7 G/133ML; G/133ML
1 ENEMA RECTAL DAILY PRN
Status: DISCONTINUED | OUTPATIENT
Start: 2023-08-22 | End: 2023-09-09 | Stop reason: HOSPADM

## 2023-08-22 RX ORDER — ONDANSETRON 2 MG/ML
4 INJECTION INTRAMUSCULAR; INTRAVENOUS EVERY 6 HOURS PRN
Status: DISCONTINUED | OUTPATIENT
Start: 2023-08-22 | End: 2023-09-09 | Stop reason: HOSPADM

## 2023-08-22 RX ORDER — BISACODYL 10 MG
10 SUPPOSITORY, RECTAL RECTAL DAILY PRN
Status: DISCONTINUED | OUTPATIENT
Start: 2023-08-22 | End: 2023-09-09 | Stop reason: HOSPADM

## 2023-08-22 RX ORDER — ATORVASTATIN CALCIUM 10 MG/1
10 TABLET, FILM COATED ORAL NIGHTLY
Status: DISCONTINUED | OUTPATIENT
Start: 2023-08-22 | End: 2023-09-09 | Stop reason: HOSPADM

## 2023-08-22 RX ORDER — SODIUM CHLORIDE 0.9 % (FLUSH) 0.9 %
5-40 SYRINGE (ML) INJECTION PRN
Status: DISCONTINUED | OUTPATIENT
Start: 2023-08-22 | End: 2023-09-09 | Stop reason: HOSPADM

## 2023-08-22 RX ORDER — ACETAMINOPHEN 325 MG/1
650 TABLET ORAL 2 TIMES DAILY
Status: CANCELLED | OUTPATIENT
Start: 2023-08-22

## 2023-08-22 RX ORDER — ATENOLOL 25 MG/1
25 TABLET ORAL DAILY
Status: DISCONTINUED | OUTPATIENT
Start: 2023-08-23 | End: 2023-09-09 | Stop reason: HOSPADM

## 2023-08-22 RX ORDER — SODIUM CHLORIDE 9 MG/ML
INJECTION, SOLUTION INTRAVENOUS PRN
Status: DISCONTINUED | OUTPATIENT
Start: 2023-08-22 | End: 2023-09-09 | Stop reason: HOSPADM

## 2023-08-22 RX ORDER — FLUTICASONE PROPIONATE 50 MCG
2 SPRAY, SUSPENSION (ML) NASAL DAILY
Status: CANCELLED | OUTPATIENT
Start: 2023-08-23

## 2023-08-22 RX ORDER — SODIUM CHLORIDE 0.9 % (FLUSH) 0.9 %
5-40 SYRINGE (ML) INJECTION PRN
Status: CANCELLED | OUTPATIENT
Start: 2023-08-22

## 2023-08-22 RX ORDER — LIDOCAINE 4 G/G
1 PATCH TOPICAL DAILY
Status: CANCELLED | OUTPATIENT
Start: 2023-08-23

## 2023-08-22 RX ORDER — MORPHINE SULFATE 2 MG/ML
2 INJECTION, SOLUTION INTRAMUSCULAR; INTRAVENOUS EVERY 4 HOURS PRN
Status: CANCELLED | OUTPATIENT
Start: 2023-08-22

## 2023-08-22 RX ORDER — ONDANSETRON 4 MG/1
4 TABLET, ORALLY DISINTEGRATING ORAL EVERY 8 HOURS PRN
Status: DISCONTINUED | OUTPATIENT
Start: 2023-08-22 | End: 2023-09-09 | Stop reason: HOSPADM

## 2023-08-22 RX ORDER — LIDOCAINE 4 G/G
3 PATCH TOPICAL DAILY
Status: DISCONTINUED | OUTPATIENT
Start: 2023-08-22 | End: 2023-08-26

## 2023-08-22 RX ORDER — ONDANSETRON 2 MG/ML
4 INJECTION INTRAMUSCULAR; INTRAVENOUS EVERY 6 HOURS PRN
Status: CANCELLED | OUTPATIENT
Start: 2023-08-22

## 2023-08-22 RX ORDER — SODIUM CHLORIDE 0.9 % (FLUSH) 0.9 %
5-40 SYRINGE (ML) INJECTION EVERY 12 HOURS SCHEDULED
Status: DISCONTINUED | OUTPATIENT
Start: 2023-08-22 | End: 2023-09-09 | Stop reason: HOSPADM

## 2023-08-22 RX ORDER — LOSARTAN POTASSIUM 25 MG/1
25 TABLET ORAL DAILY
Status: CANCELLED | OUTPATIENT
Start: 2023-08-23

## 2023-08-22 RX ORDER — PANTOPRAZOLE SODIUM 40 MG/1
40 TABLET, DELAYED RELEASE ORAL
Status: DISCONTINUED | OUTPATIENT
Start: 2023-08-23 | End: 2023-09-09 | Stop reason: HOSPADM

## 2023-08-22 RX ORDER — POLYETHYLENE GLYCOL 3350 17 G/17G
17 POWDER, FOR SOLUTION ORAL DAILY PRN
Status: CANCELLED | OUTPATIENT
Start: 2023-08-22

## 2023-08-22 RX ORDER — BACLOFEN 10 MG/1
5 TABLET ORAL 2 TIMES DAILY
Status: DISCONTINUED | OUTPATIENT
Start: 2023-08-22 | End: 2023-09-09 | Stop reason: HOSPADM

## 2023-08-22 RX ORDER — AMLODIPINE BESYLATE 5 MG/1
5 TABLET ORAL DAILY
Status: DISCONTINUED | OUTPATIENT
Start: 2023-08-23 | End: 2023-09-09 | Stop reason: HOSPADM

## 2023-08-22 RX ADMIN — FLUTICASONE PROPIONATE 2 SPRAY: 50 SPRAY, METERED NASAL at 08:29

## 2023-08-22 RX ADMIN — Medication 100 MG: at 19:25

## 2023-08-22 RX ADMIN — AMLODIPINE BESYLATE 5 MG: 5 TABLET ORAL at 08:28

## 2023-08-22 RX ADMIN — BACLOFEN 5 MG: 10 TABLET ORAL at 08:28

## 2023-08-22 RX ADMIN — BUSPIRONE HYDROCHLORIDE 5 MG: 10 TABLET ORAL at 22:06

## 2023-08-22 RX ADMIN — CYANOCOBALAMIN 1000 MCG: 1000 INJECTION, SOLUTION INTRAMUSCULAR; SUBCUTANEOUS at 19:25

## 2023-08-22 RX ADMIN — BACLOFEN 5 MG: 10 TABLET ORAL at 22:05

## 2023-08-22 RX ADMIN — ACETAMINOPHEN 650 MG: 325 TABLET ORAL at 08:27

## 2023-08-22 RX ADMIN — BUSPIRONE HYDROCHLORIDE 5 MG: 5 TABLET ORAL at 08:28

## 2023-08-22 RX ADMIN — ATENOLOL 25 MG: 25 TABLET ORAL at 08:27

## 2023-08-22 RX ADMIN — Medication 10 ML: at 22:05

## 2023-08-22 RX ADMIN — TRAZODONE HYDROCHLORIDE 150 MG: 50 TABLET ORAL at 22:06

## 2023-08-22 RX ADMIN — PANTOPRAZOLE SODIUM 40 MG: 40 TABLET, DELAYED RELEASE ORAL at 06:25

## 2023-08-22 RX ADMIN — LOSARTAN POTASSIUM 25 MG: 25 TABLET, FILM COATED ORAL at 08:28

## 2023-08-22 RX ADMIN — Medication 2000 UNITS: at 19:25

## 2023-08-22 RX ADMIN — DEXAMETHASONE SODIUM PHOSPHATE 4 MG: 10 INJECTION INTRAMUSCULAR; INTRAVENOUS at 06:25

## 2023-08-22 RX ADMIN — ATORVASTATIN CALCIUM 10 MG: 10 TABLET, FILM COATED ORAL at 22:05

## 2023-08-22 RX ADMIN — DEXAMETHASONE SODIUM PHOSPHATE 4 MG: 10 INJECTION, SOLUTION INTRAMUSCULAR; INTRAVENOUS at 14:46

## 2023-08-22 RX ADMIN — DEXAMETHASONE SODIUM PHOSPHATE 4 MG: 10 INJECTION, SOLUTION INTRAMUSCULAR; INTRAVENOUS at 22:31

## 2023-08-22 RX ADMIN — TAMSULOSIN HYDROCHLORIDE 0.4 MG: 0.4 CAPSULE ORAL at 08:28

## 2023-08-22 RX ADMIN — Medication 10 ML: at 08:27

## 2023-08-22 ASSESSMENT — ENCOUNTER SYMPTOMS
RESPIRATORY NEGATIVE: 1
BACK PAIN: 1
ABDOMINAL PAIN: 1
CONSTIPATION: 0
NAUSEA: 1
DIARRHEA: 0
COUGH: 0

## 2023-08-22 ASSESSMENT — PAIN SCALES - GENERAL
PAINLEVEL_OUTOF10: 4
PAINLEVEL_OUTOF10: 0

## 2023-08-22 NOTE — PROGRESS NOTES
Assessment completed, pt sitting up in chair, pm meds given, new IV placed to right ac, denies further needs, will continue to monitor

## 2023-08-22 NOTE — PROGRESS NOTES
Palliative Care Progress Note  Patient: Echo Manzanares  Gender: male  YOB: 1935  Unit/Bed: D607/Q987-98  CodeStatus: Full Code  Inpatient Treatment Team: Treatment Team: Attending Provider: Brenda Dunlap MD; Consulting Physician: Grover Hernandes MD; Consulting Physician: Vee Duncan MD; Consulting Physician: Ganga Epstein MD; Consulting Physician: Juan Carlos Solis MD; Consulting Physician: Megan Yadav MD; Utilization Reviewer: Wilfredo Trejo RN; Registered Nurse: Alek Sparks, RN; : Aury Marie, RN; : Diego Moser, RICHARD  Admit Date:  8/17/2023    Chief Complaint: Back pain    History of Presenting Illness:      Echo Manzaanres is a 81 y.o. male on hospital day 5 with a history of tobacco use, copd, esophageal issues, HTN. Patient was brought to the emergency room with uncontrolled back pain. He reports it has been ongoing for 2-3 months but has worsened in the last week. 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 Patient was admitted for intractable back pain secondary to bony metastasis and urinary retention. Palliative care was consulted for goals of care, CODE STATUS discussion, family support, and symptom management. Patient is from home alone. He is up independently. Over the last week he has not increased weakness, fatigue and inability to walk due to pain. Patient reports his appetite is usually great for the last couple days due to the pain. He denies any weight loss. Upon entering room patient is sitting up in bed. He is alert and oriented x4. Patient reports pain 5 out of 10 in his back. He reports the pain is sharp and is sometimes worse with movement. He denies numbness and tingling. Patient has morphine as needed and Pepcid as needed ordered. He had issues with urinary retention and now has a Núñez with dark brown urine. results of biopsy. Will start patient on routine tylenol and routine baclofen. I do not feel that patient is going to benefit relief from an PRN Buspar therefore will implement routine Buspar BID. Updated plan of care is as follows:   1. Start Tylenol 650mg PO BID routine  2. Start Baclofen 5mg PO BID routine  3. Start Oxycodone 5mg q4h PRN  4. Start Buspar 5mg BID routine  5. Continue PRN Morphine 2mg q4h PRN   6. D/C Percocet PRN   7. D/C Buspar PRN dose  8. D/C Baclofen PRN dose  9. Patient wishes to continue full aggressive treatment as FULL CODE.     8/22/23:  Pain well-controlled at this time. Continue scheduled tylenol and Baclofen. Continue buspar for anxiety. D/C prn morphine as patient has not needed this in several days. Will sign off. Please re-consult with further questions/concerns. Otherwise will follow as an outpatient.  May need home visits.       -Patient is currently being treated for multiple medical conditions by other providers  Intractable back pain secondary to bony metastases  Urinary retention  Tobacco use  COPD  HTN  Compression fracture of body of thoracic vertebra  Elevated PSA  Lytic bone lesions on x ray  Severe back pain  Secondary malignant neoplasm of bone  Impaired mobility and ADLs  Neurogenic bladder  Neurogenic bowel  Caregiver stress    MDM: Moderate    Electronically signed by JORDAN Sim CNP on 8/22/2023 at 12:30 PM    Collaborating physician: Dr. Pascual De La Garza

## 2023-08-22 NOTE — PROGRESS NOTES
Subjective:      Patient ID: Charlotte Weller is a 81 y.o. male    HPI  81year old male who had a prostate biopsy yesterday. His burnham catheter is draining clear yellow urine.     Past Medical History:   Diagnosis Date    Anxiety     COPD (chronic obstructive pulmonary disease) ([address])     Hyperlipidemia     Hypertension     Kidney stone      Past Surgical History:   Procedure Laterality Date    PROSTATE BIOPSY  08/21/2023    U/S guided prostate biopsy completed by Dr. Robin Saleem History    Marital status:      Spouse name: None    Number of children: None    Years of education: None    Highest education level: None   Tobacco Use    Smoking status: Every Day     Packs/day: 1.00     Years: 60.00     Pack years: 60.00     Types: Cigarettes    Smokeless tobacco: Never   Substance and Sexual Activity    Alcohol use: Yes    Drug use: No   Social History Narrative    Lives With: Alone (Wife has dementia -moving to a nursing home [facility] in [address] from a memory care unit in [address] to finances)    Type of Home: House in [address]: One level    Home Access: Stairs to enter without rails - Number of Steps: 2    Bathroom Shower/Tub: Walk-in shower-Equipment: None    Home Equipment: None    Has the patient had two or more falls in the past year or any fall with injury in the past year?: No    Receives Help From: Family (son comes a couple times a week)    ADL Assistance: Independent    Homemaking Assistance: Independent (neighbor cuts grass), Homemaking Responsibilities: Yes    Ambulation Assistance: Independent, Transfer Assistance: Independent    Active : Yes    Occupation: Retired    IADL Comments: pt completes his own shopping, cuts some of his lawn         Social Determinants of Health     Financial Resource Strain: Low Risk     Difficulty of Paying Living Expenses: Not hard at all   Food Insecurity: No Food Insecurity    Worried About Running Out of Food in the Last Year: Never true    Ran Out of Food in the Last Year: Never true   Transportation Needs: Unknown    Lack of Transportation (Non-Medical): No   Physical Activity: Inactive    Days of Exercise per Week: 0 days    Minutes of Exercise per Session: 0 min   Housing Stability: Unknown    Unstable Housing in the Last Year: No     No family history on file.   Current Facility-Administered Medications   Medication Dose Route Frequency Provider Last Rate Last Admin    dexamethasone (DECADRON) injection 4 mg  4 mg IntraVENous Q8H Zuleyma Riley MD        busPIRone (BUSPAR) tablet 5 mg  5 mg Oral BID Avendano Genta, APRN - CNP   5 mg at 08/22/23 4340    oxyCODONE capsule 5 mg  5 mg Oral Q4H PRN Juan Alberto Genta, APRN - CNP   5 mg at 08/21/23 1748    baclofen (LIORESAL) tablet 5 mg  5 mg Oral BID Avendano Genta, APRN - CNP   5 mg at 08/22/23 9407    acetaminophen (TYLENOL) tablet 650 mg  650 mg Oral BID Avendano Genta, APRN - CNP   650 mg at 08/22/23 2315    Or    acetaminophen (TYLENOL) suppository 650 mg  650 mg Rectal BID Sophia Miner, APRN - CNP        ipratropium 0.5 mg-albuterol 2.5 mg (DUONEB) nebulizer solution 1 Dose  1 Dose Inhalation Q4H PRN Zuleyma Riley MD        calcium carbonate (TUMS) chewable tablet 750 mg  750 mg Oral TID PRN Zuleyma Riley MD   750 mg at 08/20/23 1356    pantoprazole (PROTONIX) tablet 40 mg  40 mg Oral QAM AC Zuleyma Riley MD   40 mg at 08/22/23 9328    morphine (PF) injection 2 mg  2 mg IntraVENous Q4H PRN Bre Mixe, DO   2 mg at 08/18/23 1839    sodium chloride flush 0.9 % injection 5-40 mL  5-40 mL IntraVENous 2 times per day [place] Aase, DO   10 mL at 08/22/23 0827    sodium chloride flush 0.9 % injection 5-40 mL  5-40 mL IntraVENous PRN Bre Mixe, DO        0.9 % sodium chloride infusion   IntraVENous PRN Carmen Aase, DO        ondansetron (ZOFRAN-ODT) disintegrating tablet 4 mg  4 mg Oral Q8H PRN Carmen Aase,         Or

## 2023-08-22 NOTE — DISCHARGE SUMMARY
Discharge Summary    Date: 8/22/2023  Patient Name: Jeevan Blanco    YOB: 1935     Age: 81 y.o. Admit Date: 8/17/2023  Discharge Date: 8/22/2023  Discharge Condition: [place]    Admission Diagnosis  Urinary retention [R33.9]; Intractable back pain [M54.9]; Lytic bone lesions on xray [M89.9]; Compression fracture of body of thoracic vertebra ([address]) [S22.000A]      Discharge Diagnosis  Principal Problem:    Intractable back pain  Active Problems:    Compression fracture of body of thoracic vertebra (HCC)    Elevated PSA    Urinary retention    Lytic bone lesions on xray    Palliative care encounter    Goals of care, counseling/discussion    Advanced care planning/counseling discussion    Severe back pain    Secondary malignant neoplasm of bone (HCC)    Impaired mobility and activities of daily living dt 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 Tanya Medel Neurogenic bladder    Neurogenic bowel    Caregiver stress  Resolved Problems:    * No resolved hospital problems. [facility] Stay  Narrative of Hospital Course:  Patient comes with back pain, inability to ambulate, numbness in the leg lower extremity found to have elevated PSA and bony metastasis of the spine.   Most likely likely prostate cancer, status post by biopsy PT OT recommended patient to go to acute rehab    Consultants:  IP CONSULT TO ONCOLOGY  IP CONSULT TO UROLOGY  IP CONSULT TO PALLIATIVE CARE  IP CONSULT TO [address] [place] [address]  IP CONSULT TO NEUROSURGERY  IP CONSULT TO RADIATION ONCOLOGY  IP CONSULT TO PAIN MANAGEMENT  IP CONSULT TO PALLIATIVE CARE  IP CONSULT TO INTERVENTIONAL RADIOLOGY  IP CONSULT TO REHAB/TCU ADMISSION COORDINATOR    Surgeries/procedures Performed:      Treatments:            Discharge Plan/Disposition:  Home    Hospital/Incidental Findings Requiring Follow Up:    Patient Instructions:    Diet:    Activity:  For number of days (if applicable): evaluation. Correlation to PSA lab values is recommended. Pending Labs     Order Current Status    Surgical Pathology Collected (08/21/23 1127)    Surgical Pathology In process        Discharge Medications    Current Discharge Medication List        Current Discharge Medication List        Current Discharge Medication List    CONTINUE these medications which have NOT CHANGED    ondansetron (ZOFRAN-ODT) 4 MG disintegrating tablet  Place 1 tablet under the tongue every 8 hours as needed for Nausea or Vomiting  Qty: 21 tablet Refills: 0    polyethylene glycol (GLYCOLAX) 17 GM/SCOOP powder  Take 17 g by mouth daily as needed (constipation)  Qty: 510 g Refills: 0    Baclofen (LIORESAL) 5 MG tablet  Take 1 tablet by mouth 2 times daily as needed (back pain)  Qty: 20 tablet Refills: 0  Associated Diagnoses:Muscular pain; Chronic back pain, unspecified back location, unspecified back pain laterality    predniSONE (DELTASONE) 10 MG tablet  Take 4 tabs daily for 4 days then 3 tabs daily for 3 days then 2 tabs daily for 2 days then 1 tab daily once  Qty: 30 tablet Refills: 0  Associated Diagnoses:Upper back pain    fluticasone (FLONASE) 50 MCG/ACT nasal spray  2 sprays by Nasal route daily  Qty: 16 g Refills: 5    albuterol sulfate HFA (VENTOLIN HFA) 108 (90 Base) MCG/ACT inhaler  Inhale 2 puffs into the lungs 4 times daily as needed for Wheezing  Qty: 18 g Refills: 2  Associated Diagnoses:Chronic obstructive pulmonary disease, unspecified COPD type (HCC)    atenolol (TENORMIN) 25 MG tablet  Take 1 tablet by mouth daily  Qty: 30 tablet Refills: 5  Associated Diagnoses:Tachycardia    busPIRone (BUSPAR) 5 MG tablet  Take 1 tablet by mouth 2 times daily as needed (anxiety)  Qty: 60 tablet Refills: 3  Associated Diagnoses:Anxiety    meloxicam (MOBIC) 7.5 MG tablet  Take 1 tablet by mouth daily as needed for Pain  Qty: 30 tablet Refills: 5  Associated Diagnoses:Muscular pain; Pain of left scapula;  Upper back pain on left

## 2023-08-22 NOTE — FLOWSHEET NOTE
Pt awake in bed. He ate good for breakfast. Accepted AM meds without problem. VSS. Respirations even and nonlabored. Pt's son is in to visit. Call light in reach. Bed alarm engaged. 7605 Report given to Kaylee Escobar RN on Rehab.

## 2023-08-23 ENCOUNTER — FOLLOWUP TELEPHONE ENCOUNTER (OUTPATIENT)
Dept: RADIATION ONCOLOGY | Age: 88
End: 2023-08-23

## 2023-08-23 PROBLEM — Z78.9 IMPAIRED MOBILITY AND ADLS: Status: RESOLVED | Noted: 2023-08-22 | Resolved: 2023-08-23

## 2023-08-23 PROBLEM — Z74.09 IMPAIRED MOBILITY AND ADLS: Status: RESOLVED | Noted: 2023-08-22 | Resolved: 2023-08-23

## 2023-08-23 PROBLEM — R11.0 NAUSEA: Status: ACTIVE | Noted: 2023-08-23

## 2023-08-23 PROBLEM — G89.3 NEOPLASM RELATED PAIN: Status: ACTIVE | Noted: 2023-08-23

## 2023-08-23 PROCEDURE — 99223 1ST HOSP IP/OBS HIGH 75: CPT | Performed by: PHYSICAL MEDICINE & REHABILITATION

## 2023-08-23 PROCEDURE — 97116 GAIT TRAINING THERAPY: CPT

## 2023-08-23 PROCEDURE — 97110 THERAPEUTIC EXERCISES: CPT

## 2023-08-23 PROCEDURE — 6360000002 HC RX W HCPCS: Performed by: PHYSICAL MEDICINE & REHABILITATION

## 2023-08-23 PROCEDURE — 97530 THERAPEUTIC ACTIVITIES: CPT

## 2023-08-23 PROCEDURE — 97163 PT EVAL HIGH COMPLEX 45 MIN: CPT

## 2023-08-23 PROCEDURE — 6370000000 HC RX 637 (ALT 250 FOR IP): Performed by: NURSE PRACTITIONER

## 2023-08-23 PROCEDURE — 1180000000 HC REHAB R&B

## 2023-08-23 PROCEDURE — 2580000003 HC RX 258: Performed by: INTERNAL MEDICINE

## 2023-08-23 PROCEDURE — 97535 SELF CARE MNGMENT TRAINING: CPT

## 2023-08-23 PROCEDURE — 6370000000 HC RX 637 (ALT 250 FOR IP): Performed by: PHYSICAL MEDICINE & REHABILITATION

## 2023-08-23 PROCEDURE — 51702 INSERT TEMP BLADDER CATH: CPT

## 2023-08-23 PROCEDURE — 97166 OT EVAL MOD COMPLEX 45 MIN: CPT

## 2023-08-23 PROCEDURE — 99222 1ST HOSP IP/OBS MODERATE 55: CPT | Performed by: NURSE PRACTITIONER

## 2023-08-23 PROCEDURE — 99221 1ST HOSP IP/OBS SF/LOW 40: CPT | Performed by: PHYSICIAN ASSISTANT

## 2023-08-23 PROCEDURE — 6360000002 HC RX W HCPCS: Performed by: INTERNAL MEDICINE

## 2023-08-23 PROCEDURE — 6370000000 HC RX 637 (ALT 250 FOR IP): Performed by: INTERNAL MEDICINE

## 2023-08-23 RX ORDER — OXYMETAZOLINE HYDROCHLORIDE 0.05 G/100ML
2 SPRAY NASAL 2 TIMES DAILY
Status: COMPLETED | OUTPATIENT
Start: 2023-08-23 | End: 2023-08-25

## 2023-08-23 RX ORDER — ENOXAPARIN SODIUM 100 MG/ML
40 INJECTION SUBCUTANEOUS DAILY
Status: DISCONTINUED | OUTPATIENT
Start: 2023-08-23 | End: 2023-09-09 | Stop reason: HOSPADM

## 2023-08-23 RX ORDER — ACETAMINOPHEN 500 MG
500 TABLET ORAL EVERY 8 HOURS SCHEDULED
Status: DISCONTINUED | OUTPATIENT
Start: 2023-08-23 | End: 2023-09-09 | Stop reason: HOSPADM

## 2023-08-23 RX ORDER — MECOBALAMIN 5000 MCG
5 TABLET,DISINTEGRATING ORAL NIGHTLY
Status: DISCONTINUED | OUTPATIENT
Start: 2023-08-23 | End: 2023-09-09 | Stop reason: HOSPADM

## 2023-08-23 RX ADMIN — AMLODIPINE BESYLATE 5 MG: 5 TABLET ORAL at 08:44

## 2023-08-23 RX ADMIN — Medication 10 ML: at 08:48

## 2023-08-23 RX ADMIN — OXYCODONE HYDROCHLORIDE 5 MG: 5 CAPSULE ORAL at 20:50

## 2023-08-23 RX ADMIN — ATORVASTATIN CALCIUM 10 MG: 10 TABLET, FILM COATED ORAL at 20:42

## 2023-08-23 RX ADMIN — LOSARTAN POTASSIUM 25 MG: 25 TABLET, FILM COATED ORAL at 08:45

## 2023-08-23 RX ADMIN — FLUTICASONE PROPIONATE 2 SPRAY: 50 SPRAY, METERED NASAL at 13:41

## 2023-08-23 RX ADMIN — Medication 2000 UNITS: at 17:40

## 2023-08-23 RX ADMIN — BUSPIRONE HYDROCHLORIDE 5 MG: 10 TABLET ORAL at 08:45

## 2023-08-23 RX ADMIN — ONDANSETRON 4 MG: 4 TABLET, ORALLY DISINTEGRATING ORAL at 13:38

## 2023-08-23 RX ADMIN — ATENOLOL 25 MG: 25 TABLET ORAL at 08:45

## 2023-08-23 RX ADMIN — ACETAMINOPHEN 500 MG: 325 TABLET ORAL at 13:38

## 2023-08-23 RX ADMIN — DEXAMETHASONE SODIUM PHOSPHATE 4 MG: 10 INJECTION, SOLUTION INTRAMUSCULAR; INTRAVENOUS at 13:48

## 2023-08-23 RX ADMIN — BACLOFEN 5 MG: 10 TABLET ORAL at 08:45

## 2023-08-23 RX ADMIN — Medication 5 MG: at 20:42

## 2023-08-23 RX ADMIN — DEXAMETHASONE SODIUM PHOSPHATE 4 MG: 10 INJECTION, SOLUTION INTRAMUSCULAR; INTRAVENOUS at 06:41

## 2023-08-23 RX ADMIN — TRAZODONE HYDROCHLORIDE 150 MG: 50 TABLET ORAL at 20:51

## 2023-08-23 RX ADMIN — ACETAMINOPHEN 650 MG: 325 TABLET ORAL at 08:45

## 2023-08-23 RX ADMIN — BUSPIRONE HYDROCHLORIDE 5 MG: 10 TABLET ORAL at 20:59

## 2023-08-23 RX ADMIN — ENOXAPARIN SODIUM 40 MG: 100 INJECTION SUBCUTANEOUS at 13:39

## 2023-08-23 RX ADMIN — DEXAMETHASONE SODIUM PHOSPHATE 4 MG: 10 INJECTION, SOLUTION INTRAMUSCULAR; INTRAVENOUS at 23:22

## 2023-08-23 RX ADMIN — PANTOPRAZOLE SODIUM 40 MG: 40 TABLET, DELAYED RELEASE ORAL at 06:18

## 2023-08-23 RX ADMIN — BACLOFEN 5 MG: 10 TABLET ORAL at 20:42

## 2023-08-23 RX ADMIN — Medication 100 MG: at 08:45

## 2023-08-23 RX ADMIN — Medication 10 ML: at 23:24

## 2023-08-23 RX ADMIN — OXYMETAZOLINE HCL 2 SPRAY: 0.05 SPRAY NASAL at 20:50

## 2023-08-23 RX ADMIN — TAMSULOSIN HYDROCHLORIDE 0.4 MG: 0.4 CAPSULE ORAL at 17:40

## 2023-08-23 RX ADMIN — OXYMETAZOLINE HCL 2 SPRAY: 0.05 SPRAY NASAL at 13:40

## 2023-08-23 RX ADMIN — ACETAMINOPHEN 500 MG: 325 TABLET ORAL at 20:43

## 2023-08-23 ASSESSMENT — ENCOUNTER SYMPTOMS
NAUSEA: 0
BLOOD IN STOOL: 0
SORE THROAT: 0
TROUBLE SWALLOWING: 1
BOWEL INCONTINENCE: 1
STRIDOR: 0
VOMITING: 0
DIARRHEA: 0
APNEA: 0
CONSTIPATION: 1
PHOTOPHOBIA: 0
NAUSEA: 1
BACK PAIN: 1
ABDOMINAL PAIN: 0
WHEEZING: 0
SHORTNESS OF BREATH: 0
EYE PAIN: 0
SHORTNESS OF BREATH: 1
EYE REDNESS: 0
COUGH: 0
VISUAL CHANGE: 0

## 2023-08-23 ASSESSMENT — PAIN DESCRIPTION - LOCATION
LOCATION: BACK
LOCATION: BACK

## 2023-08-23 ASSESSMENT — PAIN SCALES - GENERAL
PAINLEVEL_OUTOF10: 3
PAINLEVEL_OUTOF10: 4

## 2023-08-23 ASSESSMENT — PAIN DESCRIPTION - ORIENTATION
ORIENTATION: LOWER
ORIENTATION: LOWER

## 2023-08-23 ASSESSMENT — PAIN DESCRIPTION - DESCRIPTORS
DESCRIPTORS: ACHING
DESCRIPTORS: ACHING

## 2023-08-23 NOTE — PROGRESS NOTES
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  .  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[address]) 2023    Intractable back pain 2023    Compression fracture of body of thoracic vertebra ([address]) 2023    Current smoker 2022    Kidney stone     Hyperopia with astigmatism and presbyopia, right 2018    Secondary hypertension     Hyperlipidemia     COPD (chronic obstructive pulmonary disease) ([address])     Anxiety     Insomnia 2018       Past Medical History:   Diagnosis Date    Anxiety     COPD (chronic obstructive pulmonary disease) ([address])     Hyperlipidemia     Hypertension     Kidney stone      Past Surgical History:   Procedure Laterality Date    PROSTATE BIOPSY  2023    U/S guided prostate biopsy completed by Dr. Gaby Moreno: Yes  Patient assessed for rehabilitation services?: Yes  Family / Caregiver Present: Yes (dgt)  Diagnosis: Impaired mobility and ADL's due to

## 2023-08-23 NOTE — PROGRESS NOTES
Shift assessment completed. Pt awake in bed. Medications given per MAR. Respirations even and nonlabored. Call light in reach. Bed alarm engaged. Large BM 8/22/23.

## 2023-08-23 NOTE — PROGRESS NOTES
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  .  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[address]) 2023    Intractable back pain 2023    Compression fracture of body of thoracic vertebra ([address]) 2023    Current smoker 2022    Kidney stone     Hyperopia with astigmatism and presbyopia, right 2018    Secondary hypertension     Hyperlipidemia     COPD (chronic obstructive pulmonary disease) ([address])     Anxiety     Insomnia 2018       Past Medical History:   Diagnosis Date    Anxiety     COPD (chronic obstructive pulmonary disease) ([address])     Hyperlipidemia     Hypertension     Kidney stone      Past Surgical History:   Procedure Laterality Date    PROSTATE BIOPSY  2023    U/S guided prostate biopsy completed by Dr. Sadie Garvin       Restrictions:  Restrictions/Precautions  Restrictions/Precautions: Fall Risk  Position Activity Restriction  Other position/activity restrictions: clinical

## 2023-08-23 NOTE — PROGRESS NOTES
Physical Therapy Rehab Treatment Note  Facility/Department: [facility]  Room: B046/J433-81       NAME: David Henao  : 1935 (86 y.o.)  MRN: [id number]  CODE STATUS: Full Code    Date of Service: 2023     Restrictions:  Restrictions/Precautions: Fall Risk     SUBJECTIVE:   Subjective: Pt states he was walking fine with no device and driving 2-3 weeks ago. States he was going to PT a few months ago for his back. Pt states he was awake all night. States he is upset about not being able to take care of him self. States his wife is in a memory care unit. Pain  Pain: 2/10 back achy  Pt declined intervention. Pt states his pain meds are scheduled. States pain is tolerable. OBJECTIVE:      Transfers  Sit to Stand: Moderate Assistance;2 Person Assistance  Comment: Verbal and tactile cues for sequencing nd safety. PT Exercises  A/AROM Exercises: seated AP, LAQ, march, hip abd with ball, hip abd RTB, ham curls RTB 2x10 ea  VCs to increase ROM and for  motor control. Education  Education Provided Comments: Reviewed goals with pt. ASSESSMENT/PROGRESS TOWARDS GOALS: Initiated LE strengthening exercises per POC. Pt tolerated well. Verbal and manual cues for motor control. Pt pleasant with good participation. Goals:  Long Term Goals  Long Term Goal 1: indep and efficient bed mobility  Long Term Goal 2: indep w/c to bed transfer; SBA sit to stand and car transfers  Long Term Goal 3: SBA gait 50 feet with appropriate device  Long Term Goal 4: min assist with 2 stairs for safe home entry  Long Term Goal 5: pt able to complete [place] at 25/56 level    PLAN OF CARE/Safety: Cont per POC.        Therapy Time:   Individual   Time In 1000   Time Out 1030   Minutes 30      Minutes:  Transfer/Bed mobility trainin  Gait trainin  Neuro re education:0  Therapeutic ex:25    Cipriano Ormond, PTA, 23 at 10:33 AM

## 2023-08-23 NOTE — PROGRESS NOTES
SW attempted to contact pt by telephone as he was seen in radiation oncology on 8/18/2023 and endorsed a distress of 8/10. Pt currently has a voice mailbox which is not accepting messages, and he did not complete HIPAA consent form while in radiation. SW will attempt a second phone call to provide contact information and discuss support. However, it is also noted that pt is hospitalized for rehabilitation at this time, where he is currently receiving medical supportive services.

## 2023-08-23 NOTE — PROGRESS NOTES
Subjective:      Patient ID: Kriss Monzon is a 81 y.o. male           Past Surgical History:   Procedure Laterality Date    PROSTATE BIOPSY  08/21/2023    U/S guided prostate biopsy completed by Dr. Jarocho Titus History    Marital status:      Spouse name: None    Number of children: None    Years of education: None    Highest education level: None   Tobacco Use    Smoking status: Every Day     Packs/day: 1.00     Years: 60.00     Pack years: 60.00     Types: Cigarettes    Smokeless tobacco: Never   Substance and Sexual Activity    Alcohol use: Yes    Drug use: No   Social History Narrative    Lives With: Alone (Wife has dementia -moving to a nursing home [facility] in [address] from a memory care unit in [address] to finances)    Type of Home: House in [address]: One level    Home Access: Stairs to enter without rails - Number of Steps: 2    Bathroom Shower/Tub: Walk-in shower-Equipment: None    Home Equipment: None    Has the patient had two or more falls in the past year or any fall with injury in the past year?: No    Receives Help From: Family (son comes a couple times a week)    ADL Assistance: Independent    Homemaking Assistance: Independent (neighbor cuts grass), Homemaking Responsibilities: Yes    Ambulation Assistance: Independent, Transfer Assistance: Independent    Active : Yes    Occupation: Retired    IADL Comments: pt completes his own shopping, cuts some of his lawn         Social Determinants of Health     Financial Resource Strain: Low Risk     Difficulty of Paying Living Expenses: Not hard at all   Food Insecurity: No Food Insecurity    Worried About [place] in the Last Year: Never true    [address] in the Last Year: Never true   Transportation Needs: Unknown    Lack of Transportation (Non-Medical):  No   Physical Activity: Inactive    Days of Exercise per Week: 0 days    Minutes of Exercise per Session: 0 min   Housing Stability: Unknown    Unstable Housing in the Last Year: No     No family history on file.   Current Facility-Administered Medications   Medication Dose Route Frequency Provider Last Rate Last Admin    oxymetazoline (AFRIN) 0.05 % nasal spray 2 spray  2 spray Each Nostril BID Flor Scullin, DO        enoxaparin (LOVENOX) injection 40 mg  40 mg SubCUTAneous Daily Flor Scullin, DO        melatonin disintegrating tablet 5 mg  5 mg Oral Nightly Tasha Malloy APRCARMEN - RUBÉN        acetaminophen (TYLENOL) tablet 500 mg  500 mg Oral 3 times per day Flor Scbelindain, DO        sodium chloride flush 0.9 % injection 5-40 mL  5-40 mL IntraVENous 2 times per day Ronaldo Jenkins MD   10 mL at 08/23/23 0848    sodium chloride flush 0.9 % injection 5-40 mL  5-40 mL IntraVENous PRN Ronaldo Jenkins MD        0.9 % sodium chloride infusion   IntraVENous PRN Ronaldo Jenkins MD        ondansetron (ZOFRAN-ODT) disintegrating tablet 4 mg  4 mg Oral Q8H PRN Ronaldo Jenkins MD        Or    ondansetron (ZOFRAN) injection 4 mg  4 mg IntraVENous Q6H PRN Ronaldo Jenkins MD        polyethylene glycol (GLYCOLAX) packet 17 g  17 g Oral Daily PRN Ronaldo Jenkins MD        amLODIPine (NORVASC) tablet 5 mg  5 mg Oral Daily Ronaldo Jenkins MD   5 mg at 08/23/23 0844    atenolol (TENORMIN) tablet 25 mg  25 mg Oral Daily Ronaldo Jenkins MD   25 mg at 08/23/23 0845    fluticasone (FLONASE) 50 MCG/ACT nasal spray 2 spray  2 spray Nasal Daily Ronaldo Jenkins MD        losartan (COZAAR) tablet 25 mg  25 mg Oral Daily Ronaldo Jenkins MD   25 mg at 08/23/23 0845    atorvastatin (LIPITOR) tablet 10 mg  10 mg Oral Nightly Ronaldo Jenkins MD   10 mg at 08/22/23 2205    traZODone (DESYREL) tablet 150 mg  150 mg Oral Nightly Roanldo Jenkins MD   150 mg at 08/22/23 2206    pantoprazole (PROTONIX) tablet 40 mg  40 mg Oral QAM AC Ronaldo Jenkins MD   40 mg at 08/23/23 0618    ipratropium 0.5 mg-albuterol 2.5 mg (DUONEB) nebulizer solution 1 Dose  1 Dose

## 2023-08-23 NOTE — CARE COORDINATION
Social/Functional Status:  Social/Functional History  Lives With: Alone (Wife moving to a nursing home from a memory care unit (moving to [facility] on Sept 1st))  Type of Home: [address]: One level  Home Access: Stairs to enter without rails  Entrance Stairs - Number of Steps: 2 from the garage  Bathroom Shower/Tub: Walk-in shower, Doors, Tub/Shower unit (narrow walk in shower--unsure if chair would fit; tub/shower does not typically use but is in working condition)  Bathroom Toilet: Standard  Bathroom Equipment: None  Bathroom Accessibility: Walker accessible  Home Equipment: None  Has the patient had two or more falls in the past year or any fall with injury in the past year?: No  Receives Help From: Family (son comes a couple times a week--assists with anything pt needs such as cutting grass (pt cutting mostly prior), cleaning in ground swimming pool)  ADL Assistance: [address] Independent (neighbor cuts grass)  Homemaking Responsibilities: Yes  Meal Prep Responsibility: Primary  Laundry Responsibility: Primary (2 steps into the garage where laundry is located)  Cleaning Responsibility: Primary  Bill Paying/Finance Responsibility: Primary (balance a check book (two different checking accounts))  Shopping Responsibility: Primary  Health Care Management: Primary (uses pill organizer)  Ambulation Assistance: Independent (no device)  Transfer Assistance: Independent  Active : Yes  Mode of Transportation: [employer] (Moe Le (22years old))  Education: 4 years of college--Business Administration  Occupation: Retired  Type of Occupation: [employer]   IADL Comments: pt completes his own shopping, cuts some of his lawn      Spoke with patient and explained role in the team. Patient questions answered appropriately. Explained discharge process. Patient stated understanding. 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  Pt has

## 2023-08-23 NOTE — PROGRESS NOTES
Physical Therapy  Facility/Department: [facility] MED SURG Q865/R356-39  Physical Therapy Discharge      NAME: Genevieve Garcia    : 1935 (81 y.o.)  MRN: [id number]    Account: [de-identified]  Gender: male      Patient has been discharged from acute care hospital. DC patient from current PT program.      Electronically signed by Isaac Serrano PT on 23 at 12:47 PM EDT

## 2023-08-23 NOTE — PROGRESS NOTES
OCCUPATIONAL THERAPY  INPATIENT REHAB TREATMENT NOTE  [facility]      NAME: Kriss Monzon  : 1935 (81 y.o.)  MRN: [id number]  CODE STATUS: Full Code  Room: R248/R248-01    Date of Service: 2023    Referring Physician: Dr. 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 \"    Pain at start of treatment: Yes: No pain scale    Pain at end of treatment: Yes: No pain scale    Location: Stomach  Nursing notified: No  Intervention: None    COGNITION:  Orientation  Orientation Level: Oriented to person;Oriented to place;Oriented to time;Oriented to situation  Cognition  Overall Cognitive Status: Exceptions  Arousal/Alertness: Appropriate responses to stimuli  Following Commands: Follows all commands without difficulty  Attention Span: Appears intact  Memory: Appears intact  Safety Judgement: Decreased awareness of need for safety;Decreased awareness of need for assistance  Problem Solving: Assistance required to generate solutions;Assistance required to implement solutions  Insights: Decreased awareness of deficits  Initiation: Requires cues for some  Sequencing: Requires cues for some      OBJECTIVE:     Pt completed horizontal dowel nicky tree/ring task seated at table top height with 1# wrist wts on with Sup. Pt instructed to complete each side of tree with coordinating UE. Pt counted 12 rings onto each row. .  Pt then doffed rings one by one and placed back into bucket. Pt completed task to increase strength, fine motor skills, act tolerance to continue improving levels of Ind with functional daily tasks. Pt completed snap beads with the 1st 2 being started as a demo for pt. Pt was able to snap together    beads with mod difficulty at 1st but caught on how to complete and it became easier with only min to no difficulty. Pt states that he has been having \"difficulty with buttons\" on his shirts. Pt completed task to increase fine motor skills and strength to continue improving ability to complete self care tasks. ASSESSMENT:  Assessment: Pt perseverates some on needing to work on his legs, but states his stomach is hurting and explained to pt d/t stomach pain, therapist will have him working seated on arms with strengthening. Activity Tolerance: Patient tolerated treatment well      PLAN OF CARE:  Strengthening, Balance training, Functional mobility training, Endurance training, Patient/Caregiver education & training, Equipment evaluation, education, & procurement, Self-Care / ADL, Home management training, Safety education & training, Neuromuscular re-education  Continue OT POC until discharge    Patient goals : Go home and do like I have been doing  Time Frame for Long Term Goals : Within 1.5-2 weeks, pt to demo progress in the following areas listed below to achieve specific LTGs stated in the intial eval  Long Term Goal 1: P tto demo improved ADL/IADL status  Long Term Goal 2: Pt to demo improved standing balance and tolerance  Long Term Goal 3: Pt to demo improved ability to use AD/AE/DME as needed to improve function and maintain pain level  Long Term Goal 4: Pt to demo improved UB strength for self-care        Therapy Time:   Individual Group Co-Treat   Time In 1300       Time Out 1330         Minutes 30               Therapeutic activities: 30 minutes     Electronically signed by:     TAMRA Santiago,   8/23/2023, 1:24 PM

## 2023-08-23 NOTE — H&P
HISTORY & PHYSICAL       DATE OF ADMISSION:  8/22/2023    DATE OF SERVICE:  8/23/23    Subjective:    Bobo Cantrell, 81 y.o. male presents today with:     CHIEF COMPLAINT:  Patient was admitted through the emergency room [facility] in 8/17/2023 for evaluation of acute on chronic low back pain. Patient has been seen 3 times in the past 3 weeks in the emergency room. 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 He was found to have malignant lesion of the spine causing compression with significant pain and with progressive neurologic findings. He was admitted under the care of the hospitalist with hematology oncology, respiratory care, radiation therapy, neuro spine consulting. Back Pain  This is a chronic problem. The current episode started more than 1 year ago. The problem occurs constantly. The problem has been rapidly worsening since onset. The pain is present in the lumbar spine, sacro-iliac, thoracic spine and gluteal. The quality of the pain is described as aching. The pain radiates to the right foot. The pain is at a severity of 9/10. The pain is Worse during the day. The symptoms are aggravated by bending. Associated symptoms include bladder incontinence, bowel incontinence, headaches and weakness. Pertinent negatives include no abdominal pain, chest pain, dysuria or fever. Risk factors include history of steroid use and lack of exercise. He has tried analgesics for the symptoms. The treatment provided mild relief. Neurologic Problem  The patient's primary symptoms include focal sensory loss, focal weakness, a loss of balance and weakness.  The patient's pertinent negatives include no memory loss, near-syncope, slurred speech, syncope or visual 5 mg  5 mg Oral Daily Zuleyma Riley MD   5 mg at 08/23/23 0844    atenolol (TENORMIN) tablet 25 mg  25 mg Oral Daily Zuleyma Riley MD   25 mg at 08/23/23 0845    fluticasone (FLONASE) 50 MCG/ACT nasal spray 2 spray  2 spray Nasal Daily Zuleyma Riley MD        losartan (COZAAR) tablet 25 mg  25 mg Oral Daily Zuleyma Riley MD   25 mg at 08/23/23 0845    atorvastatin (LIPITOR) tablet 10 mg  10 mg Oral Nightly Zuleyma Riley MD   10 mg at 08/22/23 2205    traZODone (DESYREL) tablet 150 mg  150 mg Oral Nightly Zuleyma Riley MD   150 mg at 08/22/23 2206    pantoprazole (PROTONIX) tablet 40 mg  40 mg Oral QAM AC Zuleyma Riley MD   40 mg at 08/23/23 0618    ipratropium 0.5 mg-albuterol 2.5 mg (DUONEB) nebulizer solution 1 Dose  1 Dose Inhalation Q4H PRN Zuleyma Riley MD        calcium carbonate (TUMS) chewable tablet 750 mg  750 mg Oral TID PRN Zuleyma Riley MD        busPIRone (BUSPAR) tablet 5 mg  5 mg Oral BID Zuleyma Riley MD   5 mg at 08/23/23 0845    oxyCODONE capsule 5 mg  5 mg Oral Q4H PRN Zuleyma Riley MD        baclofen (LIORESAL) tablet 5 mg  5 mg Oral BID Zuleyma Riley MD   5 mg at 08/23/23 0845    dexamethasone (PF) (DECADRON) injection 4 mg  4 mg IntraVENous Q8H Zuleyma Riley MD   4 mg at 08/23/23 0641    acetaminophen (TYLENOL) tablet 650 mg  650 mg Oral Q4H PRN Flor Scullin, DO   650 mg at 08/23/23 0845    bisacodyl (DULCOLAX) suppository 10 mg  10 mg Rectal Daily PRN Flor Scullin, DO        sodium phosphate (FLEET) rectal enema 1 enema  1 enema Rectal Daily PRN Flor Scullin, DO        Vitamin D (CHOLECALCIFEROL) tablet 2,000 Units  2,000 Units Oral Dinner Flor Scullin, DO   2,000 Units at 08/22/23 1925    cyanocobalamin injection 1,000 mcg  1,000 mcg IntraMUSCular Weekly Flor Scullin, DO   1,000 mcg at 08/22/23 1925    coenzyme Q10 capsule 100 mg  100 mg Oral Daily Flor Scullin, DO   100 mg at 08/23/23 0845    lidocaine 4 % external patch 3 patch  3 patch TransDERmal Daily Flor Scullin,

## 2023-08-23 NOTE — PLAN OF CARE
Problem: Discharge Planning  Goal: Discharge to home or other facility with appropriate resources  8/23/2023 1405 by Hoang Zamora RN  Outcome: Progressing  Flowsheets (Taken 8/23/2023 1158)  Discharge to home or other facility with appropriate resources:   Identify barriers to discharge with patient and caregiver   Identify discharge learning needs (meds, wound care, etc)  8/23/2023 0317 by Rohan Anderson RN  Outcome: Progressing  8/23/2023 0317 by Rohan Anderson RN  Outcome: Progressing     Problem: Safety - Adult  Goal: Free from fall injury  8/23/2023 1405 by Hoang Zamora RN  Outcome: Progressing  8/23/2023 0317 by Rohan Anderson RN  Outcome: Progressing  8/23/2023 0317 by Rohan Anderson RN  Outcome: Progressing     Problem: ABCDS Injury Assessment  Goal: Absence of physical injury  8/23/2023 1405 by Hoang Zamora RN  Outcome: Progressing  8/23/2023 0317 by Rohan Anderson RN  Outcome: Progressing  8/23/2023 0317 by Rohan Anderson RN  Outcome: Progressing     Problem: Skin/Tissue Integrity  Goal: Absence of new skin breakdown  Description: 1. Monitor for areas of redness and/or skin breakdown  2. Assess vascular access sites hourly  3. Every 4-6 hours minimum:  Change oxygen saturation probe site  4. Every 4-6 hours:  If on nasal continuous positive airway pressure, respiratory therapy assess nares and determine need for appliance change or resting period.   Outcome: Progressing

## 2023-08-23 NOTE — CARE COORDINATION
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  Restrictions  Restrictions/Precautions: Fall Risk  Position Activity Restriction  Other position/activity restrictions: clinical reasoning- limit spine flexion  CASE MANAGEMENT    Social/Functional History  Social/Functional History  Lives With: Alone (Wife moving to a nursing home from a memory care unit (moving to [employer] on ))  Type of Home: [address] [place] [address] 24: One level  Home Access: Stairs to enter without rails  Entrance Stairs - Number of Steps: 2 from the garage  Bathroom Shower/Tub: Walk-in shower, Doors, Tub/Shower unit (narrow walk in shower--unsure if chair would fit; tub/shower does not typically use but is in working condition)  Bathroom Toilet: Standard  Bathroom Equipment: None  Bathroom Accessibility: Walker accessible  Home Equipment: None  Has the patient had two or more falls in the past year or any fall with injury in the past year?: No  Receives Help From: Family (son comes a for bowel program: No    Anticoagulants: Lovenox  Home-going needs (Education, labs): Yes    Antibiotics: Decadron for cancer  Stop date: TBD  Home-going needs (education, RX, PICC): TBD      Other: Neurogenic bowel and bladder  pt verbalized anxiety over not being able to take care of self. PHYSICAL THERAPY  Bed mobility:  Bed mobility  Rolling to Left: Stand by assistance (08/23/23 0944)  Rolling to Right: Stand by assistance (08/23/23 0944)  Supine to Sit: Moderate assistance (08/23/23 0944)  Sit to Supine: Minimal assistance (08/23/23 0944)  Scooting: Minimal assistance (08/23/23 0944)  Bed Mobility Comments: HOB flat with no rails - increased effort and concern for edge of bed safety noted (08/23/23 0944)  Transfers:  Bed mobility  Rolling to Left: Stand by assistance (08/23/23 0944)  Rolling to Right: Stand by assistance (08/23/23 0944)  Supine to Sit: Moderate assistance (08/23/23 0944)  Sit to Supine: Minimal assistance (08/23/23 0944)  Scooting: Minimal assistance (08/23/23 0944)  Bed Mobility Comments: HOB flat with no rails - increased effort and concern for edge of bed safety noted (08/23/23 0944)  Gait:   Ambulation  Surface: Level tile (08/23/23 1520)  Device: Rolling Walker (08/23/23 1520)  Other Apparatus: Wheelchair follow (08/23/23 1520)  Assistance:  Moderate assistance (08/23/23 1520)  Quality of Gait: ataxic with decreased LE control, inconsistent step length/ foot clearance, inconsistent knee stability and WERNER, pelvic instability, VCs for [employer] and gait sequencing (08/23/23 1520)  Distance: 30ft (08/23/23 1520)  Comments: unsafe to attempt gait without device (08/23/23 1000)  Stairs:  Stairs/Curb  Stairs?: No (08/23/23 1000)  W/C mobility:  Wheelchair Activities  Propulsion: No (08/23/23 1000)  LTG:  Long Term Goal 1: indep and efficient bed mobility  Long Term Goal 2: indep w/c to bed transfer; SBA sit to stand and car transfers  Long Term Goal 3: SBA gait 50 feet with appropriate

## 2023-08-23 NOTE — CONSULTS
Palliative Care Consult Note  Patient: David Henao  Gender: male  YOB: 1935  Unit/Bed: A806/R495-37  CodeStatus: Full Code  Inpatient Treatment Team: Treatment Team: Attending Provider: Chloe Dunham DO; Consulting Physician: Dl Iglesias MD; Consulting Physician: Sarah Cruz MD; Consulting Physician: Fede Pedersen MD; Consulting Physician: Ronaldo Jenkins MD; Registered Nurse: Brandon Benjamin RN; Registered Nurse: Maggy Castellon RN; Occupational Therapist: Ottie Hashimoto, OT; Occupational Therapist Assistant: Perrin Osgood, OTA; Occupational Therapist: Yahir Patterson OTR/OZIEL; : Kacy Gill, MSW, LSW  Admit Date:  8/22/2023    Chief Complaint: Low back pain    History of Presenting Illness:      David Henao is a 81 y.o. male on hospital day 1 with a history of tobacco use, copd, esophageal issues, HTN. .    Patient transferred to acute rehab unit on 8/22/23 from medical floor. He was seen on medical floor for uncontrolled back pain. Patient was found to have T3-T4 spinal cord compression. He had palliative RT on 8/18/23 with some improvement. Patient has bone metastases with elevated PSA level. Prostate bx was done on 8/21/23. Palliative care was consulted for goals of care, CODE STATUS discussion, family support, and symptom management. Upon entering room patient is coming back from therapy in wheelchair. He is alert and oriented x4. He seems to have some intermittent confusion about his medical conditions. He reports not knowing he had lesions on spine and was confused about having an oncologist following up. Once explained he then remembered ad understood. He reports his back and leg pain is a 2-3/10. He feels it is wellc controlled with Tylenol 500 mg scheduled q8h  and Baclofen 5 mg BID. He has some anxiety with his current medical conditions being unknown and waiting for prostate biopsy. He is on Buspar 5 mg BID.  He reports having nausea and decrease

## 2023-08-23 NOTE — PROGRESS NOTES
According to chart review, pt's son, Abdiel Sahu, is listed as [facility]. As such, AUSTIN telephoned Abdiel Sahu in follow-up to pt's high distress when seen at cancer center. Per Mr. Mira Lundborg request, AUSTIN sent e-mail with SW contact information should the family or pt have questions or concern regarding cancer related care. E-mail was sent and is attached in media files. No further follow-up scheduled at this time.

## 2023-08-23 NOTE — PROGRESS NOTES
Physical Therapy Rehab Treatment Note  Facility/Department: [facility]  Room: [place] [id number]       NAME: Micky Juarez  : 1935 (89 y.o.)  MRN: [id number]  CODE STATUS: Full Code    Date of Service: 2023     Restrictions:  Restrictions/Precautions: Fall Risk    SUBJECTIVE:   Subjective: Pt states everytime he eats his stomach gets upset. States he usually has an \"iron stomach\". \"Other than that I'm doing ok. \"    Pain  Pain: 2/10 back achy  Pt declined intervention. OBJECTIVE:            Transfers  Sit to Stand: Minimal Assistance  Stand to Sit: Minimal Assistance  Bed to Chair: Minimal assistance  Comment: Verbal and tactile cues for sequencing and safety. Ambulation  Surface: Level tile  Device: Rolling Walker  Other Apparatus: Wheelchair follow  Assistance: Moderate assistance  Quality of Gait: ataxic with decreased LE control, inconsistent step length/ foot clearance, inconsistent knee stability and WERNER, pelvic instability, VCs for [employer] and gait sequencing  Distance: 30ft           PT Exercises  Exercise Treatment: Seated abdominal isometrics pushing down into ball x10  Motor Control/Coordination: Single stepping to targets with focus on motor control standing at [employer].         Education  Education Provided Comments: Reviewed goals with pt. ASSESSMENT/PROGRESS TOWARDS GOALS:     Assessment: Pt required decreased assist for transfers and gait completing +1 this PM.  Pt with ataxia and decreased trunk stability.     Goals:  Long Term Goals  Long Term Goal 1: indep and efficient bed mobility  Long Term Goal 2: indep w/c to bed transfer; SBA sit to stand and car transfers  Long Term Goal 3: SBA gait 50 feet with appropriate device  Long Term Goal 4: min assist with 2 stairs for safe home entry  Long Term Goal 5: pt able to complete Patten at 25/56 level    PLAN OF CARE/Safety: Cont per pOC     Therapy Time:   Individual   Time In 1505   Time Out 1535   Minutes 30      Minutes:  Transfer/Bed mobility

## 2023-08-23 NOTE — FLOWSHEET NOTE
Patient assessment is complete. Pain medication given as ordered. Zofran was given for nausea at lunch, soup ordered for him. Oncology will follow up if needed.

## 2023-08-23 NOTE — H&P
Consult Note            Date:8/25/2023        Patient Name:George Bernetta Boxer     Date of Birth:[birth year]     Age:88 y.o. Reason for Consult: Medical management of hypertension, COPD    Chief Complaint   Weakness     History Obtained From   patient, electronic medical record    History of Present Illness   Bonny Brock is an 81-year-old  male with significant past medical history of hypertension, hyperlipidemia, COPD, anxiety, who was initially admitted for intractable back pain secondary to bony metastasis and urinary retention. Patient was found to have diffuse osteoblastic and osteolytic lesions throughout the thoracic spine with mild compression fractures of T11 and T12. Patient's condition stabilized and pain is well controlled. Currently, patient is admitted in inpatient rehab for PT and OT. Patient denies back pain, fever, chills, dizziness, shortness of breath, chest pain, and N/V/D. Past Medical History     Past Medical History:   Diagnosis Date    Anxiety     COPD (chronic obstructive pulmonary disease) ([address])     Hyperlipidemia     Hypertension     Kidney stone       Past Surgical History     Past Surgical History:   Procedure Laterality Date    PROSTATE BIOPSY  08/21/2023    U/S guided prostate biopsy completed by Dr. Jevon Orr      Medications     Prior to Admission medications    Medication Sig Start Date End Date Taking? Authorizing Provider   polyethylene glycol (GLYCOLAX) 17 GM/SCOOP powder Take 17 g by mouth daily as needed (constipation) 8/15/23 9/14/23  Daly Reddy PA-C   Baclofen (LIORESAL) 5 MG tablet Take 1 tablet by mouth 2 times daily as needed (back pain) 8/7/23   JORDAN Segura - CNP   predniSONE (DELTASONE) 10 MG tablet Take 4 tabs daily for 4 days then 3 tabs daily for 3 days then 2 tabs daily for 2 days then 1 tab daily once 8/7/23   JORDAN Rizo - CNP   fluticasone (FLONASE) 50 MCG/ACT nasal spray 2 sprays by Nasal route daily 7/24/23   Sully Aguilar,

## 2023-08-23 NOTE — CONSULTS
Urology Consult      Noemi Morales  1935  [id number]    Date of Admission:  8/22/2023  2:15 PM  Date of Consultation:  8/23/2023    Consultant: Elissa Fajardo PA-C  SupervisingPhysician: Dr. Alcira Amse  PCP:  Karla Noguera MD       Reason for Consultation: urinary retention      C/C: No chief complaint on file. History of Present Illness: Urinary Retention  Patient complains of urinary retention. Onset of retention was 1 day ago and was unknown in onset. Patient currently does have a urinary catheter in place. ml of urine were drained when catheter was placed. Prior to this event voiding symptoms consisted of slow stream. Prior treatments include tamulosin. Recent medications that may have affected his voiding include none. Allergies:No Known Allergies    PMH: Patient has a past medical history of Anxiety, COPD (chronic obstructive pulmonary disease) ([address]), Hyperlipidemia, Hypertension, and Kidney stone. PSH: Patient has a past surgical history that includes Prostate biopsy (08/21/2023). Social History: Patient reports that he has been smoking cigarettes. He has a 60.00 pack-year smoking history. He has never used smokeless tobacco. He reports current alcohol use. He reports that he does not use drugs. Family History: Patientsfamily history is not on file. ROS:  Review of Systems   Constitutional:  Negative for fatigue and fever. HENT:  Negative for congestion. Respiratory:  Negative for apnea. Cardiovascular:  Negative for chest pain. Genitourinary:  Negative for hematuria. Neurological:  Negative for speech difficulty.      Current Meds: oxymetazoline (AFRIN) 0.05 % nasal spray 2 spray, BID  enoxaparin (LOVENOX) injection 40 mg, Daily  melatonin disintegrating tablet 5 mg, Nightly  acetaminophen (TYLENOL) tablet 500 mg, 3 times per day  sodium chloride flush 0.9 % injection 5-40 mL, 2 times per day  sodium chloride flush 0.9 % injection 5-40 mL, PRN  0.9 % sodium chloride

## 2023-08-24 PROCEDURE — 99233 SBSQ HOSP IP/OBS HIGH 50: CPT | Performed by: PHYSICAL MEDICINE & REHABILITATION

## 2023-08-24 PROCEDURE — 97112 NEUROMUSCULAR REEDUCATION: CPT

## 2023-08-24 PROCEDURE — 1180000000 HC REHAB R&B

## 2023-08-24 PROCEDURE — 2580000003 HC RX 258: Performed by: INTERNAL MEDICINE

## 2023-08-24 PROCEDURE — 97535 SELF CARE MNGMENT TRAINING: CPT

## 2023-08-24 PROCEDURE — 6360000002 HC RX W HCPCS: Performed by: INTERNAL MEDICINE

## 2023-08-24 PROCEDURE — 6370000000 HC RX 637 (ALT 250 FOR IP): Performed by: INTERNAL MEDICINE

## 2023-08-24 PROCEDURE — 6360000002 HC RX W HCPCS: Performed by: NURSE PRACTITIONER

## 2023-08-24 PROCEDURE — 6370000000 HC RX 637 (ALT 250 FOR IP): Performed by: NURSE PRACTITIONER

## 2023-08-24 PROCEDURE — 6370000000 HC RX 637 (ALT 250 FOR IP): Performed by: PHYSICAL MEDICINE & REHABILITATION

## 2023-08-24 PROCEDURE — 97110 THERAPEUTIC EXERCISES: CPT

## 2023-08-24 PROCEDURE — 97116 GAIT TRAINING THERAPY: CPT

## 2023-08-24 PROCEDURE — 97530 THERAPEUTIC ACTIVITIES: CPT

## 2023-08-24 PROCEDURE — 6360000002 HC RX W HCPCS: Performed by: PHYSICAL MEDICINE & REHABILITATION

## 2023-08-24 RX ORDER — DEXAMETHASONE SODIUM PHOSPHATE 10 MG/ML
4 INJECTION, SOLUTION INTRAMUSCULAR; INTRAVENOUS EVERY 12 HOURS
Status: COMPLETED | OUTPATIENT
Start: 2023-08-24 | End: 2023-08-24

## 2023-08-24 RX ORDER — PREDNISONE 20 MG/1
20 TABLET ORAL DAILY
Status: COMPLETED | OUTPATIENT
Start: 2023-08-25 | End: 2023-08-27

## 2023-08-24 RX ORDER — PREDNISONE 10 MG/1
10 TABLET ORAL DAILY
Status: COMPLETED | OUTPATIENT
Start: 2023-08-31 | End: 2023-09-02

## 2023-08-24 RX ORDER — PREDNISONE 5 MG/1
5 TABLET ORAL DAILY
Status: COMPLETED | OUTPATIENT
Start: 2023-09-03 | End: 2023-09-05

## 2023-08-24 RX ADMIN — ACETAMINOPHEN 500 MG: 325 TABLET ORAL at 21:15

## 2023-08-24 RX ADMIN — SODIUM CHLORIDE, PRESERVATIVE FREE 10 ML: 5 INJECTION INTRAVENOUS at 06:45

## 2023-08-24 RX ADMIN — OXYMETAZOLINE HCL 2 SPRAY: 0.05 SPRAY NASAL at 21:19

## 2023-08-24 RX ADMIN — Medication 10 ML: at 10:17

## 2023-08-24 RX ADMIN — AMLODIPINE BESYLATE 5 MG: 5 TABLET ORAL at 10:11

## 2023-08-24 RX ADMIN — Medication 100 MG: at 10:11

## 2023-08-24 RX ADMIN — TRAZODONE HYDROCHLORIDE 150 MG: 50 TABLET ORAL at 21:14

## 2023-08-24 RX ADMIN — ACETAMINOPHEN 500 MG: 325 TABLET ORAL at 06:40

## 2023-08-24 RX ADMIN — BUSPIRONE HYDROCHLORIDE 5 MG: 10 TABLET ORAL at 10:11

## 2023-08-24 RX ADMIN — Medication 5 MG: at 21:15

## 2023-08-24 RX ADMIN — ATENOLOL 25 MG: 25 TABLET ORAL at 10:12

## 2023-08-24 RX ADMIN — OXYMETAZOLINE HCL 2 SPRAY: 0.05 SPRAY NASAL at 10:20

## 2023-08-24 RX ADMIN — BUSPIRONE HYDROCHLORIDE 5 MG: 10 TABLET ORAL at 21:15

## 2023-08-24 RX ADMIN — DEXAMETHASONE SODIUM PHOSPHATE 4 MG: 10 INJECTION, SOLUTION INTRAMUSCULAR; INTRAVENOUS at 18:22

## 2023-08-24 RX ADMIN — TAMSULOSIN HYDROCHLORIDE 0.4 MG: 0.4 CAPSULE ORAL at 17:38

## 2023-08-24 RX ADMIN — LOSARTAN POTASSIUM 25 MG: 25 TABLET, FILM COATED ORAL at 10:11

## 2023-08-24 RX ADMIN — ANTACID TABLETS 750 MG: 500 TABLET, CHEWABLE ORAL at 10:16

## 2023-08-24 RX ADMIN — DEXAMETHASONE SODIUM PHOSPHATE 4 MG: 10 INJECTION, SOLUTION INTRAMUSCULAR; INTRAVENOUS at 06:41

## 2023-08-24 RX ADMIN — PANTOPRAZOLE SODIUM 40 MG: 40 TABLET, DELAYED RELEASE ORAL at 06:49

## 2023-08-24 RX ADMIN — BACLOFEN 5 MG: 10 TABLET ORAL at 21:14

## 2023-08-24 RX ADMIN — Medication 10 ML: at 21:20

## 2023-08-24 RX ADMIN — ENOXAPARIN SODIUM 40 MG: 100 INJECTION SUBCUTANEOUS at 10:12

## 2023-08-24 RX ADMIN — ACETAMINOPHEN 500 MG: 325 TABLET ORAL at 15:07

## 2023-08-24 RX ADMIN — ATORVASTATIN CALCIUM 10 MG: 10 TABLET, FILM COATED ORAL at 21:14

## 2023-08-24 RX ADMIN — BACLOFEN 5 MG: 10 TABLET ORAL at 10:11

## 2023-08-24 RX ADMIN — Medication 2000 UNITS: at 17:38

## 2023-08-24 ASSESSMENT — PAIN DESCRIPTION - FREQUENCY: FREQUENCY: CONTINUOUS

## 2023-08-24 ASSESSMENT — PAIN SCALES - GENERAL
PAINLEVEL_OUTOF10: 3
PAINLEVEL_OUTOF10: 3

## 2023-08-24 ASSESSMENT — PAIN DESCRIPTION - DESCRIPTORS
DESCRIPTORS: ACHING
DESCRIPTORS: ACHING

## 2023-08-24 ASSESSMENT — PAIN DESCRIPTION - PAIN TYPE: TYPE: CHRONIC PAIN

## 2023-08-24 ASSESSMENT — PAIN DESCRIPTION - ONSET: ONSET: ON-GOING

## 2023-08-24 ASSESSMENT — PAIN DESCRIPTION - LOCATION
LOCATION: BACK
LOCATION: BACK

## 2023-08-24 ASSESSMENT — PAIN DESCRIPTION - ORIENTATION
ORIENTATION: LOWER
ORIENTATION: LOWER

## 2023-08-24 ASSESSMENT — PAIN - FUNCTIONAL ASSESSMENT: PAIN_FUNCTIONAL_ASSESSMENT: PREVENTS OR INTERFERES SOME ACTIVE ACTIVITIES AND ADLS

## 2023-08-24 NOTE — PROGRESS NOTES
Assessment completed. A&O x4. Denies pain at this time. Núñez patent draining clear christiano urine. Given PRN Tus for c/o heartburn. In chair with alarm activated. Call light in reach.  Electronically signed by Isabel Torres LPN on 1/04/0730 at 0:94 PM

## 2023-08-24 NOTE — CARE COORDINATION
LSW met with pt and discussed discharge date as well as goals. Pt made aware of not being goaled for complete independence and goaled to need minimal assist with stairs. Pt stated that he was independent prior and able to do stairs, and he does not see why he cannot do it again upon discharge. LSW explained that pt is at a different level now, pt verbalized understanding. Pt stated that he has no one able to stay with him upon discharge, and that he is not able to stay with anyone else. Pt is hopeful he will meet the goals and surpass some. LSW then spoke with Katheryn Jansen (son) and discussed pt's projected discharge date as well as goals. Katheryn Jansen made aware of pt not being goaled for independence and confirmed that pt does not have anyone that can stay with him. Katheryn Jansen stated that pt's granddaughter is local but is 7 months pregnant. Katheryn Jansen also reported that pt recently spent down financial resources for wife's care and that she is being moved to [facility] on 9/1 under Medicaid. Regarding barriers to discharge, Katheryn Jansen noted that he would install a ramp so that pt would not have to complete stairs and that a family member can complete laundry so pt would not have to go in/out of the garage as much. Katheryn Jansen reported that he plans to install cameras in pt's home and possibly obtaining a medical alert system as well. Katheryn Jansen emphasized that he would like to get pt as independent as possible so he can stay home as long as he can. Katheryn Jansen does appear to be realistic with pt's prognosis.  Electronically signed by AMEE Trujillo, LSW on 8/24/2023 at 11:41 AM

## 2023-08-24 NOTE — PLAN OF CARE
Problem: Discharge Planning  Goal: Discharge to home or other facility with appropriate resources  8/24/2023 0254 by Trixie Gonzalez RN  Outcome: Progressing  8/23/2023 1405 by Simona Melchor RN  Outcome: Progressing  Flowsheets (Taken 8/23/2023 1158)  Discharge to home or other facility with appropriate resources:   Identify barriers to discharge with patient and caregiver   Identify discharge learning needs (meds, wound care, etc)

## 2023-08-24 NOTE — PLAN OF CARE
Nutrition Problem #1: Unintended weight loss  Intervention: Food and/or Nutrient Delivery: Modify Current Diet (suggest SRAVANI)

## 2023-08-24 NOTE — PROGRESS NOTES
Physician Progress Note      PATIENT:               Janusz Gaitan  CSN #:                  [id number]  :                       1935  ADMIT DATE:       2023 10:22 AM  [facility] DATE:        2023 1:40 PM  RESPONDING  PROVIDER #:        Jessika Morrissey MD          QUERY TEXT:    Pt admitted with compression fracture of the T11 & T12 vertebra. 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 Leighann Salk If possible, please document in   progress notes and discharge summary if you are evaluating and/or treating any   of the following: The medical record reflects the following:  Risk Factors: diffuse metstatic disease, epidural tumor, T3-4  Clinical Indicators: CT T-Spine: Diffuse osteoblastic and osteolytic lesions,   thoracic spine. Malignancy diagnosis of exclusion. Mild compression fractures   T11 and T12.;  NM bone scan: Multifocal activity is seen within the cervical,   thoracic, and lumbar spine, multiple anterior and posterior ribs bilaterally,   proximal right humerus, right and left roshni pelvis. Activity at the   shoulders, wrists, hips, knees, feet, likely degenerative. Multifocal osseous   metastatic disease. Treatment: Neurosury & Oncology consults, pain mangement, CT spine, MRI spine,   Bone scan, rehab, monitoring    Thank you,  Nina Fine   Clinical Documentation Improvement Specialist  W: [phone]  Options provided:  -- Pathological compression fracture of the T11 & T12 vertebra  -- Traumatic compression fracture of the T11 & T12 vertebra  -- Other - I will add my own diagnosis  -- Disagree - Not applicable / Not valid  -- Disagree - Clinically unable to determine / Unknown  -- Refer to Clinical Documentation Reviewer    PROVIDER RESPONSE TEXT:    This patient has a traumatic compression fracture of the T11 & T12 vertebra.     Query created by: Nina Fine on 2023 10:57 AM      Electronically signed by:

## 2023-08-24 NOTE — PROGRESS NOTES
INDIVIDUALIZED OVERALL REHAB PLAN OF CARE  ADDENDUM TO REHAB PROGRESS NOTE-for audit purposes must also refer to this day's clinical note and combine the information      Date: 2023  Patient Name: Jannette Rodríguez   Room: K661/T892-87    MRN: [id number]    : 1935  (81 y.o.)  Gender: male       Today 2023 during weekly team meeting, I reviewed the patient Jannette Rodríguez in detail with the therapists and nurses involved in patient's care gathering complex physiatric data regarding current medical issues, progress in therapies, factors limiting progress, social issues, psychological issues, ongoing therapeutic plans and discharge planning. Legend:  I= independent Im =Modified independent  S=Supervised SB=stand by BENNETT=set up CG=contact hyun Min= minimal Mod=Moderate Max=maximal Max of 2 =maximal assist of 2 people      CURRENT FUNCTIONAL STATUS:    Patient was admitted through the emergency room [facility] in 2023 for evaluation of acute on chronic low back pain. Patient has been seen 3 times in the past 3 weeks in the emergency room. 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 He was found to have malignant lesion of the spine causing compression with significant pain and with progressive neurologic findings. He was admitted under the care of the hospitalist with hematology oncology, respiratory care, radiation therapy, neuro spine consulting. NURSING ISSUES:    According to chart review, pt's son, Talisha Townsend, is listed as [address] [place] [address]. As such, AUSTIN telephoned Talisha Townsend in follow-up to pt's high distress when seen at cancer center.  Per Mr. Cantorangelica Sicard request, AUSTIN sent e-mail with AUSTIN contact information should the 2  Discharge Goal: Independent, Putting On/Taking Off Footwear  Assistance Needed: Supervision or touching assistance  CARE Score: 4  Discharge Goal: Independent, Toilet Transfer  Assistance needed: Supervision or touching assistance  CARE Score: 4  Discharge Goal: Independent  SP:               From a cognitive standpoint they will need:        24 hr vs daily transitioning to supervision  -->progress to occasional             Significant problems/ barriers to functional progress include: Pt is at a high risk for functional loss,      []  Acute infection/UTI    []  Low BP's     []  COPD flare-up   []  Uncontrolled blood sugar     []  Progressive anemia     [x]  poor endurance    [x]  Severe pain     [x]  Impaired mental status    [x]  Urinary incontinence    [x]  Bowel incontinence           Plan to correct barriers to functional progress: Add scheduled rest breaks, CoQ 10 and Vit B 12, control pain by using ice Lidoderm rest and massage as well as pain medications prior to therapy. Spread therapy of 15 hours out over a 7 day window to accommodate rest breaks and medical interventions. Patient seems to be making fair to good response to these interventions. Based on a comprehensive evaluation of the above, the individualized therapy and Discharge plan will be:    -Times stated are an average that will be varied based on the patient's daily need. PT   1 1/2  hrs/day 5-7 days per wk      OT   1 1/2 hrs per day 5-7 days per wk     ST  1/2    hrs /day 3-5 days per week       Estimated LOS   1-2 week(s)    -Overall functional prognosis:     [x]  Good    []  Fair    []  Poor     -Medical Prognosis:   [x]  Good    []  Fair    []  Poor    This patient was made aware of the discussion of Plan of Care, their projected dicharge date and their projected function at discharge.          Layla Hutchins DO

## 2023-08-24 NOTE — PROGRESS NOTES
OCCUPATIONAL THERAPY  INPATIENT REHAB TREATMENT NOTE  [facility]      NAME: David Henao  : 1935 (81 y.o.)  MRN: [id number]  CODE STATUS: Full Code  Room: R248/R248-01    Date of Service: 2023    Referring Physician: Dr. 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    Restrictions  Restrictions/Precautions  Restrictions/Precautions: Fall Risk            Position Activity Restriction  Other position/activity restrictions: clinical reasoning- limit spine flexion    Patient's date of birth confirmed: Yes    SAFETY:  Safety Devices  Safety Devices in place: Yes  Type of devices: All fall risk precautions in place    SUBJECTIVE:       Pain at start/end of treatment: Yes: 2/10    Pain at end of treatment:  2    Location: stomach  Nursing notified: Yes  RN: Giuseppe Valdivia who stated she would notify Belgica Byrne (pt nurse)   Intervention: None    COGNITION:  Orientation  Overall Orientation Status: Within Functional Limits  Orientation Level: Oriented to person;Oriented to place;Oriented to time;Oriented to situation  Cognition  Overall Cognitive Status: Exceptions  Arousal/Alertness: Inconsistent responses to stimuli  Following Commands: Follows all commands without difficulty  Attention Span: Appears intact  Memory: Appears intact  Safety Judgement: Decreased awareness of need for safety;Decreased awareness of need for assistance  Problem Solving: Assistance required to generate solutions;Assistance required to implement solutions  Insights: Decreased awareness of deficits  Initiation: Requires cues for some  Sequencing: Requires cues for some  Cognition Comment: delayed processing      OBJECTIVE:     Pt completed horizontal dowel nicky tree/ring task standing at table top height with Min A for balance. Pt instructed to complete each side of tree with coordinating UE.   Pt counted 12

## 2023-08-24 NOTE — PROGRESS NOTES
Comprehensive Nutrition Assessment    Type and Reason for Visit:  Initial, Consult    Nutrition Recommendations/Plan:   Modify Current Diet (suggest SRAVANI)     Malnutrition Assessment:  Malnutrition Status: At risk for malnutrition (Comment) (08/24/23 1417)    Context:  Acute Illness     Findings of the 6 clinical characteristics of malnutrition:  Energy Intake:  Mild decrease in energy intake (Comment)  Weight Loss:  Unable to assess     Body Fat Loss:  No significant body fat loss     Muscle Mass Loss:  No significant muscle mass loss    Fluid Accumulation:  Unable to assess     Strength:  Not Performed    Nutrition Assessment:    Pt presents with inintended weight loss, with reported fair to good appetite/intake at meals. To continue to monitor adequacy of intake at meals, and continued trend of weight status. 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 Labs/meds reviewed. Gluc(8/17)-114, 156. Pt receiving steroid tx. +1 BLE edema noted. Wound Type: None       Current Nutrition Intake & Therapies:    Average Meal Intake: %  ADULT DIET; Regular      Anthropometric Measures:  Height:  5' 6\"  Ideal Body Weight (IBW):  142lb  Admission Body Weight: 162 lb 12 oz (73.8 kg) (8/22 bedscale)  Current BMI (kg/m2):  26.3  Usual Body Weight: 178 lb (80.7 kg) (5/12 ? source)                       BMI Categories: Overweight (BMI 25.0-29. 9)    Estimated Daily Nutrient Needs:  Energy Requirements Based On: Kcal/kg  Weight Used for Energy Requirements: Admission  Energy (kcal/day): 8536-0257 (kg x 23-25)  Weight Used for Protein Requirements: Ideal  Protein (g/day): 84-90 (kg x 1.3-1.4)  Method Used for Fluid Requirements: 1 ml/kcal  Fluid (ml/day): `1800    Nutrition Diagnosis:   Unintended weight loss related to inadequate protein-energy intake, catabolic illness as evidenced by weight loss    Nutrition Interventions:   Food and/or Nutrient

## 2023-08-24 NOTE — PROGRESS NOTES
Physical Therapy Rehab Treatment Note  Facility/Department: [facility]  Room: [facility]/W739-70       NAME: Jcarlos Craig  : 1935 (2 y.o.)  MRN: [id number]  CODE STATUS: Full Code    Date of Service: 2023     Restrictions:  Restrictions/Precautions: Fall Risk  Position Activity Restriction  Other position/activity restrictions: clinical reasoning- limit spine flexion    SUBJECTIVE:   Subjective: My feet are just so numb    Pain  Pain: 4/10 pain stomach pain pre and post session. Declines medication    OBJECTIVE:     Bed mobility  Rolling to Left: Stand by assistance  Rolling to Right: Stand by assistance  Supine to Sit: Minimal assistance  Sit to Supine: Minimal assistance  Scooting: Minimal assistance  Bed Mobility Comments: HOB flat with no rails - increased effort and concern for edge of bed safety noted    Sit to Stand  Assistance Level: Stand by assist  Stand to Sit  Assistance Level: Stand by assist  Skilled Clinical Factors: Utilizes BUE for eccentric lowering    Ambulation  Surface: Level surface  Device: Rolling walker  Distance: 15' x 4  Activity: Within Unit  Activity Comments: Slow pace inconsistent foot placement B knee buckling intermittently  Additional Factors: Verbal cues  Assistance Level: Minimal assistance; Moderate assistance  Gait Deviations: Slow rayn;Decreased weight shift bilateral;Decreased step length bilateral    Neuromuscular Education  NDT Treatment: Standing  Facilitation techniques: KNOWLES balance tasks with UE support due to BLE weakness including FT/FA. Semi tandem    PT Exercises  A/AROM Exercises: seated AP, LAQ, march, hip abd with ball, hip abd RTB, ham curls RTB 2x10 ea  VCs to increase ROM and for  motor control. ASSESSMENT/PROGRESS TOWARDS GOALS:   Body Structures, Functions, Activity Limitations Requiring Skilled Therapeutic Intervention: Decreased functional mobility ; Decreased strength;Decreased endurance;Decreased balance;Decreased safe awareness  Assessment:

## 2023-08-24 NOTE — PROGRESS NOTES
OCCUPATIONAL THERAPY  INPATIENT REHAB TREATMENT NOTE  [facility]      NAME: Margarita Quinones  : 1935 (81 y.o.)  MRN: [id number]  CODE STATUS: Full Code  Room: [facility]/R248-01    Date of Service: 2023    Referring Physician: Dr. 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 Location: Stomach    COGNITION:  Orientation  Overall Orientation Status: Within Functional Limits  Orientation Level: Oriented to person;Oriented to place;Oriented to time;Oriented to situation  Cognition  Overall Cognitive Status: Exceptions  Arousal/Alertness: Inconsistent responses to stimuli  Following Commands: Follows all commands without difficulty  Attention Span: Appears intact  Memory: Appears intact  Safety Judgement: Decreased awareness of need for safety;Decreased awareness of need for assistance  Problem Solving: Assistance required to generate solutions;Assistance required to implement solutions  Insights: Decreased awareness of deficits  Initiation: Requires cues for some  Sequencing: Requires cues for some  Cognition Comment: delayed processing      Pt's current cognitive status is:  Comprehension: Supervision  Expression: Mod I  Social Interaction: Supervision  Problem Solving: Mod A  Memory:  Mod A    OBJECTIVE:     Pt declined shower but stated he Frame for Long Term Goals : Within 1.5-2 weeks, pt to demo progress in the following areas listed below to achieve specific LTGs stated in the intial eval  Long Term Goal 1: P tto demo improved ADL/IADL status  Long Term Goal 2: Pt to demo improved standing balance and tolerance  Long Term Goal 3: Pt to demo improved ability to use AD/AE/DME as needed to improve function and maintain pain level  Long Term Goal 4: Pt to demo improved UB strength for self-care        Therapy Time:   Individual Group Co-Treat   Time In 1100       Time Out 1204         Minutes 64                 ADL/IADL trainin minutes     Electronically signed by:     TAMRA Jernigan,   2023, 2:41 PM

## 2023-08-24 NOTE — PROGRESS NOTES
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    Restrictions:  Restrictions/Precautions: Fall Risk       SUBJECTIVE:   Subjective: Pt reports numbness in both feet    Pain  Pain: 3/10 pain  Pre/post pain, denied intervention    OBJECTIVE:     Bed mobility  Supine to Sit: Minimal assistance  Sit to Supine: Minimal assistance    Transfers  Sit to Stand: Minimal Assistance  Stand to Sit: Minimal Assistance    Ambulation  Surface: Level tile  Device: Rolling Walker  Other Apparatus: Wheelchair follow (recommended)  Assistance: Moderate assistance  Quality of Gait: trunk ataxia, christiano with walker advancement, NBOS  Gait Deviations: Slow Alesha;Decreased step length;Decreased step height;Staggers  Distance: 30ft       PT Exercises  Exercise Treatment: AP, LAQ, GS, TA iso 10x ea           ASSESSMENT/PROGRESS TOWARDS GOALS:   Assessment: Pt with improving abilities with transfers and bed mobility. Pt still requires increase verbal prompts and assistance for gait due to trunk ataxia and limited LE strength. Goals:  Short Term Goals  Short Term Goal 1: Pt will complete all bed mobility and transfers safely and independently. Short Term Goal 2: Pt will demonstrate good static and dynamic standing balance to reduce pt's risk for falls at home. Short Term Goal 3: Pt will ambulate with LRD >/= 150' to allow him to safely return home. Short Term Goal 4: Improve veronica LE strength to increase ease with transfers and mobility.   Long Term Goals  Long Term Goal 1: indep and efficient bed mobility  Long Term Goal 2: indep w/c to bed transfer; SBA sit to stand and car transfers  Long Term Goal 3: SBA gait 50 feet

## 2023-08-25 LAB
BASOPHILS # BLD: 0 K/UL (ref 0–0.2)
BASOPHILS NFR BLD: 0.3 %
EOSINOPHIL # BLD: 0 K/UL (ref 0–0.7)
EOSINOPHIL NFR BLD: 0.2 %
ERYTHROCYTE [DISTWIDTH] IN BLOOD BY AUTOMATED COUNT: 14.5 % (ref 11.5–14.5)
HCT VFR BLD AUTO: 41.3 % (ref 42–52)
HGB BLD-MCNC: 13.7 G/DL (ref 14–18)
LYMPHOCYTES # BLD: 0.5 K/UL (ref 1–4.8)
LYMPHOCYTES NFR BLD: 3.6 %
MCH RBC QN AUTO: 31.9 PG (ref 27–31.3)
MCHC RBC AUTO-ENTMCNC: 33.2 % (ref 33–37)
MCV RBC AUTO: 96.2 FL (ref 79–92.2)
MONOCYTES # BLD: 0.8 K/UL (ref 0.2–0.8)
MONOCYTES NFR BLD: 5.8 %
NEUTROPHILS # BLD: 11.7 K/UL (ref 1.4–6.5)
NEUTS SEG NFR BLD: 90.1 %
PLATELET # BLD AUTO: 232 K/UL (ref 130–400)
RBC # BLD AUTO: 4.3 M/UL (ref 4.7–6.1)
WBC # BLD AUTO: 13 K/UL (ref 4.8–10.8)

## 2023-08-25 PROCEDURE — 97535 SELF CARE MNGMENT TRAINING: CPT

## 2023-08-25 PROCEDURE — 6370000000 HC RX 637 (ALT 250 FOR IP): Performed by: INTERNAL MEDICINE

## 2023-08-25 PROCEDURE — 36415 COLL VENOUS BLD VENIPUNCTURE: CPT

## 2023-08-25 PROCEDURE — 6370000000 HC RX 637 (ALT 250 FOR IP): Performed by: PHYSICAL MEDICINE & REHABILITATION

## 2023-08-25 PROCEDURE — 97110 THERAPEUTIC EXERCISES: CPT

## 2023-08-25 PROCEDURE — 85025 COMPLETE CBC W/AUTO DIFF WBC: CPT

## 2023-08-25 PROCEDURE — 2580000003 HC RX 258: Performed by: INTERNAL MEDICINE

## 2023-08-25 PROCEDURE — 97116 GAIT TRAINING THERAPY: CPT

## 2023-08-25 PROCEDURE — 6370000000 HC RX 637 (ALT 250 FOR IP): Performed by: NURSE PRACTITIONER

## 2023-08-25 PROCEDURE — 2580000003 HC RX 258

## 2023-08-25 PROCEDURE — 99232 SBSQ HOSP IP/OBS MODERATE 35: CPT | Performed by: PHYSICAL MEDICINE & REHABILITATION

## 2023-08-25 PROCEDURE — 1180000000 HC REHAB R&B

## 2023-08-25 PROCEDURE — 6360000002 HC RX W HCPCS: Performed by: PHYSICAL MEDICINE & REHABILITATION

## 2023-08-25 PROCEDURE — 6370000000 HC RX 637 (ALT 250 FOR IP)

## 2023-08-25 PROCEDURE — 99232 SBSQ HOSP IP/OBS MODERATE 35: CPT

## 2023-08-25 PROCEDURE — 97530 THERAPEUTIC ACTIVITIES: CPT

## 2023-08-25 PROCEDURE — 99231 SBSQ HOSP IP/OBS SF/LOW 25: CPT | Performed by: PHYSICIAN ASSISTANT

## 2023-08-25 RX ORDER — 0.9 % SODIUM CHLORIDE 0.9 %
500 INTRAVENOUS SOLUTION INTRAVENOUS ONCE
Status: COMPLETED | OUTPATIENT
Start: 2023-08-25 | End: 2023-08-25

## 2023-08-25 RX ORDER — DOCUSATE SODIUM 100 MG/1
100 CAPSULE, LIQUID FILLED ORAL 2 TIMES DAILY
Status: DISCONTINUED | OUTPATIENT
Start: 2023-08-25 | End: 2023-09-09 | Stop reason: HOSPADM

## 2023-08-25 RX ORDER — OXYCODONE HYDROCHLORIDE 5 MG/1
10 CAPSULE ORAL EVERY 4 HOURS PRN
Status: DISCONTINUED | OUTPATIENT
Start: 2023-08-25 | End: 2023-09-09 | Stop reason: HOSPADM

## 2023-08-25 RX ADMIN — LOSARTAN POTASSIUM 25 MG: 25 TABLET, FILM COATED ORAL at 08:52

## 2023-08-25 RX ADMIN — DOCUSATE SODIUM 100 MG: 100 CAPSULE, LIQUID FILLED ORAL at 21:34

## 2023-08-25 RX ADMIN — Medication 2000 UNITS: at 16:40

## 2023-08-25 RX ADMIN — OXYMETAZOLINE HCL 2 SPRAY: 0.05 SPRAY NASAL at 08:47

## 2023-08-25 RX ADMIN — BUSPIRONE HYDROCHLORIDE 5 MG: 10 TABLET ORAL at 21:34

## 2023-08-25 RX ADMIN — ATORVASTATIN CALCIUM 10 MG: 10 TABLET, FILM COATED ORAL at 21:34

## 2023-08-25 RX ADMIN — AMLODIPINE BESYLATE 5 MG: 5 TABLET ORAL at 08:42

## 2023-08-25 RX ADMIN — Medication 10 ML: at 21:38

## 2023-08-25 RX ADMIN — DOCUSATE SODIUM 100 MG: 100 CAPSULE, LIQUID FILLED ORAL at 13:04

## 2023-08-25 RX ADMIN — ACETAMINOPHEN 500 MG: 325 TABLET ORAL at 13:03

## 2023-08-25 RX ADMIN — ACETAMINOPHEN 500 MG: 325 TABLET ORAL at 21:56

## 2023-08-25 RX ADMIN — Medication 5 MG: at 21:34

## 2023-08-25 RX ADMIN — BUSPIRONE HYDROCHLORIDE 5 MG: 10 TABLET ORAL at 08:41

## 2023-08-25 RX ADMIN — OXYCODONE HYDROCHLORIDE 10 MG: 5 CAPSULE ORAL at 21:34

## 2023-08-25 RX ADMIN — BACLOFEN 5 MG: 10 TABLET ORAL at 08:42

## 2023-08-25 RX ADMIN — Medication 10 ML: at 08:47

## 2023-08-25 RX ADMIN — ANTACID TABLETS 750 MG: 500 TABLET, CHEWABLE ORAL at 13:04

## 2023-08-25 RX ADMIN — PANTOPRAZOLE SODIUM 40 MG: 40 TABLET, DELAYED RELEASE ORAL at 06:18

## 2023-08-25 RX ADMIN — TAMSULOSIN HYDROCHLORIDE 0.4 MG: 0.4 CAPSULE ORAL at 16:40

## 2023-08-25 RX ADMIN — SODIUM CHLORIDE 500 ML: 9 INJECTION, SOLUTION INTRAVENOUS at 21:55

## 2023-08-25 RX ADMIN — ATENOLOL 25 MG: 25 TABLET ORAL at 08:42

## 2023-08-25 RX ADMIN — Medication 100 MG: at 08:42

## 2023-08-25 RX ADMIN — BACLOFEN 5 MG: 10 TABLET ORAL at 21:34

## 2023-08-25 RX ADMIN — OXYMETAZOLINE HCL 2 SPRAY: 0.05 SPRAY NASAL at 21:38

## 2023-08-25 RX ADMIN — ACETAMINOPHEN 500 MG: 325 TABLET ORAL at 06:18

## 2023-08-25 RX ADMIN — TRAZODONE HYDROCHLORIDE 150 MG: 50 TABLET ORAL at 21:34

## 2023-08-25 RX ADMIN — ENOXAPARIN SODIUM 40 MG: 100 INJECTION SUBCUTANEOUS at 08:41

## 2023-08-25 RX ADMIN — OXYCODONE HYDROCHLORIDE 5 MG: 5 CAPSULE ORAL at 16:40

## 2023-08-25 RX ADMIN — PREDNISONE 20 MG: 20 TABLET ORAL at 08:46

## 2023-08-25 RX ADMIN — FLUTICASONE PROPIONATE 2 SPRAY: 50 SPRAY, METERED NASAL at 13:06

## 2023-08-25 ASSESSMENT — ENCOUNTER SYMPTOMS
BACK PAIN: 1
NAUSEA: 1
TROUBLE SWALLOWING: 0
APNEA: 0
COUGH: 0
SHORTNESS OF BREATH: 0

## 2023-08-25 ASSESSMENT — PAIN SCALES - GENERAL
PAINLEVEL_OUTOF10: 0
PAINLEVEL_OUTOF10: 8
PAINLEVEL_OUTOF10: 7
PAINLEVEL_OUTOF10: 0

## 2023-08-25 ASSESSMENT — PAIN DESCRIPTION - LOCATION
LOCATION: BACK

## 2023-08-25 ASSESSMENT — PAIN DESCRIPTION - DESCRIPTORS
DESCRIPTORS: ACHING
DESCRIPTORS: ACHING

## 2023-08-25 ASSESSMENT — PAIN DESCRIPTION - PAIN TYPE
TYPE: CHRONIC PAIN
TYPE: CHRONIC PAIN

## 2023-08-25 ASSESSMENT — PAIN DESCRIPTION - ORIENTATION
ORIENTATION: LOWER
ORIENTATION: LOWER

## 2023-08-25 ASSESSMENT — PAIN - FUNCTIONAL ASSESSMENT: PAIN_FUNCTIONAL_ASSESSMENT: PREVENTS OR INTERFERES SOME ACTIVE ACTIVITIES AND ADLS

## 2023-08-25 NOTE — PROGRESS NOTES
extended walker or wheelchair use. Patient was encouraged to use it this weekend and in the evenings in the room  Education Method: Verbal;Demonstration  Barriers to Learning: None  Education Outcome: Demonstrated understanding    Equipment recommendations:  OT Equipment Recommendations  Other: Patient was educated in use of reachers to pick items up from the floor without bending over. Patient was able to use the reachers to  one inch blocks from the floor and place them on the table. patient reported that he thought he could use them more at home      ASSESSMENT:  Assessment: Patient was questioning why he needed to work on hand strength because it is his legs that are the problem. Patient listened and accepted education that his hands are going to be very important during extended use of the walker or wheelchair  Activity Tolerance: Patient tolerated treatment well      PLAN OF CARE:  Strengthening, Balance training, Functional mobility training, Endurance training, Patient/Caregiver education & training, Equipment evaluation, education, & procurement, Self-Care / ADL, Home management training, Safety education & training, Neuromuscular re-education  Continue OT POC until discharge    Patient goals : Go home and do like I have been doing  Time Frame for Long Term Goals :  Within 1.5-2 weeks, pt to demo progress in the following areas listed below to achieve specific LTGs stated in the intial eval  Long Term Goal 1: P tto demo improved ADL/IADL status  Long Term Goal 2: Pt to demo improved standing balance and tolerance  Long Term Goal 3: Pt to demo improved ability to use AD/AE/DME as needed to improve function and maintain pain level  Long Term Goal 4: Pt to demo improved UB strength for self-care        Therapy Time:   Individual Group Co-Treat   Time In 1510       Time Out 1600         Minutes 50               Session was started late but patient was seen for 10 minutes longer in am    Therapeutic activities: 50 minutes     Electronically signed by:    HUNTER Velazquez,   8/25/2023, 3:54 PM

## 2023-08-25 NOTE — FLOWSHEET NOTE
Assessment completed. VSS. Pt c/o lower back pain medicated per order. Núñez cath draining christiano urine. Received all medications per order. Call light within reach. Electronically signed by Nayana Armas RN on 8/25/2023 at 10:29 AM

## 2023-08-25 NOTE — PROGRESS NOTES
Subjective:      Patient ID: Kristine House is a 81 y.o. male    HPI 81year old male who's burnham is draining clear yellow urine.  Voicing no urological complaints    Past Medical History:   Diagnosis Date    Anxiety     COPD (chronic obstructive pulmonary disease) (HCC)     Hyperlipidemia     Hypertension     Kidney stone      Past Surgical History:   Procedure Laterality Date    PROSTATE BIOPSY  08/21/2023    U/S guided prostate biopsy completed by Dr. Rohit Garcia History    Marital status:      Spouse name: None    Number of children: None    Years of education: None    Highest education level: None   Tobacco Use    Smoking status: Every Day     Packs/day: 1.00     Years: 60.00     Pack years: 60.00     Types: Cigarettes    Smokeless tobacco: Never   Substance and Sexual Activity    Alcohol use: Yes    Drug use: No   Social History Narrative    Lives With: Alone (Wife has dementia -moving to a nursing home [facility] in [address] from a memory care unit in [address] to finances)    Type of Home: House in [address]: One level    Home Access: Stairs to enter without rails - Number of Steps: 2    Bathroom Shower/Tub: Walk-in shower-Equipment: None    Home Equipment: None    Has the patient had two or more falls in the past year or any fall with injury in the past year?: No    Receives Help From: Family (son comes a couple times a week)    ADL Assistance: Independent    Homemaking Assistance: Independent (neighbor cuts grass), Homemaking Responsibilities: Yes    Ambulation Assistance: Independent, Transfer Assistance: Independent    Active : Yes    Occupation: Retired    IADL Comments: pt completes his own shopping, cuts some of his lawn         Social Determinants of Health     Financial Resource Strain: Low Risk     Difficulty of Paying Living Expenses: Not hard at all   Food Insecurity: No Food Insecurity    Worried About Running

## 2023-08-25 NOTE — PLAN OF CARE
Problem: Discharge Planning  Goal: Discharge to home or other facility with appropriate resources  Outcome: Progressing  Flowsheets (Taken 8/24/2023 2020)  Discharge to home or other facility with appropriate resources: Identify barriers to discharge with patient and caregiver

## 2023-08-25 NOTE — PROGRESS NOTES
Physical Therapy Rehab Treatment Note  Facility/Department: [facility]  Room: S491/V720-77       NAME: Johny Hills  : 1935 (77 y.o.)  MRN: [id number]  CODE STATUS: Full Code    Date of Service: 2023     Restrictions:  Restrictions/Precautions: Fall Risk    SUBJECTIVE:   Subjective: Pt reports heart burn after he eats. Pain  Pain: 3/10 heart burn RN notified. N/T B feet    OBJECTIVE:         Bed mobility  Rolling to Left: Stand by assistance  Rolling to Right: Stand by assistance  Supine to Sit: Stand by assistance  Sit to Supine: Stand by assistance  Bed Mobility Comments: VCs for technique. Transfers  Sit to Stand: Stand by assistance  Stand to Sit: Stand by assistance  Bed to Chair:  (insidental touching)  Comment: Verbal and tactile cues for sequencing and safety. Ambulation  Surface: Level tile  Device: Rolling Walker  Other Apparatus: Wheelchair follow  Assistance: Minimal assistance  Quality of Gait: Decreased trunk control with ataxic LEs, variable foot placement, VCs and intermittent assist for [employer] advancement and gait sequence  Distance: 30ftx2  Comments: Assist for trunk stability given. 2nd gait trial utilized mirror for visual cues with improved foot placement. PT Exercises  A/AROM Exercises: supine: bridges x10, LTR x10, s/l clams x10, s/l hip flex x10 AAROM, hook lying march x10, heel slides x10, DKTC with feet on Pball x10 Verbal and manual cues for motor control            ASSESSMENT/PROGRESS TOWARDS GOALS:     Assessment: Improvement noted to foot placement with utilization of mirror for visual cues with gait training. Focused on core/hip stability and motor control.       Goals:  Short Term Goals  Long Term Goals  Long Term Goal 1: indep and efficient bed mobility  Long Term Goal 2: indep w/c to bed transfer; SBA sit to stand and car transfers  Long Term Goal 3: SBA gait 50 feet with appropriate device  Long Term Goal 4: min assist with 2 stairs for safe home

## 2023-08-25 NOTE — PLAN OF CARE
Problem: Discharge Planning  Goal: Discharge to home or other facility with appropriate resources  8/25/2023 0352 by Marlen Fu RN  Outcome: Progressing     Problem: Safety - Adult  Goal: Free from fall injury  8/25/2023 0352 by Marlen Fu RN  Outcome: Progressing     Problem: ABCDS Injury Assessment  Goal: Absence of physical injury  8/25/2023 0352 by Marlen Fu RN  Outcome: Progressing     Problem: Skin/Tissue Integrity  Goal: Absence of new skin breakdown  Description: 1. Monitor for areas of redness and/or skin breakdown  2. Assess vascular access sites hourly  3. Every 4-6 hours minimum:  Change oxygen saturation probe site  4. Every 4-6 hours:  If on nasal continuous positive airway pressure, respiratory therapy assess nares and determine need for appliance change or resting period.   8/25/2023 1030 by Stephen Cortez RN  Outcome: Progressing

## 2023-08-25 NOTE — PROGRESS NOTES
Physical Therapy Rehab Treatment Note  Facility/Department: [facility]  Room: [place] [address]       NAME: Matt Foss  : 1935 (14 y.o.)  MRN: [id number]  CODE STATUS: Full Code    Date of Service: 2023     Restrictions:  Restrictions/Precautions: Fall Risk     SUBJECTIVE:   Subjective: Pt reports his stomach is distressing. Pain  Pain: 3/10 mild stomach ache    OBJECTIVE:            Transfers  Sit to Stand: Stand by assistance  Stand to Sit: Stand by assistance  Bed to Chair: Minimal assistance (SPT with WW;variable knee stability)  Comment: Verbal and tactile cues for sequencing and safety. Ambulation  Surface: Level tile  Device: Rolling Walker  Other Apparatus: Wheelchair follow  Assistance: Minimal assistance; Moderate assistance  Quality of Gait: Decreased trunk control with ataxic LEs, variable foot placement, VCs and intermittent assist for [employer] advancement and gait sequence  Distance: 25ft  Comments: Assist for trunk stability given. 2nd gait trial utilized mirror for visual cues with improved foot placement. PT Exercises  Exercise Treatment: Unsupported sitting rows/lats with YTB x10 ea  A/AROM Exercises: Seated alternating LAQ and march with 2# ankle wt x10 ea            ASSESSMENT/PROGRESS TOWARDS GOALS:   Assessment: Improvement noted to foot placement with utilization of mirror for visual cues with gait training. Goals:  Long Term Goals  Long Term Goal 1: indep and efficient bed mobility  Long Term Goal 2: indep w/c to bed transfer; SBA sit to stand and car transfers  Long Term Goal 3: SBA gait 50 feet with appropriate device  Long Term Goal 4: min assist with 2 stairs for safe home entry  Long Term Goal 5: pt able to complete Patten at 25/56 level    PLAN OF CARE/Safety: Cont per POC.        Therapy Time:   Individual   Time In 1030   Time Out 1100   Minutes 30      Minutes:  Transfer/Bed mobility training:10  Gait training:10  Neuro re education:0  Therapeutic ex:10    Elida Kenton

## 2023-08-25 NOTE — PROGRESS NOTES
OCCUPATIONAL THERAPY  INPATIENT REHAB TREATMENT NOTE  [facility]      NAME: Wagner Mobley  : 1935 (81 y.o.)  MRN: [id number]  CODE STATUS: Full Code  [facility]    Date of Service: 2023    Referring Physician: Dr. 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    Restrictions  Restrictions/Precautions  Restrictions/Precautions: Fall Risk       Patient's date of birth confirmed: Yes    SAFETY:  Safety Devices  Safety Devices in place: Yes  Type of devices: All fall risk precautions in place    SUBJECTIVE:       Pain at start of treatment: No    Pain at end of treatment: No      COGNITION:  Orientation  Overall Orientation Status: Within Functional Limits  Orientation Level: Oriented to person;Oriented to place;Oriented to time;Oriented to situation  Cognition  Overall Cognitive Status: Exceptions  Arousal/Alertness: Inconsistent responses to stimuli  Following Commands: Follows one step commands with repetition  Attention Span: Appears intact  Memory: Decreased short term memory;Decreased recall of recent events  Safety Judgement: Decreased awareness of need for safety;Decreased awareness of need for assistance  Problem Solving: Assistance required to generate solutions;Assistance required to implement solutions  Insights: Decreased awareness of deficits  Sequencing: Requires cues for some  Cognition Comment: delayed processing      Pt's current cognitive status is:  Comprehension: Supervision  Expression: Mod I  Social Interaction: Mod I  Problem Solving:  Mod A  Memory: Supervision    OBJECTIVE:         Grooming/Oral Hygiene  Assistance Level: Modified independent  Skilled Clinical Factors: comb hair  Upper Extremity Bathing  Assistance Level: Supervision  Skilled Clinical Factors: providing s/u  Lower Extremity Bathing  Assistance Level: Minimal assistance  Skilled Clinical Factors: provided assist with posterior care, vc's for sequencing anterior care, and seated with vc's for legs/feet  Upper Extremity Dressing  Assistance Level: Modified independent  Lower Extremity Dressing  Skilled Clinical Factors: none- pt kept same clothes on as yesterday per his choice  Putting On/Taking Off Footwear  Assistance Level: Supervision  Skilled Clinical Factors: figure 4 BLE needing extra time but able to complete foot care, doff/don socks and dry feet/between toes  Toileting  Assistance Level: Moderate assistance  Skilled Clinical Factors: Min A to pull shorts up/down, Min A for balance and watching knees for buckling/vc's to try to lock them into place upon standing to avoid a fall, and provided TD (A) while standing for posterior hygiene  Toilet Transfers  Technique: Stand step  Equipment: Standard toilet;Grab bars  Assistance Level: Minimal assistance  Skilled Clinical Factors: vc's for sequencing transitional movements with hands and turning  Tub/Shower Transfers  Skilled Clinical Factors: sponge bath completed on toilet d/t pt worrying about needing to go in the middle of bathing; pt was originally on toilet and preferred to stay on toilet to get bathed in case he needed to go while bathing.      Functional Mobility  Sit to Stand  Assistance Level: Minimal assistance  Stand to Sit  Assistance Level: Minimal assistance     Education:   Education on toilet transfer to/from w/c for sequencing and hand positioning       ASSESSMENT:  Assessment: Pt gets agitated with catheter line; is cooperative and fairly pleasant  Activity Tolerance: Patient tolerated treatment well;Treatment limited secondary to agitation      PLAN OF CARE:  Strengthening, Balance training, Functional mobility training, Endurance training, Patient/Caregiver education & training, Equipment evaluation, education, & procurement, Self-Care / ADL, Home management training, Safety education & training,

## 2023-08-25 NOTE — PLAN OF CARE
Problem: Discharge Planning  Goal: Discharge to home or other facility with appropriate resources  8/25/2023 0352 by Marlen Fu RN  Outcome: Progressing  8/25/2023 0334 by Marlen Fu RN  Outcome: Progressing  Flowsheets (Taken 8/24/2023 2020)  Discharge to home or other facility with appropriate resources: Identify barriers to discharge with patient and caregiver

## 2023-08-25 NOTE — PROGRESS NOTES
Pt assessment completed. Pt C/O pain of \"3\" Lower back. Medicated with HS med pass. VSS. PT much less anxious- verbalizes \"therapy was hard\", Im tired. \" Pt with burnham intact for retention. Pt call light with in reach. Bed low and locked. HL remains in RFA. Pt to start po prednisone tomorrow. IV decadron completed.

## 2023-08-26 LAB
ALBUMIN SERPL-MCNC: 3.3 G/DL (ref 3.5–4.6)
ALP SERPL-CCNC: 108 U/L (ref 35–104)
ALT SERPL-CCNC: 30 U/L (ref 0–41)
ANION GAP SERPL CALCULATED.3IONS-SCNC: 8 MEQ/L (ref 9–15)
AST SERPL-CCNC: 27 U/L (ref 0–40)
BACTERIA URNS QL MICRO: ABNORMAL /HPF
BILIRUB SERPL-MCNC: 0.6 MG/DL (ref 0.2–0.7)
BILIRUB UR QL STRIP: NEGATIVE
BUN SERPL-MCNC: 40 MG/DL (ref 8–23)
CALCIUM SERPL-MCNC: 8 MG/DL (ref 8.5–9.9)
CHLORIDE SERPL-SCNC: 103 MEQ/L (ref 95–107)
CLARITY UR: CLEAR
CO2 SERPL-SCNC: 25 MEQ/L (ref 20–31)
COLOR UR: YELLOW
CREAT SERPL-MCNC: 0.73 MG/DL (ref 0.7–1.2)
EPI CELLS #/AREA URNS AUTO: ABNORMAL /HPF (ref 0–5)
GLOBULIN SER CALC-MCNC: 2.2 G/DL (ref 2.3–3.5)
GLUCOSE SERPL-MCNC: 86 MG/DL (ref 70–99)
GLUCOSE UR STRIP-MCNC: NEGATIVE MG/DL
HGB UR QL STRIP: ABNORMAL
HYALINE CASTS #/AREA URNS AUTO: ABNORMAL /HPF (ref 0–5)
KETONES UR STRIP-MCNC: NEGATIVE MG/DL
LEUKOCYTE ESTERASE UR QL STRIP: ABNORMAL
NITRITE UR QL STRIP: NEGATIVE
PH UR STRIP: 5 [PH] (ref 5–9)
POTASSIUM SERPL-SCNC: 5 MEQ/L (ref 3.4–4.9)
PROT SERPL-MCNC: 5.5 G/DL (ref 6.3–8)
PROT UR STRIP-MCNC: NEGATIVE MG/DL
RBC #/AREA URNS HPF: ABNORMAL /HPF (ref 0–2)
SODIUM SERPL-SCNC: 136 MEQ/L (ref 135–144)
SP GR UR STRIP: 1.02 (ref 1–1.03)
URINE REFLEX TO CULTURE: ABNORMAL
UROBILINOGEN UR STRIP-ACNC: 0.2 E.U./DL
WBC #/AREA URNS AUTO: ABNORMAL /HPF (ref 0–5)

## 2023-08-26 PROCEDURE — 6370000000 HC RX 637 (ALT 250 FOR IP)

## 2023-08-26 PROCEDURE — 36415 COLL VENOUS BLD VENIPUNCTURE: CPT

## 2023-08-26 PROCEDURE — 2580000003 HC RX 258: Performed by: INTERNAL MEDICINE

## 2023-08-26 PROCEDURE — 6360000002 HC RX W HCPCS: Performed by: PHYSICAL MEDICINE & REHABILITATION

## 2023-08-26 PROCEDURE — 6370000000 HC RX 637 (ALT 250 FOR IP): Performed by: PHYSICAL MEDICINE & REHABILITATION

## 2023-08-26 PROCEDURE — 6370000000 HC RX 637 (ALT 250 FOR IP): Performed by: INTERNAL MEDICINE

## 2023-08-26 PROCEDURE — 97535 SELF CARE MNGMENT TRAINING: CPT

## 2023-08-26 PROCEDURE — 97530 THERAPEUTIC ACTIVITIES: CPT

## 2023-08-26 PROCEDURE — 6370000000 HC RX 637 (ALT 250 FOR IP): Performed by: NURSE PRACTITIONER

## 2023-08-26 PROCEDURE — 80053 COMPREHEN METABOLIC PANEL: CPT

## 2023-08-26 PROCEDURE — 97116 GAIT TRAINING THERAPY: CPT

## 2023-08-26 PROCEDURE — 97110 THERAPEUTIC EXERCISES: CPT

## 2023-08-26 PROCEDURE — 81001 URINALYSIS AUTO W/SCOPE: CPT

## 2023-08-26 PROCEDURE — 1180000000 HC REHAB R&B

## 2023-08-26 RX ORDER — LIDOCAINE 4 G/G
3 PATCH TOPICAL DAILY PRN
Status: DISCONTINUED | OUTPATIENT
Start: 2023-08-26 | End: 2023-09-09 | Stop reason: HOSPADM

## 2023-08-26 RX ORDER — CIPROFLOXACIN 500 MG/1
500 TABLET, FILM COATED ORAL EVERY 12 HOURS SCHEDULED
Status: COMPLETED | OUTPATIENT
Start: 2023-08-26 | End: 2023-08-26

## 2023-08-26 RX ORDER — LOSARTAN POTASSIUM 25 MG/1
12.5 TABLET ORAL DAILY
Status: DISCONTINUED | OUTPATIENT
Start: 2023-08-27 | End: 2023-09-09 | Stop reason: HOSPADM

## 2023-08-26 RX ADMIN — ENOXAPARIN SODIUM 40 MG: 100 INJECTION SUBCUTANEOUS at 08:45

## 2023-08-26 RX ADMIN — BUSPIRONE HYDROCHLORIDE 5 MG: 10 TABLET ORAL at 08:45

## 2023-08-26 RX ADMIN — ATORVASTATIN CALCIUM 10 MG: 10 TABLET, FILM COATED ORAL at 21:48

## 2023-08-26 RX ADMIN — BACLOFEN 5 MG: 10 TABLET ORAL at 08:46

## 2023-08-26 RX ADMIN — DOCUSATE SODIUM 100 MG: 100 CAPSULE, LIQUID FILLED ORAL at 08:47

## 2023-08-26 RX ADMIN — TRAZODONE HYDROCHLORIDE 150 MG: 50 TABLET ORAL at 21:47

## 2023-08-26 RX ADMIN — BUSPIRONE HYDROCHLORIDE 5 MG: 10 TABLET ORAL at 21:47

## 2023-08-26 RX ADMIN — Medication 10 ML: at 21:51

## 2023-08-26 RX ADMIN — BACLOFEN 5 MG: 10 TABLET ORAL at 21:47

## 2023-08-26 RX ADMIN — PANTOPRAZOLE SODIUM 40 MG: 40 TABLET, DELAYED RELEASE ORAL at 06:52

## 2023-08-26 RX ADMIN — FLUTICASONE PROPIONATE 2 SPRAY: 50 SPRAY, METERED NASAL at 08:57

## 2023-08-26 RX ADMIN — Medication 10 ML: at 08:56

## 2023-08-26 RX ADMIN — Medication 100 MG: at 08:46

## 2023-08-26 RX ADMIN — ACETAMINOPHEN 500 MG: 325 TABLET ORAL at 13:41

## 2023-08-26 RX ADMIN — Medication 5 MG: at 21:47

## 2023-08-26 RX ADMIN — OXYCODONE HYDROCHLORIDE 10 MG: 5 CAPSULE ORAL at 16:37

## 2023-08-26 RX ADMIN — Medication 2000 UNITS: at 16:34

## 2023-08-26 RX ADMIN — PREDNISONE 20 MG: 20 TABLET ORAL at 08:46

## 2023-08-26 RX ADMIN — DOCUSATE SODIUM 100 MG: 100 CAPSULE, LIQUID FILLED ORAL at 21:48

## 2023-08-26 RX ADMIN — ACETAMINOPHEN 500 MG: 325 TABLET ORAL at 21:46

## 2023-08-26 RX ADMIN — TAMSULOSIN HYDROCHLORIDE 0.4 MG: 0.4 CAPSULE ORAL at 16:34

## 2023-08-26 RX ADMIN — CIPROFLOXACIN 500 MG: 500 TABLET, FILM COATED ORAL at 21:47

## 2023-08-26 RX ADMIN — ACETAMINOPHEN 500 MG: 325 TABLET ORAL at 06:51

## 2023-08-26 ASSESSMENT — PAIN SCALES - GENERAL
PAINLEVEL_OUTOF10: 8
PAINLEVEL_OUTOF10: 4

## 2023-08-26 ASSESSMENT — PAIN DESCRIPTION - ORIENTATION
ORIENTATION: MID
ORIENTATION: LOWER
ORIENTATION: LOWER
ORIENTATION: MID

## 2023-08-26 ASSESSMENT — PAIN DESCRIPTION - LOCATION
LOCATION: BACK;ABDOMEN
LOCATION: ABDOMEN;BACK
LOCATION: BACK
LOCATION: BACK

## 2023-08-26 ASSESSMENT — PAIN DESCRIPTION - ONSET: ONSET: ON-GOING

## 2023-08-26 ASSESSMENT — PAIN DESCRIPTION - DESCRIPTORS
DESCRIPTORS: SHARP
DESCRIPTORS: ACHING
DESCRIPTORS: ACHING
DESCRIPTORS: DISCOMFORT

## 2023-08-26 ASSESSMENT — PAIN DESCRIPTION - PAIN TYPE: TYPE: CHRONIC PAIN

## 2023-08-26 ASSESSMENT — PAIN DESCRIPTION - FREQUENCY: FREQUENCY: CONTINUOUS

## 2023-08-26 ASSESSMENT — PAIN - FUNCTIONAL ASSESSMENT: PAIN_FUNCTIONAL_ASSESSMENT: PREVENTS OR INTERFERES SOME ACTIVE ACTIVITIES AND ADLS

## 2023-08-26 NOTE — PLAN OF CARE
Problem: Discharge Planning  Goal: Discharge to home or other facility with appropriate resources  8/26/2023 0843 by Zacarias Galvan RN  Outcome: Progressing  8/26/2023 0338 by Ed Severino RN  Outcome: Progressing     Problem: Safety - Adult  Goal: Free from fall injury  8/26/2023 0843 by Zacarias Galvan RN  Outcome: Progressing  8/26/2023 0338 by Ed Severino RN  Outcome: Progressing     Problem: ABCDS Injury Assessment  Goal: Absence of physical injury  8/26/2023 0843 by Zacarias Galvan RN  Outcome: Progressing  8/26/2023 0338 by Ed Severino RN  Outcome: Progressing     Problem: Skin/Tissue Integrity  Goal: Absence of new skin breakdown  Description: 1. Monitor for areas of redness and/or skin breakdown  2. Assess vascular access sites hourly  3. Every 4-6 hours minimum:  Change oxygen saturation probe site  4. Every 4-6 hours:  If on nasal continuous positive airway pressure, respiratory therapy assess nares and determine need for appliance change or resting period.   Outcome: Progressing     Problem: Nutrition Deficit:  Goal: Optimize nutritional status  Outcome: Progressing     Problem: Pain  Goal: Verbalizes/displays adequate comfort level or baseline comfort level  Outcome: Progressing

## 2023-08-26 NOTE — PROGRESS NOTES
Physical Therapy    Physical Therapy Missed Treatment   Facility/Department: [facility]  O366/D217-42    NAME: Bonny Brock    : 1935 (81 y.o.)  MRN: [id number]    Account: [de-identified]  Gender: male  Pt rescheduled for make up time for later this morning as pt with OT for feeding assist.     Vinny Almanza, PTA, 23 at 8:38 AM

## 2023-08-26 NOTE — PROGRESS NOTES
OCCUPATIONAL THERAPY  INPATIENT REHAB TREATMENT NOTE  [facility]      NAME: Micky Juarez  : 1935 (81 y.o.)  MRN: [id number]  CODE STATUS: Full Code  Room: R248/R248-01    Date of Service: 2023    Referring Physician: Dr. 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    Restrictions  Restrictions/Precautions  Restrictions/Precautions: Fall Risk            Position Activity Restriction  Other position/activity restrictions: clinical reasoning- limit spine flexion    Patient's date of birth confirmed: Yes    SAFETY:  Safety Devices  Type of devices: All fall risk precautions in place    SUBJECTIVE:       Pain at start of treatment: Yes: 3/10    Pain at end of treatment: Yes: 3/10    Location: back and stomach  Description its from sitting  Nursing notified: Not Applicable  RN: n/a  Intervention: None      OBJECTIVE:       Pt transfer sit to stand with Min A at table, standing x 2:56 reaching for golf tees and placing them in a board, with Fair balance. Standing second time x 2:58 before requiring rest period. Functional reaching for increased I with self care tasks. [place] tasks included in standing activities. Pt c/o feeling of numbness in feet/legs after standing activities. Core and UE rom/strength reaching for rings and tossing them, and use of reacher to  from floor with good follow through            OT Exercises  Exercise Treatment: Pt completing BUE strengtheing exercises with 2# weight x 15 reps shoulder press, punch out, bicep curls,  pronation/supination, and wrist flexion/extension.          Education:  Education  Education Given To: Patient  Education Provided: Role of Therapy;Transfer Training;ADL Function  Education Method: Verbal;Demonstration  Barriers to Learning: None  Education Outcome: Demonstrated understanding    Equipment recommendations: ASSESSMENT:  Assessment: Pt has good understanding of therapy interventions and rationale. good participation. Continue to address defecits  Activity Tolerance: Patient tolerated treatment well      PLAN OF CARE:  Strengthening, Balance training, Functional mobility training, Endurance training, Patient/Caregiver education & training, Equipment evaluation, education, & procurement, Self-Care / ADL, Home management training, Safety education & training, Neuromuscular re-education  Continue OT POC until discharge    Patient goals : Go home and do like I have been doing  Time Frame for Long Term Goals :  Within 1.5-2 weeks, pt to demo progress in the following areas listed below to achieve specific LTGs stated in the intial eval  Long Term Goal 1: P tto demo improved ADL/IADL status  Long Term Goal 2: Pt to demo improved standing balance and tolerance  Long Term Goal 3: Pt to demo improved ability to use AD/AE/DME as needed to improve function and maintain pain level  Long Term Goal 4: Pt to demo improved UB strength for self-care        Therapy Time:   Individual Group Co-Treat   Time In 1300       Time Out 1330         Minutes 30                      Electronically signed by:    TAMRA Amos,   8/26/2023, 2:01 PM

## 2023-08-26 NOTE — PROGRESS NOTES
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 Post Treatment Pain Screening:3-4/10       OBJECTIVE:      Bed mobility  Bridging: Stand by assistance  Rolling to Left: Stand by assistance  Rolling to Right: Stand by assistance  Supine to Sit: Stand by assistance  Sit to Supine: Stand by assistance  Bed Mobility Comments: VCs for technique. Ambulation  Surface: Level tile  Device: Rolling Walker  Assistance: Minimal assistance;Contact guard assistance  Quality of Gait: Decreased trunk control with ataxic LEs, variable foot placement, VCs and intermittent assist for [employer] advancement and gait sequence  Gait Deviations: Slow Alesha;Decreased step length;Decreased step height  Distance: 50FT    Stairs/Curb  Stairs?: Yes  Stairs  # Steps : 4  Stairs Height: 6\"  Rails: Bilateral  Device: No Device  Assistance: Minimal assistance; Moderate assistance  Comment: Reciprocal ascending, non recip descending, attempted to lead with Rt and Lt knee buckled requiring  Mod A to correct    ASSESSMENT/PROGRESS TOWARDS GOALS:  Body Structures, Functions, Activity Limitations Requiring Skilled Therapeutic Intervention: Decreased functional mobility ; Decreased strength;Decreased endurance;Decreased balance;Decreased safe

## 2023-08-26 NOTE — PROGRESS NOTES
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 Post Treatment Pain Screening:3-4/10       OBJECTIVE:      Bed mobility  Bridging: Stand by assistance  Rolling to Left: Stand by assistance  Rolling to Right: Stand by assistance  Supine to Sit: Stand by assistance  Sit to Supine: Stand by assistance  Bed Mobility Comments: VCs for technique. Ambulation  Surface: Level tile  Device: Rolling Walker  Assistance: Contact guard assistance;Minimal assistance  Quality of Gait: Decreased trunk control with ataxic LEs, variable foot placement, VCs and intermittent assist for [employer] advancement and gait sequence, 2-3 knee jimmie Lt, Lt scissoring  Gait Deviations: Slow Alesha;Decreased step length;Decreased step height  Distance: 25ft x 2    ASSESSMENT/PROGRESS TOWARDS GOALS:  Body Structures, Functions, Activity Limitations Requiring Skilled Therapeutic Intervention: Decreased functional mobility ; Decreased strength;Decreased endurance;Decreased balance;Decreased safe awareness  Assessment: Pt demo'd increased instability during gait with increased Lt knee buckling and NBOS with 1 episode of scissoring LLE, Mod A to correct. Single stepping to target fwd and lateral with decreased motor planning.     Goals:  Long Term

## 2023-08-26 NOTE — PROGRESS NOTES
OCCUPATIONAL THERAPY  INPATIENT REHAB TREATMENT NOTE  [facility]      NAME: Dashawn Polanco  : 1935 (81 y.o.)  MRN: [id number]  CODE STATUS: Full Code  Room: R248/R248-01    Date of Service: 2023    Referring Physician: Dr. 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 Description \"I get sore laying down for a while. \"   Nursing notified: Not Applicable  RN: n/a  Intervention: None    COGNITION:         Comprehension: Independent  Expression: Independent  Social Interaction: Independent  Problem Solving: Min A  Memory: Supervision    OBJECTIVE:         Grooming/Oral Hygiene  Assistance Level: Modified independent  Skilled Clinical Factors: Pt completing oral care tasks, facial grooming tasks as well as UB washing this date seated at sink level. Toilet Transfers  Technique: Stand pivot; To the left  Equipment: Standard toilet;Grab bars  Assistance Level: Minimal assistance  Skilled Clinical Factors: Pt state that his commode was too tall, RICCI lowered, and pt trial transfers to/from with pt stating this height was better. BUE strengthening with 2# weight completed x 15 repetitions each UE, shoulder press, bicep curl, punch out, pronation/supination, wrist flexion/extension. Fine motor control tasks as well as problem solving/direction following with use of pipe tree activity with 95% accuracy, following pattern appropriately and reaching for pieces.              Education:  Education  Education

## 2023-08-26 NOTE — PROGRESS NOTES
New order received from palliative care for a X 1 bolus of  ml and increased prn ewa 10 mg. Patient pain was managed with the increased dose and he slept well through out the night.

## 2023-08-26 NOTE — PROGRESS NOTES
Message sent to palliative care regarding results of urine test,CMP and low BP. Electronically signed by Devon Geronimo RN on 8/26/23 at 1:19 PM EDT     Patient noted with difficulty taking pills this morning. Given one at a time in applesauce. Began gagging trying to get them down. No issues noted during meals. SLP fransisco ordered. Will crush pills until seen.  Electronically signed by Devon Geronimo RN on 8/26/23 at 2:19 PM EDT

## 2023-08-27 PROCEDURE — 6370000000 HC RX 637 (ALT 250 FOR IP): Performed by: INTERNAL MEDICINE

## 2023-08-27 PROCEDURE — 51702 INSERT TEMP BLADDER CATH: CPT

## 2023-08-27 PROCEDURE — 92610 EVALUATE SWALLOWING FUNCTION: CPT

## 2023-08-27 PROCEDURE — 6370000000 HC RX 637 (ALT 250 FOR IP): Performed by: PHYSICAL MEDICINE & REHABILITATION

## 2023-08-27 PROCEDURE — 99232 SBSQ HOSP IP/OBS MODERATE 35: CPT | Performed by: PHYSICAL MEDICINE & REHABILITATION

## 2023-08-27 PROCEDURE — 92523 SPEECH SOUND LANG COMPREHEN: CPT

## 2023-08-27 PROCEDURE — 6370000000 HC RX 637 (ALT 250 FOR IP)

## 2023-08-27 PROCEDURE — 6360000002 HC RX W HCPCS: Performed by: PHYSICAL MEDICINE & REHABILITATION

## 2023-08-27 PROCEDURE — 1180000000 HC REHAB R&B

## 2023-08-27 PROCEDURE — 99233 SBSQ HOSP IP/OBS HIGH 50: CPT | Performed by: UROLOGY

## 2023-08-27 PROCEDURE — 6370000000 HC RX 637 (ALT 250 FOR IP): Performed by: NURSE PRACTITIONER

## 2023-08-27 RX ADMIN — DOCUSATE SODIUM 100 MG: 100 CAPSULE, LIQUID FILLED ORAL at 21:11

## 2023-08-27 RX ADMIN — OXYCODONE HYDROCHLORIDE 10 MG: 5 CAPSULE ORAL at 21:11

## 2023-08-27 RX ADMIN — ACETAMINOPHEN 500 MG: 325 TABLET ORAL at 06:39

## 2023-08-27 RX ADMIN — BUSPIRONE HYDROCHLORIDE 5 MG: 10 TABLET ORAL at 10:36

## 2023-08-27 RX ADMIN — BACLOFEN 5 MG: 10 TABLET ORAL at 10:35

## 2023-08-27 RX ADMIN — BACLOFEN 5 MG: 10 TABLET ORAL at 21:11

## 2023-08-27 RX ADMIN — PANTOPRAZOLE SODIUM 40 MG: 40 TABLET, DELAYED RELEASE ORAL at 06:39

## 2023-08-27 RX ADMIN — Medication 2000 UNITS: at 17:29

## 2023-08-27 RX ADMIN — TRAZODONE HYDROCHLORIDE 150 MG: 50 TABLET ORAL at 21:10

## 2023-08-27 RX ADMIN — LOSARTAN POTASSIUM 12.5 MG: 25 TABLET, FILM COATED ORAL at 10:36

## 2023-08-27 RX ADMIN — ENOXAPARIN SODIUM 40 MG: 100 INJECTION SUBCUTANEOUS at 10:35

## 2023-08-27 RX ADMIN — ACETAMINOPHEN 500 MG: 325 TABLET ORAL at 15:06

## 2023-08-27 RX ADMIN — Medication 5 MG: at 21:11

## 2023-08-27 RX ADMIN — DOCUSATE SODIUM 100 MG: 100 CAPSULE, LIQUID FILLED ORAL at 10:36

## 2023-08-27 RX ADMIN — OXYCODONE HYDROCHLORIDE 10 MG: 5 CAPSULE ORAL at 13:02

## 2023-08-27 RX ADMIN — FLUTICASONE PROPIONATE 2 SPRAY: 50 SPRAY, METERED NASAL at 10:37

## 2023-08-27 RX ADMIN — Medication 100 MG: at 10:36

## 2023-08-27 RX ADMIN — ATORVASTATIN CALCIUM 10 MG: 10 TABLET, FILM COATED ORAL at 21:11

## 2023-08-27 RX ADMIN — ACETAMINOPHEN 500 MG: 325 TABLET ORAL at 21:10

## 2023-08-27 RX ADMIN — PREDNISONE 20 MG: 20 TABLET ORAL at 10:35

## 2023-08-27 RX ADMIN — BUSPIRONE HYDROCHLORIDE 5 MG: 10 TABLET ORAL at 21:11

## 2023-08-27 RX ADMIN — TAMSULOSIN HYDROCHLORIDE 0.4 MG: 0.4 CAPSULE ORAL at 17:29

## 2023-08-27 ASSESSMENT — PAIN SCALES - GENERAL
PAINLEVEL_OUTOF10: 0
PAINLEVEL_OUTOF10: 6
PAINLEVEL_OUTOF10: 7

## 2023-08-27 ASSESSMENT — ENCOUNTER SYMPTOMS
ABDOMINAL DISTENTION: 0
ABDOMINAL PAIN: 0

## 2023-08-27 ASSESSMENT — PAIN DESCRIPTION - DESCRIPTORS: DESCRIPTORS: NAGGING

## 2023-08-27 ASSESSMENT — PAIN DESCRIPTION - LOCATION
LOCATION: BACK
LOCATION: BACK

## 2023-08-27 ASSESSMENT — PAIN DESCRIPTION - ORIENTATION
ORIENTATION: LOWER
ORIENTATION: LOWER

## 2023-08-27 NOTE — PLAN OF CARE
Problem: Discharge Planning  Goal: Discharge to home or other facility with appropriate resources  8/27/2023 1454 by Ani Samuels RN  Outcome: Progressing  8/27/2023 0517 by Dereje Manzo RN  Outcome: Progressing     Problem: Safety - Adult  Goal: Free from fall injury  8/27/2023 1454 by Ani Samuels RN  Outcome: Progressing  8/27/2023 0517 by Dereje Manzo RN  Outcome: Progressing     Problem: ABCDS Injury Assessment  Goal: Absence of physical injury  8/27/2023 1454 by Ani Samuels RN  Outcome: Progressing  8/27/2023 0517 by Dereje Manzo RN  Outcome: Progressing     Problem: Skin/Tissue Integrity  Goal: Absence of new skin breakdown  Description: 1. Monitor for areas of redness and/or skin breakdown  2. Assess vascular access sites hourly  3. Every 4-6 hours minimum:  Change oxygen saturation probe site  4. Every 4-6 hours:  If on nasal continuous positive airway pressure, respiratory therapy assess nares and determine need for appliance change or resting period.   8/27/2023 1454 by Ani Samuels RN  Outcome: Progressing  8/27/2023 0517 by Dereje Manzo RN  Outcome: Progressing     Problem: Nutrition Deficit:  Goal: Optimize nutritional status  8/27/2023 1454 by Ani Samuels RN  Outcome: Progressing  8/27/2023 0517 by Dereje Manzo RN  Outcome: Progressing     Problem: Pain  Goal: Verbalizes/displays adequate comfort level or baseline comfort level  8/27/2023 1454 by Ani Samuels RN  Outcome: Progressing  8/27/2023 0517 by Dereje Manzo RN  Outcome: Progressing

## 2023-08-27 NOTE — PROGRESS NOTES
[facility]   Facility/Department: [facility]  Speech Language Pathology  Clinical Bedside Swallow Evaluation    NAME:Caden Paulino  : 1935 (81 y.o.)   [x]   confirmed    MRN: [id number]  ROOM: [place] [address]  ADMISSION DATE: 2023  PATIENT DIAGNOSIS(ES): Impaired mobility and ADLs [Z74.09, Z78.9]  No chief complaint on file. 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  .  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[address]) 2023    Intractable back pain 2023    Compression fracture of body of thoracic vertebra ([address]) 2023    Current smoker 2022    Kidney stone     Hyperopia with astigmatism and presbyopia, right 2018    Secondary hypertension     Hyperlipidemia     COPD (chronic obstructive pulmonary disease) ([address])     Anxiety     Insomnia 2018     Past Medical History:   Diagnosis Date    Anxiety     COPD (chronic obstructive pulmonary disease) ([address])     Hyperlipidemia     Hypertension     Kidney stone      Past Surgical History:   Procedure Laterality Date    PROSTATE BIOPSY  2023    U/S guided prostate biopsy completed by Dr. Becky Jason     No Known Allergies    Date of Onset: 2023  Rehab Diagnosis: Impaired mobility and ADL's due to SCI-diffuse metastatic

## 2023-08-27 NOTE — PROGRESS NOTES
Progress Note    8/27/2023   10:59 AM    Name:  Echo Manzanares  MRN:    [id number]     Acct:     [de-identified]   Room:  [facility]/[address] Day: 5     Admit Date: 8/22/2023  2:15 PM  PCP: Denys Pacheco MD    Subjective:     C/C: No chief complaint on file. Interval History: Status: This is an 80 yo male with COPD, Anxiety, HTN and admitted on 8/17/23 with progressive back pain and lower ext weakness and MRI findings suggestive of thoracic and lumbar mets and has a PSA of 628 ng/ml and a firm prostate on exam. He developed acute urinary symptoms and now has urinary retention with a burnham catheter. He had acute radiation treatment to the T3/4 region/mets (confirmed by MRI and Bone scan) for cord compression and he is s/p prostate biopsy by IR and is now in Rehab. He feels that his pain and lower ext strength has improved. The urine is clear in the Burnham bag. I reviewed the final pathology results in detail today with the patient, nurse and Dr Robert Spann. Past Medical History:   Diagnosis Date    Anxiety     COPD (chronic obstructive pulmonary disease) ([address])     Hyperlipidemia     Hypertension     Kidney stone        ROS:  Review of Systems   Constitutional:  Negative for fever. Gastrointestinal:  Negative for abdominal distention and abdominal pain. Genitourinary:  Negative for flank pain and hematuria. Medications:      Allergies: No Known Allergies    Current Meds: lidocaine 4 % external patch 3 patch, Daily PRN  losartan (COZAAR) tablet 12.5 mg, Daily  docusate sodium (COLACE) capsule 100 mg, BID  oxyCODONE capsule 10 mg, Q4H PRN  [START ON 8/28/2023] predniSONE (DELTASONE) tablet 15 mg, Daily   Followed by  Petty Puckett ON 8/31/2023] predniSONE (DELTASONE) tablet 10 mg, Daily   Followed by  Petty Puckett ON 9/3/2023] predniSONE (DELTASONE) tablet 5 mg, Daily  enoxaparin (LOVENOX) injection 40 mg, Daily  melatonin disintegrating tablet 5 mg, Nightly  acetaminophen (TYLENOL) tablet 500 mg, 3 times per day  sodium

## 2023-08-27 NOTE — PROGRESS NOTES
Pt recently medicated for back pain 6/10 see MAR. Pt a CGA to transfer from bed to w/c to be able to move around. Núñez patent to gravity, urine clear yellow.  Electronically signed by Beba Patterson RN on 8/27/2023 at 3:02 PM

## 2023-08-27 NOTE — PROGRESS NOTES
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 Requires SLP Intervention: Yes     D/C Recommendations: Ongoing speech therapy is recommended during this hospitalization    General  Behavior/Cognition: Alert;Pleasant mood; Cooperative   Respiratory Status: Room air  O2 Device: None (Room air)  Previous level of function and limitations:        Vision and Hearing  Vision  Vision Exceptions: Wears glasses for reading  Hearing  Hearing: Exceptions to [facility]  Hearing Exceptions: Hard of hearing/hearing concerns     Oral/Motor  Oral Motor   Dentition: Partials (comment) (bridge on the top and the bottom)  Oral Hygiene: Moist  Lingual: No impairment  Velum: No Impairment  Mandible: No impairment  Gag: No Impairment    Motor Speech  Motor Speech  Dysarthric Characteristics: None    Comprehension  Auditory Comprehension  Comprehension: Exceptions  Simple yes/no questions: WFL  Moderate yes/no questions: WFL  Complex yes/no questions: WFL  Complex Questions: WFL  One Step Commands: WFL  Two Step Commands: WFL  Multistep Commands: WFL  Complex/Abstract Commands: WFL  Common Objects: WFL  Reading Comprehension  Reading Status: Exceptions to [facility]  Sentence Impairment Severity: WFL    Expression  Expression  Primary Mode of Expression: Verbal  Verbal Expression  Verbal Expression: Exceptions to functional limits  Initiation: WFL  Repetition: WFL  Automatic Speech: WFL  Convergent: WFL  Divergent: WFL  Responsive: WFL  Conversation: WFL  Written Expression  Dominant Hand: Right  Written Expression: Exceptions to [facility]  Legibility Impairment Severity: WFL  Self formulation Impairment Severity: Word    Cognition  Orientation  Overall Orientation Status: Impaired  Orientation Level: Oriented X4  Attention  Attention: Within Functional Limits  Memory  Memory: Within Functional Limits  Problem Solving  Problem Solving: Within Functional Limits  Numeric Reasoning  Numeric Reasoning: Within Functional Limits  Abstract Reasoning  Abstract Reasoning: Within Functional Limits  Safety/Judgment  Safety/Judgment: Within Functional Limits    Additional Assessments  Informal Acute Rehab Cognitive-Linguistic Evaluation    Results of Cognitive-linguistic Evaluation Total Score for each section Percentage Score Severity Rating for each section   Auditory Comprehension 8/10 80% Mild (pt is hard of hearing)   Verbal Reasoning 12/15 80% Mild   Memory 19/20 95% WFL   Problem Solving/Sequencing 10/10 100% [facility]   Executive Function 9/10 90% WFL     Overall Cognitive-Linguistic Severity Rating 89% overall indicating a mild impairment however the pt is hard of hearing affecting the overall auditory comprehension score. Recommendations  Requires SLP Intervention: NO  Prognosis  Speech Therapy Prognosis  Prognosis: Excellent  Prognosis Considerations: Motivation    Education  Patient Education: Pt educated on swallowing techniques, reason for evaluation. Patient Education Response: Verbalizes understanding    Patient's goals: \"To be able to go home and take care of myself like I was before \"    [address] in place: Not Applicable  Restraints Initially in Place: No    Pain Assessment  Patient does not c/o pain. Patient does not appear in pain. Pain Re-assessment  Patient does not c/o pain. Patient does not appear in pain.     Speech Therapy Level of Assistance Scale:  AUDITORY COMPREHENSION  Rating: Modified Independent  VERBAL EXPRESSION  Rating: Independent-Modified Independent  MOTOR SPEECH  Rating: Independent  PROBLEM SOLVING  Rating: Independent-Modified Independent  MEMORY  Rating: Independent-Modified Independent    Therapy

## 2023-08-28 LAB
ANION GAP SERPL CALCULATED.3IONS-SCNC: 9 MEQ/L (ref 9–15)
BUN SERPL-MCNC: 26 MG/DL (ref 8–23)
CALCIUM SERPL-MCNC: 8.4 MG/DL (ref 8.5–9.9)
CHLORIDE SERPL-SCNC: 102 MEQ/L (ref 95–107)
CO2 SERPL-SCNC: 28 MEQ/L (ref 20–31)
CREAT SERPL-MCNC: 0.68 MG/DL (ref 0.7–1.2)
GLUCOSE SERPL-MCNC: 88 MG/DL (ref 70–99)
POTASSIUM SERPL-SCNC: 3.7 MEQ/L (ref 3.4–4.9)
SODIUM SERPL-SCNC: 139 MEQ/L (ref 135–144)

## 2023-08-28 PROCEDURE — 99232 SBSQ HOSP IP/OBS MODERATE 35: CPT | Performed by: PHYSICAL MEDICINE & REHABILITATION

## 2023-08-28 PROCEDURE — 97535 SELF CARE MNGMENT TRAINING: CPT

## 2023-08-28 PROCEDURE — 92526 ORAL FUNCTION THERAPY: CPT

## 2023-08-28 PROCEDURE — 1180000000 HC REHAB R&B

## 2023-08-28 PROCEDURE — 97116 GAIT TRAINING THERAPY: CPT

## 2023-08-28 PROCEDURE — 6360000002 HC RX W HCPCS: Performed by: PHYSICAL MEDICINE & REHABILITATION

## 2023-08-28 PROCEDURE — 97110 THERAPEUTIC EXERCISES: CPT

## 2023-08-28 PROCEDURE — 6370000000 HC RX 637 (ALT 250 FOR IP): Performed by: NURSE PRACTITIONER

## 2023-08-28 PROCEDURE — 6370000000 HC RX 637 (ALT 250 FOR IP): Performed by: UROLOGY

## 2023-08-28 PROCEDURE — 80048 BASIC METABOLIC PNL TOTAL CA: CPT

## 2023-08-28 PROCEDURE — 36415 COLL VENOUS BLD VENIPUNCTURE: CPT

## 2023-08-28 PROCEDURE — 99232 SBSQ HOSP IP/OBS MODERATE 35: CPT | Performed by: NURSE PRACTITIONER

## 2023-08-28 PROCEDURE — 6370000000 HC RX 637 (ALT 250 FOR IP)

## 2023-08-28 PROCEDURE — 99233 SBSQ HOSP IP/OBS HIGH 50: CPT | Performed by: UROLOGY

## 2023-08-28 PROCEDURE — 6370000000 HC RX 637 (ALT 250 FOR IP): Performed by: INTERNAL MEDICINE

## 2023-08-28 PROCEDURE — 6370000000 HC RX 637 (ALT 250 FOR IP): Performed by: PHYSICAL MEDICINE & REHABILITATION

## 2023-08-28 PROCEDURE — NBSRV NON-BILLABLE SERVICE: Performed by: PHYSICIAN ASSISTANT

## 2023-08-28 PROCEDURE — 97530 THERAPEUTIC ACTIVITIES: CPT

## 2023-08-28 PROCEDURE — 97112 NEUROMUSCULAR REEDUCATION: CPT

## 2023-08-28 RX ORDER — BICALUTAMIDE 50 MG/1
50 TABLET, FILM COATED ORAL DAILY
Status: DISCONTINUED | OUTPATIENT
Start: 2023-08-28 | End: 2023-09-09 | Stop reason: HOSPADM

## 2023-08-28 RX ADMIN — DOCUSATE SODIUM 100 MG: 100 CAPSULE, LIQUID FILLED ORAL at 09:12

## 2023-08-28 RX ADMIN — BUSPIRONE HYDROCHLORIDE 5 MG: 10 TABLET ORAL at 09:11

## 2023-08-28 RX ADMIN — FLUTICASONE PROPIONATE 2 SPRAY: 50 SPRAY, METERED NASAL at 12:22

## 2023-08-28 RX ADMIN — Medication 2000 UNITS: at 17:10

## 2023-08-28 RX ADMIN — PANTOPRAZOLE SODIUM 40 MG: 40 TABLET, DELAYED RELEASE ORAL at 06:46

## 2023-08-28 RX ADMIN — BACLOFEN 5 MG: 10 TABLET ORAL at 21:27

## 2023-08-28 RX ADMIN — TAMSULOSIN HYDROCHLORIDE 0.4 MG: 0.4 CAPSULE ORAL at 17:10

## 2023-08-28 RX ADMIN — ATENOLOL 25 MG: 25 TABLET ORAL at 09:13

## 2023-08-28 RX ADMIN — ATORVASTATIN CALCIUM 10 MG: 10 TABLET, FILM COATED ORAL at 21:27

## 2023-08-28 RX ADMIN — BACLOFEN 5 MG: 10 TABLET ORAL at 09:12

## 2023-08-28 RX ADMIN — ACETAMINOPHEN 500 MG: 325 TABLET ORAL at 21:28

## 2023-08-28 RX ADMIN — OXYCODONE HYDROCHLORIDE 10 MG: 5 CAPSULE ORAL at 21:26

## 2023-08-28 RX ADMIN — Medication 5 MG: at 21:27

## 2023-08-28 RX ADMIN — ENOXAPARIN SODIUM 40 MG: 100 INJECTION SUBCUTANEOUS at 09:11

## 2023-08-28 RX ADMIN — POLYETHYLENE GLYCOL 3350 17 G: 17 POWDER, FOR SOLUTION ORAL at 18:08

## 2023-08-28 RX ADMIN — PREDNISONE 15 MG: 10 TABLET ORAL at 09:11

## 2023-08-28 RX ADMIN — TRAZODONE HYDROCHLORIDE 150 MG: 50 TABLET ORAL at 21:26

## 2023-08-28 RX ADMIN — AMLODIPINE BESYLATE 5 MG: 5 TABLET ORAL at 09:11

## 2023-08-28 RX ADMIN — LOSARTAN POTASSIUM 12.5 MG: 25 TABLET, FILM COATED ORAL at 09:12

## 2023-08-28 RX ADMIN — OXYCODONE HYDROCHLORIDE 10 MG: 5 CAPSULE ORAL at 18:08

## 2023-08-28 RX ADMIN — BICALUTAMIDE 50 MG: 50 TABLET, FILM COATED ORAL at 12:32

## 2023-08-28 RX ADMIN — ACETAMINOPHEN 500 MG: 325 TABLET ORAL at 06:46

## 2023-08-28 RX ADMIN — DOCUSATE SODIUM 100 MG: 100 CAPSULE, LIQUID FILLED ORAL at 21:28

## 2023-08-28 RX ADMIN — ACETAMINOPHEN 500 MG: 325 TABLET ORAL at 17:10

## 2023-08-28 RX ADMIN — BUSPIRONE HYDROCHLORIDE 5 MG: 10 TABLET ORAL at 21:27

## 2023-08-28 RX ADMIN — Medication 100 MG: at 09:12

## 2023-08-28 ASSESSMENT — PAIN DESCRIPTION - LOCATION
LOCATION: BACK;ABDOMEN
LOCATION: ABDOMEN;BACK
LOCATION: ABDOMEN;BACK

## 2023-08-28 ASSESSMENT — PAIN SCALES - GENERAL
PAINLEVEL_OUTOF10: 5
PAINLEVEL_OUTOF10: 6
PAINLEVEL_OUTOF10: 7

## 2023-08-28 ASSESSMENT — PAIN DESCRIPTION - DESCRIPTORS
DESCRIPTORS: ACHING

## 2023-08-28 ASSESSMENT — PAIN DESCRIPTION - ORIENTATION: ORIENTATION: LOWER

## 2023-08-28 ASSESSMENT — ENCOUNTER SYMPTOMS
GASTROINTESTINAL NEGATIVE: 1
APNEA: 0
BACK PAIN: 1
COUGH: 0
SHORTNESS OF BREATH: 0
ABDOMINAL PAIN: 0
TROUBLE SWALLOWING: 0
WHEEZING: 0

## 2023-08-28 NOTE — PROGRESS NOTES
activity pacing, reassessing rehab goals and discharge planning. Urology is hoping for a voiding trial but with his spinal cord compression I am doubtful that it will be successful. I am working on this with the patient and he is also very hesitant to go ahead with a voiding trial at this point.           Electronically signed by Barbara Felipe DO on 8/24/23 at 8:02 AM HAY Walter D.O., PM&R     Attending    [address] [place] [address]

## 2023-08-28 NOTE — PROGRESS NOTES
OCCUPATIONAL THERAPY  INPATIENT REHAB TREATMENT NOTE  [facility]      NAME: Savanna Dickerson  : 1935 (81 y.o.)  MRN: [id number]  CODE STATUS: Full Code  Room: R248/R248-01    Date of Service: 2023    Referring Physician: Dr. 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    Restrictions  Restrictions/Precautions  Restrictions/Precautions: Fall Risk            Position Activity Restriction  Other position/activity restrictions: clinical reasoning- limit spine flexion    Patient's date of birth confirmed: Yes    SAFETY:  Safety Devices  Safety Devices in place: Yes  Type of devices: All fall risk precautions in place    SUBJECTIVE:       Pain at start of treatment: Yes: 2/10    Pain at end of treatment: Yes: 2/10    Location: stomach and back  Nursing notified: Declined    COGNITION:  Orientation  Overall Orientation Status: Within Functional Limits  Orientation Level: Oriented to person;Oriented to place;Oriented to time;Oriented to situation  Cognition  Overall Cognitive Status: Exceptions  Arousal/Alertness: Appropriate responses to stimuli  Following Commands: Follows one step commands with repetition  Attention Span: Appears intact  Memory: Decreased short term memory;Decreased recall of recent events  Safety Judgement: Decreased awareness of need for safety;Decreased awareness of need for assistance  Problem Solving: Assistance required to generate solutions;Assistance required to implement solutions  Insights: Decreased awareness of deficits  Initiation: Requires cues for some  Sequencing: Requires cues for some  Cognition Comment: delayed processing          OBJECTIVE:     Pt completed standing task at table top with SBA. Pt completed plastic dowel and rubber ring activity.   Pt completed L 3 rings/dowels with L hand and the R 3 rings/dowels with R hand to

## 2023-08-28 NOTE — PROGRESS NOTES
Facility/Department: [facility]  Rehabilitation Goal update: Physical Therapy  Room: [place] [id number]        Pts goals updated as listed below:  Long Term Goal 1: indep and efficient bed mobility  Long Term Goal 2: indep w/c to bed transfer; SBA sit to stand and car transfers  Long Term Goal 3: SBA gait 50 feet with appropriate device  Long Term Goal 4: min assist with 2 stairs for safe home entry  Long Term Goal 5: pt able to propel w/c indep 50 feet and complete w/c prep indep

## 2023-08-28 NOTE — PROGRESS NOTES
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 Goal 2: Pt will tolerate the least restrictive diet level without clinical s/s of aspiration. Goal 3: Pt will tolerate ETC diet consistency with utilization of safe swallowing guidelines and comp. strategies as indicated without overt s/s of aspiration. Pt participated in therapeutic meal monitor with ETC and thin liquids. Pt had x1 episode of gagging/expectorating food after taking a large bite of meat. Pt voiced \"I know I took a large bite. \" Pt then utilized small bites for rest of meal. SLP educated on using alternating solids/liquids during the meal and pt utilized after verbal education from SLP.      Recommend continued meal monitor and swallow therapy  Goal 4: Pt will swallow medications whole in puree with utilization of techniques (1-2 at a time, chin tuck, and upright positioning) with >90% of opportunities. Treatment/Activity Tolerance:  Patient tolerated treatment well    Plan:  Continue per POC    Pain Assessment:  Patient does not c/o pain. Pain Re-assessment:  Patient does not c/o pain. Patient/Caregiver Education:  Patient educated on session and progression towards goals. Education needs reinforcement. Safety Devices:   All fall risk precautions in place      Dysphagia Outcome Severity Scale    SWALLOWING  Ratin        Therapy Time  SLP Individual Minutes  Time In: 1200  Time Out: 0018  Minutes: 12            Signature: Electronically signed by JAIRO Flores on 2023 at 12:27 PM

## 2023-08-28 NOTE — PROGRESS NOTES
Physical Therapy Rehab Treatment Note  [facility]  Room: D213/B438-96       NAME: Selene Kyle  : 1935 (59 y.o.)  MRN: [id number]  CODE STATUS: Full Code    Date of Service: 2023     Restrictions:  Restrictions/Precautions: Fall Risk     SUBJECTIVE:   Subjective: Pt states he has carpet throughout is home. States his wife is being moved to a nursing home and and he needs to sign paperwork in his room at noon today. Pain: Pt denies pain. Reports feet numbness     OBJECTIVE:         Bed mobility  Rolling to Left: Stand by assistance  Rolling to Right: Stand by assistance  Supine to Sit: Stand by assistance  Sit to Supine: Stand by assistance  Bed Mobility Comments: Bed flat without rails. Increased time and effort to complete. Transfers  Sit to Stand: Stand by assistance  Stand to Sit: Stand by assistance  Bed to Chair: Minimal assistance (SPT without device and with WW to L and R)  Comment: Verbal cues for sequencing and safety. Ambulation  Surface: Level tile  Device: Rolling Walker  Other Apparatus: Wheelchair follow  Assistance: Minimal assistance  Quality of Gait: Decreased trunk control with ataxic LEs, variable foot placement with occasional scissoring  Distance: 60ft  Comments: WC follow utilized for safety and focusing on increasing distance. ASSESSMENT/PROGRESS TOWARDS GOALS:   Assessment: Pt tolerated increased gait distance. WC follow utilized for safety. Pt with ataxic trunk and LEs with decreased awareness of LE placement causing safety concerns in gait and transfers. Goals:  Short Term Goal 3: Pt will ambulate with LRD >/= 150' to allow him to safely return home. Short Term Goal 4: Improve veronica LE strength to increase ease with transfers and mobility.   Long Term Goals  Long Term Goal 1: indep and efficient bed mobility  Long Term Goal 2: indep w/c to bed transfer; SBA sit to stand and car transfers  Long Term Goal 3: SBA gait 50 feet

## 2023-08-28 NOTE — PROGRESS NOTES
Progress Note    8/28/2023   11:06 AM    Name:  Olga Lidia Riley  MRN:    [id number]     Acct:     [de-identified]   Room:  [facility]/[address] Day: 6     Admit Date: 8/22/2023  2:15 PM  PCP: Raquel Junior MD    Subjective:     C/C: No chief complaint on file. Interval History: Status: This is an 80 yo male with COPD, Anxiety, HTN and admitted on 8/17/23 with progressive back pain and lower ext weakness and MRI findings suggestive of thoracic and lumbar mets and has a PSA of 628 ng/ml and a firm prostate on exam. He developed acute urinary symptoms and now has urinary retention with a burnham catheter. He had acute radiation treatment to the T3/4 region/mets (confirmed by MRI and Bone scan) for cord compression and he is s/p prostate biopsy by IR and is now in Rehab. He feels that his pain and lower ext strength has improved. The urine is clear in the Burnham bag. I reviewed the final pathology results with the patient yesterday and discussed the management plans with Dr Pennie Denney today over the phone and with Pharmacy to obtain Casodex daily. Past Medical History:   Diagnosis Date    Anxiety     COPD (chronic obstructive pulmonary disease) ([address])     Hyperlipidemia     Hypertension     Kidney stone        ROS:  Review of Systems   Gastrointestinal:  Negative for abdominal pain. Genitourinary:  Negative for flank pain and hematuria. Medications:      Allergies: No Known Allergies    Current Meds: bicalutamide (CASODEX) chemo tablet 50 mg, Daily  lidocaine 4 % external patch 3 patch, Daily PRN  losartan (COZAAR) tablet 12.5 mg, Daily  docusate sodium (COLACE) capsule 100 mg, BID  oxyCODONE capsule 10 mg, Q4H PRN  predniSONE (DELTASONE) tablet 15 mg, Daily   Followed by  Corky Severs ON 8/31/2023] predniSONE (DELTASONE) tablet 10 mg, Daily   Followed by  Corky Severs ON 9/3/2023] predniSONE (DELTASONE) tablet 5 mg, Daily  enoxaparin (LOVENOX) injection 40 mg, Daily  melatonin disintegrating tablet 5 mg, Nightly  acetaminophen

## 2023-08-28 NOTE — PROGRESS NOTES
Palliative Care Progress Note  Patient: Matt Foss  Gender: male  YOB: 1935  Unit/Bed: V265/Q376-74  CodeStatus: Full Code  Inpatient Treatment Team: Treatment Team: Attending Provider: Betty Sicard, DO; Consulting Physician: Hannah Sagastume MD; Consulting Physician: Kirstie Gitelman, MD; Consulting Physician: Nalini Zeng MD; Consulting Physician: Panda Vasquez MD; Registered Nurse: Ed Severino, RN; Registered Nurse: Helen De Souza RN; Occupational Therapist: Michael Epps OT; Occupational Therapist: Osvaldo Harry OTR/L; : Olga Heaton, MSW, LSW; LPN: Jovon Cabello LPN  Admit Date:  6/84/6188    Chief Complaint: Low back pain    History of Presenting Illness:      Matt Foss is a 81 y.o. male on hospital day 6 with a history of tobacco use, copd, esophageal issues, HTN. .    Patient transferred to acute rehab unit on 8/22/23 from medical floor. He was seen on medical floor for uncontrolled back pain. Patient was found to have T3-T4 spinal cord compression. He had palliative RT on 8/18/23 with some improvement. Patient has bone metastases with elevated PSA level. Prostate bx was done on 8/21/23. Palliative care was consulted for goals of care, CODE STATUS discussion, family support, and symptom management. Upon entering room patient is coming back from therapy in wheelchair. He is alert and oriented x4. He seems to have some intermittent confusion about his medical conditions. He reports not knowing he had lesions on spine and was confused about having an oncologist following up. Once explained he then remembered ad understood. He reports his back and leg pain is a 2-3/10. He feels it is wellc controlled with Tylenol 500 mg scheduled q8h  and Baclofen 5 mg BID. He has some anxiety with his current medical conditions being unknown and waiting for prostate biopsy. He is on Buspar 5 mg BID. He reports having nausea and decrease appetite.  He is unsure if it 8/31/2023] predniSONE (DELTASONE) tablet 10 mg  10 mg Oral Daily JORDAN Otoole CNP        Followed by    Reji Art ON 9/3/2023] predniSONE (DELTASONE) tablet 5 mg  5 mg Oral Daily JORDAN Otoole CNP        enoxaparin (LOVENOX) injection 40 mg  40 mg SubCUTAneous Daily Flor Scullin, DO   40 mg at 08/28/23 6170    melatonin disintegrating tablet 5 mg  5 mg Oral Nightly JORDAN Otoole CNP   5 mg at 08/27/23 2111    acetaminophen (TYLENOL) tablet 500 mg  500 mg Oral 3 times per day Flor Scullin, DO   500 mg at 08/28/23 0646    sodium chloride flush 0.9 % injection 5-40 mL  5-40 mL IntraVENous 2 times per day Panda Vasquez MD   10 mL at 08/26/23 2151    sodium chloride flush 0.9 % injection 5-40 mL  5-40 mL IntraVENous PRN Panda Vasquez MD   10 mL at 08/24/23 0645    0.9 % sodium chloride infusion   IntraVENous PRN Panda Vasquez MD        ondansetron (ZOFRAN-ODT) disintegrating tablet 4 mg  4 mg Oral Q8H PRN Panda Vasquez MD   4 mg at 08/23/23 1338    Or    ondansetron (ZOFRAN) injection 4 mg  4 mg IntraVENous Q6H PRN Panda Vasquez MD        polyethylene glycol (GLYCOLAX) packet 17 g  17 g Oral Daily PRN Panda Vasquez MD        amLODIPine (NORVASC) tablet 5 mg  5 mg Oral Daily Panda Vasquez MD   5 mg at 08/28/23 0911    atenolol (TENORMIN) tablet 25 mg  25 mg Oral Daily Panda Vasquez MD   25 mg at 08/28/23 0913    fluticasone (FLONASE) 50 MCG/ACT nasal spray 2 spray  2 spray Nasal Daily Panda Vasquez MD   2 spray at 08/28/23 1222    atorvastatin (LIPITOR) tablet 10 mg  10 mg Oral Nightly Panda Vasquez MD   10 mg at 08/27/23 2111    traZODone (DESYREL) tablet 150 mg  150 mg Oral Nightly Panda Vasquez MD   150 mg at 08/27/23 2110    pantoprazole (PROTONIX) tablet 40 mg  40 mg Oral QAM AC Panda Vasquez MD   40 mg at 08/28/23 0646    ipratropium 0.5 mg-albuterol 2.5 mg (DUONEB) nebulizer solution 1 Dose  1 Dose Inhalation Q4H PRN Panda Vasquez MD        calcium carbonate

## 2023-08-28 NOTE — PROGRESS NOTES
Subjective:      Patient ID: Charlotte Weller is a 81 y.o. male    HPI 81year old male who's burnham is draining clear yellow urine. Voicing no urological complaints.  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 Kel Anthony  8/21/2023    Tera Clemons 8/21/2023 MLOZ ULTRASOUND     Social History     Socioeconomic History    Marital status:      Spouse name: None    Number of children: None    Years of education: None    Highest education level: None   Tobacco Use    Smoking status: Every Day     Packs/day: 1.00     Years: 60.00     Pack years: 60.00     Types: Cigarettes    Smokeless tobacco: Never   Substance and Sexual Activity    Alcohol use: Yes    Drug use: No   Social History Narrative    Lives With: Alone (Wife has dementia -moving to a nursing home [facility] in [address] from a memory care unit in [address] to finances)    Type of Home: House in [address]: One level    Home Access: Stairs to enter without rails - Number of Steps: 2    Bathroom Shower/Tub: Walk-in shower-Equipment: None    Home Equipment: None    Has the patient had two or more falls in the past year or any fall with injury in the past year?: No    Receives Help From: Family (son comes a couple times a week)    ADL Assistance: Independent    Homemaking Assistance: Independent (neighbor cuts grass), Homemaking Responsibilities: Yes    Ambulation Assistance: Independent, Transfer Assistance: Independent    Active : Yes    Occupation: Retired    IADL Comments: pt completes his own shopping, cuts some of his lawn         Social Determinants of Health

## 2023-08-28 NOTE — PROGRESS NOTES
Progress Note    Date:8/28/2023       [facility]  Patient Name:Caden Honeycutt     Date of Birth:[birth year]     Age:88 y.o. 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 .  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 Jules Kapadia           Caregiver stress 8/23/2023 Yes    Neoplasm related pain 8/23/2023 Yes    Nausea 8/23/2023 Yes       Plan:      *Impaired mobility and activities of daily living due to CSI-diffuse metastatic disease throughout the spine compression fracture of body of thoracic vertebrae  -Dr. Christian Mcgowan managing; PT OT  -Pain management pain management following    *Metastic disease throughout the spine compression fracture body thoracic vertebrae  -CT of the spine on 8/15 reveals diffuse osteoblastic and osteolytic lesions, thoracic spine malignancy  -Mild compression fractures T11 and T12    *Elevated PSA  -Biopsy on 8/21  -Pathology report reveals adenocarcinoma in 3 of 3 tissue cores percentage of prostate tissue involved by tumor

## 2023-08-28 NOTE — PROGRESS NOTES
Hygiene  Assistance Level: Modified independent  Skilled Clinical Factors: brushing teeth and combing hair seated w/c level at sink  Upper Extremity Bathing  Assistance Level: Supervision  Lower Extremity Bathing  Assistance Level: Stand by assist  Upper Extremity Dressing  Assistance Level: Modified independent  Lower Extremity Dressing  Assistance Level: Contact guard assist  Putting On/Taking Off Footwear  Assistance Level: Supervision  Toileting  Skilled Clinical Factors: none  Toilet Transfers  Skilled Clinical Factors: none  Tub/Shower Transfers  Type: Shower  Transfer From: Rolling walker  Transfer To: Shower chair with back  Additional Factors: Cues for hand placement; With handrails; Increased time to complete;Verbal cues  Assistance Level: Stand by assist  Skilled Clinical Factors: pt demo  safety with transfer into shower attempting to push walker in shower way further than he should have and did not step into the walker; pt req vc's to step into the walker       Functional Mobility  Sit to Stand  Assistance Level: Contact guard assist  Stand to Sit  Assistance Level: Contact guard assist     Education:   Pt educated on standing in alignment to decrease risk of falls. ASSESSMENT:  Assessment: Pt gets aggrevated when a situation does not go as intended but usually apologizes later; and pt demo slight progress with LB dressing and transfers/ demo improvements with functional mobility and stability with knees. Knees are still slightly unstable but did not \"give out\" while standing or completing mobility. Pt needs continued training on transfers.   Activity Tolerance: Patient tolerated treatment well      PLAN OF CARE:  Strengthening, Balance training, Functional mobility training, Endurance training, Patient/Caregiver education & training, Equipment evaluation, education, & procurement, Self-Care / ADL, Home management training, Safety education & training, Neuromuscular re-education  Continue OT POC until discharge    Patient goals : Go home and do like I have been doing  Time Frame for Long Term Goals : Within 1.5-2 weeks, pt to demo progress in the following areas listed below to achieve specific LTGs stated in the intial eval  Long Term Goal 1: P tto demo improved ADL/IADL status  Long Term Goal 2: Pt to demo improved standing balance and tolerance  Long Term Goal 3: Pt to demo improved ability to use AD/AE/DME as needed to improve function and maintain pain level  Long Term Goal 4: Pt to demo improved UB strength for self-care        Therapy Time:   Individual Group Co-Treat   Time In 1100       Time Out 1200         Minutes 60               ADL/IADL trainin minutes     Electronically signed by:     TAMRA Toscano,   2023, 12:13 PM

## 2023-08-28 NOTE — PROGRESS NOTES
Physical Therapy Rehab Treatment Note  Facility/Department: [facility]  Room: X928/X180-93       NAME: Wagner Mobley  : 1935 (79 y.o.)  MRN: [id number]  CODE STATUS: Full Code    Date of Service: 2023     Restrictions:  Restrictions/Precautions: Fall Risk     SUBJECTIVE:   Subjective: Pt states is overwhelmed with the paperwork for his wife. Pain  Pain: 4/10 low back and stomach aggervating \"It could be more anxiety than pain. \"  Pt declined intervention. OBJECTIVE:         Bed mobility  Rolling to Left: Stand by assistance  Rolling to Right: Stand by assistance  Supine to Sit: Stand by assistance  Sit to Supine: Stand by assistance  Bed Mobility Comments: Bed flat without rails. Increased time and effort to complete. Transfers  Sit to Stand: Stand by assistance  Stand to Sit: Stand by assistance  Bed to Chair: Minimal assistance (SPT without device and with WW to L and R)  Comment: Verbal cues for sequencing and safety. Ambulation  Surface: Level tile  Device: Rolling Walker  Other Apparatus: Wheelchair follow  Assistance: Minimal assistance  Quality of Gait: Decreased trunk control with ataxic LEs, variable foot placement with occasional scissoring. Distance: 50ft  Comments: Completed without WC follow. VCs to look at feet with improved foot placement. Balance  Comments: Brittany Travis   PT Exercises  Exercise Treatment: Unsupported sitting rows/lats with RTB x15 ea  A/AROM Exercises: supine bridges 2x10, LTR x10, hooklying march x10 AAROM, heel slides AAROM x10, hooklying hip add with ball x20, hooklying hip abd RTB x20 focusing on LE strength and motor control. ASSESSMENT/PROGRESS TOWARDS GOALS:   Assessment: Completed gait training without WC follow. High risk for falls per Brittany Travis. Challanged in all standing without UE support.     Goals:  Short Term Goals  Long Term Goals  Long Term Goal 1: indep and efficient bed mobility  Long Term Goal 2: indep w/c to bed transfer; SBA

## 2023-08-29 PROCEDURE — 97112 NEUROMUSCULAR REEDUCATION: CPT

## 2023-08-29 PROCEDURE — 97535 SELF CARE MNGMENT TRAINING: CPT

## 2023-08-29 PROCEDURE — 99232 SBSQ HOSP IP/OBS MODERATE 35: CPT | Performed by: PHYSICAL MEDICINE & REHABILITATION

## 2023-08-29 PROCEDURE — 51798 US URINE CAPACITY MEASURE: CPT

## 2023-08-29 PROCEDURE — 99231 SBSQ HOSP IP/OBS SF/LOW 25: CPT | Performed by: PHYSICIAN ASSISTANT

## 2023-08-29 PROCEDURE — 6370000000 HC RX 637 (ALT 250 FOR IP): Performed by: UROLOGY

## 2023-08-29 PROCEDURE — 97542 WHEELCHAIR MNGMENT TRAINING: CPT

## 2023-08-29 PROCEDURE — 97116 GAIT TRAINING THERAPY: CPT

## 2023-08-29 PROCEDURE — 1180000000 HC REHAB R&B

## 2023-08-29 PROCEDURE — 6370000000 HC RX 637 (ALT 250 FOR IP): Performed by: NURSE PRACTITIONER

## 2023-08-29 PROCEDURE — 6370000000 HC RX 637 (ALT 250 FOR IP): Performed by: PHYSICAL MEDICINE & REHABILITATION

## 2023-08-29 PROCEDURE — 97530 THERAPEUTIC ACTIVITIES: CPT

## 2023-08-29 PROCEDURE — 6370000000 HC RX 637 (ALT 250 FOR IP): Performed by: INTERNAL MEDICINE

## 2023-08-29 PROCEDURE — 97110 THERAPEUTIC EXERCISES: CPT

## 2023-08-29 PROCEDURE — 6360000002 HC RX W HCPCS: Performed by: PHYSICAL MEDICINE & REHABILITATION

## 2023-08-29 RX ADMIN — BACLOFEN 5 MG: 10 TABLET ORAL at 21:36

## 2023-08-29 RX ADMIN — PREDNISONE 15 MG: 10 TABLET ORAL at 09:09

## 2023-08-29 RX ADMIN — Medication 2000 UNITS: at 17:31

## 2023-08-29 RX ADMIN — TAMSULOSIN HYDROCHLORIDE 0.4 MG: 0.4 CAPSULE ORAL at 17:31

## 2023-08-29 RX ADMIN — Medication 5 MG: at 21:39

## 2023-08-29 RX ADMIN — Medication 100 MG: at 09:10

## 2023-08-29 RX ADMIN — TRAZODONE HYDROCHLORIDE 150 MG: 50 TABLET ORAL at 21:38

## 2023-08-29 RX ADMIN — ACETAMINOPHEN 500 MG: 325 TABLET ORAL at 13:57

## 2023-08-29 RX ADMIN — ACETAMINOPHEN 500 MG: 325 TABLET ORAL at 06:26

## 2023-08-29 RX ADMIN — BISACODYL 10 MG: 10 SUPPOSITORY RECTAL at 16:41

## 2023-08-29 RX ADMIN — POLYETHYLENE GLYCOL 3350 17 G: 17 POWDER, FOR SOLUTION ORAL at 12:29

## 2023-08-29 RX ADMIN — ENOXAPARIN SODIUM 40 MG: 100 INJECTION SUBCUTANEOUS at 09:09

## 2023-08-29 RX ADMIN — BUSPIRONE HYDROCHLORIDE 5 MG: 10 TABLET ORAL at 09:10

## 2023-08-29 RX ADMIN — ATENOLOL 25 MG: 25 TABLET ORAL at 09:10

## 2023-08-29 RX ADMIN — CYANOCOBALAMIN 1000 MCG: 1000 INJECTION, SOLUTION INTRAMUSCULAR; SUBCUTANEOUS at 09:16

## 2023-08-29 RX ADMIN — BUSPIRONE HYDROCHLORIDE 5 MG: 10 TABLET ORAL at 21:39

## 2023-08-29 RX ADMIN — BACLOFEN 5 MG: 10 TABLET ORAL at 09:23

## 2023-08-29 RX ADMIN — DOCUSATE SODIUM 100 MG: 100 CAPSULE, LIQUID FILLED ORAL at 09:10

## 2023-08-29 RX ADMIN — PANTOPRAZOLE SODIUM 40 MG: 40 TABLET, DELAYED RELEASE ORAL at 06:27

## 2023-08-29 RX ADMIN — BICALUTAMIDE 50 MG: 50 TABLET, FILM COATED ORAL at 09:25

## 2023-08-29 RX ADMIN — ACETAMINOPHEN 500 MG: 325 TABLET ORAL at 21:37

## 2023-08-29 RX ADMIN — DOCUSATE SODIUM 100 MG: 100 CAPSULE, LIQUID FILLED ORAL at 21:47

## 2023-08-29 RX ADMIN — FLUTICASONE PROPIONATE 2 SPRAY: 50 SPRAY, METERED NASAL at 09:17

## 2023-08-29 RX ADMIN — ATORVASTATIN CALCIUM 10 MG: 10 TABLET, FILM COATED ORAL at 21:36

## 2023-08-29 ASSESSMENT — ENCOUNTER SYMPTOMS: APNEA: 0

## 2023-08-29 ASSESSMENT — PAIN DESCRIPTION - DESCRIPTORS
DESCRIPTORS: ACHING
DESCRIPTORS: ACHING

## 2023-08-29 ASSESSMENT — PAIN DESCRIPTION - LOCATION
LOCATION: ABDOMEN
LOCATION: BACK

## 2023-08-29 ASSESSMENT — PAIN DESCRIPTION - ORIENTATION: ORIENTATION: LOWER

## 2023-08-29 ASSESSMENT — PAIN SCALES - GENERAL
PAINLEVEL_OUTOF10: 2
PAINLEVEL_OUTOF10: 0
PAINLEVEL_OUTOF10: 6

## 2023-08-29 NOTE — PROGRESS NOTES
Late entry for 0915. Trial voiding initiated per Dr. aJmes Frankel progress note. Burnham removed. Pt. Noted with small  abrasion to the meatus. Cleansed with soap and water. Pt. Pleased to have the burnham out. He is worried if he can urinate on his own. Positive affirmations offered. Pt. Worries about many family situations pt. Was encouraged to write it down in his journal that son provided. Pt. Has not voided nor had the urge as of yet. Pt complained of no bowel movement for a few days. Glycolax given. Pt was educated that a suppository may also be needed to assist. Pt. Declined at this time will offer again at the end of the therapy day. Good appetite. Consumed 100% of lunch. Will continue to monitor for pt needs. Pt void through out the afternoon. PVR. 130  Pt. Given suppository x large bowel movement. Pt. Feels better.

## 2023-08-29 NOTE — PROGRESS NOTES
Pt assessment completed. Pt BP low as well as HR@ 1920 vitals. Pt denies any CP, Nausea or dizziness. Pt was assisted from W/C to bed with CGA and W/W pt did well with cueing. Pt was medicated for \"back\" pain of \"5\". Encouraged pt to drink water-pt verbalizes being tired from therapy.

## 2023-08-29 NOTE — PLAN OF CARE
Problem: Discharge Planning  Goal: Discharge to home or other facility with appropriate resources  Outcome: Progressing  Flowsheets (Taken 8/28/2023 2115)  Discharge to home or other facility with appropriate resources: Identify barriers to discharge with patient and caregiver

## 2023-08-29 NOTE — PROGRESS NOTES
OCCUPATIONAL THERAPY  INPATIENT REHAB TREATMENT NOTE  [facility]      NAME: Wagner Mobley  : 1935 (81 y.o.)  MRN: [id number]  CODE STATUS: Full Code  [facility]    Date of Service: 2023    Referring Physician: Dr. 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    Restrictions  Restrictions/Precautions  Restrictions/Precautions: Fall Risk            Position Activity Restriction  Other position/activity restrictions: clinical reasoning- limit spine flexion    Patient's date of birth confirmed: Yes    SAFETY:  Safety Devices  Safety Devices in place: Yes  Type of devices: All fall risk precautions in place    SUBJECTIVE:  Subjective: \" Im hoping for the best.\"    Pain at start of treatment: No    Pain at end of treatment: No      COGNITION:  Orientation  Orientation Level: Oriented to person;Oriented to place;Oriented to time;Oriented to situation  Cognition  Overall Cognitive Status: Exceptions  Arousal/Alertness: Appropriate responses to stimuli  Following Commands: Follows one step commands with repetition  Attention Span: Appears intact  Memory: Decreased short term memory;Decreased recall of recent events  Safety Judgement: Decreased awareness of need for safety;Decreased awareness of need for assistance  Problem Solving: Assistance required to generate solutions;Assistance required to implement solutions  Insights: Decreased awareness of deficits  Initiation: Requires cues for some  Sequencing: Requires cues for some  Cognition Comment: delayed processing      Pt's current cognitive status is:  Comprehension: Supervision  Expression:  Mod I  Social Interaction: Supervision  Problem Solving: Min A  Memory: Supervision    OBJECTIVE:         Grooming/Oral Hygiene  Assistance Level: Modified independent  Skilled Clinical Factors: brushing teeth and combing hair

## 2023-08-29 NOTE — PROGRESS NOTES
Strain: Low Risk     Difficulty of Paying Living Expenses: Not hard at all   Food Insecurity: No Food Insecurity    Worried About Running Out of Food in the Last Year: Never true    Ran Out of Food in the Last Year: Never true   Transportation Needs: Unknown    Lack of Transportation (Non-Medical): No   Physical Activity: Inactive    Days of Exercise per Week: 0 days    Minutes of Exercise per Session: 0 min   Housing Stability: Unknown    Unstable Housing in the Last Year: No     No family history on file.   Current Facility-Administered Medications   Medication Dose Route Frequency Provider Last Rate Last Admin    bicalutamide (CASODEX) chemo tablet 50 mg   50 mg Oral Daily Alpa Christopher MD   50 mg at 08/29/23 0925    lidocaine 4 % external patch 3 patch  3 patch TransDERmal Daily PRN Marcelo Hylton MD        losartan (COZAAR) tablet 12.5 mg  12.5 mg Oral Daily Elsy Mckinney MD   12.5 mg at 08/28/23 0912    docusate sodium (COLACE) capsule 100 mg  100 mg Oral BID Flor Scbelindain, DO   100 mg at 08/29/23 0910    oxyCODONE capsule 10 mg  10 mg Oral Q4H PRN JORDAN Davis CNP   10 mg at 08/28/23 2126    predniSONE (DELTASONE) tablet 15 mg  15 mg Oral Daily JORDAN Masters CNP   15 mg at 08/29/23 2397    Followed by    Robson Watters ON 8/31/2023] predniSONE (DELTASONE) tablet 10 mg  10 mg Oral Daily JORDAN Masters - CNP        Followed by    Robson Watters ON 9/3/2023] predniSONE (DELTASONE) tablet 5 mg  5 mg Oral Daily JORDAN Masters CNP        enoxaparin (LOVENOX) injection 40 mg  40 mg SubCUTAneous Daily Flor Scullin, DO   40 mg at 08/29/23 9797    melatonin disintegrating tablet 5 mg  5 mg Oral Nightly JORDAN Masters CNP   5 mg at 08/28/23 2127    acetaminophen (TYLENOL) tablet 500 mg  500 mg Oral 3 times per day Flor Scullin, DO   500 mg at 08/29/23 0626    sodium chloride flush 0.9 % injection 5-40 mL  5-40 mL IntraVENous 2 times per day Arleen Quintero MD   10 mL at 08/26/23 2151    sodium chloride flush 0.9 % injection 5-40 mL  5-40 mL IntraVENous PRN Arleen Quintero MD   10 mL at 08/24/23 0645    0.9 % sodium chloride infusion   IntraVENous PRN Arleen Quintero MD        ondansetron (ZOFRAN-ODT) disintegrating tablet 4 mg  4 mg Oral Q8H PRN Arleen Quintero MD   4 mg at 08/23/23 1338    Or    ondansetron (ZOFRAN) injection 4 mg  4 mg IntraVENous Q6H PRN Arleen Quintero MD        polyethylene glycol (GLYCOLAX) packet 17 g  17 g Oral Daily PRN Arleen Quintero MD   17 g at 08/29/23 1229    amLODIPine (NORVASC) tablet 5 mg  5 mg Oral Daily Arleen Quintero MD   5 mg at 08/28/23 0911    atenolol (TENORMIN) tablet 25 mg  25 mg Oral Daily Arleen Quintero MD   25 mg at 08/29/23 0910    fluticasone (FLONASE) 50 MCG/ACT nasal spray 2 spray  2 spray Nasal Daily Arleen Quintero MD   2 spray at 08/29/23 0917    atorvastatin (LIPITOR) tablet 10 mg  10 mg Oral Nightly Arleen Quintero MD   10 mg at 08/28/23 2127    traZODone (DESYREL) tablet 150 mg  150 mg Oral Nightly Arleen Quintero MD   150 mg at 08/28/23 2126    pantoprazole (PROTONIX) tablet 40 mg  40 mg Oral QAM AC Arleen Quintero MD   40 mg at 08/29/23 1385    ipratropium 0.5 mg-albuterol 2.5 mg (DUONEB) nebulizer solution 1 Dose  1 Dose Inhalation Q4H PRN Arleen Quintero MD        calcium carbonate (TUMS) chewable tablet 750 mg  750 mg Oral TID PRN Arleen Quintero MD   750 mg at 08/25/23 1304    busPIRone (BUSPAR) tablet 5 mg  5 mg Oral BID Arleen Quintero MD   5 mg at 08/29/23 0910    baclofen (LIORESAL) tablet 5 mg  5 mg Oral BID Arleen Quintero MD   5 mg at 08/29/23 8383    acetaminophen (TYLENOL) tablet 650 mg  650 mg Oral Q4H PRN Flor Scullin, DO   650 mg at 08/23/23 0845    bisacodyl (DULCOLAX) suppository 10 mg  10 mg Rectal Daily PRN Flor Scullin, DO        sodium phosphate (FLEET) rectal enema 1 enema  1 enema Rectal Daily PRN Flor Scullin, DO        Vitamin D (CHOLECALCIFEROL) tablet 2,000 Units  2,000 Units

## 2023-08-29 NOTE — PROGRESS NOTES
OCCUPATIONAL THERAPY  INPATIENT REHAB TREATMENT NOTE  [facility]      NAME: Orlando Varma  : 1935 (81 y.o.)  MRN: [id number]  CODE STATUS: Full Code  Room: R248/R248-01    Date of Service: 2023    Referring Physician: Dr. 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    Restrictions  Restrictions/Precautions  Restrictions/Precautions: Fall Risk            Position Activity Restriction  Other position/activity restrictions: clinical reasoning- limit spine flexion    Patient's date of birth confirmed: Yes    SAFETY:  Safety Devices  Safety Devices in place: Yes  Type of devices: All fall risk precautions in place    SUBJECTIVE:  Subjective: \" Im hoping for the best.\"    Pain at start of treatment: No    Pain at end of treatment: No    COGNITION:  Orientation  Overall Orientation Status: Within Functional Limits  Orientation Level: Oriented to person;Oriented to place;Oriented to time;Oriented to situation  Cognition  Overall Cognitive Status: Exceptions  Arousal/Alertness: Appropriate responses to stimuli  Following Commands: Follows one step commands with repetition  Attention Span: Appears intact  Memory: Decreased short term memory;Decreased recall of recent events  Safety Judgement: Decreased awareness of need for safety;Decreased awareness of need for assistance  Problem Solving: Assistance required to generate solutions;Assistance required to implement solutions  Insights: Decreased awareness of deficits  Initiation: Requires cues for some  Sequencing: Requires cues for some  Cognition Comment: delayed processing    OBJECTIVE:     Patient was provided BUE strengthening seated with SBA  Pt completed all shoulder ex with 1 #, and bicep ex with 2#, and forearm and wrist exercises with 1 # wt.    Pt completed shoulder flex/ext, horiz ab/ad, chest presses, bicep curls,

## 2023-08-29 NOTE — PROGRESS NOTES
Physical Therapy Rehab Treatment Note  Facility/Department: [facility]  Room: A499/S928-44       NAME: Kary Pal  : 1935 (89 y.o.)  MRN: [id number]  CODE STATUS: Full Code    Date of Service: 2023     Restrictions:  Restrictions/Precautions: Fall Risk     SUBJECTIVE:   Subjective: Pt states his pain has let up. States is stomach is feeling better. Pt states he has alot going with his wife and he was able to get some of that done. States he just had his catheter out 1 hour ago. States he hasn't gone to the bathroom yet. States he is getting around pretty good in the [facility]. Pain  Pain: 2-310 low back Pt declined intervention. OBJECTIVE:         Bed mobility  Rolling to Left: Supervision  Rolling to Right: Supervision  Supine to Sit: Stand by assistance  Sit to Supine: Stand by assistance  Bed Mobility Comments: VCs for technique. Increased time and effort to complete. Transfers  Sit to Stand: Stand by assistance  Stand to Sit: Stand by assistance    Ambulation  Surface: Level tile  Device: Rolling Walker  Assistance: Minimal assistance  Quality of Gait: Decreased trunk control with ataxic LEs, heavy UE support through [employer], variable foot placement with occasional scissoring, intermittent decreased B knee stability  Distance: 50ft  Comments: VCs for awareness of foot placement. PT Exercises  A/AROM Exercises: supine bridges 2x10, LTR x10, hooklying march x10, heel slides AAROM x10, hooklying hip add with ball x20, hooklying hip abd RTB x20, LAQ 2x10 focusing on LE strength and motor control. Functional Mobility Circuit Training: STS x3  Dynamic Standing Balance Exercises: Beach ball tap standing with 1 UE support on [employer] SBA. Increased trunk sway noted. Standing reaching for cones with 1 UE support on [employer].   Motor Control/Coordination: Toe taps onto 6in cone, step overs, stepping to targets standing at [employer] hands on assist-Min assist.            ASSESSMENT/PROGRESS TOWARDS GOALS:

## 2023-08-29 NOTE — PROGRESS NOTES
Physical Therapy Rehab Treatment Note  Facility/Department: [facility]  Room: [facility]/P630-40       NAME: Dashawn Polanco  : 1935 (89 y.o.)  MRN: [id number]  CODE STATUS: Full Code    Date of Service: 2023     Restrictions:  Restrictions/Precautions: Fall Risk     SUBJECTIVE:   Subjective: \"The pain isn't bad today. \"    Pain  Pain: 4/10 low back and stomach \"Its not bad\"  pt declined intervention    OBJECTIVE:         Bed mobility  Rolling to Left: Supervision  Rolling to Right: Supervision  Supine to Sit: Stand by assistance  Sit to Supine: Stand by assistance  Bed Mobility Comments: VCs for technique. Increased time and effort to complete. Transfers  Sit to Stand: Stand by assistance  Stand to Sit: Stand by assistance  Bed to Chair: Minimal assistance;Stand by assistance  Comment: Verbal cues for technique with SPT. Completed SPT WC<>mat multiple times to L and R to improve technique. Min assist initially progressing to SBA. Ambulation  Surface: Level tile  Device: Rolling Walker  Assistance: Minimal assistance  Quality of Gait: Decreased trunk control with ataxic LEs, heavy UE support through [employer], variable foot placement with occasional scissoring, intermittent decreased B knee stability  Distance: 50ft  Comments: VCs for awareness of foot placement. Improves when cued to intermittently look at feet. Wheelchair Activities  Wheelchair Parts Management: Yes  Left Leg Rest Level of Assistance: Stand by assistance  Right Leg Rest Level of Assistance: Stand by assistance  Left Brakes Level of Assistance: Stand by assistance  Right Brakes Level of Assistance: Stand by Assist  Propulsion: Yes  Propulsion 1  Propulsion: Manual  Level: Level Tile  Method: RUE;LUE  Level of Assistance: Modified independent  Description/ Details: Pt indep with propelling. SBA wih parts management and positioning in set up for transfer after instruction.   Distance: 150ft     Education Provided: Equipment  Education  Education Given To: Staff  Education Provided: Equipment  Education Provided Comments: LSW notified on need for 18in WC.    ASSESSMENT/PROGRESS TOWARDS GOALS:   Assessment: Focused on mobility training to progress towards indepence at a WC level. Pt indep with propeling WC. Required SBA with VCs for parts management and set up for transfer. SPT improved requiring decreased assist after multiple trials and instuction. Goals:  Long Term Goals  Long Term Goal 1: indep and efficient bed mobility  Long Term Goal 2: indep w/c to bed transfer; SBA sit to stand and car transfers  Long Term Goal 3: SBA gait 50 feet with appropriate device  Long Term Goal 4: min assist with 2 stairs for safe home entry  Long Term Goal 5: pt able to propel w/c indep 50 feet and complete w/c prep indep    PLAN OF CARE/Safety: Cont per POC.       Therapy Time:   Individual   Time In 5843   Time Out 1525   Minutes 30      Minutes:  Transfer/Bed mobility training:10  Gait training:10  Neuro re education:0  Therapeutic ex:0  WC 10    Zigmund Drivers, PTA, 08/29/23 at 4:01 PM

## 2023-08-30 PROCEDURE — 6370000000 HC RX 637 (ALT 250 FOR IP): Performed by: NURSE PRACTITIONER

## 2023-08-30 PROCEDURE — 6370000000 HC RX 637 (ALT 250 FOR IP)

## 2023-08-30 PROCEDURE — 97110 THERAPEUTIC EXERCISES: CPT

## 2023-08-30 PROCEDURE — 51798 US URINE CAPACITY MEASURE: CPT

## 2023-08-30 PROCEDURE — 97535 SELF CARE MNGMENT TRAINING: CPT

## 2023-08-30 PROCEDURE — 97530 THERAPEUTIC ACTIVITIES: CPT

## 2023-08-30 PROCEDURE — 6360000002 HC RX W HCPCS: Performed by: PHYSICAL MEDICINE & REHABILITATION

## 2023-08-30 PROCEDURE — 6370000000 HC RX 637 (ALT 250 FOR IP): Performed by: PHYSICAL MEDICINE & REHABILITATION

## 2023-08-30 PROCEDURE — 97112 NEUROMUSCULAR REEDUCATION: CPT

## 2023-08-30 PROCEDURE — 92526 ORAL FUNCTION THERAPY: CPT

## 2023-08-30 PROCEDURE — 99231 SBSQ HOSP IP/OBS SF/LOW 25: CPT | Performed by: PHYSICIAN ASSISTANT

## 2023-08-30 PROCEDURE — 1180000000 HC REHAB R&B

## 2023-08-30 PROCEDURE — 6370000000 HC RX 637 (ALT 250 FOR IP): Performed by: INTERNAL MEDICINE

## 2023-08-30 PROCEDURE — 97116 GAIT TRAINING THERAPY: CPT

## 2023-08-30 PROCEDURE — 97542 WHEELCHAIR MNGMENT TRAINING: CPT

## 2023-08-30 PROCEDURE — 6370000000 HC RX 637 (ALT 250 FOR IP): Performed by: UROLOGY

## 2023-08-30 RX ADMIN — ATENOLOL 25 MG: 25 TABLET ORAL at 09:33

## 2023-08-30 RX ADMIN — ATORVASTATIN CALCIUM 10 MG: 10 TABLET, FILM COATED ORAL at 21:19

## 2023-08-30 RX ADMIN — BICALUTAMIDE 50 MG: 50 TABLET, FILM COATED ORAL at 12:06

## 2023-08-30 RX ADMIN — BUSPIRONE HYDROCHLORIDE 5 MG: 10 TABLET ORAL at 09:32

## 2023-08-30 RX ADMIN — FLUTICASONE PROPIONATE 2 SPRAY: 50 SPRAY, METERED NASAL at 09:34

## 2023-08-30 RX ADMIN — AMLODIPINE BESYLATE 5 MG: 5 TABLET ORAL at 09:33

## 2023-08-30 RX ADMIN — ACETAMINOPHEN 500 MG: 325 TABLET ORAL at 04:28

## 2023-08-30 RX ADMIN — TRAZODONE HYDROCHLORIDE 150 MG: 50 TABLET ORAL at 21:19

## 2023-08-30 RX ADMIN — DOCUSATE SODIUM 100 MG: 100 CAPSULE, LIQUID FILLED ORAL at 09:32

## 2023-08-30 RX ADMIN — PREDNISONE 15 MG: 10 TABLET ORAL at 09:32

## 2023-08-30 RX ADMIN — Medication 5 MG: at 21:19

## 2023-08-30 RX ADMIN — ACETAMINOPHEN 500 MG: 325 TABLET ORAL at 21:20

## 2023-08-30 RX ADMIN — PANTOPRAZOLE SODIUM 40 MG: 40 TABLET, DELAYED RELEASE ORAL at 09:31

## 2023-08-30 RX ADMIN — TAMSULOSIN HYDROCHLORIDE 0.4 MG: 0.4 CAPSULE ORAL at 17:15

## 2023-08-30 RX ADMIN — BACLOFEN 5 MG: 10 TABLET ORAL at 09:33

## 2023-08-30 RX ADMIN — Medication 2000 UNITS: at 17:15

## 2023-08-30 RX ADMIN — OXYCODONE HYDROCHLORIDE 10 MG: 5 CAPSULE ORAL at 15:39

## 2023-08-30 RX ADMIN — BUSPIRONE HYDROCHLORIDE 5 MG: 10 TABLET ORAL at 21:19

## 2023-08-30 RX ADMIN — BACLOFEN 5 MG: 10 TABLET ORAL at 21:22

## 2023-08-30 RX ADMIN — DOCUSATE SODIUM 100 MG: 100 CAPSULE, LIQUID FILLED ORAL at 21:20

## 2023-08-30 RX ADMIN — LOSARTAN POTASSIUM 12.5 MG: 25 TABLET, FILM COATED ORAL at 09:32

## 2023-08-30 RX ADMIN — ENOXAPARIN SODIUM 40 MG: 100 INJECTION SUBCUTANEOUS at 09:33

## 2023-08-30 RX ADMIN — ACETAMINOPHEN 500 MG: 325 TABLET ORAL at 14:07

## 2023-08-30 RX ADMIN — Medication 100 MG: at 09:32

## 2023-08-30 ASSESSMENT — PAIN DESCRIPTION - ORIENTATION
ORIENTATION: LOWER
ORIENTATION: LOWER

## 2023-08-30 ASSESSMENT — ENCOUNTER SYMPTOMS: APNEA: 0

## 2023-08-30 ASSESSMENT — PAIN DESCRIPTION - PAIN TYPE: TYPE: CHRONIC PAIN

## 2023-08-30 ASSESSMENT — PAIN SCALES - GENERAL
PAINLEVEL_OUTOF10: 6
PAINLEVEL_OUTOF10: 5
PAINLEVEL_OUTOF10: 0

## 2023-08-30 ASSESSMENT — PAIN DESCRIPTION - DESCRIPTORS: DESCRIPTORS: ACHING

## 2023-08-30 ASSESSMENT — PAIN DESCRIPTION - LOCATION
LOCATION: BACK
LOCATION: BACK

## 2023-08-30 NOTE — CARE COORDINATION
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 Kayley Cornelius  2023    [address] 2023 MLOZ ULTRASOUND        Restrictions  Restrictions/Precautions: Fall Risk  Position Activity Restriction  Other position/activity restrictions: clinical reasoning- limit spine flexion  CASE MANAGEMENT    Social/Functional History  Social/Functional History  Lives With: Alone (Wife moving to a nursing home from a memory care unit (moving to [facility] on ))  Type of Home: [address] [place] [address] 24: One level  Home Access: Stairs to enter without rails  Entrance Stairs - Number of Steps: 2 from the garage  Bathroom Shower/Tub: Walk-in shower, Doors, Tub/Shower unit (narrow walk in shower--unsure if chair would fit; tub/shower does not typically use but is in working condition)  Bathroom Toilet: Standard  Bathroom Equipment: None  Bathroom Accessibility: Walker accessible  Home Equipment: None  Has the patient had two or more falls in the past year or any fall with injury in the past year?: No  Receives Help From: Family (son comes a couple times a week--assists with anything pt needs such as cutting grass (pt cutting mostly prior), cleaning in ground swimming pool)  ADL Assistance: Independent  [employer] 1139)  Additional Factors: Cues for hand placement; With handrails; Increased time to complete;Verbal cues (08/28/23 1139)  Assistance Level: Stand by assist (08/28/23 1139)  Skilled Clinical Factors: sponge bath at sink w/c level (08/29/23 1202)        LTG:  Eating:Discharge Goal: Independent  Oral Hygiene:Discharge Goal: Independent  Shower/Bathe self: Discharge Goal: Independent  Upper body dressing:Discharge Goal: Independent  Lower body dressing:Discharge Goal: Independent  Putting on taking off footwear:Discharge Goal: Independent   Toileting: Discharge Goal: Independent  Toilet transfer:Discharge Goal: Independent  OT Treatment Time: 1.5 hrs      SPEECH THERAPY       Comprehension: Exceptions  Verbal Expression: Exceptions to functional limits      Diet/Swallow:        Dysphagia Outcome Severity Scale: Level 5: Mild dysphagia- Distant supervision. May need one diet consistency restricted    Compensatory Swallowing Strategies : Small bites/sips, Upright as possible for all oral intake, Lingual sweep, Remain upright for 30-45 minutes after meals  Therapeutic Interventions: Diet tolerance monitoring, Bolus control exercises, Patient/Family education, Oral motor exercises, Pharyngeal exercises      LTG:     Long-term Goals  Timeframe for Long-term Goals: 1-2 weeks  Goal 1: Pt will consume least restrictive diet without overt s/s of aspiration. COGNITION  OT: Cognition Comment: delayed processing  SP:        RECREATIONAL THERAPY  Attendance to recreational therapy programs:    []  Pet Therapy  [] Music Therapy  [] Art Therapy    [] Recreation Therapy Group [] Support Group           Patient social interaction (mood, participation): good    Patient strengths: independent prior    Patients goal: return home    Problems/Barriers: lives alone, difficulty with carryover, needs a lot of cueing for safety with ADLs, stairs to enter home--family working on ramp, lives alone, poor prognosis         1.  Safety:

## 2023-08-30 NOTE — PROGRESS NOTES
OCCUPATIONAL THERAPY  INPATIENT REHAB TREATMENT NOTE  [facility]      NAME: Jeevan Blanco  : 1935 (81 y.o.)  MRN: [id number]  CODE STATUS: Full Code  Room: R248/R248-01    Date of Service: 2023    Referring Physician: Dr. 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 \"       Pain at start of treatment: No    Pain at end of treatment: No    Location: N/A  Description N/A  Nursing notified: Not Applicable  RN: N/A  Intervention: Repositioned    COGNITION:         Patient's cognition will improve or maintain baseline to safely perform ADLs:    OBJECTIVE:     Pt completed acts at table top to increase        Education:       Equipment recommendations:         ASSESSMENT:         PLAN OF CARE:  Strengthening, Balance training, Functional mobility training, Endurance training, Patient/Caregiver education & training, Equipment evaluation, education, & procurement, Self-Care / ADL, Home management training, Safety education & training, Neuromuscular re-education  Continue OT POC until discharge    Patient goals : Go home and do like I have been doing  Time Frame for Long Term Goals :  Within 1.5-2 weeks, pt to demo progress in the following areas listed below to achieve specific LTGs stated in the intial eval  Long Term Goal 1: P tto demo improved ADL/IADL status  Long Term Goal 2: Pt to demo improved standing balance and tolerance  Long Term Goal 3: Pt to demo improved ability to use AD/AE/DME as needed to improve function and maintain pain level  Long Term Goal 4: Pt to demo improved UB strength for self-care        Therapy Time:   Individual Group Co-Treat   Time In 1100       Time Out 1200         Minutes 60                   Therapeutic activities: 60 minutes     Electronically signed by:    HUNTER Castro,   7/44/2448, 12:49 PM Electronically signed by HUNTER Castro on 1/09/19 at 12:53 PM EDT

## 2023-08-30 NOTE — PROGRESS NOTES
OCCUPATIONAL THERAPY  INPATIENT REHAB TREATMENT NOTE  [facility]      NAME: Naina Delarosa  : 1935 (81 y.o.)  MRN: [id number]  CODE STATUS: Full Code  Room: R248/R248-01    Date of Service: 2023    Referring Physician: Dr. 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 Pain at start of treatment: No    Pain at end of treatment: No    Location: N/A  Description N/A  Nursing notified: Not Applicable  RN: N/A  Intervention: Repositioned    COGNITION:         Patient's cognition will improve or maintain baseline to safely perform ADLs:    OBJECTIVE:       Pt completed acts in seated and standing to increase stand tolerance for acts completion and bilateral UE strength for acts completion. Pt completed bean bag toss x 2 standing trials to complete 25 bags each stand trial.  Pt completed one trial in static standing and one on tandem standing. Pt with 1# cuff weights bilaterally completed Pt completed pressure fitting assembly with bilateral hands while seated. Pt required increased time for task completion. Pt completed Hi Q's with 1# cuff weights to increase FMC and bilateral strength. Education:       Equipment recommendations:         ASSESSMENT:Pt increasing tolerance for stand acts completion with seated rest breaks PRN.          PLAN OF CARE:  Strengthening, Balance training, Functional mobility training, Endurance training, Patient/Caregiver education & training, Equipment evaluation, education, & procurement, Self-Care / ADL, Home management training, Safety education & training, Neuromuscular re-education  Continue OT POC until discharge    Patient goals : Go home and do like I have been doing  Time Frame for Long Term Goals :  Within 1.5-2 weeks, pt to demo progress in the following areas listed below to achieve specific LTGs stated in the intial eval  Long Term Goal 1: P tto demo improved ADL/IADL status  Long Term Goal 2: Pt to demo improved standing balance and tolerance  Long Term Goal 3: Pt to demo improved ability to use AD/AE/DME as needed to improve function and maintain pain level  Long Term Goal 4: Pt to demo improved UB strength for self-care        Therapy Time:   Individual Group Co-Treat   Time In 1100       Time Out 1200         Minutes 60                   Therapeutic activities: 60 minutes     Electronically signed by:    HUNTER Ruvalcaba,   7/18/9878, 3:11 PM Electronically signed by HUNTER Ruvalcaba on 2/46/04 at 3:15 PM EDT

## 2023-08-30 NOTE — PROGRESS NOTES
Pt assessment completed. Pt denied pain. VSS. Stated he voided x5 up to BR post void Days was 130. LBM 8/3o. Pt taken to BR to attempt void @0400. No results pt ambulated back with W/W  to bed unsteady, foot placement in front of each other, body alignment off. Pt  was bladder scanned 651ml-pt had urge, wanted to try again -water given and pt given privacy and emotional support.

## 2023-08-30 NOTE — PROGRESS NOTES
Pt unable to void though feeling the pressure-this RN called supervisor to get coude catheter (knowing pt has large prostate) she brought up urology cart. ICU was  called for  #14 coude. Pt was inserted  with lidocaine-waited 3 minutes. Pt very anxious. #14 coude was inserted small spurts of urine out, when pressing on bladder more drained,  pt more anxious-hooked up bag and will allow to drain for awhile. Pt tolerated well, stating that wasn't to bad\". Will monitor- pt expresses some relief immediately.

## 2023-08-30 NOTE — PROGRESS NOTES
Perfect served Dr. Efraín Fiore to leave catheter in. Pt stating thought he was doing real well- since \" I peed 5x yesterday\" attempted to explain to pt no measurements taken- emotional support given.

## 2023-08-30 NOTE — PROGRESS NOTES
Subjective:      Patient ID: Johny Hills is a 81 y.o. male    HPI  81year old male who failed his voiding trial and had to have a catheter inserted last night for retaining 600 cc's of urine.  Voicing no other complaints    Past Medical History:   Diagnosis Date    Anxiety     COPD (chronic obstructive pulmonary disease) (HCC)     Hyperlipidemia     Hypertension     Kidney stone      Past Surgical History:   Procedure Laterality Date    PROSTATE BIOPSY  08/21/2023    U/S guided prostate biopsy completed by Dr. Iam Pham  8/21/2023    Adali Medina 8/21/2023 MLOZ ULTRASOUND     Social History     Socioeconomic History    Marital status:      Spouse name: None    Number of children: None    Years of education: None    Highest education level: None   Tobacco Use    Smoking status: Every Day     Packs/day: 1.00     Years: 60.00     Pack years: 60.00     Types: Cigarettes    Smokeless tobacco: Never   Substance and Sexual Activity    Alcohol use: Yes    Drug use: No   Social History Narrative    Lives With: Alone (Wife has dementia -moving to a nursing home [facility] in [address] from a memory care unit in [address] to finances)    Type of Home: House in [address]: One level    Home Access: Stairs to enter without rails - Number of Steps: 2    Bathroom Shower/Tub: Walk-in shower-Equipment: None    Home Equipment: None    Has the patient had two or more falls in the past year or any fall with injury in the past year?: No    Receives Help From: Family (son comes a couple times a week)    ADL Assistance: Independent    Homemaking Assistance: Independent (neighbor cuts grass), Homemaking Responsibilities: Yes    Ambulation Assistance: Independent, Transfer Assistance: Independent    Active : Yes    Occupation: Retired    IADL Comments: pt completes his own shopping, cuts some of his lawn         Social Determinants of Health IntraVENous PRN Deidre Daniel MD   10 mL at 08/24/23 0645    0.9 % sodium chloride infusion   IntraVENous PRN Deidre Daniel MD        ondansetron (ZOFRAN-ODT) disintegrating tablet 4 mg  4 mg Oral Q8H PRN Deidre Daniel MD   4 mg at 08/23/23 1338    Or    ondansetron (ZOFRAN) injection 4 mg  4 mg IntraVENous Q6H PRN Deidre Daniel MD        polyethylene glycol (GLYCOLAX) packet 17 g  17 g Oral Daily PRN Deidre Daniel MD   17 g at 08/29/23 1229    amLODIPine (NORVASC) tablet 5 mg  5 mg Oral Daily Deidre Daniel MD   5 mg at 08/30/23 0933    atenolol (TENORMIN) tablet 25 mg  25 mg Oral Daily Deidre Daniel MD   25 mg at 08/30/23 0933    fluticasone (FLONASE) 50 MCG/ACT nasal spray 2 spray  2 spray Nasal Daily Deidre Daniel MD   2 spray at 08/30/23 0934    atorvastatin (LIPITOR) tablet 10 mg  10 mg Oral Nightly Deidre Daniel MD   10 mg at 08/29/23 2136    traZODone (DESYREL) tablet 150 mg  150 mg Oral Nightly Deidre Daniel MD   150 mg at 08/29/23 2138    pantoprazole (PROTONIX) tablet 40 mg  40 mg Oral QAM AC Deidre Daniel MD   40 mg at 08/30/23 0931    ipratropium 0.5 mg-albuterol 2.5 mg (DUONEB) nebulizer solution 1 Dose  1 Dose Inhalation Q4H PRN Deidre Daniel MD        calcium carbonate (TUMS) chewable tablet 750 mg  750 mg Oral TID PRN Deidre Daniel MD   750 mg at 08/25/23 1304    busPIRone (BUSPAR) tablet 5 mg  5 mg Oral BID Deidre Daniel MD   5 mg at 08/30/23 0932    baclofen (LIORESAL) tablet 5 mg  5 mg Oral BID Deidre Daniel MD   5 mg at 08/30/23 2109    acetaminophen (TYLENOL) tablet 650 mg  650 mg Oral Q4H PRN Flor Hoffmann DO   650 mg at 08/23/23 0845    bisacodyl (DULCOLAX) suppository 10 mg  10 mg Rectal Daily PRN Flor Scullin, DO   10 mg at 08/29/23 1641    sodium phosphate (FLEET) rectal enema 1 enema  1 enema Rectal Daily PRN Flor Scullin, DO        Vitamin D (CHOLECALCIFEROL) tablet 2,000 Units  2,000 Units Oral Dinner Flor Scullin, DO   2,000 Units at 08/29/23 1731    cyanocobalamin injection

## 2023-08-30 NOTE — PROGRESS NOTES
[facility]  Facility/Department: [facility]  Speech Language Pathology   Treatment Note          Concha Tee  1935  R248/R248-01  [x]   confirmed    Date: 2023      Restrictions/Precautions: Fall Risk  Position Activity Restriction  Other position/activity restrictions: clinical reasoning- limit spine flexion          ADULT ORAL NUTRITION SUPPLEMENT; HS Snack; Snack; cheese plate with fruit  ADULT DIET; Easy to Chew    SpO2: 96 % (23 0830)  O2 Flow Rate (L/min): 0 L/min (23 0720)  No active isolations    Rehab Diagnosis: Impaired mobility and ADL's due to 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 Patient completed the following pharyngeal exercises  Falsetto X6 with hold ranging between 6-9 seconds. Pitch glide X5/6 attempts with hoarse vocal quality  Pitch glide with effort: X4 with good effort X3/4     Goal 4: Pt will swallow medications whole in puree with utilization of techniques (1-2 at a time, chin tuck, and upright positioning) with >90% of opportunities. Patient consumed 1 pill at a time X10 whole in puree solids with partial chin tuck with good tolerance and no complaint of pharyngeal stasis. Treatment/Activity Tolerance:  Patient tolerated treatment well    Plan:  Continue per POC    Pain Assessment:  Patient does not c/o pain. Pain Re-assessment:  Patient does not c/o pain. Patient/Caregiver Education:  Patient educated on session and progression towards goals.     Safety Devices:  Bed alarm in place and Call

## 2023-08-30 NOTE — PROGRESS NOTES
OCCUPATIONAL THERAPY  INPATIENT REHAB TREATMENT NOTE  [facility]      NAME: Allison Gotti  : 1935 (81 y.o.)  MRN: [id number]  CODE STATUS: Full Code  Room: R248/R248-01    Date of Service: 2023    Referring Physician: Dr. 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 \"       Pain at start of treatment: No    Pain at end of treatment: No    Location: N/A  Description N/A  Nursing notified: Not Applicable  RN: N/A  Intervention: Repositioned    COGNITION:         Patient's cognition will improve or maintain baseline to safely perform ADLs:    OBJECTIVE:                                                                     Education:       Equipment recommendations:         ASSESSMENT:         PLAN OF CARE:  Strengthening, Balance training, Functional mobility training, Endurance training, Patient/Caregiver education & training, Equipment evaluation, education, & procurement, Self-Care / ADL, Home management training, Safety education & training, Neuromuscular re-education  Continue OT POC until discharge    Patient goals : Go home and do like I have been doing  Time Frame for Long Term Goals :  Within 1.5-2 weeks, pt to demo progress in the following areas listed below to achieve specific LTGs stated in the intial eval  Long Term Goal 1: P tto demo improved ADL/IADL status  Long Term Goal 2: Pt to demo improved standing balance and tolerance  Long Term Goal 3: Pt to demo improved ability to use AD/AE/DME as needed to improve function and maintain pain level  Long Term Goal 4: Pt to demo improved UB strength for self-care        Therapy Time:   Individual Group Co-Treat   Time In 1100       Time Out 1200         Minutes 60

## 2023-08-30 NOTE — PROGRESS NOTES
OCCUPATIONAL THERAPY  INPATIENT REHAB TREATMENT NOTE  [facility]      NAME: Micky Juarez  : 1935 (81 y.o.)  MRN: [id number]  CODE STATUS: Full Code  Room: R248/R248-01    Date of Service: 2023    Referring Physician: Dr. 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 Pt completed bolts and blocks design without mistakes. Pt completed [place] puzzle with one mistake noted. Pt required increased time for task completion with no noted dropping of pieces. Education:       Equipment recommendations:         ASSESSMENT:Pt increasing bilateral UE function for acts completion. PLAN OF CARE:  Strengthening, Balance training, Functional mobility training, Endurance training, Patient/Caregiver education & training, Equipment evaluation, education, & procurement, Self-Care / ADL, Home management training, Safety education & training, Neuromuscular re-education  Continue OT POC until discharge    Patient goals : Go home and do like I have been doing  Time Frame for Long Term Goals :  Within 1.5-2 weeks, pt to demo progress in the following areas listed below to achieve specific LTGs stated in the intial eval  Long Term Goal 1: P tto demo improved ADL/IADL status  Long Term Goal 2: Pt to demo

## 2023-08-30 NOTE — PROGRESS NOTES
Physical Therapy Rehab Treatment Note  Facility/Department: [facility]  Room: -88       NAME: Micky Juarez  : 1935 (64 y.o.)  MRN: [id number]  CODE STATUS: Full Code    Date of Service: 2023     Restrictions:  Restrictions/Precautions: Fall Risk     SUBJECTIVE:   Subjective: \"I had a bad night\"  Pt states he had to have the catheter put back in. \"Be gentle with me. \"    Pain  Pain: 2-3/10 back stomach Pt declined intervention    OBJECTIVE:         Bed mobility  Rolling to Left: Supervision  Rolling to Right: Supervision  Supine to Sit: Supervision  Sit to Supine: Supervision    Transfers  Sit to Stand: Stand by assistance  Stand to Sit: Stand by assistance  Bed to Chair: Stand by assistance       Wheelchair Activities  Propulsion: Yes  Propulsion 1  Propulsion: Manual  Level: Level Tile  Method: RUE;LUE  Level of Assistance: Modified independent;Stand by assistance  Description/ Details: Focused on set up for transfer. Instructed pt on positioning WC. Indep with propelling. SBA set up and parts management. Distance: NT for distance     PT Exercises  A/AROM Exercises: supine bridges 2x10, LTR x10, hooklying march x10, heel slides AAROM x10, hooklying hip add with ball x20, hooklying hip abd RTB x20, LAQ 2x10 focusing on LE strength and motor control. Dynamic Standing Balance Exercises: Fwd/retro gait drills in // bars with B UE support. Mirror for visual cues focusing on foot placement. ASSESSMENT/PROGRESS TOWARDS GOALS:   Assessment: Focused on motor control and strengthening of LEs. Pt demonstrates good ability with WC mobility around obstacles. Needs cues for set up and parts management. Requires assist with gait d/t poor motor control of LEs.     Goals:  Long Term Goals  Long Term Goal 1: indep and efficient bed mobility  Long Term Goal 2: indep w/c to bed transfer; SBA sit to stand and car transfers  Long Term Goal 3: SBA gait 50 feet with appropriate device  Long Term Goal

## 2023-08-30 NOTE — PROGRESS NOTES
Physical Therapy Rehab Treatment Note  [facility]Department[facility]  [facility]       NAME: Corry oKlb  : 1935 (87 y.o.)  MRN: [id number]  CODE STATUS: Full Code    Date of Service: 2023     Restrictions:  Restrictions/Precautions: Fall Risk    SUBJECTIVE:   Subjective: \"I had a bad night\"  Pt states he had to have the catheter put back in. \"Be gentle with me. \"    Pain  Pain: 1-2/10 low back and stomach  Pt declined intervention    OBJECTIVE:            Transfers  Sit to Stand: Stand by assistance  Stand to Sit: Stand by assistance    Ambulation  Surface: Level tile  Device: Rolling Walker  Assistance: Minimal assistance  Quality of Gait: Decreased trunk control with ataxic LEs, heavy UE support through [employer], variable foot placement with occasional scissoring, intermittent decreased B knee stability  Distance: 25ftx2 focused on ambulation to destination  Comments: VCs for awareness of foot placement. Improves with mirrorand cues to look at feet. Wheelchair Activities  Propulsion: Yes  Propulsion 1  Propulsion: Manual  Level: Level Tile  Method: RUE;LUE  Level of Assistance: Modified independent;Supervision  Description/ Details: Focused on obstacle course and karlie negoation. Pt maneuvered WC indep around obstacles. VCs with supervision on ramp. Distance: 150ft     PT Exercises  Exercise Treatment: seated AP, LAQ, # 2x10 ea; hip add with ball x20, hip abd RTB x20  Motor Control/Coordination: Standing at [employer] toe taps on # block            ASSESSMENT/PROGRESS TOWARDS GOALS:   Assessment: Focused on motor control and strengthening of LEs. Pt demonstrates good ability with WC mobility around obstacles. Requires assist with gait d/t poor motor control of LEs.   Goals:  Long Term Goals  Long Term Goal 1: indep and efficient bed mobility  Long Term Goal 2: indep w/c to bed transfer; SBA sit to stand and car transfers  Long Term Goal 3: SBA gait 50 feet with appropriate device  Long Term Goal 4: min assist with 2 stairs for safe home entry  Long Term Goal 5: pt able to propel w/c indep 50 feet and complete w/c prep indep    PLAN OF CARE/Safety: Cont per POC.        Therapy Time:   Individual   Time In 1000   Time Out 1100   Minutes 60      Minutes:  Transfer/Bed mobility training:10  Gait training:15  Neuro re education:10  Therapeutic ex:15  WC10    Tasha Camacho PTA, 08/30/23 at 11:10 AM

## 2023-08-30 NOTE — PROGRESS NOTES
Pt in w/c in room, recently medicated for c/o back pain see MAR. Núñez catheter patent to gravity, post void residual 12cc. Call light in reach.  Electronically signed by Mary Singh RN on 8/30/2023 at 4:29 PM

## 2023-08-31 DIAGNOSIS — C61 PROSTATE CANCER (HCC): Primary | ICD-10-CM

## 2023-08-31 PROCEDURE — 92526 ORAL FUNCTION THERAPY: CPT

## 2023-08-31 PROCEDURE — 6370000000 HC RX 637 (ALT 250 FOR IP): Performed by: NURSE PRACTITIONER

## 2023-08-31 PROCEDURE — 97112 NEUROMUSCULAR REEDUCATION: CPT

## 2023-08-31 PROCEDURE — 97130 THER IVNTJ EA ADDL 15 MIN: CPT

## 2023-08-31 PROCEDURE — 97129 THER IVNTJ 1ST 15 MIN: CPT

## 2023-08-31 PROCEDURE — 6370000000 HC RX 637 (ALT 250 FOR IP): Performed by: UROLOGY

## 2023-08-31 PROCEDURE — 99231 SBSQ HOSP IP/OBS SF/LOW 25: CPT | Performed by: PHYSICIAN ASSISTANT

## 2023-08-31 PROCEDURE — 97530 THERAPEUTIC ACTIVITIES: CPT

## 2023-08-31 PROCEDURE — 97116 GAIT TRAINING THERAPY: CPT

## 2023-08-31 PROCEDURE — 6370000000 HC RX 637 (ALT 250 FOR IP): Performed by: INTERNAL MEDICINE

## 2023-08-31 PROCEDURE — 6370000000 HC RX 637 (ALT 250 FOR IP): Performed by: PHYSICAL MEDICINE & REHABILITATION

## 2023-08-31 PROCEDURE — 97110 THERAPEUTIC EXERCISES: CPT

## 2023-08-31 PROCEDURE — 1180000000 HC REHAB R&B

## 2023-08-31 PROCEDURE — 6360000002 HC RX W HCPCS: Performed by: PHYSICAL MEDICINE & REHABILITATION

## 2023-08-31 PROCEDURE — 97535 SELF CARE MNGMENT TRAINING: CPT

## 2023-08-31 RX ORDER — BICALUTAMIDE 50 MG/1
50 TABLET, FILM COATED ORAL DAILY
Qty: 16 TABLET | Refills: 0 | Status: SHIPPED | OUTPATIENT
Start: 2023-08-31 | End: 2023-09-16

## 2023-08-31 RX ADMIN — PANTOPRAZOLE SODIUM 40 MG: 40 TABLET, DELAYED RELEASE ORAL at 06:36

## 2023-08-31 RX ADMIN — BUSPIRONE HYDROCHLORIDE 5 MG: 10 TABLET ORAL at 21:07

## 2023-08-31 RX ADMIN — ACETAMINOPHEN 500 MG: 325 TABLET ORAL at 06:36

## 2023-08-31 RX ADMIN — ACETAMINOPHEN 500 MG: 325 TABLET ORAL at 21:06

## 2023-08-31 RX ADMIN — DOCUSATE SODIUM 100 MG: 100 CAPSULE, LIQUID FILLED ORAL at 21:07

## 2023-08-31 RX ADMIN — ATENOLOL 25 MG: 25 TABLET ORAL at 09:39

## 2023-08-31 RX ADMIN — ENOXAPARIN SODIUM 40 MG: 100 INJECTION SUBCUTANEOUS at 09:40

## 2023-08-31 RX ADMIN — BACLOFEN 5 MG: 10 TABLET ORAL at 21:07

## 2023-08-31 RX ADMIN — Medication 100 MG: at 09:39

## 2023-08-31 RX ADMIN — PREDNISONE 10 MG: 10 TABLET ORAL at 09:39

## 2023-08-31 RX ADMIN — FLUTICASONE PROPIONATE 2 SPRAY: 50 SPRAY, METERED NASAL at 09:37

## 2023-08-31 RX ADMIN — DOCUSATE SODIUM 100 MG: 100 CAPSULE, LIQUID FILLED ORAL at 09:39

## 2023-08-31 RX ADMIN — ATORVASTATIN CALCIUM 10 MG: 10 TABLET, FILM COATED ORAL at 21:07

## 2023-08-31 RX ADMIN — BUSPIRONE HYDROCHLORIDE 5 MG: 10 TABLET ORAL at 09:38

## 2023-08-31 RX ADMIN — ACETAMINOPHEN 500 MG: 325 TABLET ORAL at 13:21

## 2023-08-31 RX ADMIN — BACLOFEN 5 MG: 10 TABLET ORAL at 09:37

## 2023-08-31 RX ADMIN — TAMSULOSIN HYDROCHLORIDE 0.4 MG: 0.4 CAPSULE ORAL at 17:29

## 2023-08-31 RX ADMIN — TRAZODONE HYDROCHLORIDE 150 MG: 50 TABLET ORAL at 21:07

## 2023-08-31 RX ADMIN — Medication 2000 UNITS: at 17:29

## 2023-08-31 RX ADMIN — AMLODIPINE BESYLATE 5 MG: 5 TABLET ORAL at 09:37

## 2023-08-31 RX ADMIN — BICALUTAMIDE 50 MG: 50 TABLET, FILM COATED ORAL at 09:40

## 2023-08-31 RX ADMIN — Medication 5 MG: at 21:07

## 2023-08-31 RX ADMIN — LOSARTAN POTASSIUM 12.5 MG: 25 TABLET, FILM COATED ORAL at 09:39

## 2023-08-31 ASSESSMENT — PAIN DESCRIPTION - LOCATION: LOCATION: BACK;FOOT;LEG

## 2023-08-31 ASSESSMENT — PAIN DESCRIPTION - DESCRIPTORS: DESCRIPTORS: TINGLING;ACHING

## 2023-08-31 ASSESSMENT — PAIN DESCRIPTION - ORIENTATION: ORIENTATION: RIGHT;LEFT;MID

## 2023-08-31 ASSESSMENT — ENCOUNTER SYMPTOMS: APNEA: 0

## 2023-08-31 ASSESSMENT — PAIN SCALES - GENERAL: PAINLEVEL_OUTOF10: 3

## 2023-08-31 NOTE — PROGRESS NOTES
Financial Resource Strain: Low Risk     Difficulty of Paying Living Expenses: Not hard at all   Food Insecurity: No Food Insecurity    Worried About Running Out of Food in the Last Year: Never true    Ran Out of Food in the Last Year: Never true   Transportation Needs: Unknown    Lack of Transportation (Non-Medical): No   Physical Activity: Inactive    Days of Exercise per Week: 0 days    Minutes of Exercise per Session: 0 min   Housing Stability: Unknown    Unstable Housing in the Last Year: No     No family history on file.   Current Facility-Administered Medications   Medication Dose Route Frequency Provider Last Rate Last Admin    bicalutamide (CASODEX) chemo tablet 50 mg   50 mg Oral Daily Tamara Rebolledo MD   50 mg at 08/31/23 0940    lidocaine 4 % external patch 3 patch  3 patch TransDERmal Daily PRN Ivan Jimenez MD        losartan (COZAAR) tablet 12.5 mg  12.5 mg Oral Daily Cedric Mckinney MD   12.5 mg at 08/31/23 0939    docusate sodium (COLACE) capsule 100 mg  100 mg Oral BID Flor Hoffmann, DO   100 mg at 08/31/23 5754    oxyCODONE capsule 10 mg  10 mg Oral Q4H PRN Malu Atkins, APRN - CNP   10 mg at 08/30/23 1539    predniSONE (DELTASONE) tablet 10 mg  10 mg Oral Daily Keron Delcid, APRN - CNP   10 mg at 08/31/23 0606    Followed by    Tatianna Khalil ON 9/3/2023] predniSONE (DELTASONE) tablet 5 mg  5 mg Oral Daily Keron Delcid, APRN - CNP        enoxaparin (LOVENOX) injection 40 mg  40 mg SubCUTAneous Daily Flor Alonzoin, DO   40 mg at 08/31/23 0940    melatonin disintegrating tablet 5 mg  5 mg Oral Nightly Keron Delcid, APRN - CNP   5 mg at 08/30/23 2119    acetaminophen (TYLENOL) tablet 500 mg  500 mg Oral 3 times per day Flor Alonzoin, DO   500 mg at 08/31/23 0636    sodium chloride flush 0.9 % injection 5-40 mL  5-40 mL IntraVENous 2 times per day Deja Hernandez MD   10 mL at 08/26/23 2151    sodium chloride flush 0.9 % injection 5-40 mL  5-40 mL IntraVENous PRN Luly Moore MD   10 mL at 08/24/23 0645    0.9 % sodium chloride infusion   IntraVENous PRN Luly Moore MD        ondansetron (ZOFRAN-ODT) disintegrating tablet 4 mg  4 mg Oral Q8H PRN Luly Moore MD   4 mg at 08/23/23 1338    Or    ondansetron (ZOFRAN) injection 4 mg  4 mg IntraVENous Q6H PRN Luly Moore MD        polyethylene glycol (GLYCOLAX) packet 17 g  17 g Oral Daily PRN Luly Moore MD   17 g at 08/29/23 1229    amLODIPine (NORVASC) tablet 5 mg  5 mg Oral Daily Luly Moore MD   5 mg at 08/31/23 0937    atenolol (TENORMIN) tablet 25 mg  25 mg Oral Daily Luly Moore MD   25 mg at 08/31/23 0939    fluticasone (FLONASE) 50 MCG/ACT nasal spray 2 spray  2 spray Nasal Daily Luly Moore MD   2 spray at 08/30/23 0934    atorvastatin (LIPITOR) tablet 10 mg  10 mg Oral Nightly Luly Moore MD   10 mg at 08/30/23 2119    traZODone (DESYREL) tablet 150 mg  150 mg Oral Nightly Luly Moore MD   150 mg at 08/30/23 2119    pantoprazole (PROTONIX) tablet 40 mg  40 mg Oral QAM AC Luly Moore MD   40 mg at 08/31/23 0636    ipratropium 0.5 mg-albuterol 2.5 mg (DUONEB) nebulizer solution 1 Dose  1 Dose Inhalation Q4H PRN Luly Moore MD        calcium carbonate (TUMS) chewable tablet 750 mg  750 mg Oral TID PRN Luly Moore MD   750 mg at 08/25/23 1304    busPIRone (BUSPAR) tablet 5 mg  5 mg Oral BID Luly Moore MD   5 mg at 08/31/23 0938    baclofen (LIORESAL) tablet 5 mg  5 mg Oral BID Luly Moore MD   5 mg at 08/31/23 4276    acetaminophen (TYLENOL) tablet 650 mg  650 mg Oral Q4H PRN Flor Hoffmann DO   650 mg at 08/23/23 0845    bisacodyl (DULCOLAX) suppository 10 mg  10 mg Rectal Daily PRN Flor Scullin, DO   10 mg at 08/29/23 1641    sodium phosphate (FLEET) rectal enema 1 enema  1 enema Rectal Daily PRN Flor Scullin, DO        Vitamin D (CHOLECALCIFEROL) tablet 2,000 Units  2,000 Units Oral Dinner Flor Scullin, DO   2,000 Units at 08/30/23 1715    cyanocobalamin injection normal...

## 2023-08-31 NOTE — PROGRESS NOTES
Physical Therapy Rehab Treatment Note  [facility]  [facility]       NAME: Marcus Moran  : 1935 (81 y.o.)  MRN: [id number]  CODE STATUS: Full Code    Date of Service: 2023       Restrictions:  Restrictions/Precautions: Fall Risk  Position Activity Restriction  Other position/activity restrictions: clinical reasoning- limit spine flexion       SUBJECTIVE: I am getting better at moving around in this w/c chair in my room. \"       Pain  Patient denies pain       OBJECTIVE:     Bed mobility  Rolling to Left: Supervision  Rolling to Right: Supervision  Supine to Sit: Supervision  Sit to Supine: Supervision  Bed Mobility Comments: bed mobility completed on mat    Transfers  Sit to Stand: Stand by assistance  Stand to Sit: Stand by assistance  Bed to Chair: Stand by assistance  Comment: Patient completes multiple SPT w/c <-> mat without AD. Requires intermittent touching assist. Instability > transferring to left. Ambulation  Surface: Level tile  Device: Rolling Walker  Assistance: Minimal assistance  Quality of Gait: right foot drag, ataxic gait, decrease pelvic stability, inconsistent step length  Distance: 25 feet      Wheelchair Activities  Left Leg Rest Level of Assistance: Modified independent;Maximum assistance (patient requires supervision to swing foot rest into place. Max assistance to release and darren/doff)  Right Leg Rest Level of Assistance: Stand by assistance;Maximum assistance  Left Brakes Level of Assistance: Supervision  Right Brakes Level of Assistance: Supervision  Propulsion: Yes  Propulsion 1  Propulsion: Manual  Level: Level Tile  Method: LUE;RUE  Level of Assistance: Modified independent      PT Exercises  A/AROM Exercises: supine: bridges 3\"x10, hooklying hip abd YTB x15, adduction with ball 5\"x15, SAQ 5\"x15, hooklying # x15 , SLR x10 B  Dynamic Standing Balance Exercises: Fwd/retro gait drills in // bars with B UE support.   Mirror for visual cues

## 2023-08-31 NOTE — CARE COORDINATION
LSW met with pt and Lena Cooley (son) and discussed pt's current level of function and goals. Family training is scheduled for Tuesday 9/5 at 8:30AM. LSW explained that pt is goaled to ambulate with min assist and has experienced some knee buckling. Due to this, pt would be at a wheelchair level in the home, but would need SBA. The stairs were also discussed and the recommendation for a ramp, pt stated that he did the stairs already by himself. LSW explained that pt did not complete the stairs himself and needed min/mod assist due to his knee buckling. Pt stated that he forgot and thought that he did complete them by himself. Lena Cooley then noted concern with reaching items in the pantry, to which it was discussed for items to be moved down to a wheelchair level. Pt's toileting was also discussed. LSW explained that pt has a burnham currently and that nursing is working on training pt how to manage it. Pt believes he should be able to empty it himself. However, bowel movements were discussed and Lena Cooley voiced concern since pt is still at a SBA level with transfers. LSW agreed and stated that it is recommended for pt to have someone with him all of the time due to this. Pt became frustrated and shut down during the conversation. Pt did not want to discuss alternate planning such as AL or SNF, pt wants to discharge home. Pt has the list of private duty [facility] agencies that LSW provided, but noted that he just had to spend down resources for his wife to qualify for Medicaid and is concerned with finances. Pt stated that he is going home regardless. LSW will follow up tomorrow after pt and Lena Cooley discuss this separately.  Electronically signed by AMEE Swann LSW on 8/31/2023 at 5:36 PM

## 2023-08-31 NOTE — PROGRESS NOTES
Physical Therapy Rehab Treatment Note  Facility/Department: [facility]  [facility]       NAME: Savanna Dickerson  : 1935 (14 y.o.)  MRN: [id number]  CODE STATUS: Full Code    Date of Service: 2023       Restrictions:  Restrictions/Precautions: Fall Risk  Position Activity Restriction  Other position/activity restrictions: clinical reasoning- limit spine flexion       SUBJECTIVE: \"My legs feel a little numb. \"       Pain  Patient denies pain pre/post session     OBJECTIVE:     Transfers  Sit to Stand: Stand by assistance  Stand to Sit: Stand by assistance  Bed to Chair: Stand by assistance    Ambulation  Surface: Level tile  Device: Rolling Walker  Assistance: Minimal assistance  Quality of Gait: inconsistent right foot clearance,  ataxic gait, decrease pelvic stability, inconsistent step length  Distance: 30 feet  Comments: Requires min A to stabilize     PT Exercises  Functional Mobility Circuit Training: STS x3  Static Standing Balance Exercises: at ww - Alt UE x5 ea  Dynamic Standing Balance Exercises: single steps forward at ww x10 B       ASSESSMENT/PROGRESS TOWARDS GOALS: Continued to provide min A to stabilize during gait training. Patient demonstrates decreased ability to control step length and foot placement during turns. Intermittent cues given for hand placement during transfers. Goals:  Short Term Goals  Time Frame for Short Term Goals: -  Short Term Goal 1: -  Short Term Goal 2: -  Short Term Goal 3: -  Short Term Goal 4: -  Long Term Goals  Long Term Goal 1: indep and efficient bed mobility  Long Term Goal 2: indep w/c to bed transfer; SBA sit to stand and car transfers  Long Term Goal 3: SBA gait 50 feet with appropriate device  Long Term Goal 4: min assist with 2 stairs for safe home entry  Long Term Goal 5: pt able to propel w/c indep 50 feet and complete w/c prep indep    PLAN OF CARE/Safety: all safety precautions in place.           Therapy Time:   Individual   Time In 1330 Time Out 1400   Minutes 30   Timed Code Treatment Minutes: 30 Minutes  Minutes:30    Gait training:15  Neuro re education:15        [facility], PTA, 08/31/23 at 3:02 PM

## 2023-08-31 NOTE — PROGRESS NOTES
[facility]  Facility/Department: [facility]  Speech Language Pathology   Treatment Note          [facility] Service  1935  R248/R248-01  [x]   confirmed    Date: 2023      Restrictions/Precautions: Fall Risk  Position Activity Restriction  Other position/activity restrictions: clinical reasoning- limit spine flexion          ADULT ORAL NUTRITION SUPPLEMENT; HS Snack; Snack; cheese plate with fruit  ADULT DIET; Easy to Chew    SpO2: 96 % (23 0756)  O2 Flow Rate (L/min): 0 L/min (23 0720)  No active isolations    Rehab Diagnosis: Impaired mobility and ADL's due to 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 strategies as indicated without overt s/s of aspiration. Patient tolerated current diet of Easy to chew and thin liquids without overt s/s of aspiration. Patient denied sensing pharyngeal residue. Patient required reminder X1 to consume small bites, one at time. ST discussed possible upgrade to Regular solids. Patient reported he prefers to consume softer solids at this time. Continue with current diet,         Treatment/Activity Tolerance:  Patient tolerated treatment well    Plan:  Continue per POC    Pain Assessment:  Patient does not c/o pain. Pain Re-assessment:  Patient does not c/o pain. Patient/Caregiver Education:  Patient educated on session and progression towards goals.     Safety Devices:  Call light within reach and Chair alarm in place      Dysphagia Outcome Severity Scale    SWALLOWING  Ratin        Therapy Time  SLP Individual Minutes  Time In: 9996  Time Out:

## 2023-08-31 NOTE — PROGRESS NOTES
OCCUPATIONAL THERAPY  INPATIENT REHAB TREATMENT NOTE  [facility]      NAME: Johny Hills  : 1935 (81 y.o.)  MRN: [id number]  CODE STATUS: Full Code  Room: R248/R248-01    Date of Service: 2023    Referring Physician: Dr. 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 \"    Pain at start of treatment: No    Pain at end of treatment: No      COGNITION:  Orientation  Overall Orientation Status: Within Functional Limits  Orientation Level: Oriented to person;Oriented to place;Oriented to time;Oriented to situation  Cognition  Overall Cognitive Status: Exceptions      OBJECTIVE:     Pt completed BUE bike on Min resistance with SBA, vc's to take breaks needed upon observation of pt fatigue, d/t pt not voicing when he needed a break. Pt utilized UE ergometer x 13 min with x 4-5 recovery periods  Pt c/o BUE fatigue, and required intermittent recovery periods in order to complete task. Provided task education on this activity improving strength but also act tolerance. Pt completed this task to improve functional activity tolerance and strength in order to improve functional transfers and mobility with safety during functional task performance skills. Provided pt with standing golf nereida peg board task with SBA. Provided pt task instruction with demo. Pt was able to follow through on instruction switching hands back and forth to don/doff pegs. Pt stood about 6 min to don/doff pegs. Pt demo F- standing balance with unilateral support except hand switch out. Pt completed 1 row with the R and 1 with the L.   Pt completed task to increase functional standing tolerance

## 2023-08-31 NOTE — FLOWSHEET NOTE
Patient assessment complete. C/o veronica leg pain with numbness and tingling in both legs and feet. Scheduled Tylenol given  with applesauce.

## 2023-08-31 NOTE — PROGRESS NOTES
Progress Note    Date:8/31/2023       [facility]  Patient Name:Caden Palomares     Date of Birth:[birth year]     Age:88 y.o. 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 .  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 Paul Cruz           Caregiver stress 8/23/2023 Yes    Neoplasm related pain 8/23/2023 Yes    Nausea 8/23/2023 Yes       Plan:        Plan:      *Impaired mobility and activities of daily living due to CSI-diffuse metastatic disease throughout the spine compression fracture of body of thoracic vertebrae  -Dr. 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 CHEMISTRIES:No results for input(s): NA, K, CL, CO2, BUN, CREATININE, GLUCOSE, CA, PHOS, MG in the last 72 hours. PT/INR:No results for input(s): PROTIME, INR in the last 72 hours. APTT:No results for input(s): APTT in the last 72 hours. LIVER PROFILE:No results for input(s): AST, ALT, BILIDIR, BILITOT, ALKPHOS in the last 72 hours. Imaging Last 24 Hours:  No results found.     Electronically signed by JORDAN Reid CNP on 8/31/23 at 10:20 AM EDT

## 2023-08-31 NOTE — PROGRESS NOTES
OCCUPATIONAL THERAPY  INPATIENT REHAB TREATMENT NOTE  [facility]      NAME: Matt Foss  : 1935 (81 y.o.)  MRN: [id number]  CODE STATUS: Full Code  Room: R248/R248-01    Date of Service: 2023    Referring Physician: Dr. 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    Restrictions  Restrictions/Precautions  Restrictions/Precautions: Fall Risk                 Patient's date of birth confirmed: Yes    SAFETY:  Safety Devices  Safety Devices in place: Yes  Type of devices: All fall risk precautions in place    SUBJECTIVE:       Pain at start of treatment: No    Pain at end of treatment: No      COGNITION:  Orientation  Overall Orientation Status: Within Functional Limits  Orientation Level: Oriented to person;Oriented to place;Oriented to time;Oriented to situation  Cognition  Overall Cognitive Status: Exceptions      OBJECTIVE:     Pt completed a variety of cognitive sequencing of picture safety identification tasks via picture cards, and problem solving skills in order to improve pt safety and sequencing during ADL/IADL's . Pt had to place 4 pictures in order 2x's for sequencing ADL/IADL skill tasks. Pt demo severe difficulty with bed making picture d/t it not demo'ing how pt would make his bed at home. Pt was told to think outside the box of making a bed, and place the steps in sequential order and continued to have difficulty. Pt required Mod A verbally to place the pictures in order. 2nd picture, was of making a sandwich. Pt placed these 4 pictures of how to make a sandwich in sequential order 100% without issues. During problem solving pt was provided several questions regarding problem solving on different situations.   Situations pt got correct on all questions were a plugged in crock pot getting ready to be washed,  and a gas stove turned on getting

## 2023-09-01 LAB
BASOPHILS # BLD: 0 K/UL (ref 0–0.2)
BASOPHILS NFR BLD: 0.3 %
EOSINOPHIL # BLD: 0 K/UL (ref 0–0.7)
EOSINOPHIL NFR BLD: 0.4 %
ERYTHROCYTE [DISTWIDTH] IN BLOOD BY AUTOMATED COUNT: 15.2 % (ref 11.5–14.5)
HCT VFR BLD AUTO: 34 % (ref 42–52)
HGB BLD-MCNC: 11.7 G/DL (ref 14–18)
LYMPHOCYTES # BLD: 0.7 K/UL (ref 1–4.8)
LYMPHOCYTES NFR BLD: 9.8 %
MCH RBC QN AUTO: 32.3 PG (ref 27–31.3)
MCHC RBC AUTO-ENTMCNC: 34.3 % (ref 33–37)
MCV RBC AUTO: 94.1 FL (ref 79–92.2)
MONOCYTES # BLD: 0.7 K/UL (ref 0.2–0.8)
MONOCYTES NFR BLD: 8.9 %
NEUTROPHILS # BLD: 6.2 K/UL (ref 1.4–6.5)
NEUTS SEG NFR BLD: 80.6 %
PLATELET # BLD AUTO: 148 K/UL (ref 130–400)
RBC # BLD AUTO: 3.61 M/UL (ref 4.7–6.1)
WBC # BLD AUTO: 7.7 K/UL (ref 4.8–10.8)

## 2023-09-01 PROCEDURE — 97535 SELF CARE MNGMENT TRAINING: CPT

## 2023-09-01 PROCEDURE — 97112 NEUROMUSCULAR REEDUCATION: CPT

## 2023-09-01 PROCEDURE — 6360000002 HC RX W HCPCS: Performed by: PHYSICAL MEDICINE & REHABILITATION

## 2023-09-01 PROCEDURE — 36415 COLL VENOUS BLD VENIPUNCTURE: CPT

## 2023-09-01 PROCEDURE — 1180000000 HC REHAB R&B

## 2023-09-01 PROCEDURE — 97542 WHEELCHAIR MNGMENT TRAINING: CPT

## 2023-09-01 PROCEDURE — 6370000000 HC RX 637 (ALT 250 FOR IP): Performed by: PHYSICAL MEDICINE & REHABILITATION

## 2023-09-01 PROCEDURE — 85025 COMPLETE CBC W/AUTO DIFF WBC: CPT

## 2023-09-01 PROCEDURE — 6370000000 HC RX 637 (ALT 250 FOR IP): Performed by: INTERNAL MEDICINE

## 2023-09-01 PROCEDURE — 97530 THERAPEUTIC ACTIVITIES: CPT

## 2023-09-01 PROCEDURE — 6370000000 HC RX 637 (ALT 250 FOR IP): Performed by: UROLOGY

## 2023-09-01 PROCEDURE — 6370000000 HC RX 637 (ALT 250 FOR IP): Performed by: NURSE PRACTITIONER

## 2023-09-01 PROCEDURE — 97116 GAIT TRAINING THERAPY: CPT

## 2023-09-01 PROCEDURE — 99231 SBSQ HOSP IP/OBS SF/LOW 25: CPT | Performed by: PHYSICIAN ASSISTANT

## 2023-09-01 RX ADMIN — BACLOFEN 5 MG: 10 TABLET ORAL at 19:53

## 2023-09-01 RX ADMIN — ACETAMINOPHEN 500 MG: 325 TABLET ORAL at 20:17

## 2023-09-01 RX ADMIN — TRAZODONE HYDROCHLORIDE 150 MG: 50 TABLET ORAL at 19:50

## 2023-09-01 RX ADMIN — BUSPIRONE HYDROCHLORIDE 5 MG: 10 TABLET ORAL at 19:52

## 2023-09-01 RX ADMIN — Medication 2000 UNITS: at 16:43

## 2023-09-01 RX ADMIN — Medication 100 MG: at 09:59

## 2023-09-01 RX ADMIN — ATORVASTATIN CALCIUM 10 MG: 10 TABLET, FILM COATED ORAL at 19:47

## 2023-09-01 RX ADMIN — Medication 5 MG: at 19:54

## 2023-09-01 RX ADMIN — TAMSULOSIN HYDROCHLORIDE 0.4 MG: 0.4 CAPSULE ORAL at 16:43

## 2023-09-01 RX ADMIN — PANTOPRAZOLE SODIUM 40 MG: 40 TABLET, DELAYED RELEASE ORAL at 06:01

## 2023-09-01 RX ADMIN — ATENOLOL 25 MG: 25 TABLET ORAL at 09:59

## 2023-09-01 RX ADMIN — BACLOFEN 5 MG: 10 TABLET ORAL at 09:59

## 2023-09-01 RX ADMIN — AMLODIPINE BESYLATE 5 MG: 5 TABLET ORAL at 10:00

## 2023-09-01 RX ADMIN — DOCUSATE SODIUM 100 MG: 100 CAPSULE, LIQUID FILLED ORAL at 10:00

## 2023-09-01 RX ADMIN — ACETAMINOPHEN 500 MG: 325 TABLET ORAL at 06:00

## 2023-09-01 RX ADMIN — LOSARTAN POTASSIUM 12.5 MG: 25 TABLET, FILM COATED ORAL at 09:59

## 2023-09-01 RX ADMIN — FLUTICASONE PROPIONATE 2 SPRAY: 50 SPRAY, METERED NASAL at 10:00

## 2023-09-01 RX ADMIN — DOCUSATE SODIUM 100 MG: 100 CAPSULE, LIQUID FILLED ORAL at 19:54

## 2023-09-01 RX ADMIN — BICALUTAMIDE 50 MG: 50 TABLET, FILM COATED ORAL at 10:00

## 2023-09-01 RX ADMIN — BUSPIRONE HYDROCHLORIDE 5 MG: 10 TABLET ORAL at 09:59

## 2023-09-01 RX ADMIN — ACETAMINOPHEN 500 MG: 325 TABLET ORAL at 16:43

## 2023-09-01 RX ADMIN — ENOXAPARIN SODIUM 40 MG: 100 INJECTION SUBCUTANEOUS at 10:00

## 2023-09-01 RX ADMIN — PREDNISONE 10 MG: 10 TABLET ORAL at 09:59

## 2023-09-01 ASSESSMENT — ENCOUNTER SYMPTOMS: APNEA: 0

## 2023-09-01 ASSESSMENT — PAIN DESCRIPTION - DESCRIPTORS
DESCRIPTORS: ACHING;DISCOMFORT
DESCRIPTORS: NAGGING

## 2023-09-01 ASSESSMENT — PAIN DESCRIPTION - LOCATION
LOCATION: BACK
LOCATION: HIP

## 2023-09-01 ASSESSMENT — PAIN SCALES - GENERAL
PAINLEVEL_OUTOF10: 0
PAINLEVEL_OUTOF10: 4
PAINLEVEL_OUTOF10: 3

## 2023-09-01 NOTE — PROGRESS NOTES
pt's risk of falling and easier management. Pt with improved ability to manage stairs. Assessment  Assessment: Pt treatment focus in PM on trunk stability and balance. Pt needs to stabilize self in standing, for safety, with use of LE's against something and is very challenged without some sort of support. Activity Tolerance: Patient tolerated treatment well  Discharge Recommendations: Continue to assess pending progress    Goals:  Short Term Goals  Time Frame for Short Term Goals: -  Short Term Goal 1: -  Short Term Goal 2: -  Short Term Goal 3: -  Short Term Goal 4: -  Long Term Goals  Long Term Goal 1: indep and efficient bed mobility  Long Term Goal 2: indep w/c to bed transfer; SBA sit to stand and car transfers  Long Term Goal 3: SBA gait 50 feet with appropriate device  Long Term Goal 4: min assist with 2 stairs for safe home entry  Long Term Goal 5: pt able to propel w/c indep 50 feet and complete w/c prep indep    PLAN OF CARE/Safety:   Safety Devices  Type of Devices: All fall risk precautions in place; Left in chair;Chair alarm in place      Therapy Time:   Individual   Time In 1330   Time Out 1400   Minutes 30      Minutes:30  Transfer/Bed mobility trainin  Gait trainin  Neuro re education: 25  Therapeutic ex: 0      Andreina Funk PTA, 23 at 3:07 PM

## 2023-09-01 NOTE — PROGRESS NOTES
OCCUPATIONAL THERAPY  INPATIENT REHAB TREATMENT NOTE  [facility]      NAME: Jcarlos Craig  : 1935 (81 y.o.)  MRN: [id number]  CODE STATUS: Full Code  Room: R248/R248-01    Date of Service: 2023    Referring Physician: Dr. 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    Restrictions  Restrictions/Precautions  Restrictions/Precautions: Fall Risk            Position Activity Restriction  Other position/activity restrictions: clinical reasoning- limit spine flexion    Patient's date of birth confirmed: Yes    SAFETY:  Safety Devices  Safety Devices in place: Yes  Type of devices: All fall risk precautions in place    SUBJECTIVE:       Pain at start of treatment: No    Pain at end of treatment: No      COGNITION:  Orientation  Overall Orientation Status: Within Functional Limits  Orientation Level: Oriented to person;Oriented to place;Oriented to time;Oriented to situation  Cognition  Overall Cognitive Status: Exceptions      Pt's current cognitive status is:  Comprehension: Mod I  Expression: Independent  Social Interaction: Independent  Problem Solving: Min A  Memory: Supervision    OBJECTIVE:         Grooming/Oral Hygiene  Assistance Level: Modified independent  Skilled Clinical Factors: brushing teeth and combing hair seated w/c level at sink  Upper Extremity Bathing  Assistance Level: Modified independent  Lower Extremity Bathing  Assistance Level: Supervision  Upper Extremity Dressing  Assistance Level: Modified independent  Lower Extremity Dressing  Assistance Level: Stand by assist  Toileting  Skilled Clinical Factors: none  Toilet Transfers  Skilled Clinical Factors: none  Tub/Shower Transfers  Type: Shower  Transfer From: Wheelchair  Transfer To:  Shower chair with back  Assistance Level: Stand by assist     Functional Mobility  Sit to Stand  Assistance Level: Stand by assist  Stand to Sit  Assistance Level: Stand by assist        Education:   Educated pt on which leg hole to thread cath bag down through; educated it \"always goes down the leg hole that the line is attached to\" ( L leg then L hole of pants/shorts)       ASSESSMENT:  Activity Tolerance: Patient tolerated treatment well      PLAN OF CARE:  Strengthening, Balance training, Functional mobility training, Endurance training, Patient/Caregiver education & training, Equipment evaluation, education, & procurement, Self-Care / ADL, Home management training, Safety education & training, Neuromuscular re-education  Continue OT POC until discharge    Patient goals : Go home and do like I have been doing  Time Frame for Long Term Goals : Within 1.5-2 weeks, pt to demo progress in the following areas listed below to achieve specific LTGs stated in the intial eval  Long Term Goal 1: P tto demo improved ADL/IADL status  Long Term Goal 2: Pt to demo improved standing balance and tolerance  Long Term Goal 3: Pt to demo improved ability to use AD/AE/DME as needed to improve function and maintain pain level  Long Term Goal 4: Pt to demo improved UB strength for self-care        Therapy Time:   Individual Group Co-Treat   Time In 1100       Time Out 1200         Minutes 60               ADL/IADL trainin minutes     Electronically signed by:     TAMRA Pryor,   2023, 11:39 AM

## 2023-09-01 NOTE — PROGRESS NOTES
Physical Therapy Rehab Treatment Note  Facility/Department: [facility]  Room: Q898/U673-83       NAME: Yas Arriaga  : 1935 (78 y.o.)  MRN: [id number]  CODE STATUS: Full Code    Date of Service: 2023       Restrictions:  Restrictions/Precautions: Fall Risk  Position Activity Restriction  Other position/activity restrictions: clinical reasoning- limit spine flexion       SUBJECTIVE:   Subjective: I'm ok with using a w/c at home. Pain  Pain: 0/10 pre and post treatment. OBJECTIVE:             Bed mobility  Rolling to Left: Supervision;Modified independent  Rolling to Right: Supervision;Modified independent  Supine to Sit: Supervision;Modified independent  Sit to Supine: Supervision;Modified independent  Scooting: Supervision  Bed Mobility Comments: HOB Flat and without rails. Transfers  Sit to Stand: Stand by assistance;Supervision  Stand to Sit: Stand by assistance;Supervision  Bed to Chair: Stand by assistance;Supervision  Stand Pivot Transfers: Stand by [facility] Transfer: Stand by assistance  Comment: Multiple transfers bed <> W/C with improved technique and without touching assist.    Ambulation  Surface: Level tile  Device: Rolling Walker  Assistance: Minimal assistance (Pt inconsistent with assist from tactile to steadying assist.)  Quality of Gait: inconsistent WERNER with occasional scissoring and also with inconsistent step length. Pt with decreased balance when letting go of walker while still taking steps. Pt educated to stop, increase WERNER and then let go if needing to point or rub his nose. Gait Deviations: Slow Alesha;Decreased step length;Decreased step height  Distance: 40', 48' with turns    Stairs/Curb  Stairs?: Yes  Stairs  # Steps : 4  Stairs Height: 6\"  Rails: Bilateral  Assistance: Minimal assistance (tactile assist only)  Comment: non-reciprocal ascending and descending: no buckling. Lead with RLE.     Wheelchair Activities  Wheelchair Parts Management:

## 2023-09-01 NOTE — PROGRESS NOTES
Assessment completed. A&O x4. Denies pain at this time. Burnham patent, draining clear christiano urine. Pt anxious about having burnham catheter after being dc'd. This nurse told pt about a burnham leg bag during the day. Pt was interested in using a leg bag today. This nurse switched standard burnham bag to leg bag. Pt appreciative. In chair with alarm activated. Call light in reach. Electronically signed by Jennifer Gimenez LPN on 2/5/6912 at 3:59 PM      Dr Kaleb Owusu office called regarding radiation. Per his office, last radiation was 8/18/23. Radiation is now as needed. Will be up to oncology if wants to continue PO chemo. At this time pt is currently on Casodex 50 mg PO daily per Urology.  Electronically signed by Jennifer Gimenez LPN on 6/9/4904 at 3:96 PM

## 2023-09-01 NOTE — PROGRESS NOTES
Patient assessment complete. C/o veronica leg pain with numbness and tingling in both legs and feet. Scheduled Tylenol given  with applesauce. Pt currently denies any other needs at this time. Call light in reach and bed in the lowest position.

## 2023-09-01 NOTE — PROGRESS NOTES
Subjective:      Patient ID: Marcus Moran is a 81 y.o. male    HPI 81year old male who's burnham catheter is draining clear yellow urine. Voicing no other urological complaints.     Past Medical History:   Diagnosis Date    Anxiety     COPD (chronic obstructive pulmonary disease) (HCC)     Hyperlipidemia     Hypertension     Kidney stone      Past Surgical History:   Procedure Laterality Date    PROSTATE BIOPSY  08/21/2023    U/S guided prostate biopsy completed by Dr. Pratibha Traylor  8/21/2023    Hoda Angulo 8/21/2023 MLOZ ULTRASOUND     Social History     Socioeconomic History    Marital status:      Spouse name: None    Number of children: None    Years of education: None    Highest education level: None   Tobacco Use    Smoking status: Every Day     Packs/day: 1.00     Years: 60.00     Pack years: 60.00     Types: Cigarettes    Smokeless tobacco: Never   Substance and Sexual Activity    Alcohol use: Yes    Drug use: No   Social History Narrative    Lives With: Alone (Wife has dementia -moving to a nursing home [facility] in [address] from a memory care unit in [address] to finances)    Type of Home: House in [address]: One level    Home Access: Stairs to enter without rails - Number of Steps: 2    Bathroom Shower/Tub: Walk-in shower-Equipment: None    Home Equipment: None    Has the patient had two or more falls in the past year or any fall with injury in the past year?: No    Receives Help From: Family (son comes a couple times a week)    ADL Assistance: Independent    Homemaking Assistance: Independent (neighbor cuts grass), Homemaking Responsibilities: Yes    Ambulation Assistance: Independent, Transfer Assistance: Independent    Active : Yes    Occupation: Retired    IADL Comments: pt completes his own shopping, cuts some of his lawn         Social Determinants of Health     Financial Resource Strain: [address]

## 2023-09-01 NOTE — CARE COORDINATION
LSW spoke with son Sarah Daniel) regarding discharge plan. Pt is still adamant that he wants to return home alone even though he is not goaled to reach an independent level. Sarah Daniel is encouraging pt to hire assistance for mornings and evenings, but voiced that pt is overwhelmed with the list of [address] agencies LSW provided. LSW discussed option for [employer] to be referred so that they can assist with choosing a [address] [facility] [address] [facility] agency, Sarah Daniel declined and stated that he would have pt pick one. Sarah Daniel then voiced that he was upset that no doctors have called him to discuss prognosis or results from the biopsy. LSW explained that LSW could request for a physician to call him, Sarah Daniel declined. LSW also offered to have Dr. Prabha Santos call him as he is currently on the unit, Sarah Daniel declined. Pt's discharge date was also discussed and Sarah Daniel is requesting for an extension of 2-3 days. This request was made as he is working on making pt's home handicap accessible and so that a HHA can be hired. LSW will notify IDT to see if pt can be extended with a new discharge date of 9/8 or 9/9. Sarah Daniel confirmed he will be in on Tuesday 9/5 for family training at 8:30AM. Sarah Moniqueiden is aware that LSW will not be in that day but if there are any concerns, that RN Unit Manager can be reached.  Electronically signed by AMEE Hartley, FABY on 9/1/2023 at 6:57 PM

## 2023-09-01 NOTE — PROGRESS NOTES
OCCUPATIONAL THERAPY  INPATIENT REHAB TREATMENT NOTE  [facility]      NAME: Concha Tee  : 1935 (81 y.o.)  MRN: [id number]  CODE STATUS: Full Code  Room: R248/R248-01    Date of Service: 2023    Referring Physician: Dr. 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    Restrictions  Restrictions/Precautions  Restrictions/Precautions: Fall Risk            Position Activity Restriction  Other position/activity restrictions: clinical reasoning- limit spine flexion    Patient's date of birth confirmed: Yes    SAFETY:  Safety Devices  Safety Devices in place: Yes  Type of devices: All fall risk precautions in place    SUBJECTIVE:       Pain at start of treatment: No    Pain at end of treatment: No      COGNITION:  Orientation  Overall Orientation Status: Within Functional Limits  Orientation Level: Oriented to person;Oriented to place;Oriented to time;Oriented to situation  Cognition  Overall Cognitive Status: Exceptions          OBJECTIVE:       Grooming/Oral Hygiene  Assistance Level: Sup d/t pt being on blood thinners and pt pressing fairly hard on face to try to get a clean shave completed. Pt required vc's to \"not press so hard, and hold skin taught shaving from bottom to top. Skilled Clinical Factors: using electric shaver/padmini seated at sink level     Provided pt resistive clothes pin activity using 1 # wrist wts with Sup task. Pt was seated at table top height in w/c to complete task. Pt completed the task with instruction to don resistive clothes pins onto the metal dowels in order of resistance as stated on provided list of colors and resistance levels. Pt used switched hands back and forth reaching forward for item retrieval, and slightly overhead to begin donning/doffing clothes on on/off dowels.   Pt completed task to increase hand strength and BUE

## 2023-09-01 NOTE — CONSULTS
HISTORY: Reason for exam:->back pain; bone lesions on CT r/o bone mets What reading provider will be dictating this exam?->CRC FINDINGS: Multifocal activity is seen within the cervical, thoracic, and lumbar spine, multiple anterior and posterior ribs bilaterally, proximal right humerus, right and left roshni pelvis. Activity at the shoulders, wrists, hips, knees, feet, likely degenerative. Physiologic activity is seen within the kidneys and urinary bladder as well as Núñez catheter. Multifocal osseous metastatic disease. CTA CHEST ABDOMEN PELVIS W WO CONTRAST    Result Date: 8/15/2023  EXAMINATION: CTA DISSECTION CHEST ABDOMEN PELVIS WITHOUT AND WITH CONTRAST 8/15/2023 11:31 am: TECHNIQUE: CTA of the chest, abdomen and pelvis was performed before and after the administration of intravenous contrast as per dissection protocol. Multiplanar reformatted images are provided for review. MIP images are provided for review. Automated exposure control, iterative reconstruction, and/or weight based adjustment of the mA/kV was utilized to reduce the radiation dose to as low as reasonably achievable. COMPARISON: None. HISTORY: ORDERING SYSTEM PROVIDED HISTORY: chest pain, upper back pain; evaluate for dissection TECHNOLOGIST PROVIDED HISTORY: Reason for exam:->chest pain, upper back pain; evaluate for dissection Decision Support Exception - unselect if not a suspected or confirmed emergency medical condition->Emergency Medical Condition (MA) What reading provider will be dictating this exam?->CRC FINDINGS: CTA CHEST: Thoracic aorta: No evidence of thoracic aortic aneurysm or dissection. Ectasia of the ascending thoracic aorta at 3.7 cm. The descending thoracic aorta measures 2.8 cm. No acute abnormality of the aorta. Mediastinum: No evidence of mediastinal lymphadenopathy. Coronary artery calcification is identified with mild enlargement of the cardiac chambers. No pericardial effusion.   No bulky hilar or axillary free fluid or free air. No abnormal mass or fluid collections identified. Bones/Soft Tissues: Bony structures reveal degenerative changes seen within the spine and pelvis. There is a focal area of sclerosis involving the sacroiliac joint on the right to suggest possible prior healed sacroiliitis. There are faint areas of sclerosis identified throughout the bony pelvis. Sclerotic disease cannot be completely excluded. Faint area of sclerosis seen within the femoral neck on the right and left femoral head. Consider whole-body bone scan for further evaluation. CTA PELVIS: Aorta/Iliacs: The distal abdominal aorta is normal in caliber with atherosclerotic disease present. Atherosclerotic disease of the iliac vessels with no evidence of dissection or intimal flap. No significant stenosis. Other: Large stone in the bladder. There is heterogeneity identified within the prostate. No adenopathy identified within the pelvis. Bones/Soft Tissues: Bony structures reveal again sclerotic disease throughout the bony pelvis and lower lumbar spine concerning for metastatic process. Consider whole-body bone scan for further evaluation. No ventral abdominal wall mass. Mild laxity of the peritoneal lining with small right inguinal hernia containing fat only. There is no thoracic or abdominal aortic aneurysm, dissection, or intimal flap. Several non calcified nodule seen largest in the central aspect of the right lower lobe in which 3 month follow-up is recommended for stability. Sclerotic lesions seen diffusely throughout the bony skeleton to suggest a diffuse metastatic process. Whole body bone scan is recommended for further evaluation. Correlation to PSA lab values is recommended. ASSESSMENT AND PLAN  Prostate AdenoCA  with extensive bone mets . Recently started on Casodex 50mg daily on 8/30/23 and is planned to receive 1st dose of Lupron-Depot on 9/6  which  will be repeated ever 3 months.  --cont.  Casodex 50

## 2023-09-02 PROCEDURE — 6370000000 HC RX 637 (ALT 250 FOR IP): Performed by: PHYSICAL MEDICINE & REHABILITATION

## 2023-09-02 PROCEDURE — 1180000000 HC REHAB R&B

## 2023-09-02 PROCEDURE — 6370000000 HC RX 637 (ALT 250 FOR IP): Performed by: UROLOGY

## 2023-09-02 PROCEDURE — 6370000000 HC RX 637 (ALT 250 FOR IP)

## 2023-09-02 PROCEDURE — 6370000000 HC RX 637 (ALT 250 FOR IP): Performed by: NURSE PRACTITIONER

## 2023-09-02 PROCEDURE — 6360000002 HC RX W HCPCS: Performed by: PHYSICAL MEDICINE & REHABILITATION

## 2023-09-02 PROCEDURE — 97535 SELF CARE MNGMENT TRAINING: CPT

## 2023-09-02 PROCEDURE — 6370000000 HC RX 637 (ALT 250 FOR IP): Performed by: INTERNAL MEDICINE

## 2023-09-02 PROCEDURE — 97116 GAIT TRAINING THERAPY: CPT

## 2023-09-02 RX ADMIN — DOCUSATE SODIUM 100 MG: 100 CAPSULE, LIQUID FILLED ORAL at 21:13

## 2023-09-02 RX ADMIN — BICALUTAMIDE 50 MG: 50 TABLET, FILM COATED ORAL at 10:23

## 2023-09-02 RX ADMIN — DOCUSATE SODIUM 100 MG: 100 CAPSULE, LIQUID FILLED ORAL at 10:23

## 2023-09-02 RX ADMIN — ATORVASTATIN CALCIUM 10 MG: 10 TABLET, FILM COATED ORAL at 21:14

## 2023-09-02 RX ADMIN — TAMSULOSIN HYDROCHLORIDE 0.4 MG: 0.4 CAPSULE ORAL at 18:03

## 2023-09-02 RX ADMIN — LOSARTAN POTASSIUM 12.5 MG: 25 TABLET, FILM COATED ORAL at 10:25

## 2023-09-02 RX ADMIN — ENOXAPARIN SODIUM 40 MG: 100 INJECTION SUBCUTANEOUS at 10:23

## 2023-09-02 RX ADMIN — ACETAMINOPHEN 500 MG: 325 TABLET ORAL at 13:56

## 2023-09-02 RX ADMIN — Medication 100 MG: at 10:24

## 2023-09-02 RX ADMIN — Medication 5 MG: at 21:13

## 2023-09-02 RX ADMIN — OXYCODONE HYDROCHLORIDE 10 MG: 5 CAPSULE ORAL at 06:00

## 2023-09-02 RX ADMIN — ATENOLOL 25 MG: 25 TABLET ORAL at 10:24

## 2023-09-02 RX ADMIN — OXYCODONE HYDROCHLORIDE 10 MG: 5 CAPSULE ORAL at 21:14

## 2023-09-02 RX ADMIN — BACLOFEN 5 MG: 10 TABLET ORAL at 10:24

## 2023-09-02 RX ADMIN — Medication 2000 UNITS: at 18:03

## 2023-09-02 RX ADMIN — AMLODIPINE BESYLATE 5 MG: 5 TABLET ORAL at 10:25

## 2023-09-02 RX ADMIN — BUSPIRONE HYDROCHLORIDE 5 MG: 10 TABLET ORAL at 21:14

## 2023-09-02 RX ADMIN — PANTOPRAZOLE SODIUM 40 MG: 40 TABLET, DELAYED RELEASE ORAL at 05:17

## 2023-09-02 RX ADMIN — BACLOFEN 5 MG: 10 TABLET ORAL at 21:13

## 2023-09-02 RX ADMIN — TRAZODONE HYDROCHLORIDE 150 MG: 50 TABLET ORAL at 21:13

## 2023-09-02 RX ADMIN — BUSPIRONE HYDROCHLORIDE 5 MG: 10 TABLET ORAL at 10:24

## 2023-09-02 RX ADMIN — FLUTICASONE PROPIONATE 2 SPRAY: 50 SPRAY, METERED NASAL at 10:35

## 2023-09-02 RX ADMIN — ACETAMINOPHEN 500 MG: 325 TABLET ORAL at 21:22

## 2023-09-02 RX ADMIN — ACETAMINOPHEN 500 MG: 325 TABLET ORAL at 05:16

## 2023-09-02 RX ADMIN — PREDNISONE 10 MG: 10 TABLET ORAL at 10:24

## 2023-09-02 ASSESSMENT — PAIN DESCRIPTION - LOCATION
LOCATION: BACK

## 2023-09-02 ASSESSMENT — PAIN DESCRIPTION - DESCRIPTORS
DESCRIPTORS: ACHING
DESCRIPTORS: SORE

## 2023-09-02 ASSESSMENT — PAIN SCALES - GENERAL
PAINLEVEL_OUTOF10: 7
PAINLEVEL_OUTOF10: 7

## 2023-09-02 ASSESSMENT — PAIN DESCRIPTION - PAIN TYPE: TYPE: CHRONIC PAIN

## 2023-09-02 NOTE — PROGRESS NOTES
Physical Therapy Rehab Treatment Note  Facility/Department: [facility]  Room: S264/C386-70       NAME: Bonny Brock  : 1935 (81 y.o.)  MRN: [id number]  CODE STATUS: Full Code    Date of Service: 2023       Restrictions: fall risk          SUBJECTIVE:   Subjective: \"I kicked my son out of my room, I've never done that before\"    Pain  Pain: 0/10 pre and post treatment. OBJECTIVE:   Orientation  Overall Orientation Status: Within Functional Limits  Cognition  Overall Cognitive Status: Exceptions  Arousal/Alertness: Appropriate responses to stimuli  Following Commands: Follows one step commands with repetition  Attention Span: Appears intact  Insights: Decreased awareness of deficits  Initiation: Requires cues for some              Transfer Training  Transfer Training: Yes  Overall Level of Assistance: Stand-by assistance  Interventions: Verbal cues  Sit to Stand: Stand-by assistance  Stand to Sit: Stand-by assistance  Stand Pivot Transfers: Stand-by assistance  Bed to Chair: Stand-by assistance    Gait Training: Yes  Overall Level of Assistance: Contact-guard assistance;Minimum assistance  Distance (ft): 30 Feet x 2 with change in direction. Assistive Device: Walker, rolling  Interventions: Verbal cues; Tactile cues  Base of Support: Narrowed  Speed/Alesha: Pace decreased (< 100 feet/min)  Step Length: Left shortened  Gait Abnormalities: Decreased step clearance  Right Side Weight Bearing: As tolerated  Left Side Weight Bearing: As tolerated              Neuromuscular Education  Neuromuscular Comments: static standing without upper extremity support x 1 minute. 5 x sit to stands with good eccentric control. ASSESSMENT/PROGRESS TOWARDS GOALS: pt states he feels good about utilizing wc at home when he is discharged but liked the opportunity to be able to practice ambulation during PT session.         Goals:  Short Term Goals  Time Frame for Short Term Goals: -  Short Term Goal 1: -  Short Term Goal 2: -  Short Term Goal 3: -  Short Term Goal 4: -  Long Term Goals  Long Term Goal 1: indep and efficient bed mobility  Long Term Goal 2: indep w/c to bed transfer; SBA sit to stand and car transfers  Long Term Goal 3: SBA gait 50 feet with appropriate device  Long Term Goal 4: min assist with 2 stairs for safe home entry  Long Term Goal 5: pt able to propel w/c indep 50 feet and complete w/c prep indep    PLAN OF CARE/Safety: ongoing          Therapy Time:   Individual   Time In 1400   Time Out 1430   Minutes 30      Minutes:30  Transfer/Bed mobility training:10  Gait training:10  Neuro re education:10  Therapeutic ex:0      Veena Vu PTA, 09/02/23 at 3:22 PM

## 2023-09-02 NOTE — PROGRESS NOTES
Patient alert and oriented remains anxious about discharge and would like to stay here until  he is more independent LSW  aware of patient and sons request of extending discharge date.

## 2023-09-03 LAB
ANION GAP SERPL CALCULATED.3IONS-SCNC: 10 MEQ/L (ref 9–15)
BASOPHILS # BLD: 0 K/UL (ref 0–0.2)
BASOPHILS NFR BLD: 0.4 %
BUN SERPL-MCNC: 19 MG/DL (ref 8–23)
CALCIUM SERPL-MCNC: 8.6 MG/DL (ref 8.5–9.9)
CHLORIDE SERPL-SCNC: 104 MEQ/L (ref 95–107)
CO2 SERPL-SCNC: 28 MEQ/L (ref 20–31)
CREAT SERPL-MCNC: 0.8 MG/DL (ref 0.7–1.2)
EOSINOPHIL # BLD: 0 K/UL (ref 0–0.7)
EOSINOPHIL NFR BLD: 0.5 %
ERYTHROCYTE [DISTWIDTH] IN BLOOD BY AUTOMATED COUNT: 15.2 % (ref 11.5–14.5)
GLUCOSE SERPL-MCNC: 95 MG/DL (ref 70–99)
HCT VFR BLD AUTO: 36 % (ref 42–52)
HGB BLD-MCNC: 12 G/DL (ref 14–18)
LYMPHOCYTES # BLD: 0.6 K/UL (ref 1–4.8)
LYMPHOCYTES NFR BLD: 8.2 %
MCH RBC QN AUTO: 31.8 PG (ref 27–31.3)
MCHC RBC AUTO-ENTMCNC: 33.4 % (ref 33–37)
MCV RBC AUTO: 95.2 FL (ref 79–92.2)
MONOCYTES # BLD: 0.5 K/UL (ref 0.2–0.8)
MONOCYTES NFR BLD: 6.3 %
NEUTROPHILS # BLD: 6.2 K/UL (ref 1.4–6.5)
NEUTS SEG NFR BLD: 84.6 %
PLATELET # BLD AUTO: 147 K/UL (ref 130–400)
POTASSIUM SERPL-SCNC: 4.1 MEQ/L (ref 3.4–4.9)
RBC # BLD AUTO: 3.78 M/UL (ref 4.7–6.1)
SODIUM SERPL-SCNC: 142 MEQ/L (ref 135–144)
WBC # BLD AUTO: 7.3 K/UL (ref 4.8–10.8)

## 2023-09-03 PROCEDURE — 6370000000 HC RX 637 (ALT 250 FOR IP): Performed by: INTERNAL MEDICINE

## 2023-09-03 PROCEDURE — 36415 COLL VENOUS BLD VENIPUNCTURE: CPT

## 2023-09-03 PROCEDURE — 6370000000 HC RX 637 (ALT 250 FOR IP): Performed by: NURSE PRACTITIONER

## 2023-09-03 PROCEDURE — 6370000000 HC RX 637 (ALT 250 FOR IP): Performed by: UROLOGY

## 2023-09-03 PROCEDURE — 80048 BASIC METABOLIC PNL TOTAL CA: CPT

## 2023-09-03 PROCEDURE — 85025 COMPLETE CBC W/AUTO DIFF WBC: CPT

## 2023-09-03 PROCEDURE — 6360000002 HC RX W HCPCS: Performed by: PHYSICAL MEDICINE & REHABILITATION

## 2023-09-03 PROCEDURE — 6370000000 HC RX 637 (ALT 250 FOR IP): Performed by: PHYSICAL MEDICINE & REHABILITATION

## 2023-09-03 PROCEDURE — 1180000000 HC REHAB R&B

## 2023-09-03 PROCEDURE — 6370000000 HC RX 637 (ALT 250 FOR IP)

## 2023-09-03 RX ADMIN — FLUTICASONE PROPIONATE 2 SPRAY: 50 SPRAY, METERED NASAL at 10:22

## 2023-09-03 RX ADMIN — Medication 100 MG: at 10:06

## 2023-09-03 RX ADMIN — ENOXAPARIN SODIUM 40 MG: 100 INJECTION SUBCUTANEOUS at 10:05

## 2023-09-03 RX ADMIN — BACLOFEN 5 MG: 10 TABLET ORAL at 10:07

## 2023-09-03 RX ADMIN — ACETAMINOPHEN 500 MG: 325 TABLET ORAL at 06:37

## 2023-09-03 RX ADMIN — PREDNISONE 5 MG: 5 TABLET ORAL at 10:06

## 2023-09-03 RX ADMIN — BUSPIRONE HYDROCHLORIDE 5 MG: 10 TABLET ORAL at 10:06

## 2023-09-03 RX ADMIN — Medication 5 MG: at 21:10

## 2023-09-03 RX ADMIN — BUSPIRONE HYDROCHLORIDE 5 MG: 10 TABLET ORAL at 21:08

## 2023-09-03 RX ADMIN — ACETAMINOPHEN 500 MG: 325 TABLET ORAL at 15:54

## 2023-09-03 RX ADMIN — BACLOFEN 5 MG: 10 TABLET ORAL at 21:08

## 2023-09-03 RX ADMIN — BICALUTAMIDE 50 MG: 50 TABLET, FILM COATED ORAL at 10:06

## 2023-09-03 RX ADMIN — OXYCODONE HYDROCHLORIDE 10 MG: 5 CAPSULE ORAL at 21:08

## 2023-09-03 RX ADMIN — ATORVASTATIN CALCIUM 10 MG: 10 TABLET, FILM COATED ORAL at 21:07

## 2023-09-03 RX ADMIN — TRAZODONE HYDROCHLORIDE 150 MG: 50 TABLET ORAL at 21:08

## 2023-09-03 RX ADMIN — ACETAMINOPHEN 500 MG: 325 TABLET ORAL at 21:08

## 2023-09-03 RX ADMIN — Medication 2000 UNITS: at 17:38

## 2023-09-03 RX ADMIN — TAMSULOSIN HYDROCHLORIDE 0.4 MG: 0.4 CAPSULE ORAL at 17:38

## 2023-09-03 RX ADMIN — DOCUSATE SODIUM 100 MG: 100 CAPSULE, LIQUID FILLED ORAL at 21:08

## 2023-09-03 RX ADMIN — PANTOPRAZOLE SODIUM 40 MG: 40 TABLET, DELAYED RELEASE ORAL at 06:37

## 2023-09-03 RX ADMIN — DOCUSATE SODIUM 100 MG: 100 CAPSULE, LIQUID FILLED ORAL at 10:22

## 2023-09-03 ASSESSMENT — PAIN DESCRIPTION - DESCRIPTORS: DESCRIPTORS: ACHING

## 2023-09-03 ASSESSMENT — PAIN DESCRIPTION - LOCATION: LOCATION: BACK

## 2023-09-03 ASSESSMENT — PAIN SCALES - GENERAL: PAINLEVEL_OUTOF10: 3

## 2023-09-03 ASSESSMENT — PAIN DESCRIPTION - ORIENTATION: ORIENTATION: MID

## 2023-09-03 NOTE — PROGRESS NOTES
retention  -Urology following  -Núñez  -Biopsy on 8/21  -Pathology report reveals adenocarcinoma in 3 of 3 tissue cores percentage of prostate tissue involved by tumor 85%  -Heme-onc following;; on Casodex with plan to start Lupron depot on 9/6 at discharge     -*COPD  -DuoNeb treatments every 4 hours as needed  -Denies shortness of breath; SPO2 99% on room air     *Hypertension, hyperlipidemia  -On Norvasc, atenolol, Lipitor, and Cozaar         *Anxiety  -On [facility] medicine managing acute needs. Patient will need to follow-up with PCP for chronic disease management. Time spent with patient 37 minutes    Subjective   Interval History Status: Patient awake in bed complains of not sleeping well last night no aggravating or triggering factors reported complains of fatigue this a.m patient denies chest pain, palpitations, headache, dizziness, shortness of breath, cough, fever, chills, N/V/D, and changes in appetite.   .       Review of Systems   12 point review of systems reviewed with patient with pertinent positives listed in HPI otherwise ROS negative    Medications   Scheduled Meds:    bicalutamide  50 mg Oral Daily    losartan  12.5 mg Oral Daily    docusate sodium  100 mg Oral BID    predniSONE  5 mg Oral Daily    enoxaparin  40 mg SubCUTAneous Daily    melatonin  5 mg Oral Nightly    acetaminophen  500 mg Oral 3 times per day    sodium chloride flush  5-40 mL IntraVENous 2 times per day    amLODIPine  5 mg Oral Daily    atenolol  25 mg Oral Daily    fluticasone  2 spray Nasal Daily    atorvastatin  10 mg Oral Nightly    traZODone  150 mg Oral Nightly    pantoprazole  40 mg Oral QAM AC    busPIRone  5 mg Oral BID    Baclofen  5 mg Oral BID    Vitamin D  2,000 Units Oral Dinner    cyanocobalamin  1,000 mcg IntraMUSCular Weekly    coenzyme Q10  100 mg Oral Daily    tamsulosin  0.4 mg Oral Dinner     Continuous Infusions:    sodium chloride       PRN Meds: lidocaine, oxyCODONE, sodium chloride BUN, CREATININE, GLUCOSE, CA, PHOS, MG in the last 72 hours. PT/INR:No results for input(s): PROTIME, INR in the last 72 hours. APTT:No results for input(s): APTT in the last 72 hours. LIVER PROFILE:No results for input(s): AST, ALT, BILIDIR, BILITOT, ALKPHOS in the last 72 hours. Imaging Last 24 Hours:  No results found.     Electronically signed by JORDAN Alonso CNP on 9/3/23 at 11:35 AM EDT

## 2023-09-04 PROCEDURE — 97535 SELF CARE MNGMENT TRAINING: CPT

## 2023-09-04 PROCEDURE — 97530 THERAPEUTIC ACTIVITIES: CPT

## 2023-09-04 PROCEDURE — 97542 WHEELCHAIR MNGMENT TRAINING: CPT

## 2023-09-04 PROCEDURE — 6370000000 HC RX 637 (ALT 250 FOR IP): Performed by: UROLOGY

## 2023-09-04 PROCEDURE — 99232 SBSQ HOSP IP/OBS MODERATE 35: CPT | Performed by: PHYSICAL MEDICINE & REHABILITATION

## 2023-09-04 PROCEDURE — 6370000000 HC RX 637 (ALT 250 FOR IP): Performed by: PHYSICAL MEDICINE & REHABILITATION

## 2023-09-04 PROCEDURE — 6370000000 HC RX 637 (ALT 250 FOR IP): Performed by: INTERNAL MEDICINE

## 2023-09-04 PROCEDURE — 1180000000 HC REHAB R&B

## 2023-09-04 PROCEDURE — 97110 THERAPEUTIC EXERCISES: CPT

## 2023-09-04 PROCEDURE — 6370000000 HC RX 637 (ALT 250 FOR IP)

## 2023-09-04 PROCEDURE — 6370000000 HC RX 637 (ALT 250 FOR IP): Performed by: NURSE PRACTITIONER

## 2023-09-04 PROCEDURE — 97116 GAIT TRAINING THERAPY: CPT

## 2023-09-04 PROCEDURE — 6360000002 HC RX W HCPCS: Performed by: PHYSICAL MEDICINE & REHABILITATION

## 2023-09-04 RX ADMIN — PREDNISONE 5 MG: 5 TABLET ORAL at 10:25

## 2023-09-04 RX ADMIN — BUSPIRONE HYDROCHLORIDE 5 MG: 10 TABLET ORAL at 10:25

## 2023-09-04 RX ADMIN — BUSPIRONE HYDROCHLORIDE 5 MG: 10 TABLET ORAL at 21:08

## 2023-09-04 RX ADMIN — DOCUSATE SODIUM 100 MG: 100 CAPSULE, LIQUID FILLED ORAL at 21:10

## 2023-09-04 RX ADMIN — BICALUTAMIDE 50 MG: 50 TABLET, FILM COATED ORAL at 10:24

## 2023-09-04 RX ADMIN — OXYCODONE HYDROCHLORIDE 10 MG: 5 CAPSULE ORAL at 21:08

## 2023-09-04 RX ADMIN — Medication 5 MG: at 21:10

## 2023-09-04 RX ADMIN — ACETAMINOPHEN 500 MG: 325 TABLET ORAL at 06:41

## 2023-09-04 RX ADMIN — ACETAMINOPHEN 500 MG: 325 TABLET ORAL at 21:09

## 2023-09-04 RX ADMIN — Medication 100 MG: at 10:24

## 2023-09-04 RX ADMIN — Medication 2000 UNITS: at 16:46

## 2023-09-04 RX ADMIN — BACLOFEN 5 MG: 10 TABLET ORAL at 21:10

## 2023-09-04 RX ADMIN — PANTOPRAZOLE SODIUM 40 MG: 40 TABLET, DELAYED RELEASE ORAL at 06:41

## 2023-09-04 RX ADMIN — FLUTICASONE PROPIONATE 2 SPRAY: 50 SPRAY, METERED NASAL at 10:30

## 2023-09-04 RX ADMIN — DOCUSATE SODIUM 100 MG: 100 CAPSULE, LIQUID FILLED ORAL at 10:24

## 2023-09-04 RX ADMIN — LOSARTAN POTASSIUM 12.5 MG: 25 TABLET, FILM COATED ORAL at 10:24

## 2023-09-04 RX ADMIN — AMLODIPINE BESYLATE 5 MG: 5 TABLET ORAL at 10:25

## 2023-09-04 RX ADMIN — TRAZODONE HYDROCHLORIDE 150 MG: 50 TABLET ORAL at 21:10

## 2023-09-04 RX ADMIN — BACLOFEN 5 MG: 10 TABLET ORAL at 10:24

## 2023-09-04 RX ADMIN — ATORVASTATIN CALCIUM 10 MG: 10 TABLET, FILM COATED ORAL at 21:09

## 2023-09-04 RX ADMIN — ENOXAPARIN SODIUM 40 MG: 100 INJECTION SUBCUTANEOUS at 10:24

## 2023-09-04 RX ADMIN — OXYCODONE HYDROCHLORIDE 10 MG: 5 CAPSULE ORAL at 10:24

## 2023-09-04 RX ADMIN — ACETAMINOPHEN 500 MG: 325 TABLET ORAL at 13:58

## 2023-09-04 RX ADMIN — ATENOLOL 25 MG: 25 TABLET ORAL at 10:25

## 2023-09-04 RX ADMIN — TAMSULOSIN HYDROCHLORIDE 0.4 MG: 0.4 CAPSULE ORAL at 16:46

## 2023-09-04 ASSESSMENT — PAIN DESCRIPTION - FREQUENCY: FREQUENCY: CONTINUOUS

## 2023-09-04 ASSESSMENT — PAIN DESCRIPTION - ONSET: ONSET: ON-GOING

## 2023-09-04 ASSESSMENT — PAIN DESCRIPTION - DESCRIPTORS: DESCRIPTORS: ACHING

## 2023-09-04 ASSESSMENT — PAIN DESCRIPTION - LOCATION
LOCATION: BACK
LOCATION: BACK;LEG

## 2023-09-04 ASSESSMENT — PAIN DESCRIPTION - ORIENTATION
ORIENTATION: MID;LEFT
ORIENTATION: MID

## 2023-09-04 ASSESSMENT — PAIN - FUNCTIONAL ASSESSMENT: PAIN_FUNCTIONAL_ASSESSMENT: PREVENTS OR INTERFERES SOME ACTIVE ACTIVITIES AND ADLS

## 2023-09-04 ASSESSMENT — PAIN SCALES - GENERAL
PAINLEVEL_OUTOF10: 5
PAINLEVEL_OUTOF10: 6

## 2023-09-04 ASSESSMENT — PAIN DESCRIPTION - PAIN TYPE: TYPE: CHRONIC PAIN

## 2023-09-04 NOTE — PROGRESS NOTES
Physical Therapy Rehab Treatment Note  Facility/Department: [facility]  Room: O119/J283-73       NAME: Bryn Case  : 1935 (19 y.o.)  MRN: [id number]  CODE STATUS: Full Code    Date of Service: 2023       Restrictions:  Restrictions/Precautions: Fall Risk  Position Activity Restriction  Other position/activity restrictions: clinical reasoning- limit spine flexion       SUBJECTIVE:   Subjective: \"I had the night shift save me some food in the fridge\"    Pain  Pain: 0/10 pre and post treatment. OBJECTIVE:   Transfers  Surface: Wheelchair  Additional Factors: Verbal cues; Hand placement cues; Increased time to complete  Device: Walker  Sit to Stand  Assistance Level: Stand by assist  Skilled Clinical Factors: increased time to stabilize in standing, utilized BLE against chair to stabilize self. Stand to Sit  Assistance Level: Stand by assist  Skilled Clinical Factors: Utilizes BUE for eccentric lowering  Stand Pivot  Assistance Level: Stand by assist  Skilled Clinical Factors: no AD, vc's for technique and sequencing. Pt requires frequent repetition of cues for safety throughout, often forgetful of cues. Car Transfer  Assistance Level: Stand by assist  Skilled Clinical Factors: Pt attempts to step into car as opposed to sitting and turning into seat, vc's provided for technique and sequencing and pt with good ability to carryover and sit safely with given cues. Ambulation  Surface: Level surface  Device: Rolling walker  Distance: 35' x 2 - limited by destination  Activity: Within Unit  Activity Comments: Slow pace inconsistent foot placement  Additional Factors: Verbal cues  Assistance Level: Minimal assistance  Gait Deviations: Slow ryan;Decreased weight shift bilateral;Decreased step length bilateral  Skilled Clinical Factors: No LOB this session, however pt requires cues to keep [employer] within close proximity as pt with tendency to lean too far forward on [employer] causing increased instability.  Pt requires

## 2023-09-04 NOTE — PROGRESS NOTES
OCCUPATIONAL THERAPY  INPATIENT REHAB TREATMENT NOTE  [facility]      NAME: Matt Foss  : 1935 (81 y.o.)  MRN: [id number]  CODE STATUS: Full Code  Room: R248/R248-01    Date of Service: 2023    Referring Physician: Dr. 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    Restrictions  Restrictions/Precautions  Restrictions/Precautions: Fall Risk            Position Activity Restriction  Other position/activity restrictions: clinical reasoning- limit spine flexion    Patient's date of birth confirmed: Yes    SAFETY:  Safety Devices  Safety Devices in place: Yes  Type of devices: All fall risk precautions in place    SUBJECTIVE:  Subjective: \"I want to work on my standing\"    Pain at start of treatment: No    Pain at end of treatment: Yes: 2 or 3/10    Location: L abdomen and back  Description achy  Nursing notified: Declined    Intervention: None      OBJECTIVE:      Sit to Stand  Assistance Level: Supervision  Stand to Sit  Assistance Level: Supervision    Pt engaged in construction of [place] [facility] [employer] while seated with 1/2# weights on B wrists to work on arm strength and endurance. He completed puzzle in 5 minutes with good scanning of his visual field to find pieces.      -Challenged pt through usage of PVC pipes/connectors with pt to build 2D/3D structures according to visual model presented on piece of paper. Pt also instructed to disassemble all parts for added /digit exercise. Placed wrist weights of .5# onto B wrists in order to further address UE strengthening in form of added resistance for the task. Pt completed 2 designs in standing to work on activity tolerance and balance. Pt demonstrated ability to stand without UE support for 1-5 seconds at a time without LOB.       -Completed manipulation of circular shower clips for placement onto horizontal

## 2023-09-04 NOTE — FLOWSHEET NOTE
Patient alert, oriented and cooperative. Complains of back pain, pain medications effective. Complains of stomach distention and bloating, bowel sounds active. Will continue to monitor. Denies any further needs or complaints at this time. Call light within reach.

## 2023-09-04 NOTE — PROGRESS NOTES
OCCUPATIONAL THERAPY  INPATIENT REHAB TREATMENT NOTE  [facility]      NAME: Wagner Mobley  : 1935 (81 y.o.)  MRN: [id number]  CODE STATUS: Full Code  Room: R248/R248-01    Date of Service: 2023    Referring Physician: Dr. 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    Restrictions  Restrictions/Precautions  Restrictions/Precautions: Fall Risk            Position Activity Restriction  Other position/activity restrictions: clinical reasoning- limit spine flexion    Patient's date of birth confirmed: Yes    SAFETY:  Safety Devices  Safety Devices in place: Yes  Type of devices: All fall risk precautions in place    SUBJECTIVE:  Subjective: \"I'll try my best.\"    Pain at start of treatment: Yes: 3/10    Pain at end of treatment: Yes: 3/10    Location: lower back    Description ache  Nursing notified: Declined  RN: N/A  Intervention: Repositioned      OBJECTIVE:  Pt participated in static standing balance task playing [employer] to  improve pt overall standing balance, standing tolerance and activity endurance for ADLs. Pt able to tolerate standing for 7 minutes and 3 seconds and 6 minutes and 20 seconds. Pt had 1# wrist weights donned while playing [employer]. Pt demonstrated x3 mild LOB, but pt able to self-correct. Rest breaks as needed. Education:  Education  Education Given To: Patient  Education Provided: Energy Conservation  Education Method: Verbal  Barriers to Learning: None  Education Outcome: Verbalized understanding    Equipment recommendations:    ASSESSMENT:  Assessment: Pt demonstrated improved static standing tolerance during a thinking task. Pt continues to benefit from OT services to maximize pt independence and safety with ADLs and functional transfers for safe d/c home.       PLAN OF CARE:  Strengthening, Balance training, Functional mobility training, Endurance training, Patient/Caregiver education & training, Equipment evaluation, education, & procurement, Self-Care / ADL, Home management training, Safety education & training, Neuromuscular re-education  Continue OT POC until discharge    Patient goals : Go home and do like I have been doing  Time Frame for Long Term Goals :  Within 1.5-2 weeks, pt to demo progress in the following areas listed below to achieve specific LTGs stated in the intial eval  Long Term Goal 1: P tto demo improved ADL/IADL status  Long Term Goal 2: Pt to demo improved standing balance and tolerance  Long Term Goal 3: Pt to demo improved ability to use AD/AE/DME as needed to improve function and maintain pain level  Long Term Goal 4: Pt to demo improved UB strength for self-care        Therapy Time:   Individual Group Co-Treat   Time In 1130       Time Out 1200         Minutes 30                   Therapeutic activities: 30 minutes     Electronically signed by:    HUNTER Mensah,   9/4/2023, 12:52 PM

## 2023-09-04 NOTE — PROGRESS NOTES
Physical Therapy Rehab Treatment Note  Facility/Department: [facility]  [facility]       NAME: Bryn Case  : 1935 (30 y.o.)  MRN: [id number]  CODE STATUS: Full Code    Date of Service: 2023       Restrictions:  Restrictions/Precautions: Fall Risk  Position Activity Restriction  Other position/activity restrictions: clinical reasoning- limit spine flexion       SUBJECTIVE:   Subjective: \"they haven't put my schedule yet\"    Pain  Pain: 0/10 pre and post treatment. OBJECTIVE:   Transfers  Surface: To chair with arms;From chair with arms; Wheelchair  Additional Factors: Verbal cues; Hand placement cues; Increased time to complete  Device: Walker  Sit to Stand  Assistance Level: Stand by assist  Skilled Clinical Factors: increased time to stabilize in standing, utilized BLE against chair to stabilize self. Stand to Sit  Assistance Level: Stand by assist  Skilled Clinical Factors: Utilizes BUE for eccentric lowering    Ambulation  Surface: Level surface  Device: Rolling walker  Distance: 35' x 2  Activity Comments: Slow pace inconsistent foot placement  Additional Factors: Verbal cues  Assistance Level: Minimal assistance; Moderate assistance  Gait Deviations: Slow ryan;Decreased weight shift bilateral;Decreased step length bilateral  Skilled Clinical Factors: Pt with one LOB throughout gait requiring ModA to correct, pt states he just lost his balance and was unable to correct without assist. vc's to widen WERNER and maintain stability throughout with inconsistent carryover.     PT Exercises  Exercise Treatment: seated AP, LAQ, march 2x10 ea; hip add with ball x20, hip abd x20  PROM Exercises: seated HS/gastroc stretch x 3 30 sec hold  Resistive Exercises: seated RTB HS curls/Hip Abd x10 BLE     Activity Tolerance  Activity Tolerance: Patient tolerated treatment well    ASSESSMENT/PROGRESS TOWARDS GOALS:   Assessment  Assessment: Pt still lacks significant balance with on feet activity, requires assist for all gait to stabilize and prevent LOB throughout. Increased time spent in session spent discussing discharge safety and homegoing concerns with pt. Pt requires frequent cues for safety throughout. Activity Tolerance: Patient tolerated treatment well    Goals:  Short Term Goals  Time Frame for Short Term Goals: -  Short Term Goal 1: -  Short Term Goal 2: -  Short Term Goal 3: -  Short Term Goal 4: -  Long Term Goals  Long Term Goal 1: indep and efficient bed mobility  Long Term Goal 2: indep w/c to bed transfer; SBA sit to stand and car transfers  Long Term Goal 3: SBA gait 50 feet with appropriate device  Long Term Goal 4: min assist with 2 stairs for safe home entry  Long Term Goal 5: pt able to propel w/c indep 50 feet and complete w/c prep indep    PLAN OF CARE/Safety:   Safety Devices  Type of Devices: All fall risk precautions in place; Chair alarm in place; Left in chair      Therapy Time:   Individual   Time In 930   Time Out 1000   Minutes 30      Minutes: 30  Transfer/Bed mobility trainin  Gait training: 10  Neuro re education:  Therapeutic ex: [address], PTA, 23 at 12:06 PM

## 2023-09-04 NOTE — PLAN OF CARE
Problem: Discharge Planning  Goal: Discharge to home or other facility with appropriate resources  9/4/2023 1526 by Nikos Ovalle RN  Outcome: Progressing  9/4/2023 0415 by Miquel Fish RN  Outcome: Progressing     Problem: Safety - Adult  Goal: Free from fall injury  9/4/2023 1526 by Nikos Ovalle RN  Outcome: Progressing  9/4/2023 0415 by Miquel Fish RN  Outcome: Progressing     Problem: ABCDS Injury Assessment  Goal: Absence of physical injury  9/4/2023 1526 by Nikos Ovalle RN  Outcome: Progressing  9/4/2023 0415 by Miquel Fish RN  Outcome: Progressing     Problem: Skin/Tissue Integrity  Goal: Absence of new skin breakdown  Description: 1. Monitor for areas of redness and/or skin breakdown  2. Assess vascular access sites hourly  3. Every 4-6 hours minimum:  Change oxygen saturation probe site  4. Every 4-6 hours:  If on nasal continuous positive airway pressure, respiratory therapy assess nares and determine need for appliance change or resting period.   Outcome: Progressing     Problem: Nutrition Deficit:  Goal: Optimize nutritional status  Outcome: Progressing     Problem: Pain  Goal: Verbalizes/displays adequate comfort level or baseline comfort level  Outcome: Progressing     Problem: Chronic Conditions and Co-morbidities  Goal: Patient's chronic conditions and co-morbidity symptoms are monitored and maintained or improved  Outcome: Progressing

## 2023-09-05 PROCEDURE — 92526 ORAL FUNCTION THERAPY: CPT

## 2023-09-05 PROCEDURE — 6370000000 HC RX 637 (ALT 250 FOR IP): Performed by: PHYSICAL MEDICINE & REHABILITATION

## 2023-09-05 PROCEDURE — 6370000000 HC RX 637 (ALT 250 FOR IP): Performed by: INTERNAL MEDICINE

## 2023-09-05 PROCEDURE — 1180000000 HC REHAB R&B

## 2023-09-05 PROCEDURE — 97535 SELF CARE MNGMENT TRAINING: CPT

## 2023-09-05 PROCEDURE — 99233 SBSQ HOSP IP/OBS HIGH 50: CPT | Performed by: PHYSICAL MEDICINE & REHABILITATION

## 2023-09-05 PROCEDURE — 6360000002 HC RX W HCPCS: Performed by: PHYSICAL MEDICINE & REHABILITATION

## 2023-09-05 PROCEDURE — 99231 SBSQ HOSP IP/OBS SF/LOW 25: CPT | Performed by: PHYSICIAN ASSISTANT

## 2023-09-05 PROCEDURE — 97116 GAIT TRAINING THERAPY: CPT

## 2023-09-05 PROCEDURE — 6370000000 HC RX 637 (ALT 250 FOR IP)

## 2023-09-05 PROCEDURE — 97542 WHEELCHAIR MNGMENT TRAINING: CPT

## 2023-09-05 PROCEDURE — 6370000000 HC RX 637 (ALT 250 FOR IP): Performed by: NURSE PRACTITIONER

## 2023-09-05 PROCEDURE — 6370000000 HC RX 637 (ALT 250 FOR IP): Performed by: UROLOGY

## 2023-09-05 PROCEDURE — 97530 THERAPEUTIC ACTIVITIES: CPT

## 2023-09-05 RX ORDER — PSEUDOEPHEDRINE HCL 30 MG
100 TABLET ORAL 2 TIMES DAILY
Qty: 60 CAPSULE | Refills: 1 | Status: SHIPPED | OUTPATIENT
Start: 2023-09-05

## 2023-09-05 RX ORDER — PANTOPRAZOLE SODIUM 40 MG/1
40 TABLET, DELAYED RELEASE ORAL
Qty: 30 TABLET | Refills: 3 | Status: SHIPPED | OUTPATIENT
Start: 2023-09-06

## 2023-09-05 RX ORDER — BUSPIRONE HYDROCHLORIDE 5 MG/1
5 TABLET ORAL 2 TIMES DAILY
Qty: 60 TABLET | Refills: 2 | Status: SHIPPED | OUTPATIENT
Start: 2023-09-05

## 2023-09-05 RX ORDER — ONDANSETRON 4 MG/1
4 TABLET, ORALLY DISINTEGRATING ORAL EVERY 8 HOURS PRN
Qty: 21 TABLET | Refills: 1 | Status: SHIPPED | OUTPATIENT
Start: 2023-09-05 | End: 2023-09-19

## 2023-09-05 RX ORDER — UBIDECARENONE 100 MG
100 CAPSULE ORAL DAILY
Qty: 60 CAPSULE | Refills: 1 | Status: SHIPPED | OUTPATIENT
Start: 2023-09-06

## 2023-09-05 RX ORDER — CALCIUM CARBONATE 500 MG/1
750 TABLET, CHEWABLE ORAL 3 TIMES DAILY PRN
Qty: 30 TABLET | Refills: 1 | Status: SHIPPED | OUTPATIENT
Start: 2023-09-05 | End: 2023-10-05

## 2023-09-05 RX ORDER — OXYCODONE HYDROCHLORIDE 5 MG/1
10 CAPSULE ORAL EVERY 4 HOURS PRN
Qty: 60 CAPSULE | Refills: 0 | Status: SHIPPED | OUTPATIENT
Start: 2023-09-05 | End: 2023-09-12

## 2023-09-05 RX ORDER — CHOLECALCIFEROL (VITAMIN D3) 50 MCG
2000 TABLET ORAL
Qty: 60 TABLET | Refills: 1 | Status: SHIPPED | OUTPATIENT
Start: 2023-09-05

## 2023-09-05 RX ORDER — LOSARTAN POTASSIUM 25 MG/1
12.5 TABLET ORAL DAILY
Qty: 30 TABLET | Refills: 3 | Status: SHIPPED | OUTPATIENT
Start: 2023-09-06

## 2023-09-05 RX ORDER — MECOBALAMIN 5000 MCG
5 TABLET,DISINTEGRATING ORAL NIGHTLY
Qty: 30 TABLET | Refills: 1 | Status: SHIPPED | OUTPATIENT
Start: 2023-09-05

## 2023-09-05 RX ADMIN — PANTOPRAZOLE SODIUM 40 MG: 40 TABLET, DELAYED RELEASE ORAL at 06:15

## 2023-09-05 RX ADMIN — Medication 5 MG: at 21:18

## 2023-09-05 RX ADMIN — ATORVASTATIN CALCIUM 10 MG: 10 TABLET, FILM COATED ORAL at 21:19

## 2023-09-05 RX ADMIN — ACETAMINOPHEN 500 MG: 325 TABLET ORAL at 13:03

## 2023-09-05 RX ADMIN — TAMSULOSIN HYDROCHLORIDE 0.4 MG: 0.4 CAPSULE ORAL at 17:40

## 2023-09-05 RX ADMIN — ACETAMINOPHEN 500 MG: 325 TABLET ORAL at 21:18

## 2023-09-05 RX ADMIN — PREDNISONE 5 MG: 5 TABLET ORAL at 08:18

## 2023-09-05 RX ADMIN — BACLOFEN 5 MG: 10 TABLET ORAL at 21:20

## 2023-09-05 RX ADMIN — ACETAMINOPHEN 500 MG: 325 TABLET ORAL at 06:15

## 2023-09-05 RX ADMIN — OXYCODONE HYDROCHLORIDE 10 MG: 5 CAPSULE ORAL at 08:24

## 2023-09-05 RX ADMIN — DOCUSATE SODIUM 100 MG: 100 CAPSULE, LIQUID FILLED ORAL at 08:18

## 2023-09-05 RX ADMIN — ATENOLOL 25 MG: 25 TABLET ORAL at 08:18

## 2023-09-05 RX ADMIN — BUSPIRONE HYDROCHLORIDE 5 MG: 10 TABLET ORAL at 08:17

## 2023-09-05 RX ADMIN — BUSPIRONE HYDROCHLORIDE 5 MG: 10 TABLET ORAL at 21:18

## 2023-09-05 RX ADMIN — ENOXAPARIN SODIUM 40 MG: 100 INJECTION SUBCUTANEOUS at 08:17

## 2023-09-05 RX ADMIN — CYANOCOBALAMIN 1000 MCG: 1000 INJECTION, SOLUTION INTRAMUSCULAR; SUBCUTANEOUS at 08:16

## 2023-09-05 RX ADMIN — BACLOFEN 5 MG: 10 TABLET ORAL at 08:17

## 2023-09-05 RX ADMIN — Medication 100 MG: at 08:18

## 2023-09-05 RX ADMIN — Medication 2000 UNITS: at 17:40

## 2023-09-05 RX ADMIN — LOSARTAN POTASSIUM 12.5 MG: 25 TABLET, FILM COATED ORAL at 08:18

## 2023-09-05 RX ADMIN — FLUTICASONE PROPIONATE 2 SPRAY: 50 SPRAY, METERED NASAL at 13:04

## 2023-09-05 RX ADMIN — TRAZODONE HYDROCHLORIDE 150 MG: 50 TABLET ORAL at 21:19

## 2023-09-05 RX ADMIN — BICALUTAMIDE 50 MG: 50 TABLET, FILM COATED ORAL at 08:17

## 2023-09-05 RX ADMIN — DOCUSATE SODIUM 100 MG: 100 CAPSULE, LIQUID FILLED ORAL at 21:19

## 2023-09-05 RX ADMIN — AMLODIPINE BESYLATE 5 MG: 5 TABLET ORAL at 08:17

## 2023-09-05 ASSESSMENT — PAIN DESCRIPTION - DESCRIPTORS
DESCRIPTORS: ACHING
DESCRIPTORS: CRAMPING

## 2023-09-05 ASSESSMENT — PAIN SCALES - GENERAL
PAINLEVEL_OUTOF10: 5
PAINLEVEL_OUTOF10: 3

## 2023-09-05 ASSESSMENT — ENCOUNTER SYMPTOMS: APNEA: 0

## 2023-09-05 ASSESSMENT — PAIN DESCRIPTION - ORIENTATION: ORIENTATION: MID

## 2023-09-05 ASSESSMENT — PAIN DESCRIPTION - LOCATION
LOCATION: BACK
LOCATION: BACK

## 2023-09-05 NOTE — PROGRESS NOTES
Physical Therapy Rehab Treatment Note  Facility/Department: [facility]  Room: G281/Y978-88       NAME: Yas Arriaga  : 1935 (85 y.o.)  MRN: [id number]  CODE STATUS: Full Code    Date of Service: 2023     Restrictions:  Restrictions/Precautions: Fall Risk     SUBJECTIVE:   Subjective: Pt's son reports there are no rails on stairs to enter. States he is going to get a portable ramp. Son states the home is set up for a Wc but he needs to get someone to help patient. Pain  Pain: Pt denies pain. OBJECTIVE:         Bed mobility  Rolling to Left: Independent  Supine to Sit: Independent  Sit to Supine: Independent    Transfers  Sit to Stand: Supervision  Stand to Sit: Supervision  Bed to Chair: Stand by assistance;Supervision  Comment: Initial transfer required VCs for hand placement and technique. Improved with multiple trials. Ambulation  Surface: Level tile  Device: Rolling Walker  Assistance: Minimal assistance  Quality of Gait: Ataxic LEs with variable step length and WERNER. Distance: 50ft    Stairs/Curb  Stairs?: Yes  Stairs  # Steps : 4  Rails: Bilateral  Assistance: Minimal assistance (Incidental touching for safety.)  Comment: Requires B rails. Wheelchair Activities  Wheelchair Parts Management: Yes  Left Leg Rest Level of Assistance: Stand by assistance  Right Leg Rest Level of Assistance: Stand by assistance  Left Brakes Level of Assistance: Independent  Right Brakes Level of Assistance: Independent  Propulsion: Yes  Propulsion 1  Propulsion: Manual  Level: Level Tile  Method: LUE;RUE  Level of Assistance: Modified independent  Description/ Details: Indep propeling with leg rests removed. Supervision with min VCs to improve positioning of WC with set up for transfer. Distance: 200ft               Education Provided: Mobility Training;Transfer Training;Family Education  Education  Education Given To: Patient  Education Provided:  Mobility Training;Transfer Training;Family Education  Education Provided Comments: Educated pt's son on PT goals and current level of function. Observed and educated on assisting pt with transfers including car and stairs. Son reports they have no rails on stairs to enter and that he is going to purchace a portable ramp. Educated son on Wc parts management and set up for transfer. Also instructed son and pt to use WC as main mode of locomotion and to cont working on gait with therapy. Son reports he needs to hire help. Education Method: Demonstration;Verbal  Barriers to Learning: None  Education Outcome: Verbalized understanding;Demonstrated understanding    ASSESSMENT/PROGRESS TOWARDS GOALS:   Assessment: Completed family training. Son verbilizes understanding and feels comfortable assisting pt with car transfer. States he is going to get a portable ramp and needs to arrange for someone to assist pt at home. Pt has met goals 1 and 4. Has partially met goal 2 and 5. Pt is indep with WC mobility and breaks. Min VCs to improve positioning in set up for transfer and for technique with transfer. Goals:  Long Term Goals  Long Term Goal 1: indep and efficient bed mobility  Long Term Goal 2: indep w/c to bed transfer; SBA sit to stand and car transfers  Long Term Goal 3: SBA gait 50 feet with appropriate device  Long Term Goal 4: min assist with 2 stairs for safe home entry  Long Term Goal 5: pt able to propel w/c indep 50 feet and complete w/c prep indep    PLAN OF CARE/Safety: Cont per POC. Therapy Time:   Individual   Time In 935   Time Out 1030   Minutes 55      Minutes:Missed 5 min. Delayed from ADL with OT.     Transfer/Bed mobility trainin  Gait trainin  Neuro re education:0  Therapeutic ex:0  WC 10    Deyvi Smith PTA, 23 at 11:43 AM

## 2023-09-05 NOTE — PROGRESS NOTES
[facility]   Facility/Department: [facility]  Speech Language Pathology  Discharge Report        Patient: Almaz Hickey  : 1935    Date: 2023    Initial Status:  Diet:   Easy to chew solids with thin liquids  Dysphagia Outcome Severity Scale:  Ratin    Long Term Goals:  Long-term Goals  Timeframe for Long-term Goals: 1-2 weeks  Goal 1: Pt will consume least restrictive diet without overt s/s of aspiration. Patient's Response to Therapy:  Patient reported improved tolerance of pills and po diet. Discharge Status:  Diet:   ADULT ORAL NUTRITION SUPPLEMENT; HS Snack; Snack; cheese plate with fruit  ADULT DIET; Easy to Chew  Compensatory Swallowing Strategies : Small bites/sips, Upright as possible for all oral intake, Lingual sweep, Remain upright for 30-45 minutes after meals  Dysphagia Outcome Severity Scale:  Ratin      Functional Status at time of Discharge:    Cognition: Patient demonstrates no cognitive deficits. Language: Patient demonstrates no language deficits. Motor Speech: Patient demonstrates no motor speech deficits. Swallow: Patient demonstrates minimal dysphagia.                                  Patient is discharged to Home               [] Recommend continued speech therapy   [x] Speech Therapy is no longer warranted      Signature:  Electronically signed by JAIRO Berry on 2023 at 2:43 PM

## 2023-09-05 NOTE — PLAN OF CARE
Problem: Discharge Planning  Goal: Discharge to home or other facility with appropriate resources  9/5/2023 1612 by Connor Way RN  Outcome: Progressing  9/5/2023 0306 by Duy Carrillo RN  Outcome: Progressing  Flowsheets (Taken 9/4/2023 2055)  Discharge to home or other facility with appropriate resources: Identify barriers to discharge with patient and caregiver     Problem: Safety - Adult  Goal: Free from fall injury  9/5/2023 1612 by Connor Way RN  Outcome: Progressing  9/5/2023 0306 by Duy Carrillo RN  Outcome: Progressing     Problem: ABCDS Injury Assessment  Goal: Absence of physical injury  9/5/2023 1612 by Connor Way RN  Outcome: Progressing  9/5/2023 0306 by Duy Carrillo RN  Outcome: Progressing     Problem: Skin/Tissue Integrity  Goal: Absence of new skin breakdown  Description: 1. Monitor for areas of redness and/or skin breakdown  2. Assess vascular access sites hourly  3. Every 4-6 hours minimum:  Change oxygen saturation probe site  4. Every 4-6 hours:  If on nasal continuous positive airway pressure, respiratory therapy assess nares and determine need for appliance change or resting period.   9/5/2023 1612 by Connor Way RN  Outcome: Progressing  9/5/2023 0306 by Duy Carrillo RN  Outcome: Progressing     Problem: Nutrition Deficit:  Goal: Optimize nutritional status  9/5/2023 1612 by Connor Way RN  Outcome: Progressing  9/5/2023 0306 by Duy Carrillo RN  Outcome: Progressing     Problem: Pain  Goal: Verbalizes/displays adequate comfort level or baseline comfort level  9/5/2023 1612 by Connor Way RN  Outcome: Progressing  9/5/2023 0306 by Duy Carrillo RN  Outcome: Progressing     Problem: Chronic Conditions and Co-morbidities  Goal: Patient's chronic conditions and co-morbidity symptoms are monitored and maintained or improved  9/5/2023 1612 by Connor Way RN  Outcome: Progressing  9/5/2023 0306 by Duy Carrillo RN  Outcome: Progressing  Flowsheets

## 2023-09-05 NOTE — PROGRESS NOTES
[facility]  Facility/Department: [facility]  Speech Language Pathology   Treatment Note          Johny Hills  1935  A653/P007-08  [x]   confirmed    Date: 2023      Restrictions/Precautions: Fall Risk  Position Activity Restriction  Other position/activity restrictions: clinical reasoning- limit spine flexion    Weight - Scale: 157 lb 12.8 oz (71.6 kg)     ADULT ORAL NUTRITION SUPPLEMENT; HS Snack; Snack; cheese plate with fruit  ADULT DIET; Easy to Chew    SpO2: 97 % (23 0815)  O2 Flow Rate (L/min): 0 L/min (23 0720)  No active isolations    Rehab Diagnosis: Impaired mobility and ADL's due to 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 Pt recalled exercise handout in red folder. Educated pt and son regarding purpose and technique. Goal 2: Pt will tolerate the least restrictive diet level without clinical s/s of aspiration. Pt's son reported pt having easier time swallowing and pt appears to have less phlegm. Goal 3: Pt will tolerate ETC diet consistency with utilization of safe swallowing guidelines and comp. strategies as indicated without overt s/s of aspiration. Pt reported no difficulty on easy to chew consistencies. Pt reported he will occasionally have difficulty with meat. Educated on tender meats, and adding extra sauces/gravy. Educated on easy to chew consistencies for home. Goal 4: Pt will swallow medications whole in puree with utilization of techniques (1-2 at a time, chin tuck, and upright positioning) with >90% of opportunities.   Pt reported he is having improved ability to swallow

## 2023-09-05 NOTE — PROGRESS NOTES
Pt assessment completed. VSS. Pt C/O pain @ \"5\" back and legs. Oxycodone given with HS med pass. Pt slighlty anxious with being  D/C'd in few days,  9/6. But also excited. To go home. Núñez intact draining clear christiano urine. Education for pt emptying bag. Needs teaching regarding leg bag. Call light with in reach. LAST BM9/3.

## 2023-09-05 NOTE — PROGRESS NOTES
Physical Therapy Rehab Treatment Note  [facility]  [facility]       NAME: Melchor Dias  : 1935 (89 y.o.)  MRN: [id number]  CODE STATUS: Full Code    Date of Service: 2023     Restrictions:  Restrictions/Precautions: Fall Risk     SUBJECTIVE:   Subjective: Pt's son reports there are no rails on stairs to enter. States he is going to get a portable ramp. Son states the home is set up for a Wc but he needs to get someone to help patient. Pain  Pain: 2/10 low back  \"not bad\"    OBJECTIVE:         Bed mobility  Rolling to Left: Independent  Supine to Sit: Independent  Sit to Supine: Independent    Transfers  Sit to Stand: Supervision  Stand to Sit: Stand by assistance  Bed to Chair: Stand by assistance  Comment: Practiced SPT to L and R with WC set up multiple times. Initial transfer required VCs for technique improving throughout. Wheelchair Activities  Wheelchair Parts Management: Yes  Left Leg Rest Level of Assistance: Stand by assistance  Right Leg Rest Level of Assistance: Stand by assistance  Left Brakes Level of Assistance: Independent  Right Brakes Level of Assistance: Independent  Propulsion: Yes  Propulsion 1  Propulsion: Manual  Level: Level Tile  Method: LUE;RUE  Level of Assistance: Modified independent  Description/ Details: Indep propeling with leg rests removed. Supervision with min VCs to improve positioning of WC with set up for transfer. Distance: 200ft     PT Exercises  Functional Mobility Circuit Training: STS witharms crossed x3 min assist  Static Standing Balance Exercises: static standing with no UE support 30sec with increased ant/post sway SBA      ASSESSMENT/PROGRESS TOWARDS GOALS:   Assessment: Decreased carryover of transfer techique from previous session requiring VCs. Improved with practice. Pt fearful in static standing without UE support.     Goals:  Short Term Goals  Time Frame for Short Term Goals: -  Long Term Goals  Long Term Goal 1:

## 2023-09-05 NOTE — PROGRESS NOTES
Subjective:      Patient ID: Elio Hernandez is a 81 y.o. male    HPI 81year old male who's burnham catheter is draining clear yellow urine. Voicing no other urological complaints.        Past Medical History:   Diagnosis Date    Anxiety     COPD (chronic obstructive pulmonary disease) (HCC)     Hyperlipidemia     Hypertension     Kidney stone      Past Surgical History:   Procedure Laterality Date    PROSTATE BIOPSY  08/21/2023    U/S guided prostate biopsy completed by Dr. Mason Denver  8/21/2023    Mehdi Gaitan 8/21/2023 MLOZ ULTRASOUND     Social History     Socioeconomic History    Marital status:      Spouse name: None    Number of children: None    Years of education: None    Highest education level: None   Tobacco Use    Smoking status: Every Day     Packs/day: 1.00     Years: 60.00     Pack years: 60.00     Types: Cigarettes    Smokeless tobacco: Never   Substance and Sexual Activity    Alcohol use: Yes    Drug use: No   Social History Narrative    Lives With: Alone (Wife has dementia -moving to a nursing home [facility] in [address] from a memory care unit in [address] to finances)    Type of Home: House in [address]: One level    Home Access: Stairs to enter without rails - Number of Steps: 2    Bathroom Shower/Tub: Walk-in shower-Equipment: None    Home Equipment: None    Has the patient had two or more falls in the past year or any fall with injury in the past year?: No    Receives Help From: Family (son comes a couple times a week)    ADL Assistance: Independent    Homemaking Assistance: Independent (neighbor cuts grass), Homemaking Responsibilities: Yes    Ambulation Assistance: Independent, Transfer Assistance: Independent    Active : Yes    Occupation: Retired    IADL Comments: pt completes his own shopping, cuts some of his lawn         Social Determinants of Health     Financial Resource Strain: [address]

## 2023-09-05 NOTE — PROGRESS NOTES
Pt OOB to w/c in room. Núñez catheter drainage bag changed to leg bag this AM, pt and son given education regarding use. Núñez patent to gravity, urine clear yellow. Pt discharge date to be extended to 9/9, pt aware. Called pt's son Dionna Delaney and updated him on d/c date, medications, and outpatient follow up. Electronically signed by Mary Singh RN on 9/5/2023 at 4:03 PM

## 2023-09-05 NOTE — PLAN OF CARE
Problem: Discharge Planning  Goal: Discharge to home or other facility with appropriate resources  9/5/2023 0306 by Marcus Zamarripa RN  Outcome: Progressing  Flowsheets (Taken 9/4/2023 2055)  Discharge to home or other facility with appropriate resources: Identify barriers to discharge with patient and caregiver  9/4/2023 1526 by Andres Patterson RN  Outcome: Progressing

## 2023-09-05 NOTE — PROGRESS NOTES
OCCUPATIONAL THERAPY  INPATIENT REHAB TREATMENT NOTE  [facility]      NAME: Noemi Morlaes  : 1935 (81 y.o.)  MRN: [id number]  CODE STATUS: Full Code  [facility]    Date of Service: 2023    Referring Physician: Dr. 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    Restrictions  Restrictions/Precautions  Restrictions/Precautions: Fall Risk          Patient's date of birth confirmed: Yes    SAFETY:  Safety Devices  Safety Devices in place: Yes  Type of devices: All fall risk precautions in place    SUBJECTIVE:       Pain at start of treatment: No    Pain at end of treatment: No      COGNITION:  Orientation  Overall Orientation Status: Within Functional Limits  Orientation Level: Oriented to person;Oriented to place;Oriented to time;Oriented to situation  Cognition  Overall Cognitive Status: Exceptions  Arousal/Alertness: Appropriate responses to stimuli  Following Commands: Follows one step commands with repetition  Attention Span: Appears intact  Memory: Decreased short term memory;Decreased recall of recent events  Safety Judgement: Decreased awareness of need for safety;Decreased awareness of need for assistance  Problem Solving: Assistance required to generate solutions;Assistance required to implement solutions  Insights: Decreased awareness of deficits  Initiation: Requires cues for some  Sequencing: Requires cues for some  Cognition Comment: delayed processing      Pt's current cognitive status is:  Comprehension: Supervision  Expression: Mod I  Social Interaction: Mod I  Problem Solving:  Mod A  Memory: Min A    OBJECTIVE:         Grooming/Oral Hygiene  Assistance Level: Modified independent  Upper Extremity Bathing  Assistance Level: Modified independent  Lower Extremity Bathing  Assistance Level: Minimal assistance  Skilled Clinical Factors: min A d/t pt

## 2023-09-05 NOTE — CARE COORDINATION
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 Bud Bethea  2023    [address] 2023 MLOZ ULTRASOUND        Restrictions  Restrictions/Precautions: Fall Risk  Position Activity Restriction  Other position/activity restrictions: clinical reasoning- limit spine flexion  CASE MANAGEMENT    Social/Functional History  Social/Functional History  Lives With: Alone (Wife moving to a nursing home from a memory care unit (moving to [facility] on ))  Type of Home: [address] [place] [address] 24: One level  Home Access: Stairs to enter without rails  Entrance Stairs - Number of Steps: 2 from the garage  Bathroom Shower/Tub: Walk-in shower, Doors, Tub/Shower unit (narrow walk in shower--unsure if chair would fit; tub/shower does not typically use but is in working condition)  Bathroom Toilet: Standard  Bathroom Equipment: None  Bathroom Accessibility: Walker accessible  Home Equipment: None  Has the patient had two or more falls in the past year or any fall with injury in the past year?: No  Receives Help From: Family (son comes a couple times a week--assists with anything pt needs such as cutting grass (pt cutting mostly prior), cleaning in ground swimming pool)  ADL Assistance: Independent  Homemaking indep  PT Treatment Time:  1.5 hrs      OCCUPATIONAL THERAPY    EVALUATION SELF CARE STATUS:  Hand Dominance: Right  Feeding: Independent (08/23/23 1154)  Grooming: Supervision (08/23/23 1154)  UE Bathing: Supervision (08/23/23 1154)  LE Bathing: Minimal assistance (08/23/23 1154)  LE Bathing Skilled Clinical Factors: assist distally, touching assist in standing for posterior hygiene (08/23/23 1154)  UE Dressing Skilled Clinical Factors: gown only (08/23/23 1154)  LE Dressing: Maximum assistance (08/23/23 1154)  LE Dressing Skilled Clinical Factors: TotalA over feet with burnham, assist over feet (08/23/23 1154)  Toileting: Maximum assistance (08/23/23 1154)  Toileting Skilled Clinical Factors: burnham in place, small BM- pt able to self complete hygiene (08/23/23 1154)             CURRENT SELF CARE:  Grooming/Oral Hygiene  Assistance Level: Modified independent (09/01/23 1308)  Skilled Clinical Factors: using electric shaver/padmini seated at sink level (09/01/23 1308)  Upper Extremity Bathing  Assistance Level: Modified independent (09/01/23 1133)  Skilled Clinical Factors: seated, CGA if standing (Touching assist for fall precautions) (08/29/23 1202)  Lower Extremity Bathing  Assistance Level: Supervision (09/01/23 1133)  Skilled Clinical Factors: touching assist neeed if standing/ Sup seated (08/29/23 1202)  Upper Extremity Dressing  Assistance Level: Modified independent (09/01/23 1133)  Lower Extremity Dressing  Assistance Level: Stand by assist (09/01/23 1133)  Skilled Clinical Factors: CGA-touching assist if standing; Sup seated (08/29/23 1202)  Putting On/Taking Off Footwear  Assistance Level: Modified independent (08/29/23 1202)  Skilled Clinical Factors: figure 4 BLE needing extra time but able to complete foot care, doff/don socks and dry feet/between toes (08/25/23 1227)  Toileting  Assistance Level:  Moderate assistance (08/25/23 1227)  Skilled Clinical Factors: none (09/01/23 1133)  Toilet

## 2023-09-05 NOTE — PROGRESS NOTES
[address] 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 Alejo Ye    RESTRICTIONS/PRECAUTIONS:  Restrictions/Precautions: Fall Risk     Hearing: Exceptions to [facility]  Hearing Exceptions: Hard of hearing/hearing concerns    Subjective/Observation:   Patient declined participating in individual music therapy session at 12:20pm stating he is not interested at this time. Assessment/Plan:      [] Patient would like to have music therapy, just not today. [facility] if [facility]. [] Patient would like to have music therapy another time, however their D/C is before [facility] will be back on unit. *If patient is still on rehab unit, or comes back to rehab unit, [facility] if time allows. [x] Patient does not want music therapy. [facility] will not attempt to see pt again unless pt specifically requests.        SUNG Byrd    9/5/2023  Electronically signed by Shahram Pablo on 9/5/2023 at 12:24 PM

## 2023-09-05 NOTE — PROGRESS NOTES
OCCUPATIONAL THERAPY  INPATIENT REHAB TREATMENT NOTE  [facility]      NAME: Kriss Monzon  : 1935 (81 y.o.)  MRN: [id number]  CODE STATUS: Full Code  Room: R248/R248-01    Date of Service: 2023    Referring Physician: Dr. 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    Restrictions  Restrictions/Precautions  Restrictions/Precautions: Fall Risk                 Patient's date of birth confirmed: Yes    SAFETY:  Safety Devices  Safety Devices in place: Yes  Type of devices: All fall risk precautions in place    SUBJECTIVE:       Pain at start of treatment: No    Pain at end of treatment: No      COGNITION:  Orientation  Overall Orientation Status: Within Functional Limits  Orientation Level: Oriented to person;Oriented to place;Oriented to time;Oriented to situation  Cognition  Overall Cognitive Status: Exceptions  Arousal/Alertness: Appropriate responses to stimuli  Following Commands: Follows one step commands with repetition  Attention Span: Appears intact  Memory: Decreased short term memory;Decreased recall of recent events  Safety Judgement: Decreased awareness of need for safety;Decreased awareness of need for assistance  Problem Solving: Assistance required to generate solutions;Assistance required to implement solutions  Insights: Decreased awareness of deficits  Initiation: Requires cues for some  Sequencing: Requires cues for some  Cognition Comment: delayed processing      OBJECTIVE:     Patient was provided with HEP for BUE strengthening. Pt completed all shoulder, bicep, forearm and wrist exercises 10x1 set with 1# wt. Informed pt to use can of veggies/fruit at home equivelant to 1# for exercises. Patient demonstrated an ability to complete the exercises with need for SBA, and need for verbal cues for positioning/movements to each ex.   Pt completed

## 2023-09-06 PROCEDURE — 99232 SBSQ HOSP IP/OBS MODERATE 35: CPT | Performed by: PHYSICAL MEDICINE & REHABILITATION

## 2023-09-06 PROCEDURE — 6370000000 HC RX 637 (ALT 250 FOR IP): Performed by: INTERNAL MEDICINE

## 2023-09-06 PROCEDURE — 97535 SELF CARE MNGMENT TRAINING: CPT

## 2023-09-06 PROCEDURE — 6360000002 HC RX W HCPCS: Performed by: PHYSICAL MEDICINE & REHABILITATION

## 2023-09-06 PROCEDURE — 6370000000 HC RX 637 (ALT 250 FOR IP): Performed by: PHYSICAL MEDICINE & REHABILITATION

## 2023-09-06 PROCEDURE — 6370000000 HC RX 637 (ALT 250 FOR IP): Performed by: UROLOGY

## 2023-09-06 PROCEDURE — 97110 THERAPEUTIC EXERCISES: CPT

## 2023-09-06 PROCEDURE — 99231 SBSQ HOSP IP/OBS SF/LOW 25: CPT | Performed by: PHYSICIAN ASSISTANT

## 2023-09-06 PROCEDURE — 6370000000 HC RX 637 (ALT 250 FOR IP): Performed by: NURSE PRACTITIONER

## 2023-09-06 PROCEDURE — 97112 NEUROMUSCULAR REEDUCATION: CPT

## 2023-09-06 PROCEDURE — 1180000000 HC REHAB R&B

## 2023-09-06 PROCEDURE — 97116 GAIT TRAINING THERAPY: CPT

## 2023-09-06 RX ADMIN — ACETAMINOPHEN 500 MG: 325 TABLET ORAL at 06:05

## 2023-09-06 RX ADMIN — DOCUSATE SODIUM 100 MG: 100 CAPSULE, LIQUID FILLED ORAL at 08:17

## 2023-09-06 RX ADMIN — LOSARTAN POTASSIUM 12.5 MG: 25 TABLET, FILM COATED ORAL at 08:18

## 2023-09-06 RX ADMIN — BUSPIRONE HYDROCHLORIDE 5 MG: 10 TABLET ORAL at 22:32

## 2023-09-06 RX ADMIN — AMLODIPINE BESYLATE 5 MG: 5 TABLET ORAL at 08:17

## 2023-09-06 RX ADMIN — ENOXAPARIN SODIUM 40 MG: 100 INJECTION SUBCUTANEOUS at 08:17

## 2023-09-06 RX ADMIN — DOCUSATE SODIUM 100 MG: 100 CAPSULE, LIQUID FILLED ORAL at 22:32

## 2023-09-06 RX ADMIN — ACETAMINOPHEN 500 MG: 325 TABLET ORAL at 15:51

## 2023-09-06 RX ADMIN — BACLOFEN 5 MG: 10 TABLET ORAL at 08:17

## 2023-09-06 RX ADMIN — Medication 5 MG: at 22:35

## 2023-09-06 RX ADMIN — Medication 2000 UNITS: at 18:38

## 2023-09-06 RX ADMIN — TRAZODONE HYDROCHLORIDE 150 MG: 50 TABLET ORAL at 22:32

## 2023-09-06 RX ADMIN — BACLOFEN 5 MG: 10 TABLET ORAL at 22:32

## 2023-09-06 RX ADMIN — Medication 100 MG: at 08:17

## 2023-09-06 RX ADMIN — ACETAMINOPHEN 500 MG: 325 TABLET ORAL at 22:33

## 2023-09-06 RX ADMIN — BUSPIRONE HYDROCHLORIDE 5 MG: 10 TABLET ORAL at 08:18

## 2023-09-06 RX ADMIN — ATORVASTATIN CALCIUM 10 MG: 10 TABLET, FILM COATED ORAL at 22:33

## 2023-09-06 RX ADMIN — PANTOPRAZOLE SODIUM 40 MG: 40 TABLET, DELAYED RELEASE ORAL at 06:06

## 2023-09-06 RX ADMIN — BICALUTAMIDE 50 MG: 50 TABLET, FILM COATED ORAL at 08:17

## 2023-09-06 RX ADMIN — ATENOLOL 25 MG: 25 TABLET ORAL at 08:18

## 2023-09-06 RX ADMIN — TAMSULOSIN HYDROCHLORIDE 0.4 MG: 0.4 CAPSULE ORAL at 18:38

## 2023-09-06 ASSESSMENT — PAIN SCALES - GENERAL
PAINLEVEL_OUTOF10: 0
PAINLEVEL_OUTOF10: 4
PAINLEVEL_OUTOF10: 3

## 2023-09-06 ASSESSMENT — PAIN DESCRIPTION - DESCRIPTORS: DESCRIPTORS: ACHING

## 2023-09-06 ASSESSMENT — PAIN DESCRIPTION - ORIENTATION
ORIENTATION: MID
ORIENTATION: MID

## 2023-09-06 ASSESSMENT — ENCOUNTER SYMPTOMS: APNEA: 0

## 2023-09-06 ASSESSMENT — PAIN DESCRIPTION - LOCATION
LOCATION: BACK
LOCATION: BUTTOCKS

## 2023-09-06 NOTE — PROGRESS NOTES
OCCUPATIONAL THERAPY  INPATIENT REHAB TREATMENT NOTE  [facility]      NAME: David Henao  : 1935 (81 y.o.)  MRN: [id number]  CODE STATUS: Full Code  Room: R248/R248-01    Date of Service: 2023    Referring Physician: Dr. 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    Restrictions  Restrictions/Precautions  Restrictions/Precautions: Fall Risk                 Patient's date of birth confirmed: Yes    SAFETY:  Safety Devices  Safety Devices in place: Yes  Type of devices: All fall risk precautions in place    SUBJECTIVE:       Pain at start of treatment: No    Pain at end of treatment: No      COGNITION:  Orientation  Overall Orientation Status: Within Normal Limits  Orientation Level: Oriented to person;Oriented to place;Oriented to time;Oriented to situation  Cognition  Overall Cognitive Status: Exceptions  Arousal/Alertness: Appropriate responses to stimuli  Following Commands: Follows one step commands with repetition  Attention Span: Appears intact  Memory: Decreased short term memory;Decreased recall of recent events  Safety Judgement: Decreased awareness of need for safety;Decreased awareness of need for assistance  Problem Solving: Assistance required to generate solutions;Assistance required to implement solutions  Insights: Decreased awareness of deficits  Initiation: Requires cues for some  Sequencing: Requires cues for some      OBJECTIVE:     Provided pt medicine management task with medication organizer and bottles of beads to represent medications. All medication bottles had written directions on them. Pt was able to open/close all bottles without difficulty. Provided Sup to task. Pt was able to read all directions and follow them with good+ accuracy. Pt was able to manipulate all beads to place them without difficulty.   Pt was able to appropriately place all beads in medicine planner but missed 1 container for a 7 day medication direction label. Pt completed task task increase Ind and safety with IADL tasks. ASSESSMENT:  Assessment: Pt having an \"off day\" today. Pt observed to be \"down/solemn\" upon arrival.  Pt stated he did not feel right, but \"did not sleep well\" upon asking, and stated that his \"son and son in law are being indifferent to each other,\" about the pt's care. Activity Tolerance: Patient limited by fatigue      PLAN OF CARE:  Strengthening, Balance training, Functional mobility training, Endurance training, Patient/Caregiver education & training, Equipment evaluation, education, & procurement, Self-Care / ADL, Home management training, Safety education & training, Neuromuscular re-education  Continue OT POC until discharge    Patient goals : Go home and do like I have been doing  Time Frame for Long Term Goals : Within 1.5-2 weeks, pt to demo progress in the following areas listed below to achieve specific LTGs stated in the intial eval  Long Term Goal 1: P tto demo improved ADL/IADL status  Long Term Goal 2: Pt to demo improved standing balance and tolerance  Long Term Goal 3: Pt to demo improved ability to use AD/AE/DME as needed to improve function and maintain pain level  Long Term Goal 4: Pt to demo improved UB strength for self-care        Therapy Time:   Individual Group Co-Treat   Time In 1300       Time Out 1330         Minutes 30                   ADL/IADL trainin minutes     Electronically signed by:     TAMRA Tellez,   2023, 1:08 PM

## 2023-09-06 NOTE — PROGRESS NOTES
Comprehensive Nutrition Assessment    Type and Reason for Visit:  Reassess    Nutrition Recommendations/Plan:   Modify Current Diet (suggest SRAVANI)     Malnutrition Assessment:  Malnutrition Status:  No Malnutrition    Nutrition Assessment:    Pt presents with history of inintended weight loss. Pt reports good appetite/itntake currently. Request updated weight to assess continued trend of weight status. To continue to monitor adequacy of intake at meals. 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 Labs/meds reviewed. Gluc(8/28)-88. Pt receiving steroid tx. +1 BLE edema noted. Last BM noted 8/29. Wound Type: None       Current Nutrition Intake & Therapies:    Average Meal Intake: %  ADULT DIET; Easy to Chew    Anthropometric Measures:  Height:  5' 6\"  Ideal Body Weight (IBW): 142lb  Admission Body Weight: 162 lb 12 oz (73.8 kg) (8/22 bedscale)  Current Body Weight:  (n/a-please obtain current weight)  Current BMI (kg/m2):  25.5  Usual Body Weight: 178 lb (80.7 kg) (5/12 ? source)                       BMI Categories: Overweight (BMI 25.0-29. 9)    Estimated Daily Nutrient Needs:  Energy Requirements Based On: Kcal/kg  Weight Used for Energy Requirements: Admission  Energy (kcal/day): 9017-5274 (kg x 23-25)  Weight Used for Protein Requirements: Ideal  Protein (g/day): 84-90 (kg x 1.3-1.4)  Method Used for Fluid Requirements: 1 ml/kcal  Fluid (ml/day): `1800    Nutrition Diagnosis:   Unintended weight loss related to inadequate protein-energy intake, catabolic illness as evidenced by weight loss    Nutrition Interventions:   Food and/or Nutrient Delivery: Modify Current Diet (suggest SRAVANI)  Nutrition Education/Counseling: No recommendation at this time  Coordination of Nutrition Care: Continue to monitor while inpatient       Goals:     Goals: PO intake 75% or greater (stable weight.  Gluc <140.)       Nutrition Monitoring and Evaluation: Food/Nutrient Intake Outcomes: Food and Nutrient Intake  Physical Signs/Symptoms Outcomes: Weight, Biochemical Data, Fluid Status or Edema    Discharge Planning:     None Identified     Shyann Sosa RD, LD

## 2023-09-06 NOTE — CARE COORDINATION
LSW met with pt and discussed discharge date moving to 9/9, pt is in agreement. Pt's son is currently working on getting a HHA in the home as well as finishing modifications to make home wheelchair accessible. Pt also noted that he is unhappy with the extended brakes on his wheelchair from [facility] and would like the standard instead. LSW reached out to [facility] and is waiting for feedback regarding changing out the hand brakes.  Electronically signed by AMEE Arora LSW on 9/6/2023 at 5:56 PM

## 2023-09-06 NOTE — PROGRESS NOTES
Physical Therapy Rehab Treatment Note  Facility/Department: [facility]  [facility]       NAME: Savanna Dickerson  : 1935 (24 y.o.)  MRN: [id number]  CODE STATUS: Full Code    Date of Service: 2023     Restrictions:  Restrictions/Precautions: Fall Risk     SUBJECTIVE: Pt states he is supposed to get a leg bag but the nurse hasn't come yet. Pain: Pt denies pain     OBJECTIVE:         Bed mobility  Rolling to Left: Independent  Rolling to Right: Independent  Supine to Sit: Independent  Sit to Supine: Independent    Transfers  Sit to Stand: Supervision  Stand to Sit: Supervision  Bed to Chair: Stand by assistance  Comment: Initial transfer required VCs for hand placement and technique. Improved with multiple trials. Ambulation  Surface: Level tile  Device: Rolling Walker  Assistance: Minimal assistance  Quality of Gait: Ataxic LEs with variable step length and WERNER. Distance: 50ftx2  Comments: VCs for awareness of foot placement. Stairs/Curb  Stairs?: Yes  Stairs  # Steps : 4  Stairs Height: 6\"  Rails: Bilateral  Assistance: Minimal assistance  Comment: Vcs to bring hands down rails with descent. Wheelchair Activities  Left Brakes Level of Assistance: Independent  Right Brakes Level of Assistance: Independent  Propulsion: Yes  Propulsion 1  Propulsion: Manual  Level: Level Tile  Method: LUE;RUE  Level of Assistance: Modified independent  Description/ Details: Indep propeling with leg rests removed. Supervision with min VCs to improve positioning of WC to be able to lay down and get back up independently. Distance: 200ft     PT Exercises  Exercise Treatment: seated AP x20, LAQ 2# x10, march 2# x10; hip add with ball x20, hip abd x20 RTB  A/AROM Exercises: supine bridges x10; LTR x10            ASSESSMENT/PROGRESS TOWARDS GOALS: Assessment: Pt hyperfocused on urinary cateter and not having leg bag on. RN notified. Pt able to be redirected.   Pt cont to have decreased carryover of transfer

## 2023-09-06 NOTE — FLOWSHEET NOTE
Patient assessment complete. C/o burnham bag feels like it is tugging/pulling, requesting a leg bag be applied-done. Pt states he is very anxious today due to worries over his wife and finances, emotional support given. HETAL feet plus two edema/pitting, per pt this is new as of this morning; Dr. Orquidea Benz notified. Stockings applied.

## 2023-09-06 NOTE — PROGRESS NOTES
Comprehensive Nutrition Assessment    Type and Reason for Visit:  Reassess    Nutrition Recommendations/Plan:   Modify Current Diet (suggest SRAVANI)     Malnutrition Assessment:  Malnutrition Status:  No malnutrition (09/06/23 1108)      Nutrition Assessment:    Pt presents with history of inintended weight loss. Pt reports good appetite/itntake currently. Request updated weight to assess continued trend of weight status. To continue to monitor adequacy of intake at meals. 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 Labs/meds reviewed. Gluc(9/3)-95. Pt prev receiving steroid tx (d/c'd 9/5). +1 BLE edema noted. Last BM noted 9/3. Wound Type: None       Current Nutrition Intake & Therapies:    Average Meal Intake: %  ADULT DIET; Easy to Chew      Anthropometric Measures:  Height:  5' 6\"Ideal Body Weight (IBW): 142lb  Ad[facility] 162 lb 12 [facility]73.8 [facility]  Current Body Weight:  (n/a-please obtain current weight)  Current BMI (kg/m2):  25.5  Usual Body Weight: 178 lb (80.7 kg) (5/12 ? source)                       BMI Categories: Overweight (BMI 25.0-29. 9)    Estimated Daily Nutrient Needs:  Energy Requirements Based On: Kcal/kg  Weight Used for Energy Requirements: Admission  Energy (kcal/day): 8346-4616 (kg x 23-25)  Weight Used for Protein Requirements: Ideal  Protein (g/day): 84-90 (kg x 1.3-1.4)  Method Used for Fluid Requirements: 1 ml/kcal  Fluid (ml/day): `1800    Nutrition Diagnosis:   Unintended weight loss related to inadequate protein-energy intake, catabolic illness as evidenced by weight loss    Nutrition Interventions:   Food and/or Nutrient Delivery: Modify Current Diet (suggest SRAVANI)  Nutrition Education/Counseling: No recommendation at this time  Coordination of Nutrition Care: Continue to monitor while inpatient       Goals:     Goals: PO intake 75% or greater (stable weight.  Gluc <140.)

## 2023-09-06 NOTE — PROGRESS NOTES
wash legs/feet  Upper Extremity Dressing  Assistance Level: Modified independent  Lower Extremity Dressing  Assistance Level: Moderate assistance  Skilled Clinical Factors: pt needing assist to don/doff short/underwear d/t difficulty and anterior cath insertion point pain  Putting On/Taking Off Footwear  Assistance Level: Dependent  Skilled Clinical Factors: difficulty reaching feet and feet swollen; JABIER hose donned and grippy socks; pt req TD (A) to doff socks  Toileting  Skilled Clinical Factors: none  Toilet Transfers  Assistance Level: Minimal assistance  Tub/Shower Transfers  Skilled Clinical Factors: sponge bath at sink w/c level         Supine to Sit  Assistance Level: Supervision  Scooting  Assistance Level: Modified independent  Sit to Stand  Assistance Level: Contact guard assist  Stand to Sit  Assistance Level: Contact guard assist  Bed To/From Chair  Technique: Stand step  Assistance Level: Minimal assistance  Skilled Clinical Factors: vc's for postural alignment, pushing up all the way through the knees; unstable upon standing and difficulty moving BLE especially the R leg to pivot. ASSESSMENT:  Assessment: Pt having an \"off day\" today. Pt observed to be \"down/solemn\" upon arrival.  Pt stated he did not feel right, but \"did not sleep well\" upon asking, and stated that his \"son and son in law are being indifferent to each other,\" about the pt's care. Activity Tolerance: Patient limited by fatigue      PLAN OF CARE:  Strengthening, Balance training, Functional mobility training, Endurance training, Patient/Caregiver education & training, Equipment evaluation, education, & procurement, Self-Care / ADL, Home management training, Safety education & training, Neuromuscular re-education  Continue OT POC until discharge    Patient goals : Go home and do like I have been doing  Time Frame for Long Term Goals :  Within 1.5-2 weeks, pt to demo progress in the following areas listed below to achieve specific LTGs stated in the intial eval  Long Term Goal 1: P tto demo improved ADL/IADL status  Long Term Goal 2: Pt to demo improved standing balance and tolerance  Long Term Goal 3: Pt to demo improved ability to use AD/AE/DME as needed to improve function and maintain pain level  Long Term Goal 4: Pt to demo improved UB strength for self-care        Therapy Time:   Individual Group Co-Treat   Time In 0830       Time Out 0930         Minutes 60                 ADL/IADL trainin minutes     Electronically signed by:     TAMRA Pedro,   2023, 9:59 AM

## 2023-09-06 NOTE — PROGRESS NOTES
Pt assessment completed. Medications given per MAR. Núñez leg bag replaced with a regular bad. Núñez is intact draining clear christiano urine. Education for pt emptying bag. Needs teaching regarding leg bag. Pt denies any needs at this time. Call light with in reach and bed is in the lowest position.

## 2023-09-06 NOTE — PROGRESS NOTES
Physical Therapy Rehab Treatment Note  Facility[facility]Department: [facility]  Room: V94/I656-01       NAME: Marcus Moran  : 1935 (43 y.o.)  MRN: [id number]  CODE STATUS: Full Code    Date of Service: 2023     Restrictions:  Restrictions/Precautions: Fall Risk     SUBJECTIVE: Pt states his son is in his room trying to arrange home health. \"I don't know how I'm going to walk on the carpet at home. \"        Pain   Pt denies pain. OBJECTIVE:         Bed mobility  Rolling to Left: Independent  Rolling to Right: Independent  Supine to Sit: Independent  Sit to Supine: Independent    Transfers  Sit to Stand: Supervision  Stand to Sit: Supervision  Bed to Chair: Supervision  Comment: Initial transfer required VCs for hand placement and technique. Improved with multiple trials. Ambulation  Surface: Level tile;Uneven;Carpet  Device: Rolling Walker  Assistance: Minimal assistance  Quality of Gait: Ataxic LEs with variable step length and WERNER. Distance: 50ftx3  Comments: VCs for awareness of foot placement and WW control. PT Exercises  Exercise Treatment:   2# x10, # x10; hip add with ball x20, hip abd x20 RTB  Pressure Relief Exercises: Seated wt shifts and partial STS with UE support in WC. Dynamic Standing Balance Exercises: Standing beach ball tap with 1 UE support. Motor Control/Coordination: Standing # box and 6in cone toe taps in //bars with B UE support. Mirror utilized for visual cues to maintain upright posture. Education  Education Provided Comments: Pressure releif exercises seated. Educated pt on utilizing [facility] for indep mobility at home and to cont to work on gait and stairs with therapy. Education Outcome: Continued education needed    ASSESSMENT/PROGRESS TOWARDS GOALS:   Assessment: Pt frustrated on having to come to scheduled therapy session. Pt able to be redirected. Pt able to complete LE motor control ex in standing with B UE support and UE movements with 1 UE support.

## 2023-09-07 PROCEDURE — 97116 GAIT TRAINING THERAPY: CPT

## 2023-09-07 PROCEDURE — 97535 SELF CARE MNGMENT TRAINING: CPT

## 2023-09-07 PROCEDURE — 97112 NEUROMUSCULAR REEDUCATION: CPT

## 2023-09-07 PROCEDURE — 97129 THER IVNTJ 1ST 15 MIN: CPT

## 2023-09-07 PROCEDURE — 6360000002 HC RX W HCPCS: Performed by: PHYSICAL MEDICINE & REHABILITATION

## 2023-09-07 PROCEDURE — 97110 THERAPEUTIC EXERCISES: CPT

## 2023-09-07 PROCEDURE — 6370000000 HC RX 637 (ALT 250 FOR IP): Performed by: PHYSICAL MEDICINE & REHABILITATION

## 2023-09-07 PROCEDURE — 6370000000 HC RX 637 (ALT 250 FOR IP): Performed by: INTERNAL MEDICINE

## 2023-09-07 PROCEDURE — 99233 SBSQ HOSP IP/OBS HIGH 50: CPT | Performed by: PHYSICAL MEDICINE & REHABILITATION

## 2023-09-07 PROCEDURE — 97542 WHEELCHAIR MNGMENT TRAINING: CPT

## 2023-09-07 PROCEDURE — 97530 THERAPEUTIC ACTIVITIES: CPT

## 2023-09-07 PROCEDURE — 6370000000 HC RX 637 (ALT 250 FOR IP): Performed by: UROLOGY

## 2023-09-07 PROCEDURE — 1180000000 HC REHAB R&B

## 2023-09-07 PROCEDURE — 6370000000 HC RX 637 (ALT 250 FOR IP): Performed by: NURSE PRACTITIONER

## 2023-09-07 RX ADMIN — PANTOPRAZOLE SODIUM 40 MG: 40 TABLET, DELAYED RELEASE ORAL at 06:34

## 2023-09-07 RX ADMIN — BACLOFEN 5 MG: 10 TABLET ORAL at 20:42

## 2023-09-07 RX ADMIN — BACLOFEN 5 MG: 10 TABLET ORAL at 09:00

## 2023-09-07 RX ADMIN — DOCUSATE SODIUM 100 MG: 100 CAPSULE, LIQUID FILLED ORAL at 09:01

## 2023-09-07 RX ADMIN — ACETAMINOPHEN 500 MG: 325 TABLET ORAL at 14:00

## 2023-09-07 RX ADMIN — ATORVASTATIN CALCIUM 10 MG: 10 TABLET, FILM COATED ORAL at 20:42

## 2023-09-07 RX ADMIN — DOCUSATE SODIUM 100 MG: 100 CAPSULE, LIQUID FILLED ORAL at 20:43

## 2023-09-07 RX ADMIN — TRAZODONE HYDROCHLORIDE 150 MG: 50 TABLET ORAL at 20:43

## 2023-09-07 RX ADMIN — ENOXAPARIN SODIUM 40 MG: 100 INJECTION SUBCUTANEOUS at 09:00

## 2023-09-07 RX ADMIN — ATENOLOL 25 MG: 25 TABLET ORAL at 09:01

## 2023-09-07 RX ADMIN — BICALUTAMIDE 50 MG: 50 TABLET, FILM COATED ORAL at 09:00

## 2023-09-07 RX ADMIN — TAMSULOSIN HYDROCHLORIDE 0.4 MG: 0.4 CAPSULE ORAL at 16:53

## 2023-09-07 RX ADMIN — ACETAMINOPHEN 500 MG: 325 TABLET ORAL at 06:33

## 2023-09-07 RX ADMIN — BUSPIRONE HYDROCHLORIDE 5 MG: 10 TABLET ORAL at 09:01

## 2023-09-07 RX ADMIN — Medication 100 MG: at 09:00

## 2023-09-07 RX ADMIN — ACETAMINOPHEN 500 MG: 325 TABLET ORAL at 20:42

## 2023-09-07 RX ADMIN — LOSARTAN POTASSIUM 12.5 MG: 25 TABLET, FILM COATED ORAL at 09:00

## 2023-09-07 RX ADMIN — FLUTICASONE PROPIONATE 2 SPRAY: 50 SPRAY, METERED NASAL at 09:06

## 2023-09-07 RX ADMIN — Medication 5 MG: at 20:42

## 2023-09-07 RX ADMIN — BUSPIRONE HYDROCHLORIDE 5 MG: 10 TABLET ORAL at 20:42

## 2023-09-07 RX ADMIN — Medication 2000 UNITS: at 16:53

## 2023-09-07 RX ADMIN — AMLODIPINE BESYLATE 5 MG: 5 TABLET ORAL at 09:01

## 2023-09-07 ASSESSMENT — PAIN DESCRIPTION - LOCATION: LOCATION: BACK

## 2023-09-07 ASSESSMENT — PAIN DESCRIPTION - ORIENTATION: ORIENTATION: LOWER

## 2023-09-07 ASSESSMENT — PAIN SCALES - GENERAL
PAINLEVEL_OUTOF10: 0
PAINLEVEL_OUTOF10: 4

## 2023-09-07 ASSESSMENT — PAIN DESCRIPTION - DESCRIPTORS: DESCRIPTORS: ACHING

## 2023-09-07 NOTE — PROGRESS NOTES
Worked with patient emptying leg bag catheter. Able to twist valve open and shut. States he feel better with that.  Electronically signed by Zacarias Galvan RN on 9/7/23 at 5:04 PM EDT

## 2023-09-07 NOTE — CARE COORDINATION
KELIW spoke with Macrina Mortensen at [address] and confirmed that a technician will be out to [address] on Friday 9/8 to switch out the handbrakes.  Electronically signed by AMEE Hartley, FABY on 9/7/2023 at 2:34 PM

## 2023-09-07 NOTE — PROGRESS NOTES
OCCUPATIONAL THERAPY  INPATIENT REHAB TREATMENT NOTE  [facility]      NAME: Noemi Morales  : 1935 (81 y.o.)  MRN: [id number]  CODE STATUS: Full Code  Room: R248/R248-01    Date of Service: 2023    Referring Physician: Dr. 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 Challenged pt through usage of theraclips of natural resistance levels. Pt required to manipulate clips through pinching/grasping, reaching and placing according to instruction onto vertical dowel. Pt then required to retrieve. Repetitive bilateral UE reaches completed to various planes and directions and at times required >90 degrees of shoulder flexion. Good overall ability to manage clips with both hands     STS: SBA  Standing tolerance: 4 mins  Balance: Fair-, UE support on table     While seated, challenged strength, activity tolerance, ROM, and flexibility for improved ADL performance. Issued BUE HEP handout. Pt with fair ability to use pictures of handout for proper technique    Challenged pt through usage of 2# weight to complete BUE exercises. 2 sets x 10 reps completed.  Exercises focused on all UE joints and planes of motion including scapular protraction/retraction, shoulder flexion/extension/rotation/horizontal abduction, elbow flexion/extension, supination/pronation, and wrist/digit flexion/extension. Education:  Education  Education Given To: Patient  Education Provided: Role of Therapy; ADL Function;Precautions  Education Method: Verbal;Demonstration  Education Outcome: Continued education needed    ASSESSMENT:  Activity Tolerance: Patient tolerated treatment well    PLAN OF CARE:  Strengthening, Balance training, Functional mobility training, Endurance training, Patient/Caregiver education & training, Equipment evaluation, education, & procurement, Self-Care / ADL, Home management training, Safety education & training, Neuromuscular re-education  Continue OT POC until discharge    Patient goals : Go home and do like I have been doing  Time Frame for Long Term Goals :  Within 1.5-2 weeks, pt to demo progress in the following areas listed below to achieve specific LTGs stated in the intial eval  Long Term Goal 1: P tto demo improved ADL/IADL status  Long Term Goal 2: Pt to demo improved standing balance and tolerance  Long Term Goal 3: Pt to demo improved ability to use AD/AE/DME as needed to improve function and maintain pain level  Long Term Goal 4: Pt to demo improved UB strength for self-care    Therapy Time:   Individual Group Co-Treat   Time In 1430       Time Out 1500         Minutes 30             Therapeutic activities: 30 minutes     Electronically signed by:    Veronique Mullen OT,   9/7/2023, 2:46 PM

## 2023-09-07 NOTE — PLAN OF CARE
Problem: Discharge Planning  Goal: Discharge to home or other facility with appropriate resources  9/7/2023 1158 by Alvaro Bell RN  Outcome: Progressing  9/7/2023 0152 by Lynnette Craig RN  Outcome: Progressing  Flowsheets  Taken 9/6/2023 2200 by Lynnette Craig RN  Discharge to home or other facility with appropriate resources: Identify barriers to discharge with patient and caregiver  Taken 9/6/2023 1310 by Dana Costello RN  Discharge to home or other facility with appropriate resources: Identify barriers to discharge with patient and caregiver     Problem: Safety - Adult  Goal: Free from fall injury  9/7/2023 1158 by Alvaro Bell RN  Outcome: Progressing  9/7/2023 0152 by Lynnette Craig RN  Outcome: Progressing     Problem: ABCDS Injury Assessment  Goal: Absence of physical injury  9/7/2023 1158 by Alvaro Bell RN  Outcome: Progressing  9/7/2023 0152 by Lynnette Craig RN  Outcome: Progressing     Problem: Skin/Tissue Integrity  Goal: Absence of new skin breakdown  Description: 1. Monitor for areas of redness and/or skin breakdown  2. Assess vascular access sites hourly  3. Every 4-6 hours minimum:  Change oxygen saturation probe site  4. Every 4-6 hours:  If on nasal continuous positive airway pressure, respiratory therapy assess nares and determine need for appliance change or resting period. 9/7/2023 1158 by Alvaro Bell RN  Outcome: Progressing  9/7/2023 0152 by Lynnette Craig RN  Outcome: Progressing     Problem: Skin/Tissue Integrity  Goal: Absence of new skin breakdown  Description: 1. Monitor for areas of redness and/or skin breakdown  2. Assess vascular access sites hourly  3. Every 4-6 hours minimum:  Change oxygen saturation probe site  4. Every 4-6 hours:  If on nasal continuous positive airway pressure, respiratory therapy assess nares and determine need for appliance change or resting period.   9/7/2023 1158 by Alvaro Bell RN  Outcome: Progressing  9/7/2023 0152 by

## 2023-09-07 NOTE — PROGRESS NOTES
OCCUPATIONAL THERAPY  INPATIENT REHAB TREATMENT NOTE  [facility]      NAME: Micky Juarez  : 1935 (81 y.o.)  MRN: [id number]  CODE STATUS: Full Code  Room: R248/R248-01    Date of Service: 2023    Referring Physician: Dr. 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    Restrictions  Restrictions/Precautions  Restrictions/Precautions: Fall Risk             Patient's date of birth confirmed: Yes    SAFETY:  Safety Devices  Safety Devices in place: Yes  Type of devices: All fall risk precautions in place    SUBJECTIVE:       Pain at start of treatment: No    Pain at end of treatment: No      COGNITION:  Orientation  Overall Orientation Status: Within Functional Limits  Orientation Level: Oriented to place;Oriented to time;Oriented to situation;Oriented to person  Cognition  Overall Cognitive Status: Exceptions  Arousal/Alertness: Appropriate responses to stimuli  Following Commands: Follows one step commands consistently; Follows multistep commands with increased time  Attention Span: Appears intact  Memory: Decreased short term memory;Decreased recall of recent events  Safety Judgement: Decreased awareness of need for safety;Decreased awareness of need for assistance  Problem Solving: Assistance required to generate solutions;Assistance required to implement solutions  Insights: Decreased awareness of deficits  Initiation: Requires cues for some  Sequencing: Requires cues for some      OBJECTIVE:     Pt completed BUE bike on Min resistance  Pt utilized UE ergometer x 10 min (5 min forwards/5 min backwards) with x 1 recovery periods  Pt c/o pain and required intermittent recovery periods in order to complete task. Pt completed task seated.   Pt completed this task to improve functional activity tolerance and strength in order to improve functional transfers and mobility

## 2023-09-07 NOTE — PROGRESS NOTES
Assessment completed. A&O x4. Denies pain at this time. Núñez patent, draining clear christiano urine. In chair with alarm activated. Call light in reach.  Electronically signed by Angelina Roach LPN on 8/5/6330 at 92:09 AM

## 2023-09-07 NOTE — PROGRESS NOTES
INDIVIDUALIZED OVERALL REHAB PLAN OF CARE  ADDENDUM TO REHAB PROGRESS NOTE-for audit purposes must also refer to this day's clinical note and combine the information      Date: 2023  Patient Name: Noemi Morales   Room: T941/C479-04    MRN: [id number]    : 1935  (81 y.o.)  Gender: male       Today 2023 during weekly team meeting, I reviewed the patient Noemi Morales in detail with the therapists and nurses involved in patient's care gathering complex physiatric data regarding current medical issues, progress in therapies, factors limiting progress, social issues, psychological issues, ongoing therapeutic plans and discharge planning. Legend:  I= independent Im =Modified independent  S=Supervised SB=stand by BENNETT=set up CG=contact hyun Min= minimal Mod=Moderate Max=maximal Max of 2 =maximal assist of 2 people      CURRENT FUNCTIONAL STATUS:    Patient was admitted through the emergency room [facility] in 2023 for evaluation of acute on chronic low back pain. Patient has been seen 3 times in the past 3 weeks in the emergency room. 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 He was found to have malignant lesion of the spine causing compression with significant pain and with progressive neurologic findings. He was admitted under the care of the hospitalist with hematology oncology, respiratory care, radiation therapy, neuro spine consulting. NURSING ISSUES:   C/o burnham bag feels like it is tugging/pulling, requesting a leg bag be applied-done. Pt states he is very anxious today due to worries over his wife and finances, emotional support given. HETAL feet plus two edema/pitting, per pt this is new as of this morning; Dr. Orquidea Benz notified. the Last Year: Not on file    Unstable Housing in the Last Year: No           THERAPY, MEDICAL AND NURSING COORDINATION:    [x]  Pain medication before therapies     [x]  Check orthostatic BP and monitor heart rate and medications effects with therapy      [x]  Ambulate to the bathroom in room    [x]  Add scheduled rest beaks     [x]  In room therapies as needed      Discharge date set for:              9/9/23      Home with:   alone  with help from   son            And: Home Health Care:     [x]  PT    [x]  OT       [x]  Aide       [x]  RN             Extended discharge date to work on consistency of function, balance, endurance, use of leg bag, transitioning to depend in the room versus independent apartment and give family more time to put the wheelchair around for him.        Equipment:  [employer],  WC, burnham with burnham supplies      At D/C their function is goaled at:   PT:Long Term Goal 1: indep and efficient bed mobility  Long Term Goal 2: indep w/c to bed transfer; SBA sit to stand and car transfers  Long Term Goal 3: SBA gait 50 feet with appropriate device  Long Term Goal 4: min assist with 2 stairs for safe home entry  Long Term Goal 5: pt able to propel w/c indep 50 feet and complete w/c prep indep  OT:Eating  Assistance Needed: Independent  CARE Score: 6  Discharge Goal: Independent, Oral Hygiene  Assistance Needed: Supervision or touching assistance  CARE Score: 4  Discharge Goal: Independent, [address] needed: Substantial/maximal assistance  CARE Score: 2  Discharge Goal: Independent, Shower/Bathe Self  Assistance Needed: Partial/moderate assistance  CARE Score: 3  Discharge Goal: Independent  Upper Body Dressing  Reason if not Attempted: Not attempted due to environmental limitations  CARE Score: 10  Discharge Goal: Independent, Lower Body Dressing  Assistance Needed: Substantial/maximal assistance  CARE Score: 2  Discharge Goal: Independent, Putting On/Taking Off Footwear  Assistance

## 2023-09-07 NOTE — PROGRESS NOTES
Physical Therapy Rehab Treatment Note  Facility/Department: [facility]  Room: Y462/H689-62       NAME: Naina Delarosa  : 1935 (81 y.o.)  MRN: [id number]  CODE STATUS: Full Code    Date of Service: 2023       Restrictions:  Restrictions/Precautions: Fall Risk  Position Activity Restriction  Other position/activity restrictions: clinical reasoning- limit spine flexion       SUBJECTIVE:   Subjective: \"I just had a long interview with the home health care people, I'm not too happy about needing it, but it is what it is\"    Pain  Pain: no pain reported pre/post session      OBJECTIVE:            Transfers  Surface: Wheelchair  Additional Factors: Verbal cues; Hand placement cues; Increased time to complete  Device: Walker  Sit to Stand  Assistance Level: Stand by assist  Skilled Clinical Factors: increased time to stabilize in standing, utilized BLE against chair to stabilize self. Stand to Sit  Assistance Level: Stand by assist  Skilled Clinical Factors: Utilizes BUE for eccentric lowering    Ambulation  Surface: Level surface  Device: Rolling walker  Distance: 175'  Activity: Within Unit  Activity Comments: Slow pace inconsistent foot placement  Additional Factors: Verbal cues  Assistance Level: Stand by assist;Minimal assistance  Gait Deviations: Slow ryan;Decreased weight shift bilateral;Decreased step length bilateral  Skilled Clinical Factors: occasional touching assist to maintain stability, cues for foot placement and to keep ww closer. Stairs  Stair Height: 6''  Device: Bilateral handrails  Number of Stairs: 4  Additional Factors: Verbal cues; Hand placement cues; Non-reciprocal going up;Non-reciprocal going down; Increased time to complete  Assistance Level: Stand by assist  Skilled Clinical Factors: increased difficulty with descending steps, close SBA for safety but no physical assist needed    Wheelchair  Surface: Level surface; Ramp  Device: Standard wheelchair  Additional Factors: Increased time to

## 2023-09-07 NOTE — PROGRESS NOTES
50ftx3 (09/06/23 1545)  Comments: VCs for awareness of foot placement and WW control. (09/06/23 1545)  Gait Training: Yes (09/02/23 1519)  Overall Level of Assistance: Contact-guard assistance;Minimum assistance (09/02/23 1519)  Distance (ft): 30 Feet (09/02/23 1519)  Assistive Device: Walker, rolling (09/02/23 1519)  Interventions: Verbal cues; Tactile cues (09/02/23 1519)  Base of Support: Narrowed (09/02/23 1519)  Speed/Ryan: Pace decreased (< 100 feet/min) (09/02/23 1519)  Step Length: Left shortened (09/02/23 1519)  Gait Abnormalities: Decreased step clearance (09/02/23 1519)  Right Side Weight Bearing: As tolerated (09/02/23 1519)  Left Side Weight Bearing: As tolerated (09/02/23 1519)  Ambulation  Surface: Level surface (09/04/23 1312)  Device: Rolling walker (09/04/23 1312)  Distance: 35' x 2 - limited by destination (09/04/23 1312)  Activity: Within Unit (09/04/23 1312)  Activity Comments: Slow pace inconsistent foot placement (09/04/23 1312)  Additional Factors: Verbal cues (09/04/23 1312)  Assistance Level: Minimal assistance (09/04/23 1312)  Gait Deviations: Slow ryan;Decreased weight shift bilateral;Decreased step length bilateral (09/04/23 1312)  Skilled Clinical Factors: No LOB this session, however pt requires cues to keep [address] [place] [address] within close proximity as pt with tendency to lean too far forward on [address] [place] [address] causing increased instability. Pt requires vc's for proper step length and ryan throughout (09/04/23 1312)  Stairs:  Stairs/Curb  Stairs?: Yes (09/06/23 1006)  Stairs  # Steps : 4 (09/06/23 1006)  Stairs Height: 6\" (09/06/23 1006)  Rails: Bilateral (09/06/23 1006)  Device: No Device (08/26/23 1115)  Assistance: Minimal assistance (09/06/23 1006)  Comment: Vcs to bring hands down rails with descent. (09/06/23 1006)  Stairs  Stair Height: 6'' (09/04/23 1312)  Device: Bilateral handrails (09/04/23 1312)  Number of Stairs: 4 (09/04/23 1312)  Additional Factors: Verbal cues; Hand placement meeting   Thursday to re-assess progress towards goals, discuss and address social, psychological and medical comorbidities and to address difficulties they may be having progressing in therapy. Patient and family education is in progress. The patient is to follow-up with their family physician after discharge. The patient will have a wheelchair at discharge due to mobility limitations that significantly impair her ability to participate in ADL's including dressing, her mobility limitations cannot be sufficiently and safely resolved by the use of a cane or walker but can be sufficiently resolved by the use of a manual wheelchair; the patient or caregiver is able to safely use a manual wheelchair; the wheelchair will be used regularly in the home. Complex Active General Medical Issues that complicated care: Active Problems:    Secondary hypertension, Hyperlipidemia-Acute rehab to monitor heart rate and rhythm with the option of telemetry and the effects of chronotropic medication with respect to increasing physical activity and exercise in PT, OT, ADLs with medication titration to lowest effective dosing. Continue blood signs every shift focusing on heart rate, rhythm and blood pressure checks with orthostatic checks-monitoring the effect of exercise, therapy and posture. Consult hospitalist for backup medical and adjust/add medications (Norvasc, Tenormin, Lipitor, Cozaar). Monitor heart rate and blood pressure as well as medications effects on vital signs before during and after therapy with especial focus on preventing orthostasis and falls risk. COPD (chronic obstructive pulmonary disease) -Acute rehab for endurance traing with Pulse Ox to monitoring oxygen saturation and heart rate with O2 titration to lowest effective dose. Pulse oximeter checks to shift and at HS to dose and titrate oxygen and aerosol treatments monitor for nocturnal hypoxemia, monitor vital signs, oxygen prn.   Focus on energy

## 2023-09-07 NOTE — PLAN OF CARE
Problem: Discharge Planning  Goal: Discharge to home or other facility with appropriate resources  Outcome: Progressing  Flowsheets  Taken 9/6/2023 2200 by Shivani Guidry RN  Discharge to home or other facility with appropriate resources: Identify barriers to discharge with patient and caregiver  Taken 9/6/2023 1310 by Jose Daniel Santiago RN  Discharge to home or other facility with appropriate resources: Identify barriers to discharge with patient and caregiver

## 2023-09-07 NOTE — CARE COORDINATION
PHYSICAL THERAPY  Bed mobility:  Bed mobility  Bridging: Stand by assistance (08/26/23 1152)  Rolling to Left: Independent (09/06/23 1016)  Rolling to Right: Independent (09/06/23 1016)  Supine to Sit: Independent (09/06/23 1016)  Sit to Supine: Independent (09/06/23 1016)  Scooting: Supervision (09/01/23 1026)  Bed Mobility Comments: HOB Flat and without rails. (09/01/23 1026)  Transfers:  Bed mobility  Bridging: Stand by assistance (08/26/23 1152)  Rolling to Left: Independent (09/06/23 1016)  Rolling to Right: Independent (09/06/23 1016)  Supine to Sit: Independent (09/06/23 1016)  Sit to Supine: Independent (09/06/23 1016)  Scooting: Supervision (09/01/23 1026)  Bed Mobility Comments: HOB Flat and without rails. (09/01/23 1026)  Gait:   Ambulation  Surface: Level tile;Uneven;Carpet (09/06/23 1545)  Device: Rolling Walker (09/06/23 1545)  Other Apparatus: Wheelchair follow (08/28/23 1158)  Assistance: Minimal assistance (09/06/23 1545)  Quality of Gait: Ataxic LEs with variable step length and WERNER. (09/06/23 1545)  Gait Deviations: Slow Alesha;Decreased step length;Decreased step height (09/01/23 1042)  Distance: 50ftx3 (09/06/23 1545)  Comments: VCs for awareness of foot placement and WW control. (09/06/23 1545)  Gait Training: Yes (09/02/23 1519)  Overall Level of Assistance: Contact-guard assistance;Minimum assistance (09/02/23 1519)  Distance (ft): 30 Feet (09/02/23 1519)  Assistive Device: Walker, rolling (09/02/23 1519)  Interventions: Verbal cues; Tactile cues (09/02/23 1519)  Base of Support: Narrowed (09/02/23 1519)  Speed/Alesha: Pace decreased (< 100 feet/min) (09/02/23 1519)  Step Length: Left shortened (09/02/23 1519)  Gait Abnormalities: Decreased step clearance (09/02/23 1519)  Right Side Weight Bearing: As tolerated (09/02/23 1519)  Left Side Weight Bearing: As tolerated (09/02/23 1519)  Ambulation  Surface: Level surface (09/04/23 1312)  Device: Rolling walker (09/04/23 1312)  Distance: knee instability        1. Safety:          - Intervention / Plan:    [x]  falls protocol     [x]  PT/OT    [x]  SP        - Results: Needs cues         2. Potential DME needs:         - Intervention / Plan:  [x]  PT/OT     [x]  Assess equipment needs/access       - Results: Wheelchair ordered and delivered by [facility]        3. Weakness:          - Intervention / Plan:  [x]  PT/OT      []  Other:         - Results:         4. Discharge planning needs:          - Intervention / Plan:  [x]  Weekly team conference      [x]  family training        - Results: Family training held on 9/5 and went well with son        5.            - Intervention / Plan:          - Results:         10.            - Intervention / Plan:         - Results:         7.            - Intervention / Plan:         - Results:           Discharge Plan   Estimated Length of Stay: 18 days    Tentative Discharge date: 9/9/23      Anticipated Discharge Destination:  Home      Team recommendations:    1. Follow up Therapy :    PT  OT  SLP  RN  Social Work  Located within [facility] Aide    2.  Home Health    Other:     Equipment needed at Discharge: W/C      Team Members Present at Conference:    Physician: Dr. Alexa López Worker: AMEE Melara, LSW  RN: Simone Cooley, RN  Physical Therapist: Yolanda Cuevas, NANCY  Occupational Therapist: Toni Yoder OTR  Speech Therapist: Augusta French, SLP  Nurse Manager: Babak Schafer, RN      Electronically signed by AMEE Melara LSW on 9/7/2023 at 9:01 AM

## 2023-09-07 NOTE — ONCOLOGY
Hematology/Oncology  Attending Progress Note        CHIEF COMPLAINT/HPI:  Pain is controlled. Weakness is better. REVIEW OF SYSTEMS:    Unremarkable except for symptoms mentioned in HPI.     Current Inpatient Medications:    Current Facility-Administered Medications: bicalutamide (CASODEX) chemo tablet 50 mg , 50 mg, Oral, Daily  lidocaine 4 % external patch 3 patch, 3 patch, TransDERmal, Daily PRN  losartan (COZAAR) tablet 12.5 mg, 12.5 mg, Oral, Daily  docusate sodium (COLACE) capsule 100 mg, 100 mg, Oral, BID  oxyCODONE capsule 10 mg, 10 mg, Oral, Q4H PRN  enoxaparin (LOVENOX) injection 40 mg, 40 mg, SubCUTAneous, Daily  melatonin disintegrating tablet 5 mg, 5 mg, Oral, Nightly  acetaminophen (TYLENOL) tablet 500 mg, 500 mg, Oral, 3 times per day  sodium chloride flush 0.9 % injection 5-40 mL, 5-40 mL, IntraVENous, 2 times per day  sodium chloride flush 0.9 % injection 5-40 mL, 5-40 mL, IntraVENous, PRN  0.9 % sodium chloride infusion, , IntraVENous, PRN  ondansetron (ZOFRAN-ODT) disintegrating tablet 4 mg, 4 mg, Oral, Q8H PRN **OR** ondansetron (ZOFRAN) injection 4 mg, 4 mg, IntraVENous, Q6H PRN  polyethylene glycol (GLYCOLAX) packet 17 g, 17 g, Oral, Daily PRN  amLODIPine (NORVASC) tablet 5 mg, 5 mg, Oral, Daily  atenolol (TENORMIN) tablet 25 mg, 25 mg, Oral, Daily  fluticasone (FLONASE) 50 MCG/ACT nasal spray 2 spray, 2 spray, Nasal, Daily  atorvastatin (LIPITOR) tablet 10 mg, 10 mg, Oral, Nightly  traZODone (DESYREL) tablet 150 mg, 150 mg, Oral, Nightly  pantoprazole (PROTONIX) tablet 40 mg, 40 mg, Oral, QAM AC  ipratropium 0.5 mg-albuterol 2.5 mg (DUONEB) nebulizer solution 1 Dose, 1 Dose, Inhalation, Q4H PRN  calcium carbonate (TUMS) chewable tablet 750 mg, 750 mg, Oral, TID PRN  busPIRone (BUSPAR) tablet 5 mg, 5 mg, Oral, BID  baclofen (LIORESAL) tablet 5 mg, 5 mg, Oral, BID  acetaminophen (TYLENOL) tablet 650 mg, 650 mg, Oral, Q4H PRN  bisacodyl (DULCOLAX) suppository 10 mg, 10 mg, Rectal, Daily

## 2023-09-07 NOTE — PROGRESS NOTES
Physical Therapy Rehab Treatment Note  Facility/Department: [facility]  Room: [facility]/R013-86       NAME: Bobo Cantrell  : 1935 (81 y.o.)  MRN: [id number]  CODE STATUS: Full Code    Date of Service: 2023       Restrictions:  Restrictions/Precautions: Fall Risk  Position Activity Restriction  Other position/activity restrictions: clinical reasoning- limit spine flexion       SUBJECTIVE:   Subjective: Patient in bathroom, seated in wc upon PTA arrival. Patient agreeable to session. No new reports.     Pain  Pain: 0/10 pre and post session    OBJECTIVE:        Ambulation  Surface: Level tile;Uneven;Carpet  Device: Rolling Walker  Assistance: Stand by assistance  Quality of Gait: ataxic ble's, cont varied step length, rt le buckling with fatigue patient able to self correct  Gait Deviations: Slow Alesha;Decreased step length;Decreased step height  Distance: 80 feet, 60 feet  Comments: vc for upright posture and ble foot awareness with turns as to not cross feet over  More Ambulation?: No    Stairs/Curb  Stairs?: Yes  Stairs  # Steps : 4  Stairs Height: 6\"  Rails: Bilateral  Device: No Device  Assistance: Stand by assistance  Comment: cont'd vc's to bring hands down rails with descent    Wheelchair Activities  Wheelchair Type: Standard  Wheelchair Parts Management: Yes  All Wheelchair Parts Management: lock and unlock breaks, remove and replace legs of wc - vc and visual cues initally  Left Leg Rest Level of Assistance: Supervision;Modified independent  Right Leg Rest Level of Assistance: Supervision;Modified independent  Right Brakes Level of Assistance: Independent  Propulsion: Yes  Propulsion 1  Propulsion: Manual  Level: Level Tile  Method: LUE;RUE  Level of Assistance: Modified independent  Description/ Details: completed Ind on straight path, incrased time and effort to back in/ change direction  Distance: 150 feet    Neuromuscular Education  Neuromuscular Comments: standing static and dynamic balance

## 2023-09-08 PROBLEM — C61 PROSTATE CANCER METASTATIC TO BONE (HCC): Status: ACTIVE | Noted: 2023-09-08

## 2023-09-08 PROBLEM — C79.51 PROSTATE CANCER METASTATIC TO BONE (HCC): Status: ACTIVE | Noted: 2023-09-08

## 2023-09-08 PROCEDURE — 6370000000 HC RX 637 (ALT 250 FOR IP): Performed by: NURSE PRACTITIONER

## 2023-09-08 PROCEDURE — 6360000002 HC RX W HCPCS: Performed by: PHYSICAL MEDICINE & REHABILITATION

## 2023-09-08 PROCEDURE — 99232 SBSQ HOSP IP/OBS MODERATE 35: CPT | Performed by: PHYSICAL MEDICINE & REHABILITATION

## 2023-09-08 PROCEDURE — 97535 SELF CARE MNGMENT TRAINING: CPT

## 2023-09-08 PROCEDURE — 6370000000 HC RX 637 (ALT 250 FOR IP): Performed by: UROLOGY

## 2023-09-08 PROCEDURE — 6370000000 HC RX 637 (ALT 250 FOR IP)

## 2023-09-08 PROCEDURE — 6370000000 HC RX 637 (ALT 250 FOR IP): Performed by: INTERNAL MEDICINE

## 2023-09-08 PROCEDURE — 6370000000 HC RX 637 (ALT 250 FOR IP): Performed by: PHYSICAL MEDICINE & REHABILITATION

## 2023-09-08 PROCEDURE — 97116 GAIT TRAINING THERAPY: CPT

## 2023-09-08 PROCEDURE — 97542 WHEELCHAIR MNGMENT TRAINING: CPT

## 2023-09-08 PROCEDURE — 1180000000 HC REHAB R&B

## 2023-09-08 PROCEDURE — 97112 NEUROMUSCULAR REEDUCATION: CPT

## 2023-09-08 PROCEDURE — 97110 THERAPEUTIC EXERCISES: CPT

## 2023-09-08 PROCEDURE — 99232 SBSQ HOSP IP/OBS MODERATE 35: CPT | Performed by: UROLOGY

## 2023-09-08 RX ADMIN — BUSPIRONE HYDROCHLORIDE 5 MG: 10 TABLET ORAL at 20:31

## 2023-09-08 RX ADMIN — ATENOLOL 25 MG: 25 TABLET ORAL at 07:47

## 2023-09-08 RX ADMIN — BACLOFEN 5 MG: 10 TABLET ORAL at 07:46

## 2023-09-08 RX ADMIN — ACETAMINOPHEN 500 MG: 325 TABLET ORAL at 06:11

## 2023-09-08 RX ADMIN — TAMSULOSIN HYDROCHLORIDE 0.4 MG: 0.4 CAPSULE ORAL at 17:26

## 2023-09-08 RX ADMIN — ACETAMINOPHEN 500 MG: 325 TABLET ORAL at 22:24

## 2023-09-08 RX ADMIN — AMLODIPINE BESYLATE 5 MG: 5 TABLET ORAL at 07:46

## 2023-09-08 RX ADMIN — BUSPIRONE HYDROCHLORIDE 5 MG: 10 TABLET ORAL at 07:47

## 2023-09-08 RX ADMIN — ACETAMINOPHEN 500 MG: 325 TABLET ORAL at 14:26

## 2023-09-08 RX ADMIN — LOSARTAN POTASSIUM 12.5 MG: 25 TABLET, FILM COATED ORAL at 07:46

## 2023-09-08 RX ADMIN — PANTOPRAZOLE SODIUM 40 MG: 40 TABLET, DELAYED RELEASE ORAL at 06:12

## 2023-09-08 RX ADMIN — BACLOFEN 5 MG: 10 TABLET ORAL at 20:31

## 2023-09-08 RX ADMIN — Medication 100 MG: at 07:46

## 2023-09-08 RX ADMIN — OXYCODONE HYDROCHLORIDE 10 MG: 5 CAPSULE ORAL at 17:26

## 2023-09-08 RX ADMIN — DOCUSATE SODIUM 100 MG: 100 CAPSULE, LIQUID FILLED ORAL at 07:48

## 2023-09-08 RX ADMIN — DOCUSATE SODIUM 100 MG: 100 CAPSULE, LIQUID FILLED ORAL at 20:32

## 2023-09-08 RX ADMIN — ATORVASTATIN CALCIUM 10 MG: 10 TABLET, FILM COATED ORAL at 20:32

## 2023-09-08 RX ADMIN — BICALUTAMIDE 50 MG: 50 TABLET, FILM COATED ORAL at 14:25

## 2023-09-08 RX ADMIN — TRAZODONE HYDROCHLORIDE 150 MG: 50 TABLET ORAL at 20:31

## 2023-09-08 RX ADMIN — Medication 2000 UNITS: at 17:26

## 2023-09-08 RX ADMIN — FLUTICASONE PROPIONATE 2 SPRAY: 50 SPRAY, METERED NASAL at 07:48

## 2023-09-08 RX ADMIN — Medication 5 MG: at 20:31

## 2023-09-08 RX ADMIN — ENOXAPARIN SODIUM 40 MG: 100 INJECTION SUBCUTANEOUS at 07:46

## 2023-09-08 ASSESSMENT — PAIN DESCRIPTION - ORIENTATION
ORIENTATION: MID
ORIENTATION: MID

## 2023-09-08 ASSESSMENT — PAIN SCALES - GENERAL
PAINLEVEL_OUTOF10: 6
PAINLEVEL_OUTOF10: 1
PAINLEVEL_OUTOF10: 4
PAINLEVEL_OUTOF10: 6

## 2023-09-08 ASSESSMENT — PAIN DESCRIPTION - LOCATION
LOCATION: BACK
LOCATION: BACK
LOCATION: GENERALIZED
LOCATION: HEAD

## 2023-09-08 ASSESSMENT — PAIN DESCRIPTION - DESCRIPTORS
DESCRIPTORS: ACHING
DESCRIPTORS: SORE
DESCRIPTORS: ACHING

## 2023-09-08 ASSESSMENT — ENCOUNTER SYMPTOMS: ABDOMINAL PAIN: 0

## 2023-09-08 NOTE — PROGRESS NOTES
Physical Therapy Rehab Treatment Note  Facility/Department: [facility]  Room: H992/N870-19       NAME: David Henao  : 1935 (87 y.o.)  MRN: [id number]  CODE STATUS: Full Code    Date of Service: 2023     Restrictions:  Restrictions/Precautions: Fall Risk       SUBJECTIVE:   Subjective: \"My feet are quite swollen today. \"    Pain  Pain: no pain reported pre/post session    OBJECTIVE:         Bed mobility  Rolling to Left: Independent  Rolling to Right: Independent  Supine to Sit: Independent  Sit to Supine: Independent    Transfers  Sit to Stand: Supervision  Stand to Sit: Supervision  Bed to Chair: Modified independent  Car Transfer: Stand by assistance    Ambulation  Surface: Level tile  Device: Rolling Walker  Assistance: Minimal assistance  Quality of Gait: ataxic ble's, variable step length and WERNER  Distance: 50ft    Stairs/Curb  Stairs?: Yes  Stairs  # Steps : 4  Stairs Height: 6\"  Rails: Bilateral  Assistance: Stand by assistance  Wheelchair Activities  Left Brakes Level of Assistance: Independent  Right Brakes Level of Assistance: Independent  Propulsion: Yes  Propulsion 1  Propulsion: Manual  Level: Level Tile (carpet; ramp)  Method: LUE;RUE  Level of Assistance: Independent  Description/ Details: Indep propelling and set up for [facility].   Distance: 200ft     PT Exercises  Exercise Treatment: Reviewed and issued for HEP: seated AP, LAQ, march, hip add, hip abd x20 ea; supine bridge, LTR, SLR, clams x10 ea         Education Provided: Home Exercise Program  Education  Education Given To: Patient  Education Provided: Home Exercise Program  Education Method: Demonstration;Verbal;Teach Back;Printed Information/Hand-outs  Education Outcome: Verbalized understanding;Demonstrated understanding    ASSESSMENT/PROGRESS TOWARDS GOALS:   Assessment: Goals met except requires hands on-min assist for gait with [employer].  Goals:  Short Term Goals  Time Frame for Short Term Goals: -  Long Term Goals  Long Term Goal 1: indep

## 2023-09-08 NOTE — CARE COORDINATION
LSW spoke with Wali Dickson (pt's son) and discussed discharge for tomorrow, 9/9. Wali Dickson reported that family training on 9/5 went well. LSW inquired on hiring private duty [facility] and Wali Dickson reported that a HHA is set to be out tomorrow from 5-9PM. Wali Dickson also reported that pt will only have a HHA for the morning for a few days until the [facility] agency gets scheduling set so that pt can have someone in the AM and PM. LSW also inquired on the ramp and Wali Dickson stated that he has one, but did not want to put it out yet because he does not want pt going in/out of the garage by himself. Wali Dickson stated that he is still thinking it over but wanted to wait until pt returns. Wali Dickson also purchased another walker for pt as well as a bsc. HHC was discussed and a referral had been made to [facility] per pt request. Wali Dickson is aware that they will be out within 48 hours and will continue to work on [place] mgmt. LSW also met with pt and he confirmed that he feels ready to discharge home. Wali Dickson and pt are both aware that 24/7 supervision/assist is recommended, pt is adamant to try returning home with the help they have hired.  Electronically signed by AMEE Dillard, KELIW on 9/8/2023 at 5:40 PM

## 2023-09-08 NOTE — PROGRESS NOTES
OCCUPATIONAL THERAPY  INPATIENT REHAB TREATMENT NOTE  [facility]      NAME: Micky Juarez  : 1935 (81 y.o.)  MRN: [id number]  CODE STATUS: Full Code  Room: R248/R248-01    Date of Service: 2023    Referring Physician: Dr. 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 \"    Pain at start of treatment: Yes: 9/10    Pain at end of treatment: Yes: 8/10    Location: catheter, back  Description pulling, ache  Nursing notified: Yes  RN: Eleonora Matthew LPN  Intervention: RN provided pain medication    COGNITION:  Orientation  Overall Orientation Status: Within Functional Limits  Cognition  Overall Cognitive Status: Exceptions    OBJECTIVE:    Instrumental ADL's  Instrumental ADLs: Yes  Health Management  Health Management Level of Assistance: Supervision  Health Management:   Medication Management Simulation Activity:  Patient completed simulation activity utilizing 7 provided medication bottles with written instruction to place a total of 74 beads into the provided weekly medication organizer. Patient with MIN difficulty opening medication bottles. Patient with MIN difficulty opening organizer boxes. Patient placed a total of 74 beads into organizer with MIN verbal cues and a total of 74 being correct. Patient with MIN difficulty manipulating beads. Patient able to sort beads back into correct bottles with 0 errors.      ASSESSMENT: Patient pleasant and cooperative

## 2023-09-08 NOTE — PROGRESS NOTES
throughout the thoracic spine. Mild anterior wedge compression T11 and T12. Limited imaging bilateral lung zones without anomaly. Diffuse osteoblastic and osteolytic lesions, thoracic spine. Malignancy diagnosis of exclusion. Mild compression fractures T11 and T12. Given above findings, pathologic fracture not excluded. Moderate diffuse degenerative changes thorax is spine with kyphosis as discussed. MRI THORACIC SPINE  8/17/2023  MRI thoracic spine: Numerous marrow replacing, enhancing lesions are seen throughout the thoracic spine and sternum. The axial images are degraded by motion artifact. No acute fracture or traumatic subluxation is identified. Extraosseous extension of disease into the spinal canal is noted from T3-T4, resulting in severe narrowing of the thecal sac with mass-effect on the cord. There is also associated cord expansion and edema, best appreciated on image number 8 of series 7 consistent with cord compression. Severe left and moderate right-sided neural foraminal stenosis is noted at T3-T4 and T4-T5 due to encroachment by tumor. MRI lumbar spine: There is a normal lumbar lordosis. No acute fracture or traumatic subluxation is identified. Innumerable marrow replacing, enhancing lesions are seen throughout the L-spine and pelvic bones consistent with metastasis. At L3-4 and L4-5 there are disc bulges and facet hypertrophy resulting in mild bilateral neural foraminal stenosis at L3-4 and mild-to-moderate neural foraminal stenosis bilaterally at L4-5. At L5-S1 there is a disc bulge as well as facet hypertrophy resulting in moderate to severe narrowing of the right neural foramen and mild-to-moderate narrowing of the left neural foramen with mild focal impingement of the exiting nerve root, on the right-hand side. Innumerable bony metastases with significant extraosseous extension of disease into the spinal canal at the T3 and T4 levels resulting in cord compression.  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 on oral Casodex while in the hospital transitioning to Lupron as an outpatient. Spinal cord injury due to spinal metastases-spinal cord bowel and bladder program    Neurogenic bladder and  Neurogenic bowel-check postvoid residuals titrate Flomax dose at at bedtime to prevent orthostasis     Focus on emotional health and caregiver involvement in care.           Electronically signed by Dereje Calles DO on 8/24/23 at 8:02 AM HAY Dinh D.O., PM&R     Attending    [address] [place] [address]

## 2023-09-08 NOTE — PROGRESS NOTES
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 Pt states he doesnt have a problem with walking or the WC. Pain  Pain: no pain reported pre/post session    OBJECTIVE:         Bed mobility  Rolling to Left: Independent  Rolling to Right: Independent  Supine to Sit: Independent  Sit to Supine: Independent    Transfers  Sit to Stand: Supervision  Stand to Sit: Supervision  Bed to Chair: Modified independent  Car Transfer: Stand by assistance    PT Exercises  Exercise Treatment: Reviewed and issued for HEP: seated AP, LAQ, march, hip add, hip abd x20 ea; supine bridge, LTR, SLR, clams x10 ea  Circulation/Endurance Exercises: Standing with UE support 3min  Static Standing Balance Exercises: static standing with no UE support 10sec with increased ant/post sway SBA  Dynamic Standing Balance Exercises: Standing alternating single steps. Standing march x10         Education Provided: Home Exercise Program;Safety  Education  Education Given To: Patient  Education Provided: Home Exercise Program;Safety  Education Provided Comments: Reviewed previously issued HEP for quality. Pt completed indep. Educted pt to use WC at home and to work on walking with therapy.   Education Method: Demonstration;Verbal;Teach Back;Printed Information/Hand-outs  Education Outcome: Verbalized understanding;Demonstrated understanding        ASSESSMENT/PROGRESS TOWARDS GOALS:   Assessment: Goals met except requires hands on-min assist for gait with [employer].    Goals:  Short Term Goals  Time Frame for Short Term Goals: -  Long Term Goals  Long Term Goal 1: indep and efficient bed mobility  Long Term Goal 2: indep w/c to bed

## 2023-09-08 NOTE — PROGRESS NOTES
OCCUPATIONAL THERAPY  INPATIENT REHAB TREATMENT NOTE  [facility]      NAME: Yas Arriaga  : 1935 (81 y.o.)  MRN: [id number]  CODE STATUS: Full Code  Room: R248/R248-01    Date of Service: 2023    Referring Physician: Dr. Prasad Carballo  Rehab Diagnosis: Impaired mobility and ADL's due to 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 compression from epidural tumor at T3-4    Restrictions  Restrictions/Precautions  Restrictions/Precautions: Fall Risk                 Patient's date of birth confirmed: Yes    SAFETY:       SUBJECTIVE:       Pain at start of treatment: No    Pain at end of treatment: No    Intervention: None    COGNITION:         Pt's current cognitive status is:  Comprehension: Supervision  Expression: Mod I  Social Interaction: Independent  Problem Solving: Min A  Memory: Supervision    OBJECTIVE:         Feeding  Assistance Level: Modified independent  Grooming/Oral Hygiene  Assistance Level: Modified independent  Upper Extremity Bathing  Assistance Level: Modified independent  Lower Extremity Bathing  Assistance Level: Stand by assist  Upper Extremity Dressing  Assistance Level: Modified independent  Lower Extremity Dressing  Assistance Level: Minimal assistance  Skilled Clinical Factors: pt able to doff undergarments/shorts with Min A for cath bag management; Franky Chandra undergarment/shorts with vcs for sequencing task with cath bag, CGA (touching assist for safety) min vc's for postural alignment d/t leaning on hip flexors with education to bed at waist slightly. Putting On/Taking Off Footwear  Assistance Level:  Moderate assistance  Skilled Clinical Factors: JABIER hose donned: TD (A) and grippy socks: SBA; pt req Sup to doff socks  Toileting  Assistance Level: Stand by assist  Skilled Clinical Factors: use of grab bars  Toilet Transfers  Technique: Stand step  Equipment: Standard toilet;Grab bars  Assistance Level: Stand by assist  Tub/Shower Transfers  Type: Group Co-Treat   Time In 08       Time Out 0930         Minutes 60                   ADL/IADL trainin minutes     Electronically signed by:     TAMRA Miranda,   2023, 9:23 AM

## 2023-09-08 NOTE — PROGRESS NOTES
Assessment completed. A&O x4. Denies pain at this time. Medications given per MAR. Núñez patent, draining clear christiano urine. In bed with alarm activated. Call light in reach. Bed in lowest position.

## 2023-09-08 NOTE — PROGRESS NOTES
CLINICAL PHARMACY NOTE: MEDS TO BEDS    Total # of Prescriptions Filled: 9   The following medications were delivered to the patient:  Pantoprazole 40 mg tab  Docusate 100 mg cap  Vitamin D3 50 mcg (2000 UT) tab  Calcium Antacid 500 mg chew   Buspirone 5 mg tab  Losartan 25 mg tab  Ondansetron 4mg ODT   Coenzyme Q  mg cap  Melatonin 5 mg TBDP     Additional Documentation:

## 2023-09-09 VITALS
RESPIRATION RATE: 16 BRPM | BODY MASS INDEX: 25.47 KG/M2 | OXYGEN SATURATION: 98 % | WEIGHT: 157.8 LBS | HEART RATE: 62 BPM | SYSTOLIC BLOOD PRESSURE: 105 MMHG | DIASTOLIC BLOOD PRESSURE: 69 MMHG | TEMPERATURE: 98.8 F

## 2023-09-09 PROCEDURE — 6370000000 HC RX 637 (ALT 250 FOR IP)

## 2023-09-09 PROCEDURE — 6370000000 HC RX 637 (ALT 250 FOR IP): Performed by: PHYSICAL MEDICINE & REHABILITATION

## 2023-09-09 PROCEDURE — 6370000000 HC RX 637 (ALT 250 FOR IP): Performed by: UROLOGY

## 2023-09-09 PROCEDURE — 97116 GAIT TRAINING THERAPY: CPT

## 2023-09-09 PROCEDURE — 6370000000 HC RX 637 (ALT 250 FOR IP): Performed by: INTERNAL MEDICINE

## 2023-09-09 PROCEDURE — 99239 HOSP IP/OBS DSCHRG MGMT >30: CPT | Performed by: PHYSICAL MEDICINE & REHABILITATION

## 2023-09-09 PROCEDURE — 97110 THERAPEUTIC EXERCISES: CPT

## 2023-09-09 PROCEDURE — 6360000002 HC RX W HCPCS: Performed by: PHYSICAL MEDICINE & REHABILITATION

## 2023-09-09 RX ORDER — BICALUTAMIDE 50 MG/1
50 TABLET, FILM COATED ORAL DAILY
Qty: 8 TABLET | Refills: 0 | Status: SHIPPED | OUTPATIENT
Start: 2023-09-10 | End: 2023-09-18

## 2023-09-09 RX ORDER — TAMSULOSIN HYDROCHLORIDE 0.4 MG/1
0.4 CAPSULE ORAL
Qty: 30 CAPSULE | Refills: 3 | Status: SHIPPED | OUTPATIENT
Start: 2023-09-09

## 2023-09-09 RX ORDER — TAMSULOSIN HYDROCHLORIDE 0.4 MG/1
0.4 CAPSULE ORAL
Qty: 30 CAPSULE | Refills: 3 | Status: SHIPPED | OUTPATIENT
Start: 2023-09-09 | End: 2023-09-09 | Stop reason: SDUPTHER

## 2023-09-09 RX ORDER — BICALUTAMIDE 50 MG/1
50 TABLET, FILM COATED ORAL DAILY
Qty: 8 TABLET | Refills: 0 | Status: SHIPPED | OUTPATIENT
Start: 2023-09-10 | End: 2023-09-09 | Stop reason: SDUPTHER

## 2023-09-09 RX ADMIN — ENOXAPARIN SODIUM 40 MG: 100 INJECTION SUBCUTANEOUS at 08:25

## 2023-09-09 RX ADMIN — LOSARTAN POTASSIUM 12.5 MG: 25 TABLET, FILM COATED ORAL at 08:25

## 2023-09-09 RX ADMIN — BACLOFEN 5 MG: 10 TABLET ORAL at 08:25

## 2023-09-09 RX ADMIN — AMLODIPINE BESYLATE 5 MG: 5 TABLET ORAL at 08:25

## 2023-09-09 RX ADMIN — BICALUTAMIDE 50 MG: 50 TABLET, FILM COATED ORAL at 08:24

## 2023-09-09 RX ADMIN — FLUTICASONE PROPIONATE 2 SPRAY: 50 SPRAY, METERED NASAL at 08:30

## 2023-09-09 RX ADMIN — Medication 100 MG: at 08:25

## 2023-09-09 RX ADMIN — ATENOLOL 25 MG: 25 TABLET ORAL at 08:25

## 2023-09-09 RX ADMIN — BUSPIRONE HYDROCHLORIDE 5 MG: 10 TABLET ORAL at 08:25

## 2023-09-09 RX ADMIN — DOCUSATE SODIUM 100 MG: 100 CAPSULE, LIQUID FILLED ORAL at 08:25

## 2023-09-09 RX ADMIN — OXYCODONE HYDROCHLORIDE 10 MG: 5 CAPSULE ORAL at 16:18

## 2023-09-09 RX ADMIN — ACETAMINOPHEN 500 MG: 325 TABLET ORAL at 06:08

## 2023-09-09 RX ADMIN — PANTOPRAZOLE SODIUM 40 MG: 40 TABLET, DELAYED RELEASE ORAL at 06:08

## 2023-09-09 ASSESSMENT — PAIN DESCRIPTION - DESCRIPTORS
DESCRIPTORS: ACHING
DESCRIPTORS: ACHING

## 2023-09-09 ASSESSMENT — PAIN DESCRIPTION - ORIENTATION: ORIENTATION: LOWER

## 2023-09-09 ASSESSMENT — PAIN SCALES - GENERAL
PAINLEVEL_OUTOF10: 5
PAINLEVEL_OUTOF10: 2

## 2023-09-09 ASSESSMENT — PAIN DESCRIPTION - LOCATION
LOCATION: BUTTOCKS
LOCATION: BACK

## 2023-09-09 NOTE — PROGRESS NOTES
Assessment completed. A&O x4. Denies pain at this time. Núñez patent, draining clear yellow urine. Pt scheduled for dc to home today. In chair with alarm activated. Call light in reach. Electronically signed by Jovon Cabello LPN on 5/0/1503 at 19:17 AM      1545 Gave pt dc instructions with son and daughter in law at bed side. Pt stated understanding. Pt has own wheelchair to take home at dc. Transport called.  Electronically signed by Jovon Cabello LPN on 4/8/1489 at 5:93 PM

## 2023-09-09 NOTE — PROGRESS NOTES
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 \"    Pain  Pain: no pain reported pre/post session       OBJECTIVE:                  Transfers  Sit to Stand: Supervision  Stand to Sit: Supervision  Comment: Initial transfer required VCs for hand placement and technique. Improved with multiple trials. Ambulation  Surface: Level tile  Device: Rolling Walker  Assistance: Minimal assistance  Quality of Gait: ataxic ble's, variable step length and WERNER  Distance: 50ft, EOB lateral steps x 5'         Wheelchair Activities  Wheelchair Type: Standard  Propulsion 1  Propulsion: Manual  Level: Level Tile  Method: RUE;RLE;LUE;LLE  Level of Assistance: Contact guard assistance;Minimal assistance  Distance: 200ft         PT Exercises  Exercise Treatment: LAQ, AP x 10  Functional Mobility Circuit Training: STS with focus on good hand placement due to increased fatigue this morning. Static Sitting Balance Exercises: 1 min bouts between STS x 5                        ASSESSMENT/PROGRESS TOWARDS GOALS:    Assessment: Continued to work on improving stability and time in upright to return home at highest level of function and to enjoy hobbies. Educated on benefits of maintaining form with activities and challenging the LEs as tolerable to improve strength and tolerance to standing activities and ambulation.  Pt verbalized understanding. Progress as able. Goals:  Short Term Goals  Time Frame for Short Term Goals: -  Long Term Goals  Long Term Goal 1: indep and efficient bed mobility  Long Term Goal 2: indep w/c to bed transfer; SBA sit to stand and car transfers  Long Term Goal 3: SBA gait 50 feet with appropriate device  Long Term Goal 4: min assist with 2 stairs for safe home entry  Long Term Goal 5: pt able to propel w/c indep 50 feet and complete w/c prep indep    PLAN OF CARE/Safety:   Safety Devices  Type of Devices: All fall risk precautions in place; Chair alarm in place; Left in chair      Therapy Time:   Individual   Time In 830   Time Out 0900   Minutes 30   Timed Code Treatment Minutes: 30 Minutes  Minutes: 30  Transfer/Bed mobility trainin  Gait trainin  Therapeutic ex:Lolly Astudillo PTA, 23 at 11:47 AM

## 2023-09-09 NOTE — PROGRESS NOTES
Progress Note    Date:9/9/2023       [facility]  Patient Name:Caden Medellin     Date of Birth:[birth year]     Age:88 y.o. 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 .  8/23/2023 Yes    Secondary hypertension 8/22/2023 Yes    Hyperlipidemia 8/22/2023 Yes    COPD (chronic obstructive pulmonary disease) ([address]) 8/22/2023 Yes    Anxiety 8/22/2023 Yes    Compression fracture of body of thoracic vertebra ([address]) 8/22/2023 Yes    Lytic bone lesions on xray 8/23/2023 Yes    Severe back pain 8/22/2023 Yes    Secondary malignant neoplasm of bone ([address]) 8/23/2023 Yes    Neurogenic bladder 8/23/2023 Yes    Overview Signed 8/21/2023  8:27 AM by Chloe Dunham, DO     diffuse metastatic disease throughout his spine as well as 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 Pilo Nunezuser Neurogenic bowel 8/23/2023 Yes    Overview Signed 8/21/2023  8:27 AM by Chloe Dunham, DO     diffuse metastatic disease throughout his spine as well as 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 Pilo Kelly           Caregiver stress 8/23/2023 Yes    Neoplasm related pain 8/23/2023 Yes    Nausea 8/23/2023 Yes    Prostate cancer metastatic to bone ([address]) 9/8/2023 Yes     Plan:        Impaired mobility and activities of daily living due to CSI-diffuse metastatic disease throughout the spine compression fracture of body of thoracic vertebrae  Dr. Danny Fitch disease throughout the spine compression fracture body thoracic vertebrae  CT of the spine (8/15/2023) field diffuse osteoblastic and osteolytic lesions, thoracic spine malignancy  Mild compression fractures T11 and T12  Heme-onc following     Prostate CA, elevated PSA, urinary retention  Pathology report reveals adenocarcinoma in 3 of 3 tissue cores percentage of range of motion and neck supple. Right lower leg: Edema present. Left lower leg: Edema present. Comments: Edema on feet  With JABIER hose on BLE   Skin:     General: Skin is warm and dry. Neurological:      Mental Status: He is alert and oriented to person, place, and time. Cranial Nerves: No cranial nerve deficit. Motor: Weakness present. Psychiatric:         Behavior: Behavior normal.       Labs/Imaging/Diagnostics   Labs:  CBC:  No results for input(s): \"WBC\", \"RBC\", \"HGB\", \"HCT\", \"MCV\", \"RDW\", \"PLT\" in the last 72 hours. CHEMISTRIES:  No results for input(s): \"NA\", \"K\", \"CL\", \"CO2\", \"BUN\", \"CREATININE\", \"GLUCOSE\", \"CA\", \"PHOS\", \"MG\" in the last 72 hours. PT/INR:No results for input(s): \"PROTIME\", \"INR\" in the last 72 hours. APTT:No results for input(s): \"APTT\" in the last 72 hours. LIVER PROFILE:No results for input(s): \"AST\", \"ALT\", \"BILIDIR\", \"BILITOT\", \"ALKPHOS\" in the last 72 hours. Imaging Last 24 Hours:  No results found.     Electronically signed by JORDAN Rivera CNP on 9/9/23 at 9:48 AM EDT

## 2023-09-09 NOTE — PLAN OF CARE
Problem: Discharge Planning  Goal: Discharge to home or other facility with appropriate resources  Outcome: Progressing  Flowsheets (Taken 9/8/2023 2015)  Discharge to home or other facility with appropriate resources:   Identify barriers to discharge with patient and caregiver   Arrange for needed discharge resources and transportation as appropriate   Identify discharge learning needs (meds, wound care, etc)   Refer to discharge planning if patient needs post-hospital services based on physician order or complex needs related to functional status, cognitive ability or social support system     Problem: Safety - Adult  Goal: Free from fall injury  Outcome: Progressing     Problem: ABCDS Injury Assessment  Goal: Absence of physical injury  Outcome: Progressing     Problem: Skin/Tissue Integrity  Goal: Absence of new skin breakdown  Description: 1. Monitor for areas of redness and/or skin breakdown  2. Assess vascular access sites hourly  3. Every 4-6 hours minimum:  Change oxygen saturation probe site  4. Every 4-6 hours:  If on nasal continuous positive airway pressure, respiratory therapy assess nares and determine need for appliance change or resting period.   Outcome: Progressing     Problem: Nutrition Deficit:  Goal: Optimize nutritional status  Outcome: Progressing     Problem: Pain  Goal: Verbalizes/displays adequate comfort level or baseline comfort level  Outcome: Progressing  Flowsheets (Taken 9/8/2023 1951)  Verbalizes/displays adequate comfort level or baseline comfort level:   Encourage patient to monitor pain and request assistance   Assess pain using appropriate pain scale   Administer analgesics based on type and severity of pain and evaluate response   Implement non-pharmacological measures as appropriate and evaluate response   Consider cultural and social influences on pain and pain management     Problem: Chronic Conditions and Co-morbidities  Goal: Patient's chronic conditions and

## 2023-09-11 NOTE — PROGRESS NOTES
needs    Assessment: Pt has made excellent gains. He is requiring assistance intermittently. Goals have not been completely met      LTG established:  Long Term Goal 1: indep and efficient bed mobility  Long Term Goal 2: indep w/c to bed transfer; SBA sit to stand and car transfers  Long Term Goal 3: SBA gait 50 feet with appropriate device  Long Term Goal 4: min assist with 2 stairs for safe home entry  Long Term Goal 5: pt able to propel w/c indep 50 feet and complete w/c prep indep    Discharge Plan: d/c to home with follow up PT recommended        Electronically signed by Emperatriz Prieto PT on 9/11/2023 at 7:28 AM

## 2023-09-11 NOTE — PROGRESS NOTES
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 Aracely Ye  2023    [address] 2023 MLOZ ULTRASOUND       Precautions:   Restrictions/Precautions: Fall Risk  Other position/activity restrictions: clinical reasoning- limit spine flexion     Social/Functional History:  Social/Functional History  Lives With: Alone (Wife moving to a nursing home from a memory care unit (moving to [employer] on ))  Type of Home: [address] [place] [address] 24: One level  Home Access: Stairs to enter without rails  Entrance Stairs - Number of Steps: 2 from the garage  Bathroom Shower/Tub: Walk-in shower, Doors, Tub/Shower unit (narrow walk in shower--unsure if chair would fit; tub/shower does not typically use but is in working condition)  Bathroom Toilet: Standard  Bathroom Equipment: None  Bathroom Accessibility: Walker accessible  Home Equipment: None  Has the patient had two or more falls in the past year or any fall with injury in the past year?: No  Receives Help From: Family (son comes a couple times a week--assists with anything pt needs such as cutting grass (pt cutting mostly prior), cleaning in ground swimming pool)  ADL Assistance: Independent  Homemaking Assistance: Independent (neighbor cuts grass)  Homemaking Responsibilities: Yes  Meal Prep Responsibility: Primary  Laundry

## 2023-09-12 ENCOUNTER — TELEPHONE (OUTPATIENT)
Dept: FAMILY MEDICINE CLINIC | Age: 88
End: 2023-09-12

## 2023-09-12 ENCOUNTER — OFFICE VISIT (OUTPATIENT)
Dept: PALLATIVE CARE | Age: 88
End: 2023-09-12
Payer: MEDICARE

## 2023-09-12 VITALS
SYSTOLIC BLOOD PRESSURE: 93 MMHG | TEMPERATURE: 98.1 F | HEART RATE: 57 BPM | OXYGEN SATURATION: 93 % | DIASTOLIC BLOOD PRESSURE: 64 MMHG

## 2023-09-12 DIAGNOSIS — J44.9 CHRONIC OBSTRUCTIVE PULMONARY DISEASE, UNSPECIFIED COPD TYPE (HCC): ICD-10-CM

## 2023-09-12 DIAGNOSIS — G89.3 NEOPLASM RELATED PAIN: Primary | ICD-10-CM

## 2023-09-12 DIAGNOSIS — Z74.09 IMPAIRED MOBILITY AND ADLS: ICD-10-CM

## 2023-09-12 DIAGNOSIS — C61 PROSTATE CANCER METASTATIC TO BONE (HCC): ICD-10-CM

## 2023-09-12 DIAGNOSIS — R60.0 BILATERAL LOWER EXTREMITY EDEMA: ICD-10-CM

## 2023-09-12 DIAGNOSIS — Z78.9 IMPAIRED MOBILITY AND ADLS: ICD-10-CM

## 2023-09-12 DIAGNOSIS — F41.8 DEPRESSION WITH ANXIETY: ICD-10-CM

## 2023-09-12 DIAGNOSIS — C79.51 PROSTATE CANCER METASTATIC TO BONE (HCC): ICD-10-CM

## 2023-09-12 DIAGNOSIS — Z51.5 PALLIATIVE CARE ENCOUNTER: ICD-10-CM

## 2023-09-12 DIAGNOSIS — I95.9 HYPOTENSION, UNSPECIFIED HYPOTENSION TYPE: ICD-10-CM

## 2023-09-12 DIAGNOSIS — Z97.8 FOLEY CATHETER IN PLACE: ICD-10-CM

## 2023-09-12 PROCEDURE — 99350 HOME/RES VST EST HIGH MDM 60: CPT | Performed by: NURSE PRACTITIONER

## 2023-09-12 PROCEDURE — 4004F PT TOBACCO SCREEN RCVD TLK: CPT | Performed by: NURSE PRACTITIONER

## 2023-09-12 PROCEDURE — 1123F ACP DISCUSS/DSCN MKR DOCD: CPT | Performed by: NURSE PRACTITIONER

## 2023-09-12 PROCEDURE — G8417 CALC BMI ABV UP PARAM F/U: HCPCS | Performed by: NURSE PRACTITIONER

## 2023-09-12 ASSESSMENT — ENCOUNTER SYMPTOMS
BACK PAIN: 1
WHEEZING: 0
NAUSEA: 0
SHORTNESS OF BREATH: 0
ABDOMINAL PAIN: 0
DIARRHEA: 0
CONSTIPATION: 0
TROUBLE SWALLOWING: 0
VOICE CHANGE: 0

## 2023-09-12 NOTE — TELEPHONE ENCOUNTER
Marlin More from [facility] OT calling  Pt bp is running very low 80/50 pt was sitting in wheelchair  Heart rate at 58 bpm.      Reach out to pt with dr hennessy.   [phone]

## 2023-09-12 NOTE — PROGRESS NOTES
Subjective:      Patient Id: Seen Jasbir Ramirez at  home in [facility] , for follow-up palliative medicine visit (seen initially inpatient). He was accompanied to the appointment by: Self. Chief Complaint   Patient presents with    Feeling frustrated with current medical condition. KYLE Hernandez is a 81 y.o. male referred to palliative care during an inpatient hospital stay for goals of care, code status discussion, family support, and symptom management in the setting of metastatic prostate cancer. Jasbir Ramirez has complex medical history that includes tobacco use, copd, esophageal issues, HTN, HLD. Home visit is necessary in lieu of office due to significant frailty and high symptom burden from comorbid illnesses. General: Patient is alert and oriented x 4. Patient was discharged from rehab on Saturday. [facility] is following. Functional status: Patient is mostly in wheelchair and his recliner but does use walker. Prostate cancer: Patient initially presented to the ER for uncontrolled back pain that was progressively worsening in addition to inability to ambulate and numbness of the left lower extremity. Patient was found to have T3-T4 spinal cord compression while at hospital in the setting of metastatic disease. He had palliative RT on 8/18/23. Patient was transferred to inpatient rehab where he participated in therapy. He was also started on oral casodex. Plan is to start lupron injections per notes. Pain: Discharged home on oxycodone 10 mg po q 4 hours prn pain and baclofen 5 mg BID prn. Patient reports his pain has been pretty good. Pain is in his midline low back and reports his buttocks get sore from sitting. Currently rating pain at a 3/10. Describes as an aching pain. Has not been using baclofen and only needs oxycodone a maximum of twice daily. Burnham catheter: Has some intermittent irritation from burnham. Patient is hoping to have it removed in the next couple of weeks.  Has upcoming

## 2023-09-18 ENCOUNTER — OFFICE VISIT (OUTPATIENT)
Dept: UROLOGY | Age: 88
End: 2023-09-18
Payer: MEDICARE

## 2023-09-18 VITALS
SYSTOLIC BLOOD PRESSURE: 112 MMHG | WEIGHT: 157 LBS | OXYGEN SATURATION: 94 % | HEART RATE: 93 BPM | DIASTOLIC BLOOD PRESSURE: 72 MMHG | BODY MASS INDEX: 25.23 KG/M2 | HEIGHT: 66 IN

## 2023-09-18 DIAGNOSIS — C79.51 PROSTATE CANCER METASTATIC TO BONE (HCC): Primary | ICD-10-CM

## 2023-09-18 DIAGNOSIS — C61 PROSTATE CANCER METASTATIC TO BONE (HCC): Primary | ICD-10-CM

## 2023-09-18 PROCEDURE — G8427 DOCREV CUR MEDS BY ELIG CLIN: HCPCS | Performed by: UROLOGY

## 2023-09-18 PROCEDURE — 1036F TOBACCO NON-USER: CPT | Performed by: UROLOGY

## 2023-09-18 PROCEDURE — 99214 OFFICE O/P EST MOD 30 MIN: CPT | Performed by: UROLOGY

## 2023-09-18 PROCEDURE — 1111F DSCHRG MED/CURRENT MED MERGE: CPT | Performed by: UROLOGY

## 2023-09-18 PROCEDURE — 96402 CHEMO HORMON ANTINEOPL SQ/IM: CPT | Performed by: UROLOGY

## 2023-09-18 PROCEDURE — 1123F ACP DISCUSS/DSCN MKR DOCD: CPT | Performed by: UROLOGY

## 2023-09-18 PROCEDURE — G8417 CALC BMI ABV UP PARAM F/U: HCPCS | Performed by: UROLOGY

## 2023-09-18 ASSESSMENT — ENCOUNTER SYMPTOMS: ABDOMINAL PAIN: 0

## 2023-09-18 NOTE — PROGRESS NOTES
since quittin.0    Smokeless tobacco: Never   Substance and Sexual Activity    Alcohol use: Yes    Drug use: No   Social History Narrative    Lives With: Alone (Wife has dementia -moving to a nursing home [facility] in [address] from a memory care unit in [address] to finances)    Type of Home: House in [address]: One level    Home Access: Stairs to enter without rails - Number of Steps: 2    Bathroom Shower/Tub: Walk-in shower-Equipment: None    Home Equipment: None    Has the patient had two or more falls in the past year or any fall with injury in the past year?: No    Receives Help From: Family (son comes a couple times a week)    ADL Assistance: Independent    Homemaking Assistance: Independent (neighbor cuts grass), Homemaking Responsibilities: Yes    Ambulation Assistance: Independent, Transfer Assistance: Independent    Active : Yes    Occupation: Retired    IADL Comments: pt completes his own shopping, cuts some of his lawn         Social Determinants of Health     Financial Resource Strain: [address]  (2023)    Overall Financial Resource Strain (CARDIA)     Difficulty of Paying Living Expenses: Not hard at all   Food Insecurity: No [address] (2023)    Hunger Vital Sign     Worried About Running Out of Food in the Last Year: Never true     [address] in the Last Year: Never true   Transportation Needs: Unknown (2023)    PRAPARE - Transportation     Lack of Transportation (Non-Medical): No   Physical Activity: Inactive (2023)    Exercise Vital Sign     Days of Exercise per Week: 0 days     Minutes of Exercise per Session: 0 min   Housing Stability: Unknown (2023)    Housing Stability Vital Sign     Unstable Housing in the Last Year: No     No family history on file.   Current Outpatient Medications   Medication Sig Dispense Refill    tamsulosin (FLOMAX) 0.4 MG capsule Take 1 capsule by mouth Daily with supper 30 capsule 3

## 2023-09-20 PROBLEM — J32.9 SINUSITIS: Status: RESOLVED | Noted: 2023-08-21 | Resolved: 2023-09-20

## 2023-09-28 ENCOUNTER — OFFICE VISIT (OUTPATIENT)
Dept: FAMILY MEDICINE CLINIC | Age: 88
End: 2023-09-28
Payer: MEDICARE

## 2023-09-28 VITALS
BODY MASS INDEX: 25.91 KG/M2 | HEIGHT: 66 IN | SYSTOLIC BLOOD PRESSURE: 136 MMHG | TEMPERATURE: 97.9 F | DIASTOLIC BLOOD PRESSURE: 82 MMHG | WEIGHT: 161.2 LBS | OXYGEN SATURATION: 97 % | HEART RATE: 60 BPM

## 2023-09-28 DIAGNOSIS — S22.000A COMPRESSION FRACTURE OF BODY OF THORACIC VERTEBRA (HCC): ICD-10-CM

## 2023-09-28 DIAGNOSIS — C79.51 SECONDARY MALIGNANT NEOPLASM OF BONE (HCC): ICD-10-CM

## 2023-09-28 DIAGNOSIS — J44.9 CHRONIC OBSTRUCTIVE PULMONARY DISEASE, UNSPECIFIED COPD TYPE (HCC): ICD-10-CM

## 2023-09-28 DIAGNOSIS — D69.6 THROMBOCYTOPENIA, UNSPECIFIED (HCC): ICD-10-CM

## 2023-09-28 DIAGNOSIS — Z51.5 PALLIATIVE CARE PATIENT: ICD-10-CM

## 2023-09-28 DIAGNOSIS — G89.3 NEOPLASM RELATED PAIN: ICD-10-CM

## 2023-09-28 DIAGNOSIS — C79.51 PROSTATE CANCER METASTATIC TO BONE (HCC): Primary | ICD-10-CM

## 2023-09-28 DIAGNOSIS — C61 PROSTATE CANCER METASTATIC TO BONE (HCC): Primary | ICD-10-CM

## 2023-09-28 PROCEDURE — 1036F TOBACCO NON-USER: CPT | Performed by: FAMILY MEDICINE

## 2023-09-28 PROCEDURE — G8427 DOCREV CUR MEDS BY ELIG CLIN: HCPCS | Performed by: FAMILY MEDICINE

## 2023-09-28 PROCEDURE — 99214 OFFICE O/P EST MOD 30 MIN: CPT | Performed by: FAMILY MEDICINE

## 2023-09-28 PROCEDURE — 3023F SPIROM DOC REV: CPT | Performed by: FAMILY MEDICINE

## 2023-09-28 PROCEDURE — G8417 CALC BMI ABV UP PARAM F/U: HCPCS | Performed by: FAMILY MEDICINE

## 2023-09-28 PROCEDURE — 1123F ACP DISCUSS/DSCN MKR DOCD: CPT | Performed by: FAMILY MEDICINE

## 2023-09-28 PROCEDURE — 1111F DSCHRG MED/CURRENT MED MERGE: CPT | Performed by: FAMILY MEDICINE

## 2023-09-28 NOTE — PROGRESS NOTES
Chief Complaint   Patient presents with    Follow-Up from [facility], extreme back pain, prostate cancer     Discuss Medications     Discuss anxiety medication       HPI:  Sugar Salguero is a 81 y.o. 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 See comments or note. ;Not required given exclusionary diagnoses. ..;Severe pain not adequately treated with lower dose. Possible medication side effects, risk of tolerance/dependence & alternative treatments discussed. ;No signs of potential drug abuse or diversion identified. ;Assessed functional status. ;Obtaining appropriate analgesic effect of treatment. Re-evaluated the status of the patient's underlying condition causing pain. ;Considered consultation with a specialist.;Obtained or confirmed \"Consent for Opioid Use\" on file.              Wt Readings from Last 3 Encounters:   09/28/23 161 lb 3.2 oz (73.1 kg)   09/18/23 157 lb (71.2 kg)   09/01/23 157 lb 12.8 oz (71.6 kg)         Patient Active Problem List   Diagnosis    Secondary hypertension    Hyperlipidemia    COPD (chronic obstructive pulmonary disease) (HCC)    Anxiety    Kidney stone    Intractable back pain    Compression fracture of body of thoracic vertebra (HCC)    Elevated PSA    Urinary retention    Lytic bone lesions on xray    Palliative care encounter    Goals of care, counseling/discussion    Advanced care planning/counseling discussion    Severe back pain    Secondary malignant neoplasm of bone ([address])    Benign prostatic hyperplasia without urinary

## 2023-10-04 DIAGNOSIS — E78.5 HYPERLIPIDEMIA, UNSPECIFIED HYPERLIPIDEMIA TYPE: ICD-10-CM

## 2023-10-04 DIAGNOSIS — F41.9 ANXIETY: ICD-10-CM

## 2023-10-04 NOTE — TELEPHONE ENCOUNTER
Comments:     Last Office Visit (last PCP visit):   9/28/2023    Next Visit Date:  No new appt      **If hasn't been seen in over a year OR hasn't followed up according to last diabetes/ADHD visit, make appointment for patient before sending refill to provider.     Rx requested:  Requested Prescriptions     Pending Prescriptions Disp Refills    simvastatin (ZOCOR) 20 MG tablet 90 tablet 2     Sig: Take 1 tablet by mouth nightly    traZODone (DESYREL) 150 MG tablet 90 tablet 2     Sig: Take 1 tablet by mouth nightly    bicalutamide (CASODEX) 50 MG chemo tablet 8 tablet 0     Sig: Take 1 tablet by mouth daily for 8 doses    docusate (COLACE, DULCOLAX) 100 MG CAPS 60 capsule 1     Sig: Take 100 mg by mouth 2 times daily

## 2023-10-05 ENCOUNTER — TELEPHONE (OUTPATIENT)
Dept: FAMILY MEDICINE CLINIC | Age: 88
End: 2023-10-05

## 2023-10-06 DIAGNOSIS — C79.51 PROSTATE CANCER METASTATIC TO BONE (HCC): ICD-10-CM

## 2023-10-06 DIAGNOSIS — G89.3 NEOPLASM RELATED PAIN: ICD-10-CM

## 2023-10-06 DIAGNOSIS — J44.9 CHRONIC OBSTRUCTIVE PULMONARY DISEASE, UNSPECIFIED COPD TYPE (HCC): ICD-10-CM

## 2023-10-06 DIAGNOSIS — C61 PROSTATE CANCER METASTATIC TO BONE (HCC): ICD-10-CM

## 2023-10-06 DIAGNOSIS — E78.5 HYPERLIPIDEMIA, UNSPECIFIED HYPERLIPIDEMIA TYPE: ICD-10-CM

## 2023-10-06 DIAGNOSIS — F41.9 ANXIETY: ICD-10-CM

## 2023-10-06 RX ORDER — BICALUTAMIDE 50 MG/1
50 TABLET, FILM COATED ORAL DAILY
Qty: 8 TABLET | Refills: 0 | Status: SHIPPED | OUTPATIENT
Start: 2023-10-06 | End: 2023-10-14

## 2023-10-06 RX ORDER — PSEUDOEPHEDRINE HCL 30 MG
100 TABLET ORAL 2 TIMES DAILY
Qty: 60 CAPSULE | Refills: 1 | Status: SHIPPED | OUTPATIENT
Start: 2023-10-06

## 2023-10-06 RX ORDER — TRAZODONE HYDROCHLORIDE 150 MG/1
150 TABLET ORAL NIGHTLY
Qty: 90 TABLET | Refills: 2 | Status: SHIPPED | OUTPATIENT
Start: 2023-10-06

## 2023-10-06 RX ORDER — SIMVASTATIN 20 MG
20 TABLET ORAL NIGHTLY
Qty: 90 TABLET | Refills: 2 | Status: SHIPPED | OUTPATIENT
Start: 2023-10-06

## 2023-10-06 NOTE — TELEPHONE ENCOUNTER
Comments: pt will be out of this medication come Monday. Last Office Visit (last PCP visit):   9/28/2023    Next Visit Date:  Future Appointments   Date Time Provider [address]   10/19/2023  8:30 AM JORDAN Caldera CNP [facility]   1/24/2024 11:00 AM Annie Lozoya MD [facility]   3/20/2024  1:15 PM Cristian Lockwood MD [address] [place] [address]       **If hasn't been seen in over a year OR hasn't followed up according to last diabetes/ADHD visit, make appointment for patient before sending refill to provider.     Rx requested:  Requested Prescriptions     Pending Prescriptions Disp Refills    bicalutamide (CASODEX) 50 MG chemo tablet 8 tablet 0     Sig: Take 1 tablet by mouth daily for 8 doses    traZODone (DESYREL) 150 MG tablet 90 tablet 2     Sig: Take 1 tablet by mouth nightly    simvastatin (ZOCOR) 20 MG tablet 90 tablet 2     Sig: Take 1 tablet by mouth nightly

## 2023-10-19 ENCOUNTER — OFFICE VISIT (OUTPATIENT)
Dept: PALLATIVE CARE | Age: 88
End: 2023-10-19
Payer: MEDICARE

## 2023-10-19 VITALS
DIASTOLIC BLOOD PRESSURE: 87 MMHG | SYSTOLIC BLOOD PRESSURE: 134 MMHG | HEART RATE: 108 BPM | OXYGEN SATURATION: 93 % | TEMPERATURE: 98.3 F

## 2023-10-19 DIAGNOSIS — Z71.89 ENCOUNTER FOR MEDICATION COUNSELING: Primary | ICD-10-CM

## 2023-10-19 DIAGNOSIS — G89.3 NEOPLASM RELATED PAIN: ICD-10-CM

## 2023-10-19 DIAGNOSIS — C61 PROSTATE CANCER METASTATIC TO BONE (HCC): ICD-10-CM

## 2023-10-19 DIAGNOSIS — R60.0 BILATERAL LOWER EXTREMITY EDEMA: ICD-10-CM

## 2023-10-19 DIAGNOSIS — Z97.8 FOLEY CATHETER IN PLACE: ICD-10-CM

## 2023-10-19 DIAGNOSIS — C79.51 PROSTATE CANCER METASTATIC TO BONE (HCC): ICD-10-CM

## 2023-10-19 DIAGNOSIS — F41.8 DEPRESSION WITH ANXIETY: ICD-10-CM

## 2023-10-19 DIAGNOSIS — Z51.5 PALLIATIVE CARE ENCOUNTER: ICD-10-CM

## 2023-10-19 PROCEDURE — 1123F ACP DISCUSS/DSCN MKR DOCD: CPT | Performed by: NURSE PRACTITIONER

## 2023-10-19 PROCEDURE — G8484 FLU IMMUNIZE NO ADMIN: HCPCS | Performed by: NURSE PRACTITIONER

## 2023-10-19 PROCEDURE — G8417 CALC BMI ABV UP PARAM F/U: HCPCS | Performed by: NURSE PRACTITIONER

## 2023-10-19 PROCEDURE — 1036F TOBACCO NON-USER: CPT | Performed by: NURSE PRACTITIONER

## 2023-10-19 PROCEDURE — 99350 HOME/RES VST EST HIGH MDM 60: CPT | Performed by: NURSE PRACTITIONER

## 2023-10-19 ASSESSMENT — ENCOUNTER SYMPTOMS
BACK PAIN: 1
TROUBLE SWALLOWING: 0
SHORTNESS OF BREATH: 0
WHEEZING: 0
CONSTIPATION: 0
ABDOMINAL PAIN: 0
VOICE CHANGE: 0
NAUSEA: 0
DIARRHEA: 0

## 2023-10-19 NOTE — PROGRESS NOTES
Financial Resource Strain: Low Risk  (4/7/2023)    Overall Financial Resource Strain (CARDIA)     Difficulty of Paying Living Expenses: Not hard at all   Food Insecurity: No Food Insecurity (4/7/2023)    Hunger Vital Sign     Worried About Running Out of Food in the Last Year: Never true     Ran Out of Food in the Last Year: Never true   Transportation Needs: Unknown (4/7/2023)    PRAPARE - Transportation     Lack of Transportation (Medical): Not on file     Lack of Transportation (Non-Medical): No   Physical Activity: Inactive (1/23/2023)    Exercise Vital Sign     Days of Exercise per Week: 0 days     Minutes of Exercise per Session: 0 min   Stress: Not on file   Social Connections: Not on file   Intimate Partner Violence: Not on file   Housing Stability: Unknown (4/7/2023)    Housing Stability Vital Sign     Unable to Pay for Housing in the Last Year: Not on file     Number of [address] in the Last Year: Not on file     Unstable Housing in the Last Year: No     History reviewed. No pertinent family history.   No Known Allergies  Current Outpatient Medications on File Prior to Visit   Medication Sig Dispense Refill    simvastatin (ZOCOR) 20 MG tablet Take 1 tablet by mouth nightly 90 tablet 2    traZODone (DESYREL) 150 MG tablet Take 1 tablet by mouth nightly 90 tablet 2    bicalutamide (CASODEX) 50 MG chemo tablet Take 1 tablet by mouth daily for 8 doses 8 tablet 0    docusate (COLACE, DULCOLAX) 100 MG CAPS Take 100 mg by mouth 2 times daily 60 capsule 1    bicalutamide (CASODEX) 50 MG chemo tablet Take 1 tablet by mouth daily for 8 doses 8 tablet 0    traZODone (DESYREL) 150 MG tablet Take 1 tablet by mouth nightly 90 tablet 2    simvastatin (ZOCOR) 20 MG tablet Take 1 tablet by mouth nightly 90 tablet 2    Handicap Placard MISC by Does not apply route Exp 5 years 1 each 0    tamsulosin (FLOMAX) 0.4 MG capsule Take 1 capsule by mouth Daily with supper 30 capsule 3    busPIRone (BUSPAR) 5 MG tablet Take 1

## 2023-11-01 DIAGNOSIS — G89.29 CHRONIC BACK PAIN, UNSPECIFIED BACK LOCATION, UNSPECIFIED BACK PAIN LATERALITY: ICD-10-CM

## 2023-11-01 DIAGNOSIS — M54.9 CHRONIC BACK PAIN, UNSPECIFIED BACK LOCATION, UNSPECIFIED BACK PAIN LATERALITY: ICD-10-CM

## 2023-11-01 DIAGNOSIS — M79.10 MUSCULAR PAIN: ICD-10-CM

## 2023-11-01 RX ORDER — BICALUTAMIDE 50 MG/1
50 TABLET, FILM COATED ORAL DAILY
Qty: 8 TABLET | Refills: 0 | Status: SHIPPED | OUTPATIENT
Start: 2023-11-01 | End: 2023-11-09

## 2023-11-01 RX ORDER — BACLOFEN 5 MG/1
5 TABLET ORAL 2 TIMES DAILY PRN
Qty: 20 TABLET | Refills: 0 | Status: SHIPPED | OUTPATIENT
Start: 2023-11-01

## 2023-11-01 NOTE — TELEPHONE ENCOUNTER
Pt has prostate cancer and has back pain his nurse saw a bottle at his house he had of baclofin and told him to ask his dr to see if he could get a refill of it he did get this in the walk in from [facility], not sure when   He is out of his prostate medication too casodex 50 mg. He is asking for you for this Dr Jeffery Knight prescribed it .      Pharmacy he uses [facility] in [facility]

## 2023-11-06 RX ORDER — PSEUDOEPHEDRINE HCL 30 MG
100 TABLET ORAL 2 TIMES DAILY
Qty: 60 CAPSULE | Refills: 1 | Status: SHIPPED | OUTPATIENT
Start: 2023-11-06

## 2023-11-06 RX ORDER — CALCIUM CARBONATE 500 MG/1
750 TABLET, CHEWABLE ORAL 3 TIMES DAILY PRN
Qty: 30 TABLET | Refills: 1 | Status: SHIPPED | OUTPATIENT
Start: 2023-11-06 | End: 2023-12-06

## 2023-11-06 RX ORDER — PANTOPRAZOLE SODIUM 40 MG/1
40 TABLET, DELAYED RELEASE ORAL
Qty: 30 TABLET | Refills: 3 | Status: SHIPPED | OUTPATIENT
Start: 2023-11-06

## 2023-11-06 RX ORDER — MECOBALAMIN 5000 MCG
5 TABLET,DISINTEGRATING ORAL NIGHTLY
Qty: 30 TABLET | Refills: 1 | Status: SHIPPED | OUTPATIENT
Start: 2023-11-06

## 2023-11-06 RX ORDER — TAMSULOSIN HYDROCHLORIDE 0.4 MG/1
0.4 CAPSULE ORAL
Qty: 30 CAPSULE | Refills: 3 | Status: SHIPPED | OUTPATIENT
Start: 2023-11-06

## 2023-11-07 ENCOUNTER — TELEPHONE (OUTPATIENT)
Dept: FAMILY MEDICINE CLINIC | Age: 88
End: 2023-11-07

## 2023-11-20 DIAGNOSIS — M54.9 CHRONIC BACK PAIN, UNSPECIFIED BACK LOCATION, UNSPECIFIED BACK PAIN LATERALITY: ICD-10-CM

## 2023-11-20 DIAGNOSIS — G89.29 CHRONIC BACK PAIN, UNSPECIFIED BACK LOCATION, UNSPECIFIED BACK PAIN LATERALITY: ICD-10-CM

## 2023-11-20 DIAGNOSIS — M54.50 LOW BACK PAIN, UNSPECIFIED BACK PAIN LATERALITY, UNSPECIFIED CHRONICITY, UNSPECIFIED WHETHER SCIATICA PRESENT: ICD-10-CM

## 2023-11-20 DIAGNOSIS — M89.8X1 PAIN OF LEFT SCAPULA: ICD-10-CM

## 2023-11-20 DIAGNOSIS — M54.9 UPPER BACK PAIN ON LEFT SIDE: ICD-10-CM

## 2023-11-20 DIAGNOSIS — M79.10 MUSCULAR PAIN: ICD-10-CM

## 2023-11-20 RX ORDER — BACLOFEN 5 MG/1
5 TABLET ORAL 2 TIMES DAILY PRN
Qty: 20 TABLET | Refills: 2 | Status: SHIPPED | OUTPATIENT
Start: 2023-11-20

## 2023-11-20 RX ORDER — MELOXICAM 7.5 MG/1
7.5 TABLET ORAL DAILY PRN
Qty: 30 TABLET | Refills: 5 | Status: SHIPPED | OUTPATIENT
Start: 2023-11-20

## 2023-11-20 NOTE — TELEPHONE ENCOUNTER
Comments: would like 5 refills attached please     Last Office Visit (last PCP visit):   9/28/2023    Next Visit Date:  Future Appointments   Date Time Provider [address]   12/6/2023 10:00 AM Ether Gold, APRN - CNP [facility]   1/24/2024 11:00 AM Leif Noe MD [facility]   3/20/2024  1:15 PM Mariia Marcum MD [address] [place] [address]       **If hasn't been seen in over a year OR hasn't followed up according to last diabetes/ADHD visit, make appointment for patient before sending refill to provider.     Rx requested:  Requested Prescriptions     Pending Prescriptions Disp Refills    Baclofen (LIORESAL) 5 MG tablet 20 tablet 0     Sig: Take 1 tablet by mouth 2 times daily as needed (back pain)

## 2023-11-30 ENCOUNTER — TELEPHONE (OUTPATIENT)
Dept: FAMILY MEDICINE CLINIC | Age: 88
End: 2023-11-30

## 2023-11-30 NOTE — TELEPHONE ENCOUNTER
Patient calling to see if we can put in a referral for a home health nurse but will need scripts refilled and will like for someone to come to the house? Would greatly appreciate the extra help due to him noticing him not remembering certain things.

## 2023-12-01 NOTE — TELEPHONE ENCOUNTER
He actually already is being followed by [facility] and has palliative care coming to the home. Either one of these avenues would make more sense for someone to do a med review in the home. I have a feeling he called without discussing with family. I don't suggest an in office appt given he has trouble leaving the home at this time.

## 2023-12-01 NOTE — TELEPHONE ENCOUNTER
Again, for basic things like pills being set out and or someone to help a couple hours out of the week, this is something the family has to pursue through an independent agency. This is not what [facility] does.      Our home health is for temporary nursing care and PT/OT needs after hospitalization

## 2023-12-01 NOTE — TELEPHONE ENCOUNTER
Pt scheduled an appt for 12/19/2023 for verification of medication.  Unsure what he is to be taking and what he is not suppose to be taking

## 2023-12-06 ENCOUNTER — OFFICE VISIT (OUTPATIENT)
Dept: PALLATIVE CARE | Age: 88
End: 2023-12-06
Payer: MEDICARE

## 2023-12-06 VITALS
OXYGEN SATURATION: 96 % | HEART RATE: 111 BPM | SYSTOLIC BLOOD PRESSURE: 133 MMHG | DIASTOLIC BLOOD PRESSURE: 94 MMHG | TEMPERATURE: 98.1 F

## 2023-12-06 DIAGNOSIS — C79.51 PROSTATE CANCER METASTATIC TO BONE (HCC): ICD-10-CM

## 2023-12-06 DIAGNOSIS — C61 PROSTATE CANCER METASTATIC TO BONE (HCC): ICD-10-CM

## 2023-12-06 DIAGNOSIS — R00.0 TACHYCARDIA: ICD-10-CM

## 2023-12-06 DIAGNOSIS — Z97.8 FOLEY CATHETER IN PLACE: ICD-10-CM

## 2023-12-06 DIAGNOSIS — R60.0 BILATERAL LOWER EXTREMITY EDEMA: ICD-10-CM

## 2023-12-06 DIAGNOSIS — J44.9 CHRONIC OBSTRUCTIVE PULMONARY DISEASE, UNSPECIFIED COPD TYPE (HCC): ICD-10-CM

## 2023-12-06 DIAGNOSIS — F41.9 ANXIETY: Primary | ICD-10-CM

## 2023-12-06 DIAGNOSIS — Z71.89 ENCOUNTER FOR MEDICATION COUNSELING: ICD-10-CM

## 2023-12-06 DIAGNOSIS — G89.3 NEOPLASM RELATED PAIN: ICD-10-CM

## 2023-12-06 DIAGNOSIS — Z51.5 PALLIATIVE CARE ENCOUNTER: ICD-10-CM

## 2023-12-06 PROCEDURE — G8484 FLU IMMUNIZE NO ADMIN: HCPCS | Performed by: NURSE PRACTITIONER

## 2023-12-06 PROCEDURE — G8417 CALC BMI ABV UP PARAM F/U: HCPCS | Performed by: NURSE PRACTITIONER

## 2023-12-06 PROCEDURE — 1123F ACP DISCUSS/DSCN MKR DOCD: CPT | Performed by: NURSE PRACTITIONER

## 2023-12-06 PROCEDURE — 99350 HOME/RES VST EST HIGH MDM 60: CPT | Performed by: NURSE PRACTITIONER

## 2023-12-06 PROCEDURE — 1036F TOBACCO NON-USER: CPT | Performed by: NURSE PRACTITIONER

## 2023-12-06 RX ORDER — OXYCODONE HYDROCHLORIDE 5 MG/1
10 TABLET ORAL 2 TIMES DAILY
Qty: 56 TABLET | Refills: 0 | Status: SHIPPED | OUTPATIENT
Start: 2023-12-06 | End: 2023-12-20

## 2023-12-06 RX ORDER — ATENOLOL 25 MG/1
25 TABLET ORAL DAILY
COMMUNITY

## 2023-12-06 RX ORDER — OXYCODONE HYDROCHLORIDE 5 MG/1
10 TABLET ORAL 2 TIMES DAILY
Qty: 56 TABLET | Refills: 0 | Status: SHIPPED | OUTPATIENT
Start: 2023-12-06 | End: 2023-12-06 | Stop reason: SDUPTHER

## 2023-12-06 RX ORDER — BUSPIRONE HYDROCHLORIDE 10 MG/1
10 TABLET ORAL 2 TIMES DAILY
Qty: 60 TABLET | Refills: 0 | Status: SHIPPED | OUTPATIENT
Start: 2023-12-06 | End: 2023-12-06

## 2023-12-06 RX ORDER — ESCITALOPRAM OXALATE 5 MG/1
5 TABLET ORAL DAILY
Qty: 30 TABLET | Refills: 1 | Status: SHIPPED | OUTPATIENT
Start: 2023-12-06

## 2023-12-06 RX ORDER — BUSPIRONE HYDROCHLORIDE 5 MG/1
5 TABLET ORAL 2 TIMES DAILY
COMMUNITY

## 2023-12-06 ASSESSMENT — ENCOUNTER SYMPTOMS
NAUSEA: 0
TROUBLE SWALLOWING: 0
DIARRHEA: 0
SHORTNESS OF BREATH: 0
CONSTIPATION: 0
BACK PAIN: 1
ABDOMINAL PAIN: 0
VOICE CHANGE: 0
WHEEZING: 0

## 2023-12-06 NOTE — TELEPHONE ENCOUNTER
Pharmacist called to see if you want 14 days or 3 days? Please change, thanks! Rx requested:  Requested Prescriptions     Pending Prescriptions Disp Refills    oxyCODONE (ROXICODONE) 5 MG immediate release tablet 56 tablet 0     Sig: Take 2 tablets by mouth 2 times daily for 14 days. Intended supply: 3 days.  Take lowest dose possible to manage pain Max Daily Amount: 20 mg       Last Office Visit:   Visit date not found      Last filled:       Next Visit Date:  Future Appointments   Date Time Provider [address] [place] [address]   12/13/2023  8:30 AM [facility]   12/20/2023  8:00 AM [facility]   1/24/2024 11:00 AM Tyshawn Mars MD [facility]   3/20/2024  1:15 PM Arturo Davalos MD [address] [place] [address]

## 2023-12-06 NOTE — PROGRESS NOTES
Subjective:      Patient Id: Seen Victor Hugo Pena at  home in [facility] , for follow-up palliative medicine. He was accompanied to the appointment by: Milvia Skelton    Chief Complaint   Patient presents with    Pain    Follow-up    Anxiety      HPI       Jannette Rodríguez is a 81 y.o. male with a complex medical history that includes tobacco use, copd, esophageal issues, HTN, HLD. Home visit is necessary in lieu of office due to significant frailty and high symptom burden from comorbid illnesses. General: Patient is alert and oriented x 4. However, ongoing general confusion regarding current health situation and medications. Functional status: Patient has been getting up as much as possible with the walker. Completed therapy with [facility]. Has wheelchair. Patient reports having home aide coming 6 days a week for 3 hours a day. Prostate cancer: Patient initially presented to the ER for uncontrolled back pain that was progressively worsening in addition to inability to ambulate and numbness of the left lower extremity. Patient was found to have T3-T4 spinal cord compression while at hospital in the setting of metastatic disease. He had palliative RT on 8/18/23. Patient was transferred to inpatient rehab where he participated in therapy. He was also started on oral casodex and lupron injections. Casodex now complete. Remains on Lupron. Reports ongoing issues with numbness in his legs. Pain: Patient reports his pain has been exacerbated. Pain is in his midline low back. Describes as an aching pain. Currently taking oxycodone BID prn (does not use this often, still has old script), mobic 7.5 mg po daily prn, and baclofen 5 mg po BID prn. Pain is exacerbated with standing/walking. Rates pain at 8/10 with standing/walking and a 3-4/10 with sitting. Núñez catheter: No current issues related to this. [facility] following for monthly catheter changes.      Mood: Continues to have feelings of being overwhelmed and significant

## 2023-12-13 ENCOUNTER — OFFICE VISIT (OUTPATIENT)
Dept: PALLATIVE CARE | Age: 88
End: 2023-12-13
Payer: MEDICARE

## 2023-12-13 VITALS — OXYGEN SATURATION: 96 % | HEART RATE: 76 BPM | SYSTOLIC BLOOD PRESSURE: 129 MMHG | DIASTOLIC BLOOD PRESSURE: 83 MMHG

## 2023-12-13 DIAGNOSIS — R60.0 BILATERAL LOWER EXTREMITY EDEMA: ICD-10-CM

## 2023-12-13 DIAGNOSIS — C61 PROSTATE CANCER METASTATIC TO BONE (HCC): ICD-10-CM

## 2023-12-13 DIAGNOSIS — F41.9 ANXIETY: ICD-10-CM

## 2023-12-13 DIAGNOSIS — G89.3 NEOPLASM RELATED PAIN: Primary | ICD-10-CM

## 2023-12-13 DIAGNOSIS — J44.9 CHRONIC OBSTRUCTIVE PULMONARY DISEASE, UNSPECIFIED COPD TYPE (HCC): ICD-10-CM

## 2023-12-13 DIAGNOSIS — Z51.5 PALLIATIVE CARE ENCOUNTER: ICD-10-CM

## 2023-12-13 DIAGNOSIS — Z97.8 FOLEY CATHETER IN PLACE: ICD-10-CM

## 2023-12-13 DIAGNOSIS — R00.0 TACHYCARDIA: ICD-10-CM

## 2023-12-13 DIAGNOSIS — C79.51 PROSTATE CANCER METASTATIC TO BONE (HCC): ICD-10-CM

## 2023-12-13 PROCEDURE — G8417 CALC BMI ABV UP PARAM F/U: HCPCS | Performed by: NURSE PRACTITIONER

## 2023-12-13 PROCEDURE — 1123F ACP DISCUSS/DSCN MKR DOCD: CPT | Performed by: NURSE PRACTITIONER

## 2023-12-13 PROCEDURE — 99349 HOME/RES VST EST MOD MDM 40: CPT | Performed by: NURSE PRACTITIONER

## 2023-12-13 PROCEDURE — G8484 FLU IMMUNIZE NO ADMIN: HCPCS | Performed by: NURSE PRACTITIONER

## 2023-12-13 PROCEDURE — 1036F TOBACCO NON-USER: CPT | Performed by: NURSE PRACTITIONER

## 2023-12-13 RX ORDER — POLYETHYLENE GLYCOL 3350 17 G/17G
17 POWDER, FOR SOLUTION ORAL DAILY PRN
COMMUNITY

## 2023-12-13 ASSESSMENT — ENCOUNTER SYMPTOMS
VOICE CHANGE: 0
CONSTIPATION: 0
TROUBLE SWALLOWING: 0
NAUSEA: 0
BACK PAIN: 1
SHORTNESS OF BREATH: 0
DIARRHEA: 0
ABDOMINAL PAIN: 0
WHEEZING: 0

## 2023-12-13 NOTE — PROGRESS NOTES
Subjective:      Patient Id: Seen [facility] at  home in [facility] , for follow-up palliative medicine. He was accompanied to the appointment by: self. Chief Complaint   Patient presents with    Follow-up      HPI       Andreia Hargrove is a 81 y.o. male with a complex medical history that includes tobacco use, copd, esophageal issues, HTN, HLD. Home visit is necessary in lieu of office due to significant frailty and high symptom burden from comorbid illnesses. General: Patient is alert and oriented x 4. Functional status: Patient has been getting up as much as possible with the walker. Completed therapy with [facility]. Has wheelchair. Patient reports having home aide coming 6 days a week for 3 hours a day. Prostate cancer: Patient initially presented to the ER for uncontrolled back pain that was progressively worsening in addition to inability to ambulate and numbness of the left lower extremity. Patient was found to have T3-T4 spinal cord compression while at hospital in the setting of metastatic disease. He had palliative RT on 8/18/23. Patient was transferred to inpatient rehab where he participated in therapy. He was also started on oral casodex and lupron injections. Casodex now complete. Remains on Lupron. Reports ongoing issues with numbness in his legs as well as lower abdominal area. Pain: Patient reports his pain has been slightly improved with scheduled oxycodone. Pain is in his midline low back. Describes as an aching pain. Currently taking oxycodone 10 mg BID scheduled and baclofen 5 mg po BID prn. Rates pain at 4-5/10. Standing and leaning back in chair helps with the pain. Núñez catheter: No current issues related to this. Needs leg securement device replaced. [address] [facility] following for monthly catheter changes. Mood: Continues to have feelings of being overwhelmed and significant anxiety due to health situation, his spouse's health, and finances.  Has been taking buspar 5 mg po BID and started on

## 2023-12-28 DIAGNOSIS — M79.10 MUSCULAR PAIN: ICD-10-CM

## 2023-12-28 DIAGNOSIS — M54.6 MIDLINE THORACIC BACK PAIN, UNSPECIFIED CHRONICITY: ICD-10-CM

## 2023-12-28 DIAGNOSIS — G89.3 NEOPLASM RELATED PAIN: ICD-10-CM

## 2023-12-28 DIAGNOSIS — M54.9 UPPER BACK PAIN: ICD-10-CM

## 2023-12-28 DIAGNOSIS — G89.29 CHRONIC BACK PAIN, UNSPECIFIED BACK LOCATION, UNSPECIFIED BACK PAIN LATERALITY: ICD-10-CM

## 2023-12-28 DIAGNOSIS — C61 PROSTATE CANCER METASTATIC TO BONE (HCC): ICD-10-CM

## 2023-12-28 DIAGNOSIS — M54.9 CHRONIC BACK PAIN, UNSPECIFIED BACK LOCATION, UNSPECIFIED BACK PAIN LATERALITY: ICD-10-CM

## 2023-12-28 DIAGNOSIS — C79.51 PROSTATE CANCER METASTATIC TO BONE (HCC): ICD-10-CM

## 2023-12-28 DIAGNOSIS — S22.000A COMPRESSION FRACTURE OF THORACIC VERTEBRA, UNSPECIFIED THORACIC VERTEBRAL LEVEL, INITIAL ENCOUNTER (HCC): ICD-10-CM

## 2023-12-28 DIAGNOSIS — M47.814 OSTEOARTHRITIS OF THORACIC SPINE, UNSPECIFIED SPINAL OSTEOARTHRITIS COMPLICATION STATUS: ICD-10-CM

## 2023-12-28 DIAGNOSIS — M89.9 BONE LESION: ICD-10-CM

## 2023-12-28 RX ORDER — LOSARTAN POTASSIUM 25 MG/1
12.5 TABLET ORAL DAILY
Qty: 30 TABLET | Refills: 3 | Status: SHIPPED | OUTPATIENT
Start: 2023-12-28

## 2023-12-28 RX ORDER — BACLOFEN 5 MG/1
5 TABLET ORAL 2 TIMES DAILY PRN
Qty: 20 TABLET | Refills: 2 | Status: SHIPPED | OUTPATIENT
Start: 2023-12-28

## 2023-12-28 RX ORDER — TRAMADOL HYDROCHLORIDE 50 MG/1
50 TABLET ORAL EVERY 6 HOURS PRN
Qty: 20 TABLET | Refills: 0 | Status: SHIPPED | OUTPATIENT
Start: 2023-12-28 | End: 2024-01-02

## 2023-12-28 RX ORDER — OXYCODONE HYDROCHLORIDE 5 MG/1
10 TABLET ORAL 2 TIMES DAILY
Qty: 56 TABLET | Refills: 0 | Status: SHIPPED | OUTPATIENT
Start: 2023-12-28 | End: 2024-01-11

## 2023-12-28 NOTE — TELEPHONE ENCOUNTER
Comments: pt caregiver calling to see if the provider can see if the provider can refill the oxycodone since he has been taking it for his back pain was advised that I would send the message but he got this from the ER     Last Office Visit (last PCP visit):   9/28/2023    Next Visit Date:  Future Appointments   Date Time Provider [address]   1/8/2024  2:30 PM Narciso Aranda APRN - [address]   1/24/2024 11:00 AM [facility]   3/20/2024  1:15 PM Lois Felipe  [facility]       **If hasn't been seen in over a year OR hasn't followed up according to last diabetes/ADHD visit, make appointment for patient before sending refill to provider. Rx requested:  Requested Prescriptions     Pending Prescriptions Disp Refills    Baclofen (LIORESAL) 5 MG tablet 20 tablet 2     Sig: Take 1 tablet by mouth 2 times daily as needed (back pain)    losartan (COZAAR) 25 MG tablet 30 tablet 3     Sig: Take 0.5 tablets by mouth daily    oxyCODONE (ROXICODONE) 5 MG immediate release tablet 56 tablet 0     Sig: Take 2 tablets by mouth 2 times daily for 14 days. Intended supply: 14 days. Take lowest dose possible to manage pain Max Daily Amount: 20 mg    traMADol (ULTRAM) 50 MG tablet 20 tablet 0     Sig: Take 1 tablet by mouth every 6 hours as needed for Pain for up to 5 days. Intended supply: 5 days.  Take lowest dose possible to manage pain Max Daily Amount: 200 mg

## 2024-01-02 DIAGNOSIS — C61 PROSTATE CANCER METASTATIC TO BONE (HCC): ICD-10-CM

## 2024-01-02 DIAGNOSIS — G89.3 NEOPLASM RELATED PAIN: ICD-10-CM

## 2024-01-02 DIAGNOSIS — C79.51 PROSTATE CANCER METASTATIC TO BONE (HCC): ICD-10-CM

## 2024-01-02 DIAGNOSIS — F41.9 ANXIETY: ICD-10-CM

## 2024-01-02 RX ORDER — ATENOLOL 25 MG/1
25 TABLET ORAL DAILY
Qty: 30 TABLET | Refills: 5 | Status: SHIPPED | OUTPATIENT
Start: 2024-01-02

## 2024-01-02 RX ORDER — CHOLECALCIFEROL (VITAMIN D3) 50 MCG
2000 TABLET ORAL
Qty: 60 TABLET | Refills: 1 | Status: SHIPPED | OUTPATIENT
Start: 2024-01-02

## 2024-01-02 RX ORDER — UBIDECARENONE 100 MG
100 CAPSULE ORAL DAILY
Qty: 60 CAPSULE | Refills: 1 | Status: SHIPPED | OUTPATIENT
Start: 2024-01-02

## 2024-01-02 RX ORDER — ESCITALOPRAM OXALATE 5 MG/1
5 TABLET ORAL DAILY
Qty: 30 TABLET | Refills: 1 | Status: SHIPPED | OUTPATIENT
Start: 2024-01-02

## 2024-01-02 RX ORDER — OXYCODONE HYDROCHLORIDE 5 MG/1
10 TABLET ORAL 2 TIMES DAILY
Qty: 56 TABLET | Refills: 0 | OUTPATIENT
Start: 2024-01-02 | End: 2024-01-16

## 2024-01-02 RX ORDER — PSEUDOEPHEDRINE HCL 30 MG
100 TABLET ORAL 2 TIMES DAILY
Qty: 60 CAPSULE | Refills: 1 | Status: SHIPPED | OUTPATIENT
Start: 2024-01-02

## 2024-01-02 RX ORDER — BUSPIRONE HYDROCHLORIDE 5 MG/1
5 TABLET ORAL 2 TIMES DAILY
Qty: 60 TABLET | Refills: 5 | Status: SHIPPED | OUTPATIENT
Start: 2024-01-02

## 2024-01-02 RX ORDER — TRAZODONE HYDROCHLORIDE 150 MG/1
150 TABLET ORAL NIGHTLY
Qty: 90 TABLET | Refills: 2 | Status: SHIPPED | OUTPATIENT
Start: 2024-01-02

## 2024-01-04 ENCOUNTER — TELEPHONE (OUTPATIENT)
Dept: FAMILY MEDICINE CLINIC | Age: 89
End: 2024-01-04

## 2024-01-04 NOTE — TELEPHONE ENCOUNTER
PATRICIA Sandy from [facility] has recertified the pt for [facility] to manage his burnham cath.

## 2024-01-08 DIAGNOSIS — G89.29 CHRONIC BACK PAIN, UNSPECIFIED BACK LOCATION, UNSPECIFIED BACK PAIN LATERALITY: ICD-10-CM

## 2024-01-08 DIAGNOSIS — M54.9 CHRONIC BACK PAIN, UNSPECIFIED BACK LOCATION, UNSPECIFIED BACK PAIN LATERALITY: ICD-10-CM

## 2024-01-08 DIAGNOSIS — M79.10 MUSCULAR PAIN: ICD-10-CM

## 2024-01-08 DIAGNOSIS — M54.9 UPPER BACK PAIN: ICD-10-CM

## 2024-01-08 NOTE — TELEPHONE ENCOUNTER
Comments:     Last Office Visit (last PCP visit):   9/28/2023    Next Visit Date:  Future Appointments   Date Time Provider Department Center   1/24/2024 11:00 AM Donny Morales MD [facility]   2/1/2024 11:00 AM Gilda Isbell APRN - CNP [facility]   3/20/2024  1:15 PM Raphael Theodore MD [facility]       **If hasn't been seen in over a year OR hasn't followed up according to last diabetes/ADHD visit, make appointment for patient before sending refill to provider.    Rx requested:  Requested Prescriptions     Pending Prescriptions Disp Refills    traMADol (ULTRAM) 50 MG tablet [Pharmacy Med Name: traMADol HCl Oral Tablet 50 MG] 20 tablet 0     Sig: TAKE ONE TABLET BY MOUTH EVERY 6 HOURS AS NEEDED FOR PAIN FOR UP TO 5 DAYS. TAKE LOWEST DOSE POSSIBLE TO MANAGE PAIN. MAX DAILY AMOUNT: 4 TABLETS. REDUCE DOSES TAKEN AS PAIN BECOMES MANAGEABLE.

## 2024-01-09 DIAGNOSIS — E78.5 HYPERLIPIDEMIA, UNSPECIFIED HYPERLIPIDEMIA TYPE: ICD-10-CM

## 2024-01-09 RX ORDER — SIMVASTATIN 20 MG
20 TABLET ORAL NIGHTLY
Qty: 90 TABLET | Refills: 2 | Status: SHIPPED | OUTPATIENT
Start: 2024-01-09

## 2024-01-09 RX ORDER — TRAMADOL HYDROCHLORIDE 50 MG/1
50 TABLET ORAL EVERY 6 HOURS PRN
Qty: 20 TABLET | Refills: 0 | Status: SHIPPED | OUTPATIENT
Start: 2024-01-09 | End: 2024-01-16

## 2024-01-09 NOTE — TELEPHONE ENCOUNTER
Comments:     Last Office Visit (last PCP visit):   9/28/2023    Next Visit Date:  Future Appointments   Date Time Provider Department Center   1/24/2024 11:00 AM Donny Morales MD [facility]   2/1/2024 11:00 AM Gilda Isbell APRN - CNP [facility]   3/20/2024  1:15 PM Raphael Theodore MD [facility]       **If hasn't been seen in over a year OR hasn't followed up according to last diabetes/ADHD visit, make appointment for patient before sending refill to provider.    Rx requested:  Requested Prescriptions     Pending Prescriptions Disp Refills    simvastatin (ZOCOR) 20 MG tablet 90 tablet 2     Sig: Take 1 tablet by mouth nightly

## 2024-01-15 DIAGNOSIS — M54.9 CHRONIC BACK PAIN, UNSPECIFIED BACK LOCATION, UNSPECIFIED BACK PAIN LATERALITY: ICD-10-CM

## 2024-01-15 DIAGNOSIS — G89.29 CHRONIC BACK PAIN, UNSPECIFIED BACK LOCATION, UNSPECIFIED BACK PAIN LATERALITY: ICD-10-CM

## 2024-01-15 DIAGNOSIS — M79.10 MUSCULAR PAIN: ICD-10-CM

## 2024-01-15 DIAGNOSIS — M54.9 UPPER BACK PAIN: ICD-10-CM

## 2024-01-15 RX ORDER — TRAMADOL HYDROCHLORIDE 50 MG/1
50 TABLET ORAL EVERY 8 HOURS PRN
Qty: 20 TABLET | Refills: 0 | Status: SHIPPED | OUTPATIENT
Start: 2024-01-15 | End: 2024-01-22

## 2024-01-15 NOTE — TELEPHONE ENCOUNTER
Comments:     Last Office Visit (last PCP visit):   9/28/2023    Next Visit Date:  Future Appointments   Date Time Provider Department Center   1/24/2024 11:00 AM Donny Morales MD [facility]   2/1/2024 11:00 AM Gilda Isbell APRN - CNP [facility]   3/20/2024  1:15 PM Raphael Theodore MD [facility]       **If hasn't been seen in over a year OR hasn't followed up according to last diabetes/ADHD visit, make appointment for patient before sending refill to provider.    Rx requested:  Requested Prescriptions     Pending Prescriptions Disp Refills    traMADol (ULTRAM) 50 MG tablet [Pharmacy Med Name: traMADol HCl Oral Tablet 50 MG] 20 tablet 0     Sig: TAKE ONE TABLET BY MOUTH EVERY 6 HOURS AS NEEDED FOR PAIN FOR UP TO 7 DAYS.  MAX DAILY AMOUNT:  4 TABLETSREDUCE DOSES TAKEN AS PAIN BECOMES MANAGEABLE

## 2024-01-24 ENCOUNTER — OFFICE VISIT (OUTPATIENT)
Dept: FAMILY MEDICINE CLINIC | Age: 89
End: 2024-01-24

## 2024-01-24 VITALS
OXYGEN SATURATION: 96 % | BODY MASS INDEX: 24.65 KG/M2 | HEART RATE: 66 BPM | DIASTOLIC BLOOD PRESSURE: 84 MMHG | HEIGHT: 66 IN | SYSTOLIC BLOOD PRESSURE: 120 MMHG | TEMPERATURE: 98.7 F | WEIGHT: 153.4 LBS

## 2024-01-24 DIAGNOSIS — G89.29 CHRONIC BACK PAIN, UNSPECIFIED BACK LOCATION, UNSPECIFIED BACK PAIN LATERALITY: Primary | ICD-10-CM

## 2024-01-24 DIAGNOSIS — M54.9 CHRONIC BACK PAIN, UNSPECIFIED BACK LOCATION, UNSPECIFIED BACK PAIN LATERALITY: Primary | ICD-10-CM

## 2024-01-24 DIAGNOSIS — E78.5 HYPERLIPIDEMIA, UNSPECIFIED HYPERLIPIDEMIA TYPE: ICD-10-CM

## 2024-01-24 DIAGNOSIS — C61 PROSTATE CANCER METASTATIC TO BONE (HCC): ICD-10-CM

## 2024-01-24 DIAGNOSIS — Z91.81 AT HIGH RISK FOR FALLS: ICD-10-CM

## 2024-01-24 DIAGNOSIS — G89.3 NEOPLASM RELATED PAIN: ICD-10-CM

## 2024-01-24 DIAGNOSIS — F41.9 ANXIETY: ICD-10-CM

## 2024-01-24 DIAGNOSIS — D69.6 THROMBOCYTOPENIA, UNSPECIFIED (HCC): ICD-10-CM

## 2024-01-24 DIAGNOSIS — J44.9 CHRONIC OBSTRUCTIVE PULMONARY DISEASE, UNSPECIFIED COPD TYPE (HCC): ICD-10-CM

## 2024-01-24 DIAGNOSIS — C79.51 PROSTATE CANCER METASTATIC TO BONE (HCC): ICD-10-CM

## 2024-01-24 RX ORDER — OXYCODONE HYDROCHLORIDE 5 MG/1
5 TABLET ORAL EVERY 6 HOURS PRN
Qty: 28 TABLET | Refills: 0 | Status: SHIPPED | OUTPATIENT
Start: 2024-01-24 | End: 2024-01-31

## 2024-01-24 ASSESSMENT — PATIENT HEALTH QUESTIONNAIRE - PHQ9
5. POOR APPETITE OR OVEREATING: 0
3. TROUBLE FALLING OR STAYING ASLEEP: 2
10. IF YOU CHECKED OFF ANY PROBLEMS, HOW DIFFICULT HAVE THESE PROBLEMS MADE IT FOR YOU TO DO YOUR WORK, TAKE CARE OF THINGS AT HOME, OR GET ALONG WITH OTHER PEOPLE: 2
6. FEELING BAD ABOUT YOURSELF - OR THAT YOU ARE A FAILURE OR HAVE LET YOURSELF OR YOUR FAMILY DOWN: 0
SUM OF ALL RESPONSES TO PHQ9 QUESTIONS 1 & 2: 3
9. THOUGHTS THAT YOU WOULD BE BETTER OFF DEAD, OR OF HURTING YOURSELF: 0
SUM OF ALL RESPONSES TO PHQ QUESTIONS 1-9: 11
7. TROUBLE CONCENTRATING ON THINGS, SUCH AS READING THE NEWSPAPER OR WATCHING TELEVISION: 3
SUM OF ALL RESPONSES TO PHQ QUESTIONS 1-9: 11
2. FEELING DOWN, DEPRESSED OR HOPELESS: 2
SUM OF ALL RESPONSES TO PHQ QUESTIONS 1-9: 11
1. LITTLE INTEREST OR PLEASURE IN DOING THINGS: 1
8. MOVING OR SPEAKING SO SLOWLY THAT OTHER PEOPLE COULD HAVE NOTICED. OR THE OPPOSITE, BEING SO FIGETY OR RESTLESS THAT YOU HAVE BEEN MOVING AROUND A LOT MORE THAN USUAL: 1
SUM OF ALL RESPONSES TO PHQ QUESTIONS 1-9: 11
4. FEELING TIRED OR HAVING LITTLE ENERGY: 2

## 2024-01-24 NOTE — PROGRESS NOTES
about wife's behavior/dementia, reports more anxiety     Lab Results   Component Value Date    WBC 7.3 09/03/2023    HGB 12.0 (L) 09/03/2023    HCT 36.0 (L) 09/03/2023     09/03/2023    CHOL 142 07/24/2023    TRIG 94 07/24/2023    HDL 42 07/24/2023    ALT 30 08/26/2023    AST 27 08/26/2023     09/03/2023    K 4.1 09/03/2023     09/03/2023    CREATININE 0.80 09/03/2023    BUN 19 09/03/2023    CO2 28 09/03/2023    TSH 1.570 07/24/2023    .50 (H) 08/18/2023    INR 1.0 08/15/2023       No results found for this visit on 01/24/24.        A&P   Diagnosis Orders   1. Chronic back pain, unspecified back location, unspecified back pain laterality        2. Prostate cancer metastatic to bone (HCC)  Handicap Placard MISC    oxyCODONE (ROXICODONE) 5 MG immediate release tablet      3. Neoplasm related pain  Handicap Placard MISC    oxyCODONE (ROXICODONE) 5 MG immediate release tablet      4. Chronic obstructive pulmonary disease, unspecified COPD type (HCC)        5. Thrombocytopenia, unspecified (HCC)        6. At high risk for falls        7. Anxiety        8. Hyperlipidemia, unspecified hyperlipidemia type            Refill oxycodone    Would have palliative consider assuming this     Donny Morales MD    [facility]    On the basis of positive falls risk screening, assessment and plan is as follows:    .

## 2024-02-01 ENCOUNTER — OFFICE VISIT (OUTPATIENT)
Dept: PALLATIVE CARE | Age: 89
End: 2024-02-01
Payer: MEDICARE

## 2024-02-01 VITALS
DIASTOLIC BLOOD PRESSURE: 89 MMHG | OXYGEN SATURATION: 95 % | TEMPERATURE: 98.7 F | HEART RATE: 73 BPM | SYSTOLIC BLOOD PRESSURE: 145 MMHG

## 2024-02-01 DIAGNOSIS — C79.51 PROSTATE CANCER METASTATIC TO BONE (HCC): ICD-10-CM

## 2024-02-01 DIAGNOSIS — R39.15 URGENCY OF URINATION: Primary | ICD-10-CM

## 2024-02-01 DIAGNOSIS — Z97.8 FOLEY CATHETER IN PLACE: ICD-10-CM

## 2024-02-01 DIAGNOSIS — R60.0 BILATERAL LOWER EXTREMITY EDEMA: ICD-10-CM

## 2024-02-01 DIAGNOSIS — G89.3 NEOPLASM RELATED PAIN: ICD-10-CM

## 2024-02-01 DIAGNOSIS — J44.9 CHRONIC OBSTRUCTIVE PULMONARY DISEASE, UNSPECIFIED COPD TYPE (HCC): ICD-10-CM

## 2024-02-01 DIAGNOSIS — R33.9 URINARY RETENTION: ICD-10-CM

## 2024-02-01 DIAGNOSIS — K21.9 GASTROESOPHAGEAL REFLUX DISEASE WITHOUT ESOPHAGITIS: ICD-10-CM

## 2024-02-01 DIAGNOSIS — K59.00 CONSTIPATION, UNSPECIFIED CONSTIPATION TYPE: ICD-10-CM

## 2024-02-01 DIAGNOSIS — F41.9 ANXIETY: ICD-10-CM

## 2024-02-01 DIAGNOSIS — C61 PROSTATE CANCER METASTATIC TO BONE (HCC): ICD-10-CM

## 2024-02-01 DIAGNOSIS — R41.89 IMPAIRED COGNITION: ICD-10-CM

## 2024-02-01 DIAGNOSIS — Z51.5 PALLIATIVE CARE ENCOUNTER: ICD-10-CM

## 2024-02-01 PROCEDURE — G8420 CALC BMI NORM PARAMETERS: HCPCS | Performed by: NURSE PRACTITIONER

## 2024-02-01 PROCEDURE — G8484 FLU IMMUNIZE NO ADMIN: HCPCS | Performed by: NURSE PRACTITIONER

## 2024-02-01 PROCEDURE — 99349 HOME/RES VST EST MOD MDM 40: CPT | Performed by: NURSE PRACTITIONER

## 2024-02-01 PROCEDURE — 1123F ACP DISCUSS/DSCN MKR DOCD: CPT | Performed by: NURSE PRACTITIONER

## 2024-02-01 PROCEDURE — 1036F TOBACCO NON-USER: CPT | Performed by: NURSE PRACTITIONER

## 2024-02-01 RX ORDER — PSEUDOEPHEDRINE HCL 30 MG
100 TABLET ORAL 2 TIMES DAILY
Qty: 180 CAPSULE | Refills: 1 | Status: SHIPPED | OUTPATIENT
Start: 2024-02-01

## 2024-02-01 RX ORDER — PHENAZOPYRIDINE HYDROCHLORIDE 200 MG/1
200 TABLET, FILM COATED ORAL 3 TIMES DAILY PRN
Qty: 30 TABLET | Refills: 0 | Status: SHIPPED | OUTPATIENT
Start: 2024-02-01

## 2024-02-01 RX ORDER — TAMSULOSIN HYDROCHLORIDE 0.4 MG/1
0.4 CAPSULE ORAL
Qty: 90 CAPSULE | Refills: 1 | Status: SHIPPED | OUTPATIENT
Start: 2024-02-01

## 2024-02-01 RX ORDER — PANTOPRAZOLE SODIUM 40 MG/1
40 TABLET, DELAYED RELEASE ORAL
Qty: 90 TABLET | Refills: 1 | Status: SHIPPED | OUTPATIENT
Start: 2024-02-01

## 2024-02-01 RX ORDER — ESCITALOPRAM OXALATE 5 MG/1
5 TABLET ORAL DAILY
Qty: 90 TABLET | Refills: 1 | Status: SHIPPED | OUTPATIENT
Start: 2024-02-01

## 2024-02-01 NOTE — PROGRESS NOTES
Subjective:      Patient Id: Seen Caden at  home in [place] , for follow-up palliative medicine. He was accompanied to the appointment by: aide.     Chief Complaint   Patient presents with    Ongoing confusion about medical appointments and health con    Follow-up      HPI       Caden Khalil is a 88 y.o. male with a complex medical history that includes tobacco use, copd, esophageal issues, HTN, HLD. Home visit is necessary in lieu of office due to significant frailty and high symptom burden from comorbid illnesses.      General: Patient is alert and oriented x 3-4. Poor short-term memory and historian. Ongoing confusion regarding appointments, medications, and current health situation.    Functional status: Patient has been getting up with walker. Also has and uses wheelchair. Patient reports having home aide coming 6 days a week for 3 hours a day. No recent falls.     Prostate cancer: Patient initially presented to the ER for uncontrolled back pain that was progressively worsening in addition to inability to ambulate and numbness of the left lower extremity. Patient was found to have T3-T4 spinal cord compression while at hospital in the setting of metastatic disease. He had palliative RT on 8/18/23. Patient was transferred to inpatient rehab where he participated in therapy. He was also started on oral casodex and lupron injections. Casodex now complete. Remains on Lupron. Also on Zometa. Reports ongoing issues with numbness in his legs as well as lower abdominal area.     Pain: Stable. Pain is in his midline low back. Describes as an aching pain. Currently taking oxycodone 5 mg po q 6 hours prn (dose changed by PCP). Rates pain at a 4-5/10. Standing and leaning back in chair helps with the pain. Pain is stable on current regimen.     Burnham catheter: Complaining of significant feeling of frequency/urgency after last burnham change. Had HHC replace again later in week. HHC normally following for monthly catheter

## 2024-02-06 ASSESSMENT — ENCOUNTER SYMPTOMS: CONSTIPATION: 1

## 2024-02-16 DIAGNOSIS — C79.51 PROSTATE CANCER METASTATIC TO BONE (HCC): ICD-10-CM

## 2024-02-16 DIAGNOSIS — C61 PROSTATE CANCER METASTATIC TO BONE (HCC): ICD-10-CM

## 2024-02-16 DIAGNOSIS — G89.3 NEOPLASM RELATED PAIN: ICD-10-CM

## 2024-02-16 RX ORDER — OXYCODONE HYDROCHLORIDE 5 MG/1
5 TABLET ORAL EVERY 6 HOURS PRN
Qty: 28 TABLET | Refills: 0 | Status: SHIPPED | OUTPATIENT
Start: 2024-02-16 | End: 2024-02-23

## 2024-02-16 NOTE — TELEPHONE ENCOUNTER
1/24/2024 lov     7/24/2024 12:15 PM  [facility] Appointment Notes: 6 month follow up

## 2024-02-23 DIAGNOSIS — G89.29 CHRONIC BACK PAIN, UNSPECIFIED BACK LOCATION, UNSPECIFIED BACK PAIN LATERALITY: ICD-10-CM

## 2024-02-23 DIAGNOSIS — M54.9 CHRONIC BACK PAIN, UNSPECIFIED BACK LOCATION, UNSPECIFIED BACK PAIN LATERALITY: ICD-10-CM

## 2024-02-23 DIAGNOSIS — M54.9 UPPER BACK PAIN: ICD-10-CM

## 2024-02-23 DIAGNOSIS — M79.10 MUSCULAR PAIN: ICD-10-CM

## 2024-02-23 RX ORDER — BACLOFEN 5 MG/1
5 TABLET ORAL 2 TIMES DAILY PRN
Qty: 20 TABLET | Refills: 2 | Status: SHIPPED | OUTPATIENT
Start: 2024-02-23

## 2024-02-23 RX ORDER — TRAMADOL HYDROCHLORIDE 50 MG/1
50 TABLET ORAL EVERY 8 HOURS PRN
Qty: 20 TABLET | Refills: 0 | OUTPATIENT
Start: 2024-02-23 | End: 2024-03-01

## 2024-02-23 NOTE — TELEPHONE ENCOUNTER
Comments: patient is completely out     Last Office Visit (last PCP visit):   1/24/2024    Next Visit Date:  Future Appointments   Date Time Provider Department Center   2/29/2024  9:00 AM Gilda Isbell APRN - CNP [facility]   2/29/2024 10:00 AM Gilda Isbell APRN - CNP [facility]   3/20/2024  1:15 PM Raphael Theodore MD [facility]   7/24/2024 12:15 PM Donny Morales MD [facility]       **If hasn't been seen in over a year OR hasn't followed up according to last diabetes/ADHD visit, make appointment for patient before sending refill to provider.    Rx requested:  Requested Prescriptions     Pending Prescriptions Disp Refills    traMADol (ULTRAM) 50 MG tablet 20 tablet 0     Sig: Take 1 tablet by mouth every 8 hours as needed for Pain for up to 7 days. Max Daily Amount: 150 mg    Baclofen (LIORESAL) 5 MG tablet 20 tablet 2     Sig: Take 1 tablet by mouth 2 times daily as needed (back pain)

## 2024-02-29 ENCOUNTER — OFFICE VISIT (OUTPATIENT)
Dept: PALLATIVE CARE | Age: 89
End: 2024-02-29

## 2024-02-29 VITALS — OXYGEN SATURATION: 95 % | SYSTOLIC BLOOD PRESSURE: 120 MMHG | DIASTOLIC BLOOD PRESSURE: 82 MMHG | HEART RATE: 87 BPM

## 2024-02-29 DIAGNOSIS — K59.00 CONSTIPATION, UNSPECIFIED CONSTIPATION TYPE: ICD-10-CM

## 2024-02-29 DIAGNOSIS — C61 PROSTATE CANCER METASTATIC TO BONE (HCC): Primary | ICD-10-CM

## 2024-02-29 DIAGNOSIS — K21.9 GASTROESOPHAGEAL REFLUX DISEASE WITHOUT ESOPHAGITIS: ICD-10-CM

## 2024-02-29 DIAGNOSIS — R33.9 URINARY RETENTION: ICD-10-CM

## 2024-02-29 DIAGNOSIS — Z97.8 FOLEY CATHETER IN PLACE: ICD-10-CM

## 2024-02-29 DIAGNOSIS — F41.9 ANXIETY: ICD-10-CM

## 2024-02-29 DIAGNOSIS — R41.89 IMPAIRED COGNITION: ICD-10-CM

## 2024-02-29 DIAGNOSIS — Z00.00 MEDICARE ANNUAL WELLNESS VISIT, SUBSEQUENT: Primary | ICD-10-CM

## 2024-02-29 DIAGNOSIS — J44.9 CHRONIC OBSTRUCTIVE PULMONARY DISEASE, UNSPECIFIED COPD TYPE (HCC): ICD-10-CM

## 2024-02-29 DIAGNOSIS — R60.0 BILATERAL LOWER EXTREMITY EDEMA: ICD-10-CM

## 2024-02-29 DIAGNOSIS — C79.51 PROSTATE CANCER METASTATIC TO BONE (HCC): Primary | ICD-10-CM

## 2024-02-29 DIAGNOSIS — R39.15 URGENCY OF URINATION: ICD-10-CM

## 2024-02-29 DIAGNOSIS — G89.3 NEOPLASM RELATED PAIN: ICD-10-CM

## 2024-02-29 DIAGNOSIS — Z51.5 PALLIATIVE CARE ENCOUNTER: ICD-10-CM

## 2024-02-29 RX ORDER — ALBUTEROL SULFATE 90 UG/1
2 AEROSOL, METERED RESPIRATORY (INHALATION) 4 TIMES DAILY PRN
Qty: 18 G | Refills: 2 | Status: SHIPPED | OUTPATIENT
Start: 2024-02-29

## 2024-02-29 RX ORDER — FLUTICASONE PROPIONATE 50 MCG
2 SPRAY, SUSPENSION (ML) NASAL DAILY
COMMUNITY

## 2024-02-29 ASSESSMENT — PATIENT HEALTH QUESTIONNAIRE - PHQ9
1. LITTLE INTEREST OR PLEASURE IN DOING THINGS: 0
9. THOUGHTS THAT YOU WOULD BE BETTER OFF DEAD, OR OF HURTING YOURSELF: 0
5. POOR APPETITE OR OVEREATING: 0
SUM OF ALL RESPONSES TO PHQ QUESTIONS 1-9: 4
2. FEELING DOWN, DEPRESSED OR HOPELESS: 1
6. FEELING BAD ABOUT YOURSELF - OR THAT YOU ARE A FAILURE OR HAVE LET YOURSELF OR YOUR FAMILY DOWN: 1
SUM OF ALL RESPONSES TO PHQ QUESTIONS 1-9: 4
4. FEELING TIRED OR HAVING LITTLE ENERGY: 0
SUM OF ALL RESPONSES TO PHQ QUESTIONS 1-9: 4
8. MOVING OR SPEAKING SO SLOWLY THAT OTHER PEOPLE COULD HAVE NOTICED. OR THE OPPOSITE, BEING SO FIGETY OR RESTLESS THAT YOU HAVE BEEN MOVING AROUND A LOT MORE THAN USUAL: 1
SUM OF ALL RESPONSES TO PHQ QUESTIONS 1-9: 4
SUM OF ALL RESPONSES TO PHQ9 QUESTIONS 1 & 2: 1
10. IF YOU CHECKED OFF ANY PROBLEMS, HOW DIFFICULT HAVE THESE PROBLEMS MADE IT FOR YOU TO DO YOUR WORK, TAKE CARE OF THINGS AT HOME, OR GET ALONG WITH OTHER PEOPLE: 0
7. TROUBLE CONCENTRATING ON THINGS, SUCH AS READING THE NEWSPAPER OR WATCHING TELEVISION: 1

## 2024-02-29 ASSESSMENT — ENCOUNTER SYMPTOMS
WHEEZING: 0
DIARRHEA: 0
CONSTIPATION: 1
VOICE CHANGE: 0
SHORTNESS OF BREATH: 0
ABDOMINAL PAIN: 0
BACK PAIN: 1
TROUBLE SWALLOWING: 0
NAUSEA: 0
COUGH: 1

## 2024-02-29 NOTE — PROGRESS NOTES
Subjective:      Patient Id: Seen Caden at  home in [place] , for follow-up palliative medicine. He was accompanied to the appointment by: Deyvi gasca, for some of visit.     Chief Complaint   Patient presents with    Follow-up      HPI       Caden Khalil is a 88 y.o. male with a complex medical history that includes tobacco use, copd, esophageal issues, HTN, HLD. Home visit is necessary in lieu of office due to significant frailty and high symptom burden from comorbid illnesses.      General: Patient is alert and oriented x 3-4. Poor short-term memory and historian. Possibly in the setting of anxiety. Normal cognitive screening per AWV.     Functional status: Patient has been getting up with walker. Also has and uses wheelchair. Remains very active and participates in exercise daily. Patient reports having home aide coming 5 days a week for 3 hours a day. No recent falls.     Prostate cancer: Patient initially presented to the ER for uncontrolled back pain that was progressively worsening in addition to inability to ambulate and numbness of the left lower extremity. Patient was found to have T3-T4 spinal cord compression while at hospital in the setting of metastatic disease. He had palliative RT on 8/18/23. Patient was transferred to inpatient rehab where he participated in therapy. He was also started on oral casodex and lupron injections. Casodex now complete. Remains on Lupron. Also on Zometa. Reports ongoing issues with numbness in his legs as well as lower abdominal area.     Pain: Pain is in his midline low back. Describes as an aching pain. Currently taking oxycodone 5 mg po q 6 hours prn (prescribed by PCP). Also on balcofen 5 mg po BID prn. Rates pain at a 8/10 at it's worst. Sitting and leaning forward aggravate pain. Standing and leaning back in chair helps with the pain. Pain is stable on current regimen.     Núñez catheter: Feeling of frequency/urgency occurs intermittently but has subsided

## 2024-02-29 NOTE — PROGRESS NOTES
Medicare Annual Wellness Visit    Caden Khalil is here for Medicare AWV    Assessment & Plan   Medicare annual wellness visit, subsequent  Recommendations for Preventive Services Due: see orders and patient instructions/AVS.  Recommended screening schedule for the next 5-10 years is provided to the patient in written form: see Patient Instructions/AVS.     Return in 1 year (on 2/28/2025).     Subjective       Patient's complete Health Risk Assessment and screening values have been reviewed and are found in Flowsheets. The following problems were reviewed today and where indicated follow up appointments were made and/or referrals ordered.    Positive Risk Factor Screenings with Interventions:    Fall Risk:  Do you feel unsteady or are you worried about falling? : no  2 or more falls in past year?: (!) yes  Fall with injury in past year?: no     Interventions:    Patient comments: Patient has walker. He has been staying physically active. Reports he feels steady on his feet.  Reviewed medications, home hazards, visual acuity, and co-morbidities that can increase risk for falls            General HRA Questions:  Select all that apply: (!) Loneliness    Loneliness Interventions:  Patient declined any further interventions or treatment. Has frequent visits from family and has aide service 5 days a week.       Dentist Screen:  Have you seen the dentist within the past year?: (!) No    Intervention:  Patient declines any further evaluation or treatment    Hearing Screen:  Do you or your family notice any trouble with your hearing that hasn't been managed with hearing aids?: (!) Yes    Interventions:  Patient declines any further evaluation or treatment      ADL's:   Patient reports needing help with:  Select all that apply: (!) Laundry, Housekeeping, Shopping, Taking Medications  Interventions:  Patient comments: Has assistance with all of above from family and aide service.     Tobacco Use:  Tobacco Use: High Risk

## 2024-03-15 DIAGNOSIS — C79.51 PROSTATE CANCER METASTATIC TO BONE (HCC): ICD-10-CM

## 2024-03-15 DIAGNOSIS — G89.3 NEOPLASM RELATED PAIN: ICD-10-CM

## 2024-03-15 DIAGNOSIS — C61 PROSTATE CANCER METASTATIC TO BONE (HCC): ICD-10-CM

## 2024-03-15 RX ORDER — OXYCODONE HYDROCHLORIDE 5 MG/1
5 TABLET ORAL EVERY 6 HOURS PRN
Qty: 28 TABLET | Refills: 0 | Status: SHIPPED | OUTPATIENT
Start: 2024-03-15 | End: 2024-03-22

## 2024-03-15 NOTE — TELEPHONE ENCOUNTER
Comments:     Last Office Visit (last PCP visit):   1/24/2024    Next Visit Date:  Future Appointments   Date Time Provider Department Center   3/20/2024  1:15 PM Raphael Theodore MD [facility]   4/4/2024 11:00 AM Gilda Isbell APRN - CNP [facility]   7/24/2024 12:15 PM Donny Morales MD [facility]       **If hasn't been seen in over a year OR hasn't followed up according to last diabetes/ADHD visit, make appointment for patient before sending refill to provider.    Rx requested:  Requested Prescriptions     Pending Prescriptions Disp Refills    oxyCODONE (ROXICODONE) 5 MG immediate release tablet 28 tablet 0     Sig: Take 1 tablet by mouth every 6 hours as needed for Pain for up to 7 days. Max Daily Amount: 20 mg

## 2024-03-20 ENCOUNTER — OFFICE VISIT (OUTPATIENT)
Dept: UROLOGY | Age: 89
End: 2024-03-20
Payer: MEDICARE

## 2024-03-20 VITALS
BODY MASS INDEX: 25.71 KG/M2 | SYSTOLIC BLOOD PRESSURE: 112 MMHG | HEART RATE: 55 BPM | HEIGHT: 66 IN | WEIGHT: 160 LBS | OXYGEN SATURATION: 95 % | DIASTOLIC BLOOD PRESSURE: 64 MMHG

## 2024-03-20 DIAGNOSIS — C61 PROSTATE CANCER (HCC): Primary | ICD-10-CM

## 2024-03-20 DIAGNOSIS — C61 PROSTATE CANCER (HCC): ICD-10-CM

## 2024-03-20 LAB — PSA SERPL-MCNC: 13.21 NG/ML (ref 0–4)

## 2024-03-20 PROCEDURE — 96402 CHEMO HORMON ANTINEOPL SQ/IM: CPT | Performed by: UROLOGY

## 2024-03-20 PROCEDURE — 4004F PT TOBACCO SCREEN RCVD TLK: CPT | Performed by: UROLOGY

## 2024-03-20 PROCEDURE — 1123F ACP DISCUSS/DSCN MKR DOCD: CPT | Performed by: UROLOGY

## 2024-03-20 PROCEDURE — G8427 DOCREV CUR MEDS BY ELIG CLIN: HCPCS | Performed by: UROLOGY

## 2024-03-20 PROCEDURE — G8484 FLU IMMUNIZE NO ADMIN: HCPCS | Performed by: UROLOGY

## 2024-03-20 PROCEDURE — 99213 OFFICE O/P EST LOW 20 MIN: CPT | Performed by: UROLOGY

## 2024-03-20 PROCEDURE — G8417 CALC BMI ABV UP PARAM F/U: HCPCS | Performed by: UROLOGY

## 2024-03-20 ASSESSMENT — ENCOUNTER SYMPTOMS: ABDOMINAL PAIN: 0

## 2024-03-20 NOTE — PROGRESS NOTES
Subjective:      Patient ID: Caden Khalil is a 88 y.o. male    HPI  This is an 89 yo male with COPD, Anxiety, HTN and admitted on 8/17/23 with progressive back pain and lower ext weakness and MRI findings suggestive of thoracic and lumbar mets and has a PSA of 628 ng/ml and a firm prostate on exam. He developed acute urinary symptoms and now has urinary retention with a burnham catheter. He had acute radiation treatment to the T3/4 region/mets (confirmed by MRI and Bone scan) for cord compression and he is s/p prostate biopsy by IR and failed a voiding trial while on rehab. He was given a 6 mo Lupron when last seen and is back for Lupron with his son. He reports that he is walking better with a walker currently but still has numbness in the genitals and rectal region and lower abd. He gets his Burnham changed every 4 weeks by [facility] and is not bothered by the Burnham.   He has no other complaints. He sees Dr Singh and has been getting \"iv chemo\" and got a PSA today. He wants to proceed with Lupron today.      Past Medical History:   Diagnosis Date    Anxiety     COPD (chronic obstructive pulmonary disease) (HCC)     Hyperlipidemia     Hypertension     Kidney stone      Past Surgical History:   Procedure Laterality Date    PROSTATE BIOPSY  08/21/2023    U/S guided prostate biopsy completed by Dr. Kwon     PROSTATE NEEDLE PUNCH  8/21/2023     PROSTATE NEEDLE PUNCH 8/21/2023 [facility] ULTRASOUND     Social History     Socioeconomic History    Marital status:    Tobacco Use    Smoking status: Every Day     Current packs/day: 0.50     Average packs/day: 0.5 packs/day for 70.9 years (35.4 ttl pk-yrs)     Types: Cigarettes     Start date: 5/5/1952    Smokeless tobacco: Never   Substance and Sexual Activity    Alcohol use: Yes    Drug use: No    Sexual activity: Not Currently   Social History Narrative    Lives With: Alone (Wife has dementia -moving to a nursing home [place] in [facility] from a memory care unit in

## 2024-03-22 DIAGNOSIS — M79.10 MUSCULAR PAIN: ICD-10-CM

## 2024-03-22 DIAGNOSIS — M54.9 CHRONIC BACK PAIN, UNSPECIFIED BACK LOCATION, UNSPECIFIED BACK PAIN LATERALITY: ICD-10-CM

## 2024-03-22 DIAGNOSIS — G89.29 CHRONIC BACK PAIN, UNSPECIFIED BACK LOCATION, UNSPECIFIED BACK PAIN LATERALITY: ICD-10-CM

## 2024-03-22 RX ORDER — BACLOFEN 5 MG/1
TABLET ORAL
Qty: 20 TABLET | Refills: 0 | Status: SHIPPED | OUTPATIENT
Start: 2024-03-22

## 2024-03-22 NOTE — TELEPHONE ENCOUNTER
Comments:     Last Office Visit (last PCP visit):   1/24/2024    Next Visit Date:  Future Appointments   Date Time Provider Department Center   4/4/2024 11:00 AM Gilda Isbell APRN - CNP  [facility]   7/24/2024 12:15 PM Donny Morales MD [facility]   10/9/2024  2:30 PM Raphael Theodore MD [facility]       **If hasn't been seen in over a year OR hasn't followed up according to last diabetes/ADHD visit, make appointment for patient before sending refill to provider.    Rx requested:  Requested Prescriptions     Pending Prescriptions Disp Refills    Baclofen (LIORESAL) 5 MG tablet [Pharmacy Med Name: Baclofen Oral Tablet 5 MG] 20 tablet 0     Sig: TAKE ONE TABLET BY MOUTH TWICE DAILY AS NEEDED FOR BACK PAIN

## 2024-04-04 ENCOUNTER — OFFICE VISIT (OUTPATIENT)
Dept: PALLATIVE CARE | Age: 89
End: 2024-04-04

## 2024-04-04 VITALS — OXYGEN SATURATION: 94 % | SYSTOLIC BLOOD PRESSURE: 132 MMHG | DIASTOLIC BLOOD PRESSURE: 81 MMHG | HEART RATE: 77 BPM

## 2024-04-04 DIAGNOSIS — Z51.5 PALLIATIVE CARE ENCOUNTER: ICD-10-CM

## 2024-04-04 DIAGNOSIS — Z97.8 FOLEY CATHETER IN PLACE: ICD-10-CM

## 2024-04-04 DIAGNOSIS — G89.3 NEOPLASM RELATED PAIN: ICD-10-CM

## 2024-04-04 DIAGNOSIS — R39.15 URGENCY OF URINATION: ICD-10-CM

## 2024-04-04 DIAGNOSIS — K21.9 GASTROESOPHAGEAL REFLUX DISEASE WITHOUT ESOPHAGITIS: ICD-10-CM

## 2024-04-04 DIAGNOSIS — C61 PROSTATE CANCER METASTATIC TO BONE (HCC): ICD-10-CM

## 2024-04-04 DIAGNOSIS — R33.9 URINARY RETENTION: ICD-10-CM

## 2024-04-04 DIAGNOSIS — K59.00 CONSTIPATION, UNSPECIFIED CONSTIPATION TYPE: ICD-10-CM

## 2024-04-04 DIAGNOSIS — F41.9 ANXIETY: ICD-10-CM

## 2024-04-04 DIAGNOSIS — R41.89 IMPAIRED COGNITION: ICD-10-CM

## 2024-04-04 DIAGNOSIS — L30.9 DERMATITIS: Primary | ICD-10-CM

## 2024-04-04 DIAGNOSIS — R60.0 BILATERAL LOWER EXTREMITY EDEMA: ICD-10-CM

## 2024-04-04 DIAGNOSIS — C79.51 PROSTATE CANCER METASTATIC TO BONE (HCC): ICD-10-CM

## 2024-04-04 DIAGNOSIS — J44.9 CHRONIC OBSTRUCTIVE PULMONARY DISEASE, UNSPECIFIED COPD TYPE (HCC): ICD-10-CM

## 2024-04-04 PROBLEM — S22.000A COMPRESSION FRACTURE OF BODY OF THORACIC VERTEBRA (HCC): Status: RESOLVED | Noted: 2023-08-17 | Resolved: 2024-04-04

## 2024-04-04 RX ORDER — MOMETASONE FUROATE 1 MG/ML
SOLUTION TOPICAL
Qty: 60 ML | Refills: 0 | Status: SHIPPED | OUTPATIENT
Start: 2024-04-04

## 2024-04-04 ASSESSMENT — ENCOUNTER SYMPTOMS
DIARRHEA: 0
COUGH: 1
WHEEZING: 0
TROUBLE SWALLOWING: 0
ABDOMINAL PAIN: 0
NAUSEA: 0
SHORTNESS OF BREATH: 0
VOICE CHANGE: 0
BACK PAIN: 1

## 2024-04-04 NOTE — PROGRESS NOTES
Subjective:      Patient Id: Seen Caden at  home in [place] , for follow-up palliative medicine. He was accompanied to the appointment by: Deyvi gasca.     Chief Complaint   Patient presents with    Itching to back.      HPI       Caden Khalil is a 88 y.o. 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 Home visit is necessary in lieu of office due to significant frailty and high symptom burden from comorbid illnesses.      General: Patient is alert and oriented x 3-4. Poor short-term memory and historian. Possibly in the setting of anxiety. Previous cognitive screening per AWV WNL.     Functional status: Patient has been getting up with walker. Also has and uses wheelchair. Remains very active and participates in exercise daily. Patient reports having home aide coming 5 days a week for 3 hours a day. No recent falls.     Prostate cancer: Patient initially presented to the ER for uncontrolled back pain that was progressively worsening in addition to inability to ambulate and numbness of the left lower extremity. Patient was found to have T3-T4 spinal cord compression while at hospital in the setting of metastatic disease. He had palliative RT on 8/18/23. Patient was transferred to inpatient rehab where he participated in therapy. He was also started on oral casodex and lupron injections. Casodex now complete. Remains on Lupron. Also on Zometa. Reports ongoing issues with numbness in his legs as well as lower abdominal area.     Pain: Pain is in his midline low back. Describes as an aching pain. Currently taking oxycodone 5 mg po q 6 hours prn (prescribed by PCP). Also on balcofen 5 mg po BID prn. Rates pain at a 5/10 currently. Sitting and leaning forward aggravate pain. Standing and leaning back in chair helps with the pain. Pain is

## 2024-04-11 DIAGNOSIS — G89.29 CHRONIC BACK PAIN, UNSPECIFIED BACK LOCATION, UNSPECIFIED BACK PAIN LATERALITY: ICD-10-CM

## 2024-04-11 DIAGNOSIS — M79.10 MUSCULAR PAIN: ICD-10-CM

## 2024-04-11 DIAGNOSIS — M54.9 CHRONIC BACK PAIN, UNSPECIFIED BACK LOCATION, UNSPECIFIED BACK PAIN LATERALITY: ICD-10-CM

## 2024-04-11 RX ORDER — BACLOFEN 5 MG/1
TABLET ORAL
Qty: 20 TABLET | Refills: 0 | Status: SHIPPED | OUTPATIENT
Start: 2024-04-11

## 2024-04-11 NOTE — TELEPHONE ENCOUNTER
Comments:     Last Office Visit (last PCP visit):   1/24/2024    Next Visit Date:  Future Appointments   Date Time Provider Department Center   5/9/2024  2:00 PM Gilda Isbell APRN - CNP  [facility]   7/24/2024 12:15 PM Donny Morales MD [facility]   10/9/2024  2:30 PM Raphael Theodore MD [facility]       **If hasn't been seen in over a year OR hasn't followed up according to last diabetes/ADHD visit, make appointment for patient before sending refill to provider.    Rx requested:  Requested Prescriptions     Pending Prescriptions Disp Refills    Baclofen (LIORESAL) 5 MG tablet [Pharmacy Med Name: Baclofen Oral Tablet 5 MG] 20 tablet 0     Sig: TAKE ONE TABLET BY MOUTH TWICE DAILY AS NEEDED FOR BACK PAIN

## 2024-04-19 DIAGNOSIS — M79.10 MUSCULAR PAIN: ICD-10-CM

## 2024-04-19 DIAGNOSIS — M54.9 CHRONIC BACK PAIN, UNSPECIFIED BACK LOCATION, UNSPECIFIED BACK PAIN LATERALITY: ICD-10-CM

## 2024-04-19 DIAGNOSIS — G89.29 CHRONIC BACK PAIN, UNSPECIFIED BACK LOCATION, UNSPECIFIED BACK PAIN LATERALITY: ICD-10-CM

## 2024-04-19 RX ORDER — BACLOFEN 5 MG/1
TABLET ORAL
Qty: 20 TABLET | Refills: 0 | Status: SHIPPED | OUTPATIENT
Start: 2024-04-19

## 2024-04-25 RX ORDER — UBIDECARENONE 30 MG
1 CAPSULE ORAL DAILY
Qty: 60 CAPSULE | Refills: 0 | Status: SHIPPED | OUTPATIENT
Start: 2024-04-25

## 2024-04-25 NOTE — TELEPHONE ENCOUNTER
Comments:     Last Office Visit (last PCP visit):   1/24/2024    Next Visit Date:  Future Appointments   Date Time Provider Department Center   5/9/2024  2:00 PM Gilda Isbell APRN - CNP  [facility]   7/24/2024 12:15 PM Donny Morales MD [facility]   10/9/2024  2:30 PM Raphael Theodore MD [facility]       **If hasn't been seen in over a year OR hasn't followed up according to last diabetes/ADHD visit, make appointment for patient before sending refill to provider.    Rx requested:  Requested Prescriptions     Pending Prescriptions Disp Refills    coenzyme Q-10 100 MG capsule [Pharmacy Med Name: Coenzyme Q-10 Oral Capsule 100 MG] 60 capsule 0     Sig: TAKE ONE CAPSULE BY MOUTH EVERY DAY

## 2024-05-02 RX ORDER — CHOLECALCIFEROL (VITAMIN D3) 125 MCG
1 CAPSULE ORAL
Qty: 60 TABLET | Refills: 0 | Status: SHIPPED | OUTPATIENT
Start: 2024-05-02

## 2024-05-02 NOTE — TELEPHONE ENCOUNTER
Comments:     Last Office Visit (last PCP visit):   1/24/2024    Next Visit Date:  Future Appointments   Date Time Provider Department Center   5/9/2024  2:00 PM Gilda Isbell APRN - CNP  [facility]   7/24/2024 12:15 PM Donny Morales MD [facility]   10/9/2024  2:30 PM Raphael Theodore MD [facility]       **If hasn't been seen in over a year OR hasn't followed up according to last diabetes/ADHD visit, make appointment for patient before sending refill to provider.    Rx requested:  Requested Prescriptions     Pending Prescriptions Disp Refills    Cholecalciferol (VITAMIN D3) 50 MCG (2000 UT) TABS [Pharmacy Med Name: Vitamin D3 Oral Tablet 50 MCG (2000 UT)] 60 tablet 0     Sig: Take 1 tablet by mouth daily with supper.

## 2024-05-03 ENCOUNTER — TELEPHONE (OUTPATIENT)
Dept: FAMILY MEDICINE CLINIC | Age: 89
End: 2024-05-03

## 2024-05-03 NOTE — TELEPHONE ENCOUNTER
Shivani from [facility] called and stated that she checked pt's blood pressure in left arm and it was 170/110 manually    Shivani re-checked in the right arm and it was 150/90 manually.    Shivani wants to make Dr Morales aware.    Shivani [phone]

## 2024-05-06 DIAGNOSIS — C79.51 PROSTATE CANCER METASTATIC TO BONE (HCC): ICD-10-CM

## 2024-05-06 DIAGNOSIS — C61 PROSTATE CANCER METASTATIC TO BONE (HCC): ICD-10-CM

## 2024-05-06 DIAGNOSIS — G89.3 NEOPLASM RELATED PAIN: ICD-10-CM

## 2024-05-06 NOTE — TELEPHONE ENCOUNTER
Comments:     Last Office Visit (last PCP visit):   1/24/2024    Next Visit Date:  Future Appointments   Date Time Provider Department Center   5/9/2024  2:00 PM Gilda Isbell APRN - CNP  [facility]   7/24/2024 12:15 PM Donny Morales MD [facility]   10/9/2024  2:30 PM Raphael Theodore MD [facility]       **If hasn't been seen in over a year OR hasn't followed up according to last diabetes/ADHD visit, make appointment for patient before sending refill to provider.    Rx requested:  Requested Prescriptions     Pending Prescriptions Disp Refills    oxyCODONE (ROXICODONE) 5 MG immediate release tablet 28 tablet 0     Sig: Take 1 tablet by mouth every 6 hours as needed for Pain for up to 7 days. Max Daily Amount: 20 mg

## 2024-05-07 DIAGNOSIS — G89.29 CHRONIC BACK PAIN, UNSPECIFIED BACK LOCATION, UNSPECIFIED BACK PAIN LATERALITY: ICD-10-CM

## 2024-05-07 DIAGNOSIS — M54.9 CHRONIC BACK PAIN, UNSPECIFIED BACK LOCATION, UNSPECIFIED BACK PAIN LATERALITY: ICD-10-CM

## 2024-05-07 DIAGNOSIS — M79.10 MUSCULAR PAIN: ICD-10-CM

## 2024-05-07 RX ORDER — OXYCODONE HYDROCHLORIDE 5 MG/1
5 TABLET ORAL EVERY 6 HOURS PRN
Qty: 28 TABLET | Refills: 0 | Status: SHIPPED | OUTPATIENT
Start: 2024-05-07 | End: 2024-05-14

## 2024-05-07 RX ORDER — BACLOFEN 5 MG/1
TABLET ORAL
Qty: 60 TABLET | Refills: 5 | Status: SHIPPED | OUTPATIENT
Start: 2024-05-07

## 2024-05-07 NOTE — TELEPHONE ENCOUNTER
Pt says he is taking Baclofen 2x daily and 20 pills is not enough.  He is requesting a months supply twice daily.

## 2024-05-09 ENCOUNTER — OFFICE VISIT (OUTPATIENT)
Dept: PALLATIVE CARE | Age: 89
End: 2024-05-09

## 2024-05-09 VITALS — SYSTOLIC BLOOD PRESSURE: 132 MMHG | HEART RATE: 67 BPM | DIASTOLIC BLOOD PRESSURE: 82 MMHG | OXYGEN SATURATION: 96 %

## 2024-05-09 DIAGNOSIS — R39.15 URGENCY OF URINATION: ICD-10-CM

## 2024-05-09 DIAGNOSIS — Z97.8 FOLEY CATHETER IN PLACE: ICD-10-CM

## 2024-05-09 DIAGNOSIS — R33.9 URINARY RETENTION: ICD-10-CM

## 2024-05-09 DIAGNOSIS — C79.51 PROSTATE CANCER METASTATIC TO BONE (HCC): Primary | ICD-10-CM

## 2024-05-09 DIAGNOSIS — G89.3 NEOPLASM RELATED PAIN: ICD-10-CM

## 2024-05-09 DIAGNOSIS — K21.9 GASTROESOPHAGEAL REFLUX DISEASE WITHOUT ESOPHAGITIS: ICD-10-CM

## 2024-05-09 DIAGNOSIS — R60.0 BILATERAL LOWER EXTREMITY EDEMA: ICD-10-CM

## 2024-05-09 DIAGNOSIS — K59.00 CONSTIPATION, UNSPECIFIED CONSTIPATION TYPE: ICD-10-CM

## 2024-05-09 DIAGNOSIS — F41.9 ANXIETY: ICD-10-CM

## 2024-05-09 DIAGNOSIS — R41.89 IMPAIRED COGNITION: ICD-10-CM

## 2024-05-09 DIAGNOSIS — L30.9 DERMATITIS: ICD-10-CM

## 2024-05-09 DIAGNOSIS — C61 PROSTATE CANCER METASTATIC TO BONE (HCC): Primary | ICD-10-CM

## 2024-05-09 DIAGNOSIS — Z51.5 PALLIATIVE CARE ENCOUNTER: ICD-10-CM

## 2024-05-09 DIAGNOSIS — J44.9 CHRONIC OBSTRUCTIVE PULMONARY DISEASE, UNSPECIFIED COPD TYPE (HCC): ICD-10-CM

## 2024-05-09 ASSESSMENT — ENCOUNTER SYMPTOMS
CONSTIPATION: 1
SHORTNESS OF BREATH: 0
DIARRHEA: 0
BACK PAIN: 1
VOICE CHANGE: 0
WHEEZING: 0
TROUBLE SWALLOWING: 0
COUGH: 1
ABDOMINAL PAIN: 0
NAUSEA: 0

## 2024-05-09 NOTE — PROGRESS NOTES
Subjective:      Patient Id: Seen Caden at  home in [place] , for follow-up palliative medicine. He was accompanied to the appointment by: self.     Chief Complaint   Patient presents with    Anxiety      HPI       Caden Khalil is a 89 y.o. 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 Home visit is necessary in lieu of office due to significant frailty and high symptom burden from comorbid illnesses.      General: Patient is alert and oriented x 3-4. Poor short-term memory and historian. Possibly in the setting of anxiety. Previous cognitive screening per AWV WNL.     Functional status: Patient has been getting up with walker. Also has and uses wheelchair. Remains very active and participates in exercise daily. Patient reports having home aide coming 5 days a week for 3 hours a day.    Prostate cancer: Patient initially presented to the ER for uncontrolled back pain that was progressively worsening in addition to inability to ambulate and numbness of the left lower extremity. Patient was found to have T3-T4 spinal cord compression while at hospital in the setting of metastatic disease. He had palliative RT on 8/18/23. Patient was transferred to inpatient rehab where he participated in therapy. He was also started on oral casodex and lupron injections. Casodex now complete. Remains on Lupron. Also on Zometa. Reports ongoing issues with numbness in his legs as well as lower abdominal area.     Pain: Pain is in his midline low back. Describes as an aching pain. Currently taking oxycodone 5 mg po q 6 hours prn (prescribed by PCP). Rarely uses this based on refills. Also on balcofen 5 mg po BID prn. Rates pain at a 5/10 currently. Sitting and leaning forward aggravate pain. Standing and leaning back in chair helps with the pain. Pain is

## 2024-06-06 ENCOUNTER — TELEPHONE (OUTPATIENT)
Dept: FAMILY MEDICINE CLINIC | Age: 89
End: 2024-06-06

## 2024-06-06 RX ORDER — OXYBUTYNIN CHLORIDE 5 MG/1
5 TABLET, EXTENDED RELEASE ORAL DAILY
Qty: 30 TABLET | Refills: 3 | Status: SHIPPED | OUTPATIENT
Start: 2024-06-06

## 2024-06-06 NOTE — TELEPHONE ENCOUNTER
Orders Placed This Encounter   Medications    oxyBUTYnin (DITROPAN-XL) 5 MG extended release tablet     Sig: Take 1 tablet by mouth daily     Dispense:  30 tablet     Refill:  3       The above med(s) were e-scripted to the patient's pharmacy.   Please advise patient  Donny Morales MD

## 2024-06-06 NOTE — TELEPHONE ENCOUNTER
Regina from [facility] is calling on behalf of Caden. Patient is having leakage around his catheter, has had [facility] call home health care 3 times due to leakage. Thinking it is bladder spasms she is wondering if there is anything he can prescribe to help.       Regina [phone]

## 2024-06-20 ENCOUNTER — OFFICE VISIT (OUTPATIENT)
Dept: PALLATIVE CARE | Age: 89
End: 2024-06-20
Payer: MEDICARE

## 2024-06-20 VITALS — OXYGEN SATURATION: 96 % | HEART RATE: 64 BPM | DIASTOLIC BLOOD PRESSURE: 71 MMHG | SYSTOLIC BLOOD PRESSURE: 119 MMHG

## 2024-06-20 DIAGNOSIS — R39.15 URGENCY OF URINATION: ICD-10-CM

## 2024-06-20 DIAGNOSIS — R60.0 BILATERAL LOWER EXTREMITY EDEMA: ICD-10-CM

## 2024-06-20 DIAGNOSIS — Z51.5 PALLIATIVE CARE ENCOUNTER: ICD-10-CM

## 2024-06-20 DIAGNOSIS — K21.9 GASTROESOPHAGEAL REFLUX DISEASE WITHOUT ESOPHAGITIS: ICD-10-CM

## 2024-06-20 DIAGNOSIS — F41.9 ANXIETY: ICD-10-CM

## 2024-06-20 DIAGNOSIS — J44.9 CHRONIC OBSTRUCTIVE PULMONARY DISEASE, UNSPECIFIED COPD TYPE (HCC): ICD-10-CM

## 2024-06-20 DIAGNOSIS — K59.00 CONSTIPATION, UNSPECIFIED CONSTIPATION TYPE: ICD-10-CM

## 2024-06-20 DIAGNOSIS — C61 PROSTATE CANCER METASTATIC TO BONE (HCC): Primary | ICD-10-CM

## 2024-06-20 DIAGNOSIS — G89.3 NEOPLASM RELATED PAIN: ICD-10-CM

## 2024-06-20 DIAGNOSIS — Z97.8 FOLEY CATHETER IN PLACE: ICD-10-CM

## 2024-06-20 DIAGNOSIS — R33.9 URINARY RETENTION: ICD-10-CM

## 2024-06-20 DIAGNOSIS — C79.51 PROSTATE CANCER METASTATIC TO BONE (HCC): Primary | ICD-10-CM

## 2024-06-20 DIAGNOSIS — R41.89 IMPAIRED COGNITION: ICD-10-CM

## 2024-06-20 PROCEDURE — 4004F PT TOBACCO SCREEN RCVD TLK: CPT | Performed by: NURSE PRACTITIONER

## 2024-06-20 PROCEDURE — 1123F ACP DISCUSS/DSCN MKR DOCD: CPT | Performed by: NURSE PRACTITIONER

## 2024-06-20 PROCEDURE — 99349 HOME/RES VST EST MOD MDM 40: CPT | Performed by: NURSE PRACTITIONER

## 2024-06-20 PROCEDURE — G8417 CALC BMI ABV UP PARAM F/U: HCPCS | Performed by: NURSE PRACTITIONER

## 2024-06-20 RX ORDER — ENZALUTAMIDE 80 MG/1
80 TABLET ORAL DAILY
COMMUNITY

## 2024-06-20 NOTE — PROGRESS NOTES
change (stable) and unexpected weight change.   HENT:  Negative for trouble swallowing and voice change.    Respiratory:  Positive for cough. Negative for shortness of breath and wheezing.    Cardiovascular:  Positive for leg swelling. Negative for chest pain.   Gastrointestinal:  Positive for constipation (stable). Negative for abdominal pain, diarrhea and nausea.   Genitourinary:  Positive for frequency (occasional).        Núñez.   Musculoskeletal:  Positive for back pain and gait problem.   Skin: Negative.    Neurological:  Positive for weakness. Negative for speech difficulty.   Psychiatric/Behavioral:  Negative for dysphoric mood. The patient is nervous/anxious (stable).            Objective:   /71   Pulse 64   SpO2 96%    Wt Readings from Last 3 Encounters:   03/20/24 72.6 kg (160 lb)   01/24/24 69.6 kg (153 lb 6.4 oz)   09/28/23 73.1 kg (161 lb 3.2 oz)       Physical Exam  Constitutional:       General: He is not in acute distress.  HENT:      Head: Normocephalic and atraumatic.      Nose: No rhinorrhea.   Eyes:      General: No scleral icterus.        Right eye: No discharge.         Left eye: No discharge.      Extraocular Movements: Extraocular movements intact.      Conjunctiva/sclera: Conjunctivae normal.   Cardiovascular:      Rate and Rhythm: Normal rate and regular rhythm.   Pulmonary:      Effort: Pulmonary effort is normal.      Breath sounds: No wheezing or rales.   Abdominal:      General: Bowel sounds are normal. There is no distension.      Palpations: Abdomen is soft.      Tenderness: There is no abdominal tenderness.   Genitourinary:     Comments: Núñez with clear, yellow urine.   Musculoskeletal:      Cervical back: Normal range of motion.      Right lower leg: Edema (+1-2) present.      Left lower leg: Edema (+1-2) present.   Skin:     General: Skin is warm and dry.   Neurological:      General: No focal deficit present.      Mental Status: He is alert and oriented to person, place,

## 2024-06-21 RX ORDER — UBIDECARENONE 30 MG
1 CAPSULE ORAL DAILY
Qty: 60 CAPSULE | Refills: 0 | Status: SHIPPED | OUTPATIENT
Start: 2024-06-21

## 2024-06-21 NOTE — TELEPHONE ENCOUNTER
Comments:     Last Office Visit (last PCP visit):   1/24/2024    Next Visit Date:  Future Appointments   Date Time Provider Department Center   7/22/2024  1:00 PM Gilda Isbell APRN - CNP  [facility]   7/24/2024 12:15 PM Donny Morales MD [facility]   10/9/2024  2:30 PM Raphael Theodore MD [facility]       **If hasn't been seen in over a year OR hasn't followed up according to last diabetes/ADHD visit, make appointment for patient before sending refill to provider.    Rx requested:  Requested Prescriptions     Pending Prescriptions Disp Refills    coenzyme Q-10 100 MG capsule [Pharmacy Med Name: Coenzyme Q-10 Oral Capsule 100 MG] 60 capsule 0     Sig: TAKE ONE CAPSULE BY MOUTH EVERY DAY

## 2024-07-01 ENCOUNTER — TELEPHONE (OUTPATIENT)
Dept: FAMILY MEDICINE CLINIC | Age: 89
End: 2024-07-01
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
## 2024-07-01 NOTE — TELEPHONE ENCOUNTER
Home health calling back she also wanted to say that there was an area on his right wrist that he thought looked like a mole and he picked it off and home health nurse advised him to clean with soap and water daily and put triple antibiotic and band aid on it.

## 2024-07-04 NOTE — PLAN OF CARE
Hospitalist Progress Note    NAME:   Gerald Yuen   : 1966   MRN: [id number]     Date/Time: 2024 2:15 PM  Patient PCP: Marcy Padilla MD    Estimated discharge date:?  , IPR pending auth  Barriers: BP stable with HD on  on midodrine, pending auth    Assessment / Plan:    SHAHRAM on CKD, ?  Stage IV-started on hemodialysis this admission  -He was told in the past that his kidneys were weak but no recent baseline creatinine.  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   -Hold losartan/HCTZ  - renal ultrasound showed no acute abnormality  Appreciate nephrology consult  Status post temporary dialysis cath placement on , HD on ,   Planning biopsy next week if feasible to determine SHAHRAM on CKD versus CKD stage V  Will need outpatient dialysis center on discharge    -s/p permcath placement      Orthostatic hypotension-another episode of hypotension during 7/3 dialysis  noted  S/p CodeS called which was canceled after prompt improvement of symptoms with BP improvement  Started patient on midodrine-Cont increased dose to 5 mg 3 times daily    Acute stroke POA involving anterior right body of corpus callosum/frontal lobe  Neurochecks  Appreciate neurology consult  Stroke pathway  Lipid panel, A1c 6.9  MRA head and neck unremarkable  Echo 60 to 65% EF, no thrombus mentioned on report  Recommended starting on dual antiplatelets aspirin 81 and Plavix 75 mg x 21 days and then aspirin monotherapy  Avoid PPI and NSAIDs while on DAPT. Okay to take Tums if needed for acid reflux while on DAPT.   Blood pressure goal less than 140/90 t  PT OT eval-IPR being arranged- pending auth, awaiting blood pressure stability with dialysis Thursday before discharge to inpatient rehab  Speech eval regular diet  Will need outpatient event monitor-cardiology consult placed        overnight events-Syncopal episode while in toilet  Repeat CT head stable  Rapid EEG no seizure burden  Repeat  Problem: Discharge Planning  Goal: Discharge to home or other facility with appropriate resources  Outcome: Progressing     Problem: Safety - Adult  Goal: Free from fall injury  Outcome: Progressing     Problem: ABCDS Injury Assessment  Goal: Absence of physical injury  Outcome: Progressing     Problem: Skin/Tissue Integrity  Goal: Absence of new skin breakdown  Description: 1. Monitor for areas of redness and/or skin breakdown  2. Assess vascular access sites hourly  3. Every 4-6 hours minimum:  Change oxygen saturation probe site  4. Every 4-6 hours:  If on nasal continuous positive airway pressure, respiratory therapy assess nares and determine need for appliance change or resting period.   Outcome: Progressing     Problem: Nutrition Deficit:  Goal: Optimize nutritional status  Outcome: Progressing     Problem: Pain  Goal: Verbalizes/displays adequate comfort level or baseline comfort level  Outcome: Progressing 2115 (!) 174/84 97.8 °F (36.6 °C) Axillary 88 17 99 %   07/03/24 1850 (!) 170/86 -- -- -- -- --           Intake/Output Summary (Last 24 hours) at 7/4/2024 1415  Last data filed at 7/4/2024 0329  Gross per 24 hour   Intake --   Output 500 ml   Net -500 ml          I had a face to face encounter and independently examined this patient on 7/4/2024, as outlined below:  PHYSICAL EXAM:  General: Alert, cooperative  EENT:  EOMI. Anicteric sclerae.  Resp:  CTA bilaterally, no wheezing or rales.  No accessory muscle use  CV:  Regular  rhythm,  No edema  GI:  Soft, Non distended, Non tender.  +Bowel sounds  Neurologic:  Alert and oriented normal speech,   Psych:   Good insight. Not anxious nor agitated  Skin:  No rashes.  No jaundice    Reviewed most current lab test results and cultures  YES  Reviewed most current radiology test results   YES  Review and summation of old records today    NO  Reviewed patient's current orders and MAR    YES  PMH/SH reviewed - no change compared to H&P    Procedures: see electronic medical records for all procedures/Xrays and details which were not copied into this note but were reviewed prior to creation of Plan.      LABS:  I reviewed today's most current labs and imaging studies.  Pertinent labs include:  Recent Labs     07/02/24 0258 07/03/24 0214 07/04/24  0304   WBC 6.9 7.4 7.6   HGB 9.0* 9.2* 9.2*   HCT 28.2* 28.7* 28.6*    231 269       Recent Labs     07/02/24 0258 07/03/24 0214 07/04/24  0304    138 137   K 3.9 4.5 4.5    106 104   CO2 31 25 28   GLUCOSE 200* 137* 181*   BUN 35* 54* 49*   CREATININE 5.78* 6.87* 6.61*   CALCIUM 8.3* 8.7 8.8   PHOS 3.8 4.2 3.6         Signed: Riley Villanueva MD    Total time 31 minutes

## 2024-07-08 RX ORDER — CHOLECALCIFEROL (VITAMIN D3) 125 MCG
1 CAPSULE ORAL
Qty: 90 TABLET | Refills: 1 | Status: SHIPPED | OUTPATIENT
Start: 2024-07-08

## 2024-07-08 RX ORDER — BUSPIRONE HYDROCHLORIDE 5 MG/1
5 TABLET ORAL 2 TIMES DAILY
Qty: 60 TABLET | Refills: 0 | Status: SHIPPED | OUTPATIENT
Start: 2024-07-08

## 2024-07-08 NOTE — TELEPHONE ENCOUNTER
Comments:     Last Office Visit (last PCP visit):   1/24/2024    Next Visit Date:  Future Appointments   Date Time Provider Department Center   7/22/2024  1:00 PM Gilda Isbell APRN - CNP  [facility]   7/24/2024 12:15 PM Donny Morales MD [facility]   10/9/2024  2:30 PM Raphael Theodore MD [facility]       **If hasn't been seen in over a year OR hasn't followed up according to last diabetes/ADHD visit, make appointment for patient before sending refill to provider.    Rx requested:  Requested Prescriptions     Pending Prescriptions Disp Refills    busPIRone (BUSPAR) 5 MG tablet [Pharmacy Med Name: busPIRone HCl Oral Tablet 5 MG] 60 tablet 0     Sig: TAKE ONE TABLET BY MOUTH TWO TIMES A DAY

## 2024-07-15 DIAGNOSIS — F41.9 ANXIETY: ICD-10-CM

## 2024-07-15 RX ORDER — ESCITALOPRAM OXALATE 5 MG/1
5 TABLET ORAL DAILY
Qty: 90 TABLET | Refills: 0 | Status: SHIPPED | OUTPATIENT
Start: 2024-07-15

## 2024-07-15 RX ORDER — ATENOLOL 25 MG/1
25 TABLET ORAL DAILY
Qty: 30 TABLET | Refills: 0 | Status: SHIPPED | OUTPATIENT
Start: 2024-07-15

## 2024-07-15 NOTE — TELEPHONE ENCOUNTER
Comments:     Last Office Visit (last PCP visit):   1/24/2024    Next Visit Date:  Future Appointments   Date Time Provider Department Center   7/22/2024  1:00 PM Gilda Isbell APRN - CNP [facility]   7/24/2024 12:15 PM Donny Morales MD [facility]   10/9/2024  2:30 PM Raphael Theodore MD [facility]       **If hasn't been seen in over a year OR hasn't followed up according to last diabetes/ADHD visit, make appointment for patient before sending refill to provider.    Rx requested:  Requested Prescriptions     Pending Prescriptions Disp Refills    atenolol (TENORMIN) 25 MG tablet [Pharmacy Med Name: Atenolol Oral Tablet 25 MG] 30 tablet 0     Sig: TAKE ONE TABLET BY MOUTH EVERY DAY

## 2024-07-22 ENCOUNTER — OFFICE VISIT (OUTPATIENT)
Dept: PALLATIVE CARE | Age: 89
End: 2024-07-22
Payer: MEDICARE

## 2024-07-22 VITALS — DIASTOLIC BLOOD PRESSURE: 88 MMHG | SYSTOLIC BLOOD PRESSURE: 131 MMHG | OXYGEN SATURATION: 97 % | HEART RATE: 84 BPM

## 2024-07-22 DIAGNOSIS — K21.9 GASTROESOPHAGEAL REFLUX DISEASE WITHOUT ESOPHAGITIS: ICD-10-CM

## 2024-07-22 DIAGNOSIS — G89.3 NEOPLASM RELATED PAIN: ICD-10-CM

## 2024-07-22 DIAGNOSIS — Z51.5 PALLIATIVE CARE ENCOUNTER: ICD-10-CM

## 2024-07-22 DIAGNOSIS — R60.0 BILATERAL LOWER EXTREMITY EDEMA: ICD-10-CM

## 2024-07-22 DIAGNOSIS — Z97.8 FOLEY CATHETER IN PLACE: ICD-10-CM

## 2024-07-22 DIAGNOSIS — F41.9 ANXIETY: Primary | ICD-10-CM

## 2024-07-22 DIAGNOSIS — R33.9 URINARY RETENTION: ICD-10-CM

## 2024-07-22 DIAGNOSIS — R39.15 URGENCY OF URINATION: ICD-10-CM

## 2024-07-22 DIAGNOSIS — C61 PROSTATE CANCER METASTATIC TO BONE (HCC): ICD-10-CM

## 2024-07-22 DIAGNOSIS — J44.9 CHRONIC OBSTRUCTIVE PULMONARY DISEASE, UNSPECIFIED COPD TYPE (HCC): ICD-10-CM

## 2024-07-22 DIAGNOSIS — R41.89 IMPAIRED COGNITION: ICD-10-CM

## 2024-07-22 DIAGNOSIS — C79.51 PROSTATE CANCER METASTATIC TO BONE (HCC): ICD-10-CM

## 2024-07-22 DIAGNOSIS — K59.00 CONSTIPATION, UNSPECIFIED CONSTIPATION TYPE: ICD-10-CM

## 2024-07-22 PROCEDURE — 99349 HOME/RES VST EST MOD MDM 40: CPT | Performed by: NURSE PRACTITIONER

## 2024-07-22 PROCEDURE — 1123F ACP DISCUSS/DSCN MKR DOCD: CPT | Performed by: NURSE PRACTITIONER

## 2024-07-22 PROCEDURE — 4004F PT TOBACCO SCREEN RCVD TLK: CPT | Performed by: NURSE PRACTITIONER

## 2024-07-22 PROCEDURE — G8417 CALC BMI ABV UP PARAM F/U: HCPCS | Performed by: NURSE PRACTITIONER

## 2024-07-22 ASSESSMENT — ENCOUNTER SYMPTOMS
TROUBLE SWALLOWING: 0
BACK PAIN: 1
ABDOMINAL PAIN: 0
WHEEZING: 0
VOICE CHANGE: 0
CONSTIPATION: 1
COUGH: 1
SHORTNESS OF BREATH: 0
DIARRHEA: 0
NAUSEA: 0

## 2024-07-22 NOTE — PROGRESS NOTES
present.      Mental Status: He is alert and oriented to person, place, and time.      Gait: Gait abnormal.      Comments: Poor short term memory.   Psychiatric:         Mood and Affect: Mood and affect normal. Mood is not anxious.         Behavior: Behavior normal.         Assessment and Plan:      1. Prostate cancer metastatic to bone (HCC)  Continue to follow with urology and oncology. Receiving Lupron injections and newly started on Xtandi.     2. Neoplasm related pain  Stable. Patient taking every oxycodone 5 mg po every 6 hours prn and baclofen 5 mg po BID prn. PCP currently managing pain. Patient also takes OTC tylenol as directed.     3. Bilateral lower extremity edema  Stable. CTM.     4. Burnham catheter in place  5. Urinary retention  6. Urgency of urination  Symptoms of frequency/urgency feeling occur occasionally. Will continue pyridium 200 mg po TID prn. Patient rarely using but reports this does help. Flomax 0.4 mg po daily. Recently started on Oxybutynin by PCP for leaking burnham-this has helped.     7. Anxiety  Stable. Continue buspar 5 mg po BID and lexapro 5 mg po daily.    8. COPD   Stable. No s/s of respiratory infection or COPD exacerbation. Patient continues to smoke.    9. Constipation  Stable. Continue colace 100 mg po daily and mom 30 mL daily prn.     10. Impaired cognition  Poor historian/short term memory. Appears to be baseline. Patient acknowledges forgetfulness/memory issues.     11. GERD  Stable. Protonix 40 mg po daily.     12. Palliative care encounter  Discussed symptom management related to chronic disease/condition. Provided emotional support and active listening. Patient understands and is agreeable to current plan.          Advance Care Planning      Palliative Medicine Provider (MD/NP)  Advance Care Planning (ACP) Conversation      Date of Conversation: 07/22/24  The patient and/or authorized decision maker consented to a voluntary Advance Care Planning conversation.

## 2024-08-01 ENCOUNTER — OFFICE VISIT (OUTPATIENT)
Dept: FAMILY MEDICINE CLINIC | Age: 89
End: 2024-08-01

## 2024-08-01 VITALS
DIASTOLIC BLOOD PRESSURE: 66 MMHG | HEART RATE: 83 BPM | OXYGEN SATURATION: 96 % | SYSTOLIC BLOOD PRESSURE: 130 MMHG | BODY MASS INDEX: 23.78 KG/M2 | TEMPERATURE: 98.3 F | WEIGHT: 148 LBS | HEIGHT: 66 IN

## 2024-08-01 DIAGNOSIS — J44.9 CHRONIC OBSTRUCTIVE PULMONARY DISEASE, UNSPECIFIED COPD TYPE (HCC): Primary | ICD-10-CM

## 2024-08-01 DIAGNOSIS — G89.29 CHRONIC BACK PAIN, UNSPECIFIED BACK LOCATION, UNSPECIFIED BACK PAIN LATERALITY: ICD-10-CM

## 2024-08-01 DIAGNOSIS — G89.3 NEOPLASM RELATED PAIN: ICD-10-CM

## 2024-08-01 DIAGNOSIS — Z97.8 FOLEY CATHETER IN PLACE: ICD-10-CM

## 2024-08-01 DIAGNOSIS — M89.9 LYTIC BONE LESIONS ON XRAY: ICD-10-CM

## 2024-08-01 DIAGNOSIS — E78.5 HYPERLIPIDEMIA, UNSPECIFIED HYPERLIPIDEMIA TYPE: ICD-10-CM

## 2024-08-01 DIAGNOSIS — C79.51 PROSTATE CANCER METASTATIC TO BONE (HCC): ICD-10-CM

## 2024-08-01 DIAGNOSIS — C61 PROSTATE CANCER METASTATIC TO BONE (HCC): ICD-10-CM

## 2024-08-01 DIAGNOSIS — F41.9 ANXIETY: ICD-10-CM

## 2024-08-01 DIAGNOSIS — M54.9 CHRONIC BACK PAIN, UNSPECIFIED BACK LOCATION, UNSPECIFIED BACK PAIN LATERALITY: ICD-10-CM

## 2024-08-01 DIAGNOSIS — R33.9 URINARY RETENTION: ICD-10-CM

## 2024-08-01 RX ORDER — OXYCODONE HYDROCHLORIDE 5 MG/1
5 TABLET ORAL EVERY 6 HOURS PRN
Qty: 28 TABLET | Refills: 0 | Status: SHIPPED | OUTPATIENT
Start: 2024-08-01 | End: 2024-08-08

## 2024-08-01 SDOH — ECONOMIC STABILITY: FOOD INSECURITY: WITHIN THE PAST 12 MONTHS, THE FOOD YOU BOUGHT JUST DIDN'T LAST AND YOU DIDN'T HAVE MONEY TO GET MORE.: NEVER TRUE

## 2024-08-01 SDOH — ECONOMIC STABILITY: INCOME INSECURITY: HOW HARD IS IT FOR YOU TO PAY FOR THE VERY BASICS LIKE FOOD, HOUSING, MEDICAL CARE, AND HEATING?: NOT HARD AT ALL

## 2024-08-01 SDOH — ECONOMIC STABILITY: FOOD INSECURITY: WITHIN THE PAST 12 MONTHS, YOU WORRIED THAT YOUR FOOD WOULD RUN OUT BEFORE YOU GOT MONEY TO BUY MORE.: NEVER TRUE

## 2024-08-01 NOTE — PROGRESS NOTES
Chief Complaint   Patient presents with    Back Pain     6 month        HPI:  Caden Khalil is a 89 y.o. male       hx metastatic prostate CA    Seeing palliative and urology for lupron injections    Palliative note reviewed     Caregiver here today     Working with HHC/PT    Taking buspar     Controlled Substance Monitoring:    Acute and Chronic Pain Monitoring:   RX Monitoring Acute Pain Prescriptions Periodic Controlled Substance Monitoring Chronic Pain > 50 MEDD   9/5/2023  10:09 AM Prescription exceeds daily limit for a specific reason. See comments or note.;Not required given exclusionary diagnoses...;Severe pain not adequately treated with lower dose. Possible medication side effects, risk of tolerance/dependence & alternative treatments discussed.;No signs of potential drug abuse or diversion identified.;Assessed functional status.;Obtaining appropriate analgesic effect of treatment. Re-evaluated the status of the patient's underlying condition causing pain.;Considered consultation with a specialist.;Obtained or confirmed \"Consent for Opioid Use\" on file.             Wt Readings from Last 3 Encounters:   08/01/24 67.1 kg (148 lb)   03/20/24 72.6 kg (160 lb)   01/24/24 69.6 kg (153 lb 6.4 oz)         Patient Active Problem List   Diagnosis    Secondary hypertension    Hyperlipidemia    COPD (chronic obstructive pulmonary disease) (HCC)    Anxiety    Kidney stone    Intractable back pain    Elevated PSA    Urinary retention    Lytic bone lesions on xray    Palliative care encounter    Goals of care, counseling/discussion    Advanced care planning/counseling discussion    Severe back pain    Secondary malignant neoplasm of bone (HCC)    Benign prostatic hyperplasia without urinary obstruction    Current smoker    Hyperopia with astigmatism and presbyopia, right    Insomnia    Impaired mobility and activities of daily living dt 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

## 2024-08-02 DIAGNOSIS — R33.9 URINARY RETENTION: ICD-10-CM

## 2024-08-02 DIAGNOSIS — K21.9 GASTROESOPHAGEAL REFLUX DISEASE WITHOUT ESOPHAGITIS: ICD-10-CM

## 2024-08-02 RX ORDER — TAMSULOSIN HYDROCHLORIDE 0.4 MG/1
0.4 CAPSULE ORAL
Qty: 90 CAPSULE | Refills: 0 | Status: SHIPPED | OUTPATIENT
Start: 2024-08-02

## 2024-08-02 RX ORDER — PANTOPRAZOLE SODIUM 40 MG/1
40 TABLET, DELAYED RELEASE ORAL
Qty: 90 TABLET | Refills: 0 | Status: SHIPPED | OUTPATIENT
Start: 2024-08-02

## 2024-08-20 DIAGNOSIS — C79.51 PROSTATE CANCER METASTATIC TO BONE (HCC): ICD-10-CM

## 2024-08-20 DIAGNOSIS — G89.3 NEOPLASM RELATED PAIN: ICD-10-CM

## 2024-08-20 DIAGNOSIS — C61 PROSTATE CANCER METASTATIC TO BONE (HCC): ICD-10-CM

## 2024-08-20 NOTE — TELEPHONE ENCOUNTER
Comments:     Last Office Visit (last PCP visit):   8/1/2024    Next Visit Date:  Future Appointments   Date Time Provider Department Center   8/29/2024 10:00 AM Gilda Isbell APRN - CNP [facility]   10/9/2024  2:30 PM Raphael Theodore MD [facility]   3/3/2025  9:30 AM Donny Morales MD [facility]       **If hasn't been seen in over a year OR hasn't followed up according to last diabetes/ADHD visit, make appointment for patient before sending refill to provider.    Rx requested:  Requested Prescriptions     Pending Prescriptions Disp Refills    oxyCODONE (ROXICODONE) 5 MG immediate release tablet 28 tablet 0     Sig: Take 1 tablet by mouth every 6 hours as needed for Pain for up to 7 days. Max Daily Amount: 20 mg

## 2024-08-22 RX ORDER — ATENOLOL 25 MG/1
25 TABLET ORAL DAILY
Qty: 30 TABLET | Refills: 0 | Status: SHIPPED | OUTPATIENT
Start: 2024-08-22

## 2024-08-22 RX ORDER — UBIDECARENONE 30 MG
1 CAPSULE ORAL DAILY
Qty: 60 CAPSULE | Refills: 0 | Status: SHIPPED | OUTPATIENT
Start: 2024-08-22

## 2024-08-22 RX ORDER — OXYCODONE HYDROCHLORIDE 5 MG/1
5 TABLET ORAL EVERY 6 HOURS PRN
Qty: 28 TABLET | Refills: 0 | Status: SHIPPED | OUTPATIENT
Start: 2024-08-22 | End: 2024-08-29

## 2024-08-27 RX ORDER — FLUTICASONE PROPIONATE 50 MCG
SPRAY, SUSPENSION (ML) NASAL
Qty: 16 G | Refills: 5 | Status: SHIPPED | OUTPATIENT
Start: 2024-08-27

## 2024-08-27 NOTE — TELEPHONE ENCOUNTER
Comments:     Last Office Visit (last PCP visit):   8/1/2024    Next Visit Date:  Future Appointments   Date Time Provider Department Center   8/29/2024 10:00 AM Gilda Isbell APRN - CNP [facility]   10/9/2024  2:30 PM Raphael Theodore MD [facility]   3/3/2025  9:30 AM Donny Morales MD [facility]P       **If hasn't been seen in over a year OR hasn't followed up according to last diabetes/ADHD visit, make appointment for patient before sending refill to provider.    Rx requested:  Requested Prescriptions     Pending Prescriptions Disp Refills    fluticasone (FLONASE) 50 MCG/ACT nasal spray [Pharmacy Med Name: Fluticasone Propionate Nasal Suspension 50 MCG/ACT] 16 g 0     Sig: INSTILL 2 SPRAYS IN EACH NOSTRIL ONCE DAILY.

## 2024-08-29 ENCOUNTER — OFFICE VISIT (OUTPATIENT)
Dept: PALLATIVE CARE | Age: 89
End: 2024-08-29
Payer: MEDICARE

## 2024-08-29 VITALS — OXYGEN SATURATION: 93 % | HEART RATE: 78 BPM | DIASTOLIC BLOOD PRESSURE: 93 MMHG | SYSTOLIC BLOOD PRESSURE: 138 MMHG

## 2024-08-29 DIAGNOSIS — K21.9 GASTROESOPHAGEAL REFLUX DISEASE WITHOUT ESOPHAGITIS: ICD-10-CM

## 2024-08-29 DIAGNOSIS — F41.9 ANXIETY: ICD-10-CM

## 2024-08-29 DIAGNOSIS — K59.00 CONSTIPATION, UNSPECIFIED CONSTIPATION TYPE: ICD-10-CM

## 2024-08-29 DIAGNOSIS — C61 PROSTATE CANCER METASTATIC TO BONE (HCC): ICD-10-CM

## 2024-08-29 DIAGNOSIS — R39.15 URGENCY OF URINATION: ICD-10-CM

## 2024-08-29 DIAGNOSIS — R33.9 URINARY RETENTION: ICD-10-CM

## 2024-08-29 DIAGNOSIS — C79.51 PROSTATE CANCER METASTATIC TO BONE (HCC): ICD-10-CM

## 2024-08-29 DIAGNOSIS — R82.81 URINE PURULENT: Primary | ICD-10-CM

## 2024-08-29 DIAGNOSIS — Z91.89 AT RISK OF CATHETER-ASSOCIATED URINARY TRACT INFECTION: ICD-10-CM

## 2024-08-29 DIAGNOSIS — Z97.8 FOLEY CATHETER IN PLACE: ICD-10-CM

## 2024-08-29 DIAGNOSIS — R60.0 BILATERAL LOWER EXTREMITY EDEMA: ICD-10-CM

## 2024-08-29 DIAGNOSIS — J44.9 CHRONIC OBSTRUCTIVE PULMONARY DISEASE, UNSPECIFIED COPD TYPE (HCC): ICD-10-CM

## 2024-08-29 DIAGNOSIS — R41.89 IMPAIRED COGNITION: ICD-10-CM

## 2024-08-29 DIAGNOSIS — G89.3 NEOPLASM RELATED PAIN: ICD-10-CM

## 2024-08-29 DIAGNOSIS — Z51.5 PALLIATIVE CARE ENCOUNTER: ICD-10-CM

## 2024-08-29 PROCEDURE — 1123F ACP DISCUSS/DSCN MKR DOCD: CPT | Performed by: NURSE PRACTITIONER

## 2024-08-29 PROCEDURE — G8420 CALC BMI NORM PARAMETERS: HCPCS | Performed by: NURSE PRACTITIONER

## 2024-08-29 PROCEDURE — 4004F PT TOBACCO SCREEN RCVD TLK: CPT | Performed by: NURSE PRACTITIONER

## 2024-08-29 PROCEDURE — 99349 HOME/RES VST EST MOD MDM 40: CPT | Performed by: NURSE PRACTITIONER

## 2024-08-29 RX ORDER — SULFAMETHOXAZOLE/TRIMETHOPRIM 800-160 MG
1 TABLET ORAL 2 TIMES DAILY
Qty: 14 TABLET | Refills: 0 | Status: SHIPPED | OUTPATIENT
Start: 2024-08-29 | End: 2024-09-05

## 2024-08-29 NOTE — PROGRESS NOTES
Review of Systems   Constitutional:  Positive for chills. Negative for activity change (stable) and unexpected weight change.   HENT:  Negative for trouble swallowing and voice change.    Respiratory:  Positive for cough. Negative for shortness of breath and wheezing.    Cardiovascular:  Positive for leg swelling. Negative for chest pain.   Gastrointestinal:  Positive for constipation (stable). Negative for abdominal pain, diarrhea and nausea.   Genitourinary:  Positive for frequency (occasional).        See. HPI. Núñez.   Musculoskeletal:  Positive for back pain and gait problem.   Skin: Negative.    Neurological:  Positive for weakness. Negative for speech difficulty.   Psychiatric/Behavioral:  Positive for dysphoric mood. The patient is nervous/anxious (stable).            Objective:   BP (!) 138/93   Pulse 78   SpO2 93%    Wt Readings from Last 3 Encounters:   08/01/24 67.1 kg (148 lb)   03/20/24 72.6 kg (160 lb)   01/24/24 69.6 kg (153 lb 6.4 oz)       Physical Exam  Constitutional:       General: He is not in acute distress.  HENT:      Head: Normocephalic and atraumatic.      Nose: No rhinorrhea.   Eyes:      General: No scleral icterus.        Right eye: No discharge.         Left eye: No discharge.      Extraocular Movements: Extraocular movements intact.      Conjunctiva/sclera: Conjunctivae normal.   Cardiovascular:      Rate and Rhythm: Normal rate and regular rhythm.   Pulmonary:      Effort: Pulmonary effort is normal.      Breath sounds: No wheezing or rales.   Abdominal:      General: Bowel sounds are normal. There is no distension.      Palpations: Abdomen is soft.      Tenderness: There is no abdominal tenderness.   Genitourinary:     Comments: Núñez with purulent, tan urine.   Musculoskeletal:      Cervical back: Normal range of motion.      Right lower leg: No edema.      Left lower leg: No edema.   Skin:     General: Skin is warm and dry.   Neurological:      General: No focal deficit

## 2024-08-30 ENCOUNTER — TELEPHONE (OUTPATIENT)
Dept: FAMILY MEDICINE CLINIC | Age: 89
End: 2024-08-30

## 2024-09-17 RX ORDER — ATENOLOL 25 MG/1
25 TABLET ORAL DAILY
Qty: 30 TABLET | Refills: 0 | Status: SHIPPED | OUTPATIENT
Start: 2024-09-17

## 2024-09-19 ENCOUNTER — OFFICE VISIT (OUTPATIENT)
Dept: PALLATIVE CARE | Age: 89
End: 2024-09-19

## 2024-09-19 VITALS — SYSTOLIC BLOOD PRESSURE: 142 MMHG | OXYGEN SATURATION: 97 % | HEART RATE: 65 BPM | DIASTOLIC BLOOD PRESSURE: 72 MMHG

## 2024-09-19 DIAGNOSIS — C79.51 PROSTATE CANCER METASTATIC TO BONE (HCC): Primary | ICD-10-CM

## 2024-09-19 DIAGNOSIS — C61 PROSTATE CANCER METASTATIC TO BONE (HCC): Primary | ICD-10-CM

## 2024-09-19 DIAGNOSIS — K59.00 CONSTIPATION, UNSPECIFIED CONSTIPATION TYPE: ICD-10-CM

## 2024-09-19 DIAGNOSIS — T83.9XXD PROBLEM WITH FOLEY CATHETER, SUBSEQUENT ENCOUNTER: ICD-10-CM

## 2024-09-19 DIAGNOSIS — R33.9 URINARY RETENTION: ICD-10-CM

## 2024-09-19 DIAGNOSIS — Z91.89 AT RISK OF CATHETER-ASSOCIATED URINARY TRACT INFECTION: ICD-10-CM

## 2024-09-19 DIAGNOSIS — R39.15 URGENCY OF URINATION: ICD-10-CM

## 2024-09-19 DIAGNOSIS — G89.3 NEOPLASM RELATED PAIN: ICD-10-CM

## 2024-09-19 DIAGNOSIS — R60.0 BILATERAL LOWER EXTREMITY EDEMA: ICD-10-CM

## 2024-09-19 DIAGNOSIS — R41.89 IMPAIRED COGNITION: ICD-10-CM

## 2024-09-19 DIAGNOSIS — F41.9 ANXIETY: ICD-10-CM

## 2024-09-19 DIAGNOSIS — Z51.5 PALLIATIVE CARE ENCOUNTER: ICD-10-CM

## 2024-09-19 DIAGNOSIS — J44.9 CHRONIC OBSTRUCTIVE PULMONARY DISEASE, UNSPECIFIED COPD TYPE (HCC): ICD-10-CM

## 2024-09-19 DIAGNOSIS — K21.9 GASTROESOPHAGEAL REFLUX DISEASE WITHOUT ESOPHAGITIS: ICD-10-CM

## 2024-09-19 DIAGNOSIS — Z97.8 FOLEY CATHETER IN PLACE: ICD-10-CM

## 2024-09-19 RX ORDER — ESCITALOPRAM OXALATE 10 MG/1
10 TABLET ORAL DAILY
Qty: 90 TABLET | Refills: 1 | Status: SHIPPED | OUTPATIENT
Start: 2024-09-19

## 2024-09-19 ASSESSMENT — ENCOUNTER SYMPTOMS
VOICE CHANGE: 0
NAUSEA: 0
TROUBLE SWALLOWING: 0
ABDOMINAL PAIN: 0
SHORTNESS OF BREATH: 0
DIARRHEA: 0
WHEEZING: 0
CONSTIPATION: 1
BACK PAIN: 1
COUGH: 1

## 2024-10-03 DIAGNOSIS — R82.90 CLOUDY URINE: Primary | ICD-10-CM

## 2024-10-03 DIAGNOSIS — N39.0 URINARY TRACT INFECTION ASSOCIATED WITH INDWELLING URETHRAL CATHETER, SUBSEQUENT ENCOUNTER: Primary | ICD-10-CM

## 2024-10-03 DIAGNOSIS — F41.9 ANXIETY: ICD-10-CM

## 2024-10-03 DIAGNOSIS — R82.90 CLOUDY URINE: ICD-10-CM

## 2024-10-03 DIAGNOSIS — T83.511D URINARY TRACT INFECTION ASSOCIATED WITH INDWELLING URETHRAL CATHETER, SUBSEQUENT ENCOUNTER: Primary | ICD-10-CM

## 2024-10-03 LAB
AMORPH SED URNS QL MICRO: ABNORMAL
BACTERIA URNS QL MICRO: ABNORMAL /HPF
BILIRUB UR QL STRIP: NEGATIVE
CLARITY UR: ABNORMAL
COLOR UR: YELLOW
CRYSTALS URNS MICRO: ABNORMAL /HPF
EPI CELLS #/AREA URNS AUTO: ABNORMAL /HPF (ref 0–5)
GLUCOSE UR STRIP-MCNC: NEGATIVE MG/DL
HGB UR QL STRIP: NEGATIVE
HYALINE CASTS #/AREA URNS AUTO: ABNORMAL /HPF (ref 0–5)
KETONES UR STRIP-MCNC: NEGATIVE MG/DL
LEUKOCYTE ESTERASE UR QL STRIP: ABNORMAL
NITRITE UR QL STRIP: POSITIVE
PH UR STRIP: >=9 [PH] (ref 5–9)
PROT UR STRIP-MCNC: >=300 MG/DL
RBC #/AREA URNS AUTO: ABNORMAL /HPF (ref 0–5)
SP GR UR STRIP: 1.02 (ref 1–1.03)
URINE REFLEX TO CULTURE: YES
UROBILINOGEN UR STRIP-ACNC: 0.2 E.U./DL
WBC #/AREA URNS AUTO: >100 /HPF (ref 0–5)

## 2024-10-03 RX ORDER — TRAZODONE HYDROCHLORIDE 150 MG/1
150 TABLET ORAL NIGHTLY
Qty: 90 TABLET | Refills: 0 | Status: SHIPPED | OUTPATIENT
Start: 2024-10-03

## 2024-10-03 NOTE — TELEPHONE ENCOUNTER
Comments:     Last Office Visit (last PCP visit):   8/1/2024    Next Visit Date:  Future Appointments   Date Time Provider Department Center   10/9/2024  2:30 PM Raphael Theodore MD [facility]   10/17/2024  9:00 AM Gilda Isbell APRN - CNP [facility]   3/3/2025  9:30 AM Donny Morales MD [facility] DEP       **If hasn't been seen in over a year OR hasn't followed up according to last diabetes/ADHD visit, make appointment for patient before sending refill to provider.    Rx requested:  Requested Prescriptions     Pending Prescriptions Disp Refills    traZODone (DESYREL) 150 MG tablet [Pharmacy Med Name: traZODone HCl Oral Tablet 150 MG] 90 tablet 0     Sig: TAKE ONE TABLET BY MOUTH NIGHTLY

## 2024-10-05 LAB — BACTERIA UR CULT: NORMAL

## 2024-10-07 DIAGNOSIS — C61 PROSTATE CANCER (HCC): Primary | ICD-10-CM

## 2024-10-07 LAB — PSA SERPL-MCNC: 16.63 NG/ML (ref 0–4)

## 2024-10-07 RX ORDER — NITROFURANTOIN 25; 75 MG/1; MG/1
100 CAPSULE ORAL 2 TIMES DAILY
Qty: 14 CAPSULE | Refills: 0 | Status: SHIPPED | OUTPATIENT
Start: 2024-10-07 | End: 2024-10-14

## 2024-10-08 DIAGNOSIS — E78.5 HYPERLIPIDEMIA, UNSPECIFIED HYPERLIPIDEMIA TYPE: ICD-10-CM

## 2024-10-08 RX ORDER — SIMVASTATIN 20 MG
20 TABLET ORAL NIGHTLY
Qty: 90 TABLET | Refills: 1 | Status: SHIPPED | OUTPATIENT
Start: 2024-10-08

## 2024-10-08 NOTE — TELEPHONE ENCOUNTER
Comments:     Last Office Visit (last PCP visit):   8/1/2024    Next Visit Date:  Future Appointments   Date Time Provider Department Center   10/9/2024  2:30 PM Raphael Theodore MD [facility]   10/17/2024  9:00 AM Gilda Isbell APRN - CNP [facility]   3/3/2025  9:30 AM Donny Morales MD [facility] DEP       **If hasn't been seen in over a year OR hasn't followed up according to last diabetes/ADHD visit, make appointment for patient before sending refill to provider.    Rx requested:  Requested Prescriptions     Pending Prescriptions Disp Refills    simvastatin (ZOCOR) 20 MG tablet [Pharmacy Med Name: Simvastatin Oral Tablet 20 MG] 90 tablet 0     Sig: TAKE ONE TABLET BY MOUTH NIGHTLY

## 2024-10-09 ENCOUNTER — OFFICE VISIT (OUTPATIENT)
Dept: UROLOGY | Age: 89
End: 2024-10-09
Payer: MEDICARE

## 2024-10-09 VITALS
HEART RATE: 67 BPM | SYSTOLIC BLOOD PRESSURE: 126 MMHG | WEIGHT: 150 LBS | BODY MASS INDEX: 24.11 KG/M2 | HEIGHT: 66 IN | DIASTOLIC BLOOD PRESSURE: 70 MMHG

## 2024-10-09 DIAGNOSIS — C61 PROSTATE CANCER (HCC): Primary | ICD-10-CM

## 2024-10-09 PROCEDURE — G8427 DOCREV CUR MEDS BY ELIG CLIN: HCPCS | Performed by: UROLOGY

## 2024-10-09 PROCEDURE — G8484 FLU IMMUNIZE NO ADMIN: HCPCS | Performed by: UROLOGY

## 2024-10-09 PROCEDURE — 1123F ACP DISCUSS/DSCN MKR DOCD: CPT | Performed by: UROLOGY

## 2024-10-09 PROCEDURE — 4004F PT TOBACCO SCREEN RCVD TLK: CPT | Performed by: UROLOGY

## 2024-10-09 PROCEDURE — 99213 OFFICE O/P EST LOW 20 MIN: CPT | Performed by: UROLOGY

## 2024-10-09 PROCEDURE — G8420 CALC BMI NORM PARAMETERS: HCPCS | Performed by: UROLOGY

## 2024-10-09 ASSESSMENT — ENCOUNTER SYMPTOMS
ABDOMINAL PAIN: 0
ABDOMINAL DISTENTION: 0

## 2024-10-09 NOTE — PROGRESS NOTES
Sexual Activity    Alcohol use: Yes    Drug use: No    Sexual activity: Not Currently   Social History Narrative    Lives With: Alone (Wife has dementia -moving to a nursing home [facility] from a memory care unit in [facility]-due to finances)    Type of Home: House in [address]    Home Layout: One level    Home Access: Stairs to enter without rails - Number of Steps: 2    Bathroom Shower/Tub: Walk-in shower-Equipment: None    Home Equipment: None    Has the patient had two or more falls in the past year or any fall with injury in the past year?: No    Receives Help From: Family (son comes a couple times a week)    ADL Assistance: Independent    Homemaking Assistance: Independent (neighbor cuts grass), Homemaking Responsibilities: Yes    Ambulation Assistance: Independent, Transfer Assistance: Independent    Active : Yes    Occupation: Retired    IADL Comments: pt completes his own shopping, cuts some of his lawn         Social Determinants of Health     Financial Resource Strain: Low Risk  (8/1/2024)    Overall Financial Resource Strain (CARDIA)     Difficulty of Paying Living Expenses: Not hard at all   Food Insecurity: No Food Insecurity (8/1/2024)    Hunger Vital Sign     Worried About Running Out of Food in the Last Year: Never true     Ran Out of Food in the Last Year: Never true   Transportation Needs: Unknown (8/1/2024)    PRAPARE - Transportation     Lack of Transportation (Non-Medical): No   Physical Activity: Insufficiently Active (2/29/2024)    Exercise Vital Sign     Days of Exercise per Week: 7 days     Minutes of Exercise per Session: 10 min   Housing Stability: Unknown (8/1/2024)    Housing Stability Vital Sign     Unstable Housing in the Last Year: No     History reviewed. No pertinent family history.  Current Outpatient Medications   Medication Sig Dispense Refill    simvastatin (ZOCOR) 20 MG tablet TAKE ONE TABLET BY MOUTH NIGHTLY 90 tablet 1

## 2024-10-17 ENCOUNTER — OFFICE VISIT (OUTPATIENT)
Dept: PALLATIVE CARE | Age: 89
End: 2024-10-17

## 2024-10-17 VITALS — HEART RATE: 73 BPM | OXYGEN SATURATION: 93 % | DIASTOLIC BLOOD PRESSURE: 70 MMHG | SYSTOLIC BLOOD PRESSURE: 142 MMHG

## 2024-10-17 DIAGNOSIS — Z97.8 FOLEY CATHETER IN PLACE: ICD-10-CM

## 2024-10-17 DIAGNOSIS — C61 PROSTATE CANCER METASTATIC TO BONE (HCC): ICD-10-CM

## 2024-10-17 DIAGNOSIS — F41.9 ANXIETY: ICD-10-CM

## 2024-10-17 DIAGNOSIS — N39.0 URINARY TRACT INFECTION ASSOCIATED WITH INDWELLING URETHRAL CATHETER, SUBSEQUENT ENCOUNTER: Primary | ICD-10-CM

## 2024-10-17 DIAGNOSIS — T83.9XXD PROBLEM WITH FOLEY CATHETER, SUBSEQUENT ENCOUNTER: ICD-10-CM

## 2024-10-17 DIAGNOSIS — G89.3 NEOPLASM RELATED PAIN: ICD-10-CM

## 2024-10-17 DIAGNOSIS — T83.511D URINARY TRACT INFECTION ASSOCIATED WITH INDWELLING URETHRAL CATHETER, SUBSEQUENT ENCOUNTER: Primary | ICD-10-CM

## 2024-10-17 DIAGNOSIS — J44.9 CHRONIC OBSTRUCTIVE PULMONARY DISEASE, UNSPECIFIED COPD TYPE (HCC): ICD-10-CM

## 2024-10-17 DIAGNOSIS — R60.0 BILATERAL LOWER EXTREMITY EDEMA: ICD-10-CM

## 2024-10-17 DIAGNOSIS — Z51.5 PALLIATIVE CARE ENCOUNTER: ICD-10-CM

## 2024-10-17 DIAGNOSIS — K21.9 GASTROESOPHAGEAL REFLUX DISEASE WITHOUT ESOPHAGITIS: ICD-10-CM

## 2024-10-17 DIAGNOSIS — K59.00 CONSTIPATION, UNSPECIFIED CONSTIPATION TYPE: ICD-10-CM

## 2024-10-17 DIAGNOSIS — C79.51 PROSTATE CANCER METASTATIC TO BONE (HCC): ICD-10-CM

## 2024-10-17 DIAGNOSIS — R33.9 URINARY RETENTION: ICD-10-CM

## 2024-10-17 DIAGNOSIS — R39.15 URGENCY OF URINATION: ICD-10-CM

## 2024-10-17 DIAGNOSIS — R41.89 IMPAIRED COGNITION: ICD-10-CM

## 2024-10-17 ASSESSMENT — ENCOUNTER SYMPTOMS
ABDOMINAL PAIN: 0
COUGH: 1
NAUSEA: 0
CONSTIPATION: 1
SHORTNESS OF BREATH: 0
VOICE CHANGE: 0
TROUBLE SWALLOWING: 0
WHEEZING: 0
DIARRHEA: 0
BACK PAIN: 1

## 2024-10-17 NOTE — PROGRESS NOTES
Subjective:      Patient Id: Seen Caden at  home in [place] , for follow-up palliative medicine. He was accompanied to the appointment by: Deyvi gasca.     Chief Complaint   Patient presents with    Urine clearing up.      Also, no further leaking.     Follow-up      HPI       Caden Khalil is a 89 y.o. 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 Home visit is necessary in lieu of office due to significant frailty and high symptom burden from comorbid illnesses.      General: Patient is alert and oriented x 3-4. Poor short-term memory and historian.    Functional status: Patient has been getting up with walker and independently. Also has and uses wheelchair. Remains very active and participates in exercise daily. Patient has home aide coming several days a week.    Prostate cancer: Patient initially presented to the ER for uncontrolled back pain that was progressively worsening in addition to inability to ambulate and numbness of the left lower extremity. Patient was found to have T3-T4 spinal cord compression while at hospital in the setting of metastatic disease. He had palliative RT on 8/18/23. Patient was transferred to inpatient rehab where he participated in therapy. He was also started on oral casodex and lupron injections. Casodex now complete. Remains on Lupron. Reports ongoing issues with numbness in his legs as well as lower abdominal area. Remains on Xtandi. Follows with urology and oncology, Dr. Singh.     Pain: Pain is in his midline low back. Describes as an aching pain. This has improved some from baseline. Currently taking oxycodone 5 mg po q 6 hours prn (prescribed by PCP). Also on balcofen 5 mg po BID prn. Sitting and leaning forward aggravate pain. Standing and leaning back in chair helps with the pain. Pain

## 2024-10-18 RX ORDER — ATENOLOL 25 MG/1
25 TABLET ORAL DAILY
Qty: 30 TABLET | Refills: 0 | Status: SHIPPED | OUTPATIENT
Start: 2024-10-18

## 2024-10-18 NOTE — TELEPHONE ENCOUNTER
Comments:     Last Office Visit (last PCP visit):   8/1/2024    Next Visit Date:  Future Appointments   Date Time Provider Department Center   11/21/2024  1:00 PM Gilda Isbell APRN - CNP  [facility]   3/3/2025  9:30 AM Donny Morales MD [facility]   4/16/2025  2:00 PM Raphael Theodore MD [facility]       **If hasn't been seen in over a year OR hasn't followed up according to last diabetes/ADHD visit, make appointment for patient before sending refill to provider.    Rx requested:  Requested Prescriptions     Pending Prescriptions Disp Refills    atenolol (TENORMIN) 25 MG tablet [Pharmacy Med Name: Atenolol Oral Tablet 25 MG] 30 tablet 0     Sig: TAKE ONE TABLET BY MOUTH EVERY DAY

## 2024-10-22 DIAGNOSIS — C61 PROSTATE CANCER METASTATIC TO BONE (HCC): ICD-10-CM

## 2024-10-22 DIAGNOSIS — C79.51 PROSTATE CANCER METASTATIC TO BONE (HCC): ICD-10-CM

## 2024-10-22 DIAGNOSIS — G89.3 NEOPLASM RELATED PAIN: ICD-10-CM

## 2024-10-22 NOTE — TELEPHONE ENCOUNTER
Comments:     Last Office Visit (last PCP visit):   8/1/2024    Next Visit Date:  Future Appointments   Date Time Provider Department Center   11/21/2024  1:00 PM Gilda Isbell APRN - CNP [facility]   3/3/2025  9:30 AM Donny Morales MD [facility]   4/16/2025  2:00 PM Raphael Theodore MD [facility]       **If hasn't been seen in over a year OR hasn't followed up according to last diabetes/ADHD visit, make appointment for patient before sending refill to provider.    Rx requested:  Requested Prescriptions     Pending Prescriptions Disp Refills    oxyCODONE (ROXICODONE) 5 MG immediate release tablet 28 tablet 0     Sig: Take 1 tablet by mouth every 6 hours as needed for Pain for up to 7 days. Max Daily Amount: 20 mg

## 2024-10-24 RX ORDER — OXYCODONE HYDROCHLORIDE 5 MG/1
5 TABLET ORAL EVERY 6 HOURS PRN
Qty: 28 TABLET | Refills: 0 | Status: SHIPPED | OUTPATIENT
Start: 2024-10-24 | End: 2024-10-31

## 2024-10-28 DIAGNOSIS — K21.9 GASTROESOPHAGEAL REFLUX DISEASE WITHOUT ESOPHAGITIS: ICD-10-CM

## 2024-10-28 DIAGNOSIS — R33.9 URINARY RETENTION: ICD-10-CM

## 2024-10-28 RX ORDER — TAMSULOSIN HYDROCHLORIDE 0.4 MG/1
CAPSULE ORAL
Qty: 90 CAPSULE | Refills: 0 | Status: SHIPPED | OUTPATIENT
Start: 2024-10-28

## 2024-10-28 RX ORDER — UBIDECARENONE 30 MG
1 CAPSULE ORAL DAILY
Qty: 60 CAPSULE | Refills: 5 | Status: SHIPPED | OUTPATIENT
Start: 2024-10-28

## 2024-10-28 RX ORDER — PANTOPRAZOLE SODIUM 40 MG/1
40 TABLET, DELAYED RELEASE ORAL
Qty: 90 TABLET | Refills: 0 | Status: SHIPPED | OUTPATIENT
Start: 2024-10-28

## 2024-10-28 NOTE — TELEPHONE ENCOUNTER
Comments:     Last Office Visit (last PCP visit):   8/1/2024    Next Visit Date:  Future Appointments   Date Time Provider Department Center   11/21/2024  1:00 PM Gilda Isbell APRN - CNP [facility]   3/3/2025  9:30 AM Donny Morales MD [facility]   4/16/2025  2:00 PM Raphael Theodore MD [facility]       **If hasn't been seen in over a year OR hasn't followed up according to last diabetes/ADHD visit, make appointment for patient before sending refill to provider.    Rx requested:  Requested Prescriptions     Pending Prescriptions Disp Refills    coenzyme Q-10 100 MG capsule [Pharmacy Med Name: Coenzyme Q-10 Oral Capsule 100 MG] 60 capsule 0     Sig: TAKE ONE CAPSULE BY MOUTH EVERY DAY

## 2024-10-30 ENCOUNTER — TELEPHONE (OUTPATIENT)
Dept: FAMILY MEDICINE CLINIC | Age: 89
End: 2024-10-30

## 2024-10-30 NOTE — TELEPHONE ENCOUNTER
[facility] calling   He was just re certified with home care and they are going to continue with his folley.   He reports that he fell on Sunday and Monday out side while using walker, the only injury is a scraped knee.    Level 2 inter action pantoprasole AND xtandi he has been taking them with out any issues they just have to report it to you.

## 2024-11-05 DIAGNOSIS — M79.10 MUSCULAR PAIN: ICD-10-CM

## 2024-11-05 DIAGNOSIS — M54.9 CHRONIC BACK PAIN, UNSPECIFIED BACK LOCATION, UNSPECIFIED BACK PAIN LATERALITY: ICD-10-CM

## 2024-11-05 DIAGNOSIS — G89.29 CHRONIC BACK PAIN, UNSPECIFIED BACK LOCATION, UNSPECIFIED BACK PAIN LATERALITY: ICD-10-CM

## 2024-11-05 RX ORDER — BACLOFEN 5 MG/1
TABLET ORAL
Qty: 60 TABLET | Refills: 5 | Status: SHIPPED | OUTPATIENT
Start: 2024-11-05

## 2024-11-05 NOTE — TELEPHONE ENCOUNTER
Comments:     Last Office Visit (last PCP visit):   8/1/2024    Next Visit Date:  Future Appointments   Date Time Provider Department Center   11/21/2024  1:00 PM Gilda Isbell APRN - CNP [facility]   3/3/2025  9:30 AM Donny Morales MD [facility]   4/16/2025  2:00 PM Raphael Theodore MD [facility]       **If hasn't been seen in over a year OR hasn't followed up according to last diabetes/ADHD visit, make appointment for patient before sending refill to provider.    Rx requested:  Requested Prescriptions     Pending Prescriptions Disp Refills    Baclofen (LIORESAL) 5 MG tablet [Pharmacy Med Name: Baclofen Oral Tablet 5 MG] 60 tablet 0     Sig: TAKE ONE TABLET BY MOUTH TWICE A DAY AS NEEDED FOR BACK PAIN

## 2024-11-19 RX ORDER — OXYBUTYNIN CHLORIDE 5 MG/1
5 TABLET, EXTENDED RELEASE ORAL DAILY
Qty: 30 TABLET | Refills: 5 | Status: SHIPPED | OUTPATIENT
Start: 2024-11-19

## 2024-11-19 RX ORDER — ATENOLOL 25 MG/1
25 TABLET ORAL DAILY
Qty: 30 TABLET | Refills: 0 | Status: SHIPPED | OUTPATIENT
Start: 2024-11-19

## 2024-11-19 NOTE — TELEPHONE ENCOUNTER
Comments:     Last Office Visit (last PCP visit):   8/1/2024    Next Visit Date:  Future Appointments   Date Time Provider Department Center   11/21/2024  1:00 PM Gilda Isbell APRN - CNP [facility]   3/3/2025  9:30 AM Donny Morales MD [facility]   4/16/2025  2:00 PM Raphael Theodore MD [facility]       **If hasn't been seen in over a year OR hasn't followed up according to last diabetes/ADHD visit, make appointment for patient before sending refill to provider.    Rx requested:  Requested Prescriptions     Pending Prescriptions Disp Refills    atenolol (TENORMIN) 25 MG tablet [Pharmacy Med Name: Atenolol Oral Tablet 25 MG] 30 tablet 0     Sig: TAKE ONE TABLET BY MOUTH EVERY DAY    oxyBUTYnin (DITROPAN-XL) 5 MG extended release tablet [Pharmacy Med Name: oxyBUTYnin Chloride ER Oral Tablet Extended Release 24 Hour 5 MG] 30 tablet 0     Sig: TAKE ONE TABLET BY MOUTH DAILY           '

## 2024-11-21 ENCOUNTER — OFFICE VISIT (OUTPATIENT)
Dept: PALLATIVE CARE | Age: 89
End: 2024-11-21
Payer: MEDICARE

## 2024-11-21 VITALS — DIASTOLIC BLOOD PRESSURE: 80 MMHG | SYSTOLIC BLOOD PRESSURE: 140 MMHG | HEART RATE: 62 BPM | OXYGEN SATURATION: 96 %

## 2024-11-21 DIAGNOSIS — R41.89 IMPAIRED COGNITION: ICD-10-CM

## 2024-11-21 DIAGNOSIS — R33.9 URINARY RETENTION: ICD-10-CM

## 2024-11-21 DIAGNOSIS — C79.51 PROSTATE CANCER METASTATIC TO BONE (HCC): ICD-10-CM

## 2024-11-21 DIAGNOSIS — C61 PROSTATE CANCER METASTATIC TO BONE (HCC): ICD-10-CM

## 2024-11-21 DIAGNOSIS — F41.9 ANXIETY: ICD-10-CM

## 2024-11-21 DIAGNOSIS — Z97.8 FOLEY CATHETER IN PLACE: ICD-10-CM

## 2024-11-21 DIAGNOSIS — G89.3 NEOPLASM RELATED PAIN: ICD-10-CM

## 2024-11-21 DIAGNOSIS — K59.00 CONSTIPATION, UNSPECIFIED CONSTIPATION TYPE: ICD-10-CM

## 2024-11-21 DIAGNOSIS — K21.9 GASTROESOPHAGEAL REFLUX DISEASE WITHOUT ESOPHAGITIS: ICD-10-CM

## 2024-11-21 DIAGNOSIS — R35.0 URINARY FREQUENCY: Primary | ICD-10-CM

## 2024-11-21 DIAGNOSIS — Z91.89 AT RISK FOR INFECTION ASSOCIATED WITH FOLEY CATHETER: ICD-10-CM

## 2024-11-21 DIAGNOSIS — Z51.5 PALLIATIVE CARE ENCOUNTER: ICD-10-CM

## 2024-11-21 DIAGNOSIS — R60.0 BILATERAL LOWER EXTREMITY EDEMA: ICD-10-CM

## 2024-11-21 DIAGNOSIS — J44.9 CHRONIC OBSTRUCTIVE PULMONARY DISEASE, UNSPECIFIED COPD TYPE (HCC): ICD-10-CM

## 2024-11-21 PROCEDURE — G8484 FLU IMMUNIZE NO ADMIN: HCPCS | Performed by: NURSE PRACTITIONER

## 2024-11-21 PROCEDURE — 1159F MED LIST DOCD IN RCRD: CPT | Performed by: NURSE PRACTITIONER

## 2024-11-21 PROCEDURE — 1123F ACP DISCUSS/DSCN MKR DOCD: CPT | Performed by: NURSE PRACTITIONER

## 2024-11-21 PROCEDURE — G8420 CALC BMI NORM PARAMETERS: HCPCS | Performed by: NURSE PRACTITIONER

## 2024-11-21 PROCEDURE — 99349 HOME/RES VST EST MOD MDM 40: CPT | Performed by: NURSE PRACTITIONER

## 2024-11-21 PROCEDURE — 4004F PT TOBACCO SCREEN RCVD TLK: CPT | Performed by: NURSE PRACTITIONER

## 2024-11-21 PROCEDURE — 1160F RVW MEDS BY RX/DR IN RCRD: CPT | Performed by: NURSE PRACTITIONER

## 2024-11-21 NOTE — PROGRESS NOTES
Subjective:      Patient Id: Seen Caden at  home in [place] , for follow-up palliative medicine. He was accompanied to the appointment by: self.     Chief Complaint   Patient presents with    Feeling of frequency and dysuria.     Has chronic burnham.      HPI       Caden Khalil is a 89 y.o. 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 Home visit is necessary in lieu of office due to significant frailty and high symptom burden from comorbid illnesses.      General: Patient is alert and oriented x 3-4. Poor short-term memory and historian.    Functional status: Patient has been getting up with walker and independently. Also has and uses wheelchair. Remains very active and participates in exercise daily. Patient has home aide coming several days a week.    Prostate cancer: Patient initially presented to the ER for uncontrolled back pain that was progressively worsening in addition to inability to ambulate and numbness of the left lower extremity. Patient was found to have T3-T4 spinal cord compression while at hospital in the setting of metastatic disease. He had palliative RT on 8/18/23. Patient was transferred to inpatient rehab where he participated in therapy. He was also started on oral casodex and lupron injections. Casodex now complete. Remains on Lupron. Reports ongoing issues with numbness in his legs as well as lower abdominal area. Remains on Xtandi. Follows with urology and oncology, Dr. Singh.     Pain: Pain is in his midline low back. Describes as an aching pain. Well-controlled. Currently taking oxycodone 5 mg po q 6 hours prn (prescribed by PCP). Also on balcofen 5 mg po BID prn. Sitting and leaning forward aggravate pain. Standing and leaning back in chair helps with the pain.    Burnham catheter: Reports issues with

## 2024-11-22 ENCOUNTER — HOSPITAL ENCOUNTER (OUTPATIENT)
Age: 89
Setting detail: SPECIMEN
Discharge: HOME | End: 2024-11-22
Payer: MEDICARE

## 2024-11-22 LAB
BACTERIA URNS QL MICRO: ABNORMAL /HPF
BILIRUB UR QL STRIP: ABNORMAL
CLARITY UR: ABNORMAL
COLOR UR: ABNORMAL
EPI CELLS #/AREA URNS AUTO: ABNORMAL /HPF (ref 0–5)
GLUCOSE UR STRIP-MCNC: NEGATIVE MG/DL
HGB UR QL STRIP: ABNORMAL
HYALINE CASTS #/AREA URNS AUTO: ABNORMAL /HPF (ref 0–5)
KETONES UR STRIP-MCNC: NEGATIVE MG/DL
LEUKOCYTE ESTERASE UR QL STRIP: ABNORMAL
NITRITE UR QL STRIP: POSITIVE
PH UR STRIP: 7.5 [PH] (ref 5–9)
PROT UR STRIP-MCNC: >=300 MG/DL
RBC #/AREA URNS AUTO: >100 /HPF (ref 0–5)
SP GR UR STRIP: 1.01 (ref 1–1.03)
URINE REFLEX TO CULTURE: YES
UROBILINOGEN UR STRIP-ACNC: 0.2 E.U./DL
WBC #/AREA URNS AUTO: >100 /HPF (ref 0–5)

## 2024-11-22 PROCEDURE — 81001 URINALYSIS AUTO W/SCOPE: CPT

## 2024-11-24 LAB — BACTERIA UR CULT: NORMAL

## 2024-11-25 ENCOUNTER — TELEPHONE (OUTPATIENT)
Dept: FAMILY MEDICINE CLINIC | Age: 89
End: 2024-11-25

## 2024-11-25 LAB — BACTERIA UR CULT: NORMAL

## 2024-11-25 NOTE — TELEPHONE ENCOUNTER
Shivani from [facility] calling asking if anything will be called into [facility] for the urine that was done 11/23/24. Please advise. Shivani phone number is [phone]. Please call the pt to let him know if anything will be called into the pharmacy.

## 2024-11-29 DIAGNOSIS — J44.9 CHRONIC OBSTRUCTIVE PULMONARY DISEASE, UNSPECIFIED COPD TYPE (HCC): ICD-10-CM

## 2024-11-29 RX ORDER — ALBUTEROL SULFATE 90 UG/1
INHALANT RESPIRATORY (INHALATION)
Qty: 8.5 G | Refills: 0 | Status: SHIPPED | OUTPATIENT
Start: 2024-11-29

## 2024-12-05 ENCOUNTER — TELEPHONE (OUTPATIENT)
Dept: FAMILY MEDICINE CLINIC | Age: 89
End: 2024-12-05

## 2024-12-05 ENCOUNTER — TELEPHONE (OUTPATIENT)
Dept: UROLOGY | Age: 89
End: 2024-12-05

## 2024-12-05 NOTE — TELEPHONE ENCOUNTER
[facility] called stating pt is having bladder spasms. Pt has a burnham they were wondering if you could give something for the spasms.

## 2024-12-05 NOTE — TELEPHONE ENCOUNTER
[facility] calling   He seems to be having bladder spasms and  is having leaking  around the folie and pain.  Is going to Dr. Theodore to see if he can increase the Flowmax

## 2024-12-09 ENCOUNTER — TELEPHONE (OUTPATIENT)
Dept: UROLOGY | Age: 89
End: 2024-12-09

## 2024-12-09 RX ORDER — OXYBUTYNIN CHLORIDE 5 MG/1
5 TABLET, EXTENDED RELEASE ORAL DAILY
Qty: 30 TABLET | Refills: 5 | Status: SHIPPED | OUTPATIENT
Start: 2024-12-09

## 2024-12-09 NOTE — TELEPHONE ENCOUNTER
Comments: pt states that this was to be increased from 5 mg to 10 mg. Please advise. Pt phone number is [phone].    Last Office Visit (last PCP visit):   8/1/2024    Next Visit Date:  Future Appointments   Date Time Provider Department Center   12/19/2024  9:00 AM Gilda Isbell APRN - CNP [facility]   3/3/2025  9:30 AM Donny Morales MD [facility]   4/16/2025  2:00 PM Raphael Theodore MD [facility]       **If hasn't been seen in over a year OR hasn't followed up according to last diabetes/ADHD visit, make appointment for patient before sending refill to provider.    Rx requested:  Requested Prescriptions     Pending Prescriptions Disp Refills    oxyBUTYnin (DITROPAN-XL) 5 MG extended release tablet 30 tablet 5     Sig: Take 1 tablet by mouth daily

## 2024-12-09 NOTE — TELEPHONE ENCOUNTER
Omaira from [facility] called to see if we could increase the pt's catheter size? It's a 14 Spanish, but it keeps getting clogged on a weekly basis. She is also asking if they can increase their visits to every week?     Ph: [phone]  F: [phone]

## 2024-12-10 ENCOUNTER — TELEPHONE (OUTPATIENT)
Dept: PALLATIVE CARE | Age: 89
End: 2024-12-10

## 2024-12-10 NOTE — TELEPHONE ENCOUNTER
Nurse Castano from [facility] called to inquire about recent UA results from 11/21/24, as patient is still having issues.   Do we need to reorder UA?  Appointment made w/ urology on 12/12/24.  Next appt with palliative is 12/19/24.

## 2024-12-10 NOTE — TELEPHONE ENCOUNTER
Spoke w/ Patricia who states there are no further orders since patient has visit scheduled w/ urology on 12/12.   [facility] nurse, Omaira, notified.

## 2024-12-11 ENCOUNTER — TELEPHONE (OUTPATIENT)
Dept: FAMILY MEDICINE CLINIC | Age: 89
End: 2024-12-11

## 2024-12-11 NOTE — TELEPHONE ENCOUNTER
Order sent over for a frequency change for home health care. Change to once a week. Shivani from [facility] just wanted to make Dr. Morales aware.

## 2024-12-12 ENCOUNTER — OFFICE VISIT (OUTPATIENT)
Dept: UROLOGY | Age: 89
End: 2024-12-12
Payer: MEDICARE

## 2024-12-12 VITALS
HEIGHT: 66 IN | BODY MASS INDEX: 25.55 KG/M2 | HEART RATE: 61 BPM | DIASTOLIC BLOOD PRESSURE: 60 MMHG | SYSTOLIC BLOOD PRESSURE: 126 MMHG | WEIGHT: 159 LBS

## 2024-12-12 DIAGNOSIS — R33.9 URINARY RETENTION: Primary | ICD-10-CM

## 2024-12-12 PROCEDURE — 1123F ACP DISCUSS/DSCN MKR DOCD: CPT | Performed by: PHYSICIAN ASSISTANT

## 2024-12-12 PROCEDURE — 1159F MED LIST DOCD IN RCRD: CPT | Performed by: PHYSICIAN ASSISTANT

## 2024-12-12 PROCEDURE — G8417 CALC BMI ABV UP PARAM F/U: HCPCS | Performed by: PHYSICIAN ASSISTANT

## 2024-12-12 PROCEDURE — 4004F PT TOBACCO SCREEN RCVD TLK: CPT | Performed by: PHYSICIAN ASSISTANT

## 2024-12-12 PROCEDURE — 99213 OFFICE O/P EST LOW 20 MIN: CPT | Performed by: PHYSICIAN ASSISTANT

## 2024-12-12 PROCEDURE — G8484 FLU IMMUNIZE NO ADMIN: HCPCS | Performed by: PHYSICIAN ASSISTANT

## 2024-12-12 PROCEDURE — G8427 DOCREV CUR MEDS BY ELIG CLIN: HCPCS | Performed by: PHYSICIAN ASSISTANT

## 2024-12-12 RX ORDER — OXYBUTYNIN CHLORIDE 10 MG/1
10 TABLET, EXTENDED RELEASE ORAL DAILY
Qty: 90 TABLET | Refills: 3 | Status: SHIPPED | OUTPATIENT
Start: 2024-12-12

## 2024-12-12 ASSESSMENT — ENCOUNTER SYMPTOMS: APNEA: 0

## 2024-12-12 NOTE — PROGRESS NOTES
person, place, and time.          Assessment:      This is an 89 yo male with COPD, Anxiety, HTN and admitted on 8/17/23 with progressive back pain and lower ext weakness and MRI findings suggestive of thoracic and lumbar mets and has a PSA of 628 ng/ml and a firm prostate on exam. He developed acute urinary symptoms and now has urinary retention with a burnham catheter. He is currently complaining of having bladder spasms for which he takes oxybutynin. He states that he has been taking 2, 5 mg oxybutynin daily. He is having excellent results and the bladder spasms have stopped. He wishes to keep the same size catheter as he currently has        Plan:      Maintain burnham catheter at current size, home health to change every 4 weeks  Oxybutynin xl 10 mg prescription sent to pharmacy  Follow up with Dr. Theodore at scheduled appointment        Karthik Dixon PA-C

## 2024-12-19 NOTE — TELEPHONE ENCOUNTER
Comments:     Last Office Visit (last PCP visit):   8/1/2024    Next Visit Date:  Future Appointments   Date Time Provider Department Center   12/26/2024  9:00 AM Gilda Isbell APRN - CNP  [facility]   3/3/2025  9:30 AM Donny Morales MD [facility]   4/16/2025  2:00 PM Raphael Theodore MD [facility]       **If hasn't been seen in over a year OR hasn't followed up according to last diabetes/ADHD visit, make appointment for patient before sending refill to provider.    Rx requested:  Requested Prescriptions     Pending Prescriptions Disp Refills    atenolol (TENORMIN) 25 MG tablet [Pharmacy Med Name: Atenolol Oral Tablet 25 MG] 30 tablet 0     Sig: TAKE ONE TABLET BY MOUTH EVERY DAY

## 2024-12-20 RX ORDER — ATENOLOL 25 MG/1
25 TABLET ORAL DAILY
Qty: 30 TABLET | Refills: 0 | Status: SHIPPED | OUTPATIENT
Start: 2024-12-20

## 2024-12-24 ENCOUNTER — TELEPHONE (OUTPATIENT)
Dept: UROLOGY | Age: 89
End: 2024-12-24

## 2024-12-24 NOTE — TELEPHONE ENCOUNTER
[facility] called would like to know if they can increase burnham from 14 C to 16C due to it getting clogged.

## 2024-12-26 ENCOUNTER — OFFICE VISIT (OUTPATIENT)
Dept: PALLATIVE CARE | Age: 89
End: 2024-12-26

## 2024-12-26 VITALS — OXYGEN SATURATION: 95 % | HEART RATE: 73 BPM | SYSTOLIC BLOOD PRESSURE: 134 MMHG | DIASTOLIC BLOOD PRESSURE: 70 MMHG

## 2024-12-26 DIAGNOSIS — R41.89 IMPAIRED COGNITION: ICD-10-CM

## 2024-12-26 DIAGNOSIS — K21.9 GASTROESOPHAGEAL REFLUX DISEASE WITHOUT ESOPHAGITIS: ICD-10-CM

## 2024-12-26 DIAGNOSIS — C61 PROSTATE CANCER METASTATIC TO BONE (HCC): ICD-10-CM

## 2024-12-26 DIAGNOSIS — G89.3 NEOPLASM RELATED PAIN: ICD-10-CM

## 2024-12-26 DIAGNOSIS — R33.9 URINARY RETENTION: ICD-10-CM

## 2024-12-26 DIAGNOSIS — C79.51 PROSTATE CANCER METASTATIC TO BONE (HCC): ICD-10-CM

## 2024-12-26 DIAGNOSIS — K59.00 CONSTIPATION, UNSPECIFIED CONSTIPATION TYPE: ICD-10-CM

## 2024-12-26 DIAGNOSIS — Z51.5 PALLIATIVE CARE ENCOUNTER: ICD-10-CM

## 2024-12-26 DIAGNOSIS — F41.9 ANXIETY: Primary | ICD-10-CM

## 2024-12-26 DIAGNOSIS — J44.9 CHRONIC OBSTRUCTIVE PULMONARY DISEASE, UNSPECIFIED COPD TYPE (HCC): ICD-10-CM

## 2024-12-26 DIAGNOSIS — Z91.89 AT RISK FOR INFECTION ASSOCIATED WITH FOLEY CATHETER: ICD-10-CM

## 2024-12-26 DIAGNOSIS — R35.0 URINARY FREQUENCY: ICD-10-CM

## 2024-12-26 DIAGNOSIS — Z97.8 FOLEY CATHETER IN PLACE: ICD-10-CM

## 2024-12-26 RX ORDER — LOSARTAN POTASSIUM 25 MG/1
12.5 TABLET ORAL DAILY
Qty: 30 TABLET | Refills: 0 | Status: SHIPPED | OUTPATIENT
Start: 2024-12-26

## 2024-12-26 RX ORDER — OXYCODONE HYDROCHLORIDE 5 MG/1
5 TABLET ORAL EVERY 6 HOURS PRN
Qty: 28 TABLET | Refills: 0 | Status: SHIPPED | OUTPATIENT
Start: 2024-12-26 | End: 2025-01-02

## 2024-12-26 RX ORDER — PHENAZOPYRIDINE HYDROCHLORIDE 200 MG/1
200 TABLET, FILM COATED ORAL 3 TIMES DAILY PRN
Qty: 30 TABLET | Refills: 0 | Status: SHIPPED | OUTPATIENT
Start: 2024-12-26

## 2024-12-26 ASSESSMENT — ENCOUNTER SYMPTOMS
TROUBLE SWALLOWING: 0
ABDOMINAL PAIN: 0
CONSTIPATION: 1
SHORTNESS OF BREATH: 0
BACK PAIN: 1
COUGH: 1
DIARRHEA: 0
VOICE CHANGE: 0
WHEEZING: 0
NAUSEA: 0

## 2024-12-26 NOTE — PROGRESS NOTES
Subjective:      Patient Id: Seen Caden at  home in [place] , for follow-up palliative medicine. He was accompanied to the appointment by: self.     Chief Complaint   Patient presents with    Follow-up      HPI       Caden Khalil is a 89 y.o. 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 Home visit is necessary in lieu of office due to significant frailty and high symptom burden from comorbid illnesses.      General: Patient is alert and oriented x 3-4. Poor short-term memory and historian.    Functional status: Patient has been getting up with walker and independently. Also has and uses wheelchair. Remains very active and participates in exercise daily. Patient has home aide coming several days a week.    Prostate cancer: Patient initially presented to the ER for uncontrolled back pain that was progressively worsening in addition to inability to ambulate and numbness of the left lower extremity. Patient was found to have T3-T4 spinal cord compression while at hospital in the setting of metastatic disease. He had palliative RT on 8/18/23. Patient was transferred to inpatient rehab where he participated in therapy. He was also started on oral casodex and lupron injections. Casodex now complete. Remains on Lupron. Reports ongoing issues with numbness in his legs as well as lower abdominal area. Remains on Xtandi. Follows with urology and oncology, Dr. Singh.     Pain: No pain currently. Pain is positional. Pain is in his midline low back when it occurs. Describes as an aching pain. Well-controlled. Currently taking oxycodone 5 mg po q 6 hours prn (prescribed by PCP). Also on balcofen 5 mg po BID prn. Sitting and leaning forward aggravate pain. Standing and leaning back in chair helps with the pain.    Núñez catheter: Oxybutynin

## 2024-12-27 RX ORDER — CHOLECALCIFEROL (VITAMIN D3) 50 MCG
TABLET ORAL
Qty: 90 TABLET | Refills: 0 | Status: SHIPPED | OUTPATIENT
Start: 2024-12-27

## 2025-01-02 DIAGNOSIS — F41.9 ANXIETY: ICD-10-CM

## 2025-01-02 DIAGNOSIS — K59.00 CONSTIPATION, UNSPECIFIED CONSTIPATION TYPE: ICD-10-CM

## 2025-01-02 RX ORDER — TRAZODONE HYDROCHLORIDE 150 MG/1
150 TABLET ORAL NIGHTLY
Qty: 90 TABLET | Refills: 0 | Status: SHIPPED | OUTPATIENT
Start: 2025-01-02

## 2025-01-02 RX ORDER — DOCUSATE SODIUM 100 MG/1
100 CAPSULE, LIQUID FILLED ORAL DAILY
Qty: 120 CAPSULE | Refills: 1 | Status: SHIPPED | OUTPATIENT
Start: 2025-01-02

## 2025-01-02 NOTE — TELEPHONE ENCOUNTER
Comments:     Last Office Visit (last PCP visit):   8/1/2024    Next Visit Date:  Future Appointments   Date Time Provider Department Center   2/11/2025 10:00 AM Gilda Isbell APRN - CNP [facility]   3/3/2025  9:30 AM Donny Morales MD [facility]   4/16/2025  2:00 PM Raphael Theodore MD [facility]       **If hasn't been seen in over a year OR hasn't followed up according to last diabetes/ADHD visit, make appointment for patient before sending refill to provider.    Rx requested:  Requested Prescriptions     Pending Prescriptions Disp Refills    traZODone (DESYREL) 150 MG tablet [Pharmacy Med Name: traZODone HCl Oral Tablet 150 MG] 90 tablet 0     Sig: TAKE ONE TABLET BY MOUTH NIGHTLY

## 2025-01-02 NOTE — TELEPHONE ENCOUNTER
Spoke with Oamira at [facility]. Advised it was ok to change burnham to 16 coude

## 2025-01-14 RX ORDER — ATENOLOL 25 MG/1
25 TABLET ORAL DAILY
Qty: 30 TABLET | Refills: 5 | Status: SHIPPED | OUTPATIENT
Start: 2025-01-14

## 2025-01-14 NOTE — TELEPHONE ENCOUNTER
Comments:     Last Office Visit (last PCP visit):   8/1/2024    Next Visit Date:  Future Appointments   Date Time Provider Department Center   2/11/2025 10:00 AM Gilda Isbell APRN - CNP  [facility]   3/3/2025  9:30 AM Donny Morales MD [facility]   4/16/2025  2:00 PM Raphael Theodore MD [facility]       **If hasn't been seen in over a year OR hasn't followed up according to last diabetes/ADHD visit, make appointment for patient before sending refill to provider.    Rx requested:  Requested Prescriptions     Pending Prescriptions Disp Refills    atenolol (TENORMIN) 25 MG tablet [Pharmacy Med Name: Atenolol Oral Tablet 25 MG] 30 tablet 5     Sig: TAKE ONE TABLET BY MOUTH EVERY DAY

## 2025-01-20 ENCOUNTER — TELEPHONE (OUTPATIENT)
Dept: UROLOGY | Age: 89
End: 2025-01-20

## 2025-01-20 NOTE — TELEPHONE ENCOUNTER
Omaira from [facility] called to report that even after having Núñez catheter size increased to 16fr they had to replace it after 16 days... FYI    [phone]

## 2025-01-27 DIAGNOSIS — R33.9 URINARY RETENTION: ICD-10-CM

## 2025-01-27 RX ORDER — TAMSULOSIN HYDROCHLORIDE 0.4 MG/1
CAPSULE ORAL
Qty: 90 CAPSULE | Refills: 0 | Status: SHIPPED | OUTPATIENT
Start: 2025-01-27

## 2025-02-03 DIAGNOSIS — J44.9 CHRONIC OBSTRUCTIVE PULMONARY DISEASE, UNSPECIFIED COPD TYPE (HCC): ICD-10-CM

## 2025-02-03 RX ORDER — ALBUTEROL SULFATE 90 UG/1
INHALANT RESPIRATORY (INHALATION)
Qty: 8.5 G | Refills: 0 | Status: SHIPPED | OUTPATIENT
Start: 2025-02-03

## 2025-02-04 DIAGNOSIS — K21.9 GASTROESOPHAGEAL REFLUX DISEASE WITHOUT ESOPHAGITIS: ICD-10-CM

## 2025-02-04 RX ORDER — PANTOPRAZOLE SODIUM 40 MG/1
40 TABLET, DELAYED RELEASE ORAL
Qty: 90 TABLET | Refills: 0 | Status: SHIPPED | OUTPATIENT
Start: 2025-02-04

## 2025-02-11 ENCOUNTER — OFFICE VISIT (OUTPATIENT)
Dept: PALLATIVE CARE | Age: 89
End: 2025-02-11

## 2025-02-11 DIAGNOSIS — C79.51 PROSTATE CANCER METASTATIC TO BONE (HCC): Primary | ICD-10-CM

## 2025-02-11 DIAGNOSIS — R33.9 URINARY RETENTION: ICD-10-CM

## 2025-02-11 DIAGNOSIS — G89.3 NEOPLASM RELATED PAIN: ICD-10-CM

## 2025-02-11 DIAGNOSIS — K21.9 GASTROESOPHAGEAL REFLUX DISEASE WITHOUT ESOPHAGITIS: ICD-10-CM

## 2025-02-11 DIAGNOSIS — R35.0 URINARY FREQUENCY: ICD-10-CM

## 2025-02-11 DIAGNOSIS — K59.00 CONSTIPATION, UNSPECIFIED CONSTIPATION TYPE: ICD-10-CM

## 2025-02-11 DIAGNOSIS — Z51.5 PALLIATIVE CARE ENCOUNTER: ICD-10-CM

## 2025-02-11 DIAGNOSIS — Z97.8 FOLEY CATHETER IN PLACE: ICD-10-CM

## 2025-02-11 DIAGNOSIS — J44.9 CHRONIC OBSTRUCTIVE PULMONARY DISEASE, UNSPECIFIED COPD TYPE (HCC): ICD-10-CM

## 2025-02-11 DIAGNOSIS — F41.9 ANXIETY: ICD-10-CM

## 2025-02-11 DIAGNOSIS — R41.89 IMPAIRED COGNITION: ICD-10-CM

## 2025-02-11 DIAGNOSIS — Z91.89 AT RISK FOR INFECTION ASSOCIATED WITH FOLEY CATHETER: ICD-10-CM

## 2025-02-11 DIAGNOSIS — C61 PROSTATE CANCER METASTATIC TO BONE (HCC): Primary | ICD-10-CM

## 2025-02-11 RX ORDER — ABIRATERONE ACETATE 250 MG/1
750 TABLET ORAL DAILY
COMMUNITY

## 2025-02-11 RX ORDER — PHENAZOPYRIDINE HYDROCHLORIDE 200 MG/1
200 TABLET, FILM COATED ORAL 3 TIMES DAILY PRN
Qty: 30 TABLET | Refills: 2 | Status: SHIPPED | OUTPATIENT
Start: 2025-02-11

## 2025-02-11 RX ORDER — PREDNISONE 5 MG/1
5 TABLET ORAL DAILY
COMMUNITY
Start: 2025-01-23

## 2025-02-11 ASSESSMENT — ENCOUNTER SYMPTOMS
DIARRHEA: 0
VOICE CHANGE: 0
BACK PAIN: 1
SHORTNESS OF BREATH: 0
WHEEZING: 0
NAUSEA: 0
COUGH: 1
ABDOMINAL PAIN: 0
CONSTIPATION: 1
TROUBLE SWALLOWING: 0

## 2025-02-11 NOTE — PROGRESS NOTES
Subjective:      Patient Id: Seen Caden at  home in [place] , for follow-up palliative medicine. He was accompanied to the appointment by: self.     Chief Complaint   Patient presents with    Follow-up      HPI       Caden Khalil is a 89 y.o. 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 Home visit is necessary in lieu of office due to significant frailty and high symptom burden from comorbid illnesses.      General: Patient is alert and oriented x 3-4. Poor short-term memory and historian.    Functional status: Patient has been getting up with walker and independently. Also has and uses wheelchair. Remains very active and participates in exercise daily. Patient has home aide coming several days a week.    Prostate cancer: Patient initially presented to the ER for uncontrolled back pain that was progressively worsening in addition to inability to ambulate and numbness of the left lower extremity. Patient was found to have T3-T4 spinal cord compression while at hospital in the setting of metastatic disease. He had palliative RT on 8/18/23. Patient was transferred to inpatient rehab where he participated in therapy. He was also started on oral casodex and lupron injections. Casodex now complete. Remains on Lupron. Reports ongoing issues with numbness in his legs as well as lower abdominal area. Follows with urology and oncology, Dr. Singh.     Pain: Pain is positional. Pain is in his midline low back when it occurs. Describes as an aching pain. Well-controlled. Currently taking oxycodone 5 mg po q 6 hours prn (prescribed by PCP). Also on balcofen 5 mg po BID prn. Sitting and leaning forward aggravate pain. Standing and leaning back in chair helps with the pain.    Núñez catheter: Oxybutynin increased by urology. No issues with

## 2025-02-13 DIAGNOSIS — M89.9 LYTIC BONE LESIONS ON XRAY: ICD-10-CM

## 2025-02-13 DIAGNOSIS — G89.3 PAIN FROM BONE METASTASES (HCC): ICD-10-CM

## 2025-02-13 DIAGNOSIS — C79.51 PAIN FROM BONE METASTASES (HCC): ICD-10-CM

## 2025-02-13 DIAGNOSIS — M54.9 SEVERE BACK PAIN: ICD-10-CM

## 2025-02-13 DIAGNOSIS — S22.000A COMPRESSION FRACTURE OF BODY OF THORACIC VERTEBRA (HCC): ICD-10-CM

## 2025-02-13 RX ORDER — OXYCODONE HYDROCHLORIDE 5 MG/1
10 CAPSULE ORAL EVERY 4 HOURS PRN
Qty: 60 CAPSULE | Refills: 0 | Status: SHIPPED | OUTPATIENT
Start: 2025-02-13 | End: 2025-02-20

## 2025-02-13 NOTE — TELEPHONE ENCOUNTER
Comments:      Last Office Visit (last PCP visit):   8/1/2024     Next Visit Date:    Future Appointments   Date Time Provider Department Center   3/3/2025  9:30 AM Donny Morales MD [facility]   4/9/2025  2:00 PM Gilda Isbell APRN - CNP [facility]   4/16/2025  2:00 PM Raphael Theodore MD [facility]        **If hasn't been seen in over a year OR hasn't followed up according to last diabetes/ADHD visit, make appointment for patient before sending refill to provider.     Rx requested:    Requested Prescriptions      No prescriptions requested or ordered in this encounter

## 2025-02-13 NOTE — TELEPHONE ENCOUNTER
Omaira with [facility] called. Pt fell 2/10/25. He was sitting in a chair that fell backwards and he bumped his head. EMS was called and checked him out. All was ok.     Omaira's # [phone]

## 2025-02-17 ENCOUNTER — TELEPHONE (OUTPATIENT)
Dept: FAMILY MEDICINE CLINIC | Age: 89
End: 2025-02-17

## 2025-02-17 DIAGNOSIS — C79.51 PROSTATE CANCER METASTATIC TO BONE (HCC): ICD-10-CM

## 2025-02-17 DIAGNOSIS — G89.3 NEOPLASM RELATED PAIN: ICD-10-CM

## 2025-02-17 DIAGNOSIS — C61 PROSTATE CANCER METASTATIC TO BONE (HCC): ICD-10-CM

## 2025-02-17 RX ORDER — OXYCODONE HYDROCHLORIDE 5 MG/1
5 TABLET ORAL EVERY 6 HOURS PRN
Qty: 28 TABLET | Refills: 0 | Status: SHIPPED | OUTPATIENT
Start: 2025-02-17 | End: 2025-02-24

## 2025-02-17 RX ORDER — OXYCODONE HYDROCHLORIDE 5 MG/1
10 TABLET ORAL EVERY 4 HOURS PRN
Refills: 0 | Status: CANCELLED | OUTPATIENT
Start: 2025-02-17 | End: 2025-02-24

## 2025-02-17 NOTE — TELEPHONE ENCOUNTER
Oxycodone script needs to be resent and say tablet and not capsules.  [facility] doesn't have capsules and they can't just change it without a new script.

## 2025-02-17 NOTE — TELEPHONE ENCOUNTER
Orders Placed This Encounter   Medications    oxyCODONE (ROXICODONE) 5 MG immediate release tablet     Sig: Take 1 tablet by mouth every 6 hours as needed for Pain for up to 7 days. Max Daily Amount: 20 mg     Dispense:  28 tablet     Refill:  0     Reduce doses taken as pain becomes manageable       The above med(s) were e-scripted to the patient's pharmacy.   Please advise patient  Donny Morales MD

## 2025-02-25 RX ORDER — LOSARTAN POTASSIUM 25 MG/1
12.5 TABLET ORAL DAILY
Qty: 30 TABLET | Refills: 0 | Status: SHIPPED | OUTPATIENT
Start: 2025-02-25

## 2025-02-26 ENCOUNTER — TELEPHONE (OUTPATIENT)
Dept: FAMILY MEDICINE CLINIC | Age: 89
End: 2025-02-26

## 2025-02-26 NOTE — TELEPHONE ENCOUNTER
Shivani from [facility] called. Pt was re-certified for 9 weeks and will discharge at that point and will follow up with urologist.

## 2025-02-28 ENCOUNTER — TELEPHONE (OUTPATIENT)
Dept: FAMILY MEDICINE CLINIC | Age: 89
End: 2025-02-28

## 2025-02-28 NOTE — TELEPHONE ENCOUNTER
[facility]  Pt is is having increased confusion and staring off in and change in mental status.    Would like to know what to so   Phone [phone]   Brianda  Can she hear back today please

## 2025-02-28 NOTE — TELEPHONE ENCOUNTER
Home care calling back Pt refused to go to ER . Said she talked to him on the phone. Pt told her he has an appt Monday

## 2025-02-28 NOTE — TELEPHONE ENCOUNTER
Brianda calling back, states pt refusing to go to the ER, she did a cognitive assessment and it was fine. She also called his son and he will check on him.

## 2025-03-03 ENCOUNTER — OFFICE VISIT (OUTPATIENT)
Dept: FAMILY MEDICINE CLINIC | Age: 89
End: 2025-03-03
Payer: MEDICARE

## 2025-03-03 VITALS
HEIGHT: 66 IN | TEMPERATURE: 98.7 F | DIASTOLIC BLOOD PRESSURE: 64 MMHG | OXYGEN SATURATION: 93 % | BODY MASS INDEX: 25.43 KG/M2 | WEIGHT: 158.2 LBS | HEART RATE: 72 BPM | SYSTOLIC BLOOD PRESSURE: 110 MMHG

## 2025-03-03 DIAGNOSIS — I10 ESSENTIAL HYPERTENSION: ICD-10-CM

## 2025-03-03 DIAGNOSIS — F32.A DEPRESSION, UNSPECIFIED DEPRESSION TYPE: ICD-10-CM

## 2025-03-03 DIAGNOSIS — C61 PROSTATE CANCER METASTATIC TO BONE (HCC): ICD-10-CM

## 2025-03-03 DIAGNOSIS — R53.83 OTHER FATIGUE: ICD-10-CM

## 2025-03-03 DIAGNOSIS — R33.9 URINARY RETENTION: ICD-10-CM

## 2025-03-03 DIAGNOSIS — C79.51 PROSTATE CANCER METASTATIC TO BONE (HCC): ICD-10-CM

## 2025-03-03 DIAGNOSIS — R73.9 HYPERGLYCEMIA: ICD-10-CM

## 2025-03-03 DIAGNOSIS — C79.51 PAIN FROM BONE METASTASES (HCC): ICD-10-CM

## 2025-03-03 DIAGNOSIS — E78.5 HYPERLIPIDEMIA, UNSPECIFIED HYPERLIPIDEMIA TYPE: ICD-10-CM

## 2025-03-03 DIAGNOSIS — F41.9 ANXIETY: ICD-10-CM

## 2025-03-03 DIAGNOSIS — Z97.8 FOLEY CATHETER IN PLACE: ICD-10-CM

## 2025-03-03 DIAGNOSIS — M89.9 LYTIC BONE LESIONS ON XRAY: ICD-10-CM

## 2025-03-03 DIAGNOSIS — G89.3 NEOPLASM RELATED PAIN: ICD-10-CM

## 2025-03-03 DIAGNOSIS — S22.000A COMPRESSION FRACTURE OF BODY OF THORACIC VERTEBRA (HCC): ICD-10-CM

## 2025-03-03 DIAGNOSIS — J44.9 CHRONIC OBSTRUCTIVE PULMONARY DISEASE, UNSPECIFIED COPD TYPE (HCC): ICD-10-CM

## 2025-03-03 DIAGNOSIS — Z00.00 MEDICARE ANNUAL WELLNESS VISIT, SUBSEQUENT: Primary | ICD-10-CM

## 2025-03-03 DIAGNOSIS — G89.3 PAIN FROM BONE METASTASES (HCC): ICD-10-CM

## 2025-03-03 LAB
ALBUMIN SERPL-MCNC: 3.8 G/DL (ref 3.5–4.6)
ALP SERPL-CCNC: 113 U/L (ref 35–104)
ALT SERPL-CCNC: <5 U/L (ref 0–41)
ANION GAP SERPL CALCULATED.3IONS-SCNC: 12 MEQ/L (ref 9–15)
AST SERPL-CCNC: 17 U/L (ref 0–40)
BACTERIA URNS QL MICRO: ABNORMAL /HPF
BILIRUB SERPL-MCNC: 0.5 MG/DL (ref 0.2–0.7)
BILIRUB UR QL STRIP: ABNORMAL
BUN SERPL-MCNC: 25 MG/DL (ref 8–23)
CALCIUM SERPL-MCNC: 9 MG/DL (ref 8.5–9.9)
CHLORIDE SERPL-SCNC: 102 MEQ/L (ref 95–107)
CLARITY UR: ABNORMAL
CO2 SERPL-SCNC: 28 MEQ/L (ref 20–31)
COLOR UR: ABNORMAL
CREAT SERPL-MCNC: 1.02 MG/DL (ref 0.7–1.2)
EPI CELLS #/AREA URNS AUTO: ABNORMAL /HPF (ref 0–5)
ERYTHROCYTE [DISTWIDTH] IN BLOOD BY AUTOMATED COUNT: 14.6 % (ref 11.5–14.5)
GLOBULIN SER CALC-MCNC: 2.9 G/DL (ref 2.3–3.5)
GLUCOSE SERPL-MCNC: 111 MG/DL (ref 70–99)
GLUCOSE UR STRIP-MCNC: NEGATIVE MG/DL
HCT VFR BLD AUTO: 36.7 % (ref 42–52)
HGB BLD-MCNC: 12.4 G/DL (ref 14–18)
HGB UR QL STRIP: ABNORMAL
HYALINE CASTS #/AREA URNS AUTO: ABNORMAL /HPF (ref 0–5)
KETONES UR STRIP-MCNC: NEGATIVE MG/DL
LEUKOCYTE ESTERASE UR QL STRIP: ABNORMAL
MCH RBC QN AUTO: 32.5 PG (ref 27–31.3)
MCHC RBC AUTO-ENTMCNC: 33.8 % (ref 33–37)
MCV RBC AUTO: 96.3 FL (ref 79–92.2)
NITRITE UR QL STRIP: POSITIVE
PH UR STRIP: 7.5 [PH] (ref 5–9)
PLATELET # BLD AUTO: 269 K/UL (ref 130–400)
POTASSIUM SERPL-SCNC: 3.6 MEQ/L (ref 3.4–4.9)
PROT SERPL-MCNC: 6.7 G/DL (ref 6.3–8)
PROT UR STRIP-MCNC: 100 MG/DL
RBC # BLD AUTO: 3.81 M/UL (ref 4.7–6.1)
RBC #/AREA URNS AUTO: >100 /HPF (ref 0–5)
SODIUM SERPL-SCNC: 142 MEQ/L (ref 135–144)
SP GR UR STRIP: 1.02 (ref 1–1.03)
TSH REFLEX: 2.65 UIU/ML (ref 0.44–3.86)
URINE REFLEX TO CULTURE: YES
UROBILINOGEN UR STRIP-ACNC: 1 E.U./DL
WBC # BLD AUTO: 11 K/UL (ref 4.8–10.8)
WBC #/AREA URNS AUTO: >100 /HPF (ref 0–5)

## 2025-03-03 PROCEDURE — 1123F ACP DISCUSS/DSCN MKR DOCD: CPT | Performed by: FAMILY MEDICINE

## 2025-03-03 PROCEDURE — 1159F MED LIST DOCD IN RCRD: CPT | Performed by: FAMILY MEDICINE

## 2025-03-03 PROCEDURE — 1160F RVW MEDS BY RX/DR IN RCRD: CPT | Performed by: FAMILY MEDICINE

## 2025-03-03 PROCEDURE — G0439 PPPS, SUBSEQ VISIT: HCPCS | Performed by: FAMILY MEDICINE

## 2025-03-03 PROCEDURE — 1125F AMNT PAIN NOTED PAIN PRSNT: CPT | Performed by: FAMILY MEDICINE

## 2025-03-03 SDOH — ECONOMIC STABILITY: FOOD INSECURITY: WITHIN THE PAST 12 MONTHS, THE FOOD YOU BOUGHT JUST DIDN'T LAST AND YOU DIDN'T HAVE MONEY TO GET MORE.: NEVER TRUE

## 2025-03-03 SDOH — ECONOMIC STABILITY: FOOD INSECURITY: WITHIN THE PAST 12 MONTHS, YOU WORRIED THAT YOUR FOOD WOULD RUN OUT BEFORE YOU GOT MONEY TO BUY MORE.: NEVER TRUE

## 2025-03-03 ASSESSMENT — PATIENT HEALTH QUESTIONNAIRE - PHQ9
SUM OF ALL RESPONSES TO PHQ QUESTIONS 1-9: 5
2. FEELING DOWN, DEPRESSED OR HOPELESS: SEVERAL DAYS
SUM OF ALL RESPONSES TO PHQ QUESTIONS 1-9: 5
9. THOUGHTS THAT YOU WOULD BE BETTER OFF DEAD, OR OF HURTING YOURSELF: NOT AT ALL
SUM OF ALL RESPONSES TO PHQ QUESTIONS 1-9: 5
8. MOVING OR SPEAKING SO SLOWLY THAT OTHER PEOPLE COULD HAVE NOTICED. OR THE OPPOSITE, BEING SO FIGETY OR RESTLESS THAT YOU HAVE BEEN MOVING AROUND A LOT MORE THAN USUAL: NOT AT ALL
SUM OF ALL RESPONSES TO PHQ QUESTIONS 1-9: 5
6. FEELING BAD ABOUT YOURSELF - OR THAT YOU ARE A FAILURE OR HAVE LET YOURSELF OR YOUR FAMILY DOWN: NOT AT ALL
10. IF YOU CHECKED OFF ANY PROBLEMS, HOW DIFFICULT HAVE THESE PROBLEMS MADE IT FOR YOU TO DO YOUR WORK, TAKE CARE OF THINGS AT HOME, OR GET ALONG WITH OTHER PEOPLE: SOMEWHAT DIFFICULT
5. POOR APPETITE OR OVEREATING: NOT AT ALL
1. LITTLE INTEREST OR PLEASURE IN DOING THINGS: SEVERAL DAYS
3. TROUBLE FALLING OR STAYING ASLEEP: SEVERAL DAYS
7. TROUBLE CONCENTRATING ON THINGS, SUCH AS READING THE NEWSPAPER OR WATCHING TELEVISION: SEVERAL DAYS

## 2025-03-03 ASSESSMENT — LIFESTYLE VARIABLES
HOW OFTEN DO YOU HAVE A DRINK CONTAINING ALCOHOL: MONTHLY OR LESS
HOW MANY STANDARD DRINKS CONTAINING ALCOHOL DO YOU HAVE ON A TYPICAL DAY: 1 OR 2

## 2025-03-03 NOTE — PROGRESS NOTES
Karthik Dixon PA   Baclofen (LIORESAL) 5 MG tablet TAKE ONE TABLET BY MOUTH TWICE A DAY AS NEEDED FOR BACK PAIN Yes Donny Morales MD   coenzyme Q-10 100 MG capsule TAKE ONE CAPSULE BY MOUTH EVERY DAY Yes Donny Morales MD   simvastatin (ZOCOR) 20 MG tablet TAKE ONE TABLET BY MOUTH NIGHTLY Yes Donny Morales MD   escitalopram (LEXAPRO) 10 MG tablet Take 1 tablet by mouth daily Yes Gilda Isbell APRN - CNP   fluticasone (FLONASE) 50 MCG/ACT nasal spray INSTILL 2 SPRAYS IN EACH NOSTRIL ONCE DAILY. Yes Donny Morales MD   busPIRone (BUSPAR) 5 MG tablet TAKE ONE TABLET BY MOUTH TWO TIMES A DAY Yes Donny Morales MD   Dextromethorphan-guaiFENesin (GUAIFENESIN DM COUGH & CHEST PO) Take 20 mLs by mouth daily Yes ProviderMartell MD   magnesium hydroxide (MILK OF MAGNESIA) 400 MG/5ML suspension Take 30 mLs by mouth daily as needed for Constipation Yes Provider, MD Martell   Handicap Placard MISC by Does not apply route Exp 5 years Yes Donny Morales MD       [facility] (Including outside providers/suppliers regularly involved in providing care):   Patient Care Team:  Donny Morales MD as PCP - General (Family Medicine)  Donny Morales MD as PCP - Empaneled Provider  Gilda Isbell APRN - CNP as Nurse Practitioner (Palliative Medicine)     Recommendations for Preventive Services Due: see orders and patient instructions/AVS.  Recommended screening schedule for the next 5-10 years is provided to the patient in written form: see Patient Instructions/AVS.     Reviewed and updated this visit:  Tobacco  Allergies  Meds  Problems  Med Hx  Surg Hx  Fam Hx  Sexual   Hx

## 2025-03-03 NOTE — PATIENT INSTRUCTIONS
to normal and no harm is done.  But if stress happens too often or lasts too long, it can have bad effects. Long-term stress can make you more likely to get sick, and it can make symptoms of some diseases worse. If you tense up when you are stressed, you may develop neck, shoulder, or low back pain. Stress is linked to high blood pressure and heart disease.  Stress also harms your emotional health. It can make you coffey, tense, or depressed. Your relationships may suffer, and you may not do well at work or school.  What can you do to manage stress?  You can try these things to help manage stress:   Do something active. Exercise or activity can help reduce stress. Walking is a great way to get started. Even everyday activities such as housecleaning or yard work can help.  Try yoga or sina chi. These techniques combine exercise and meditation. You may need some training at first to learn them.  Do something you enjoy. For example, listen to music or go to a movie. Practice your hobby or do volunteer work.  Meditate. This can help you relax, because you are not worrying about what happened before or what may happen in the future.  Do guided imagery. Imagine yourself in any setting that helps you feel calm. You can use online videos, books, or a teacher to guide you.  Do breathing exercises. For example:  From a standing position, bend forward from the waist with your knees slightly bent. Let your arms dangle close to the floor.  Breathe in slowly and deeply as you return to a standing position. Roll up slowly and lift your head last.  Hold your breath for just a few seconds in the standing position.  Breathe out slowly and bend forward from the waist.  Let your feelings out. Talk, laugh, cry, and express anger when you need to. Talking with supportive friends or family, a counselor, or a [place] leader about your feelings is a healthy way to relieve stress. Avoid discussing your feelings with people who make you feel

## 2025-03-04 LAB
BACTERIA UR CULT: NORMAL
ESTIMATED AVERAGE GLUCOSE: 100 MG/DL
HBA1C MFR BLD: 5.1 % (ref 4–6)

## 2025-03-06 RX ORDER — CIPROFLOXACIN 500 MG/1
500 TABLET, FILM COATED ORAL 2 TIMES DAILY
Qty: 20 TABLET | Refills: 0 | Status: SHIPPED | OUTPATIENT
Start: 2025-03-06 | End: 2025-03-16

## 2025-03-11 DIAGNOSIS — C79.51 PROSTATE CANCER METASTATIC TO BONE (HCC): ICD-10-CM

## 2025-03-11 DIAGNOSIS — M54.9 SEVERE BACK PAIN: ICD-10-CM

## 2025-03-11 DIAGNOSIS — C61 PROSTATE CANCER METASTATIC TO BONE (HCC): ICD-10-CM

## 2025-03-11 DIAGNOSIS — M89.9 LYTIC BONE LESIONS ON XRAY: ICD-10-CM

## 2025-03-11 DIAGNOSIS — G89.3 NEOPLASM RELATED PAIN: ICD-10-CM

## 2025-03-11 DIAGNOSIS — S22.000A COMPRESSION FRACTURE OF BODY OF THORACIC VERTEBRA (HCC): ICD-10-CM

## 2025-03-11 DIAGNOSIS — R33.9 URINARY RETENTION: ICD-10-CM

## 2025-03-11 DIAGNOSIS — G89.3 PAIN FROM BONE METASTASES (HCC): ICD-10-CM

## 2025-03-11 DIAGNOSIS — C79.51 PAIN FROM BONE METASTASES (HCC): ICD-10-CM

## 2025-03-11 RX ORDER — OXYBUTYNIN CHLORIDE 10 MG/1
10 TABLET, EXTENDED RELEASE ORAL DAILY
Qty: 90 TABLET | Refills: 3 | Status: SHIPPED | OUTPATIENT
Start: 2025-03-11

## 2025-03-11 NOTE — TELEPHONE ENCOUNTER
Comments:      Last Office Visit (last PCP visit):   3/3/2025     Next Visit Date:    Future Appointments   Date Time Provider Department Center   4/9/2025  2:00 PM Gilda Isbell APRN - CNP [facility]   4/16/2025  2:00 PM Raphael Theodore MD [facility]   4/24/2025 11:00 AM SCHEDULE, [facility]   5/23/2025 11:00 AM SCHEDULE, [facility]   9/3/2025  9:30 AM Donny Morales MD [facility]        **If hasn't been seen in over a year OR hasn't followed up according to last diabetes/ADHD visit, make appointment for patient before sending refill to provider.     Rx requested:    Requested Prescriptions     Pending Prescriptions Disp Refills    oxyBUTYnin (DITROPAN XL) 10 MG extended release tablet 90 tablet 3     Sig: Take 1 tablet by mouth daily

## 2025-03-14 DIAGNOSIS — G89.3 NEOPLASM RELATED PAIN: ICD-10-CM

## 2025-03-14 DIAGNOSIS — C61 PROSTATE CANCER METASTATIC TO BONE (HCC): ICD-10-CM

## 2025-03-14 DIAGNOSIS — C79.51 PROSTATE CANCER METASTATIC TO BONE (HCC): ICD-10-CM

## 2025-03-14 RX ORDER — OXYCODONE HYDROCHLORIDE 5 MG/1
5 TABLET ORAL EVERY 6 HOURS PRN
Qty: 28 TABLET | Refills: 0 | Status: CANCELLED | OUTPATIENT
Start: 2025-03-14 | End: 2025-03-21

## 2025-03-14 RX ORDER — OXYCODONE HYDROCHLORIDE 5 MG/1
10 CAPSULE ORAL EVERY 4 HOURS PRN
Qty: 60 CAPSULE | Refills: 0 | Status: CANCELLED | OUTPATIENT
Start: 2025-03-14 | End: 2025-03-21

## 2025-03-14 RX ORDER — OXYCODONE HYDROCHLORIDE 5 MG/1
5 TABLET ORAL EVERY 6 HOURS PRN
Qty: 28 TABLET | Refills: 0 | Status: SHIPPED | OUTPATIENT
Start: 2025-03-14 | End: 2025-03-21

## 2025-03-14 RX ORDER — OXYBUTYNIN CHLORIDE 10 MG/1
10 TABLET, EXTENDED RELEASE ORAL DAILY
Qty: 90 TABLET | Refills: 3 | Status: CANCELLED | OUTPATIENT
Start: 2025-03-14

## 2025-03-14 NOTE — TELEPHONE ENCOUNTER
Comments: Refill     Last Office Visit (last PCP visit):   3/3/2025     Next Visit Date:    Future Appointments   Date Time Provider Department Center   4/9/2025  2:00 PM Gilda Isbell APRN - CNP [facility]   4/16/2025  2:00 PM Raphael Theodore MD [facility]   4/24/2025 11:00 AM SCHEDULE, [facility]X [facility]   5/23/2025 11:00 AM SCHEDULE, [facility]X [facility]   9/3/2025  9:30 AM Donny Morales MD [facility] DE[facility]        **If hasn't been seen in over a year OR hasn't followed up according to last diabetes/ADHD visit, make appointment for patient before sending refill to provider.     Rx requested:    Requested Prescriptions     Pending Prescriptions Disp Refills    oxyCODONE (ROXICODONE) 5 MG immediate release tablet 28 tablet 0     Sig: Take 1 tablet by mouth every 6 hours as needed for Pain for up to 7 days. Max Daily Amount: 20 mg

## 2025-03-20 DIAGNOSIS — F41.9 ANXIETY: ICD-10-CM

## 2025-03-20 RX ORDER — ESCITALOPRAM OXALATE 10 MG/1
10 TABLET ORAL DAILY
Qty: 90 TABLET | Refills: 0 | Status: SHIPPED | OUTPATIENT
Start: 2025-03-20

## 2025-03-25 DIAGNOSIS — K59.00 CONSTIPATION, UNSPECIFIED CONSTIPATION TYPE: ICD-10-CM

## 2025-03-25 RX ORDER — DOCUSATE SODIUM 100 MG/1
100 CAPSULE, LIQUID FILLED ORAL 2 TIMES DAILY
Qty: 180 CAPSULE | Refills: 1 | Status: SHIPPED | OUTPATIENT
Start: 2025-03-25

## 2025-03-25 NOTE — TELEPHONE ENCOUNTER
The order is supposed to be for twice a day and we wrote it for once a day and the pharmacy won't refill it.  Please change script and send to the pharmacy.

## 2025-04-01 RX ORDER — CHOLECALCIFEROL (VITAMIN D3) 50 MCG
TABLET ORAL
Qty: 90 TABLET | Refills: 0 | Status: SHIPPED | OUTPATIENT
Start: 2025-04-01

## 2025-04-01 NOTE — TELEPHONE ENCOUNTER
Comments:     Last Office Visit (last PCP visit):   3/3/2025    Next Visit Date:  Future Appointments   Date Time Provider Department Center   4/9/2025  2:00 PM Gilda Isbell APRN - CNP [facility]   4/16/2025  2:00 PM Raphael Theodore MD [facility]   4/24/2025 11:00 AM SCHEDULE, [facility]X [facility]   5/23/2025 11:00 AM SCHEDULE, [facility]X [facility]   9/3/2025  9:30 AM Donny Morales MD [facility] DEP       **If hasn't been seen in over a year OR hasn't followed up according to last diabetes/ADHD visit, make appointment for patient before sending refill to provider.    Rx requested:  Requested Prescriptions     Pending Prescriptions Disp Refills    Cholecalciferol (VITAMIN D3) 50 MCG (2000 UT) TABS [Pharmacy Med Name: Vitamin D3 Oral Tablet 50 MCG (2000 UT)] 90 tablet 0     Sig: TAKE ONE TABLET BY MOUTH EVERY DAY WITH SUPPER

## 2025-04-08 DIAGNOSIS — E78.5 HYPERLIPIDEMIA, UNSPECIFIED HYPERLIPIDEMIA TYPE: ICD-10-CM

## 2025-04-08 DIAGNOSIS — F41.9 ANXIETY: ICD-10-CM

## 2025-04-08 RX ORDER — TRAZODONE HYDROCHLORIDE 150 MG/1
150 TABLET ORAL NIGHTLY
Qty: 90 TABLET | Refills: 0 | Status: SHIPPED | OUTPATIENT
Start: 2025-04-08

## 2025-04-08 RX ORDER — SIMVASTATIN 20 MG
20 TABLET ORAL NIGHTLY
Qty: 90 TABLET | Refills: 0 | Status: SHIPPED | OUTPATIENT
Start: 2025-04-08

## 2025-04-08 NOTE — TELEPHONE ENCOUNTER
Comments:     Last Office Visit (last PCP visit):   3/3/2025    Next Visit Date:  Future Appointments   Date Time Provider Department Center   4/9/2025  2:00 PM Gilda Isbell APRN - CNP [facility]   4/16/2025  2:00 PM Raphael Theodore MD [facility]   4/24/2025 11:00 AM SCHEDULE, MLOX [facility]   5/23/2025 11:00 AM SCHEDULE, MLOX [facility]   9/3/2025  9:30 AM Donny Morales MD [facility] DE[facility]       **If hasn't been seen in over a year OR hasn't followed up according to last diabetes/ADHD visit, make appointment for patient before sending refill to provider.    Rx requested:  Requested Prescriptions     Pending Prescriptions Disp Refills    simvastatin (ZOCOR) 20 MG tablet [Pharmacy Med Name: Simvastatin Oral Tablet 20 MG] 90 tablet 0     Sig: TAKE ONE TABLET BY MOUTH NIGHTLY    traZODone (DESYREL) 150 MG tablet [Pharmacy Med Name: traZODone HCl Oral Tablet 150 MG] 90 tablet 0     Sig: TAKE ONE TABLET BY MOUTH NIGHTLY

## 2025-04-09 ENCOUNTER — OFFICE VISIT (OUTPATIENT)
Age: 89
End: 2025-04-09

## 2025-04-09 VITALS — OXYGEN SATURATION: 94 % | SYSTOLIC BLOOD PRESSURE: 160 MMHG | DIASTOLIC BLOOD PRESSURE: 93 MMHG | HEART RATE: 64 BPM

## 2025-04-09 DIAGNOSIS — C61 PROSTATE CANCER METASTATIC TO BONE (HCC): ICD-10-CM

## 2025-04-09 DIAGNOSIS — F41.9 ANXIETY: ICD-10-CM

## 2025-04-09 DIAGNOSIS — C79.51 PROSTATE CANCER METASTATIC TO BONE (HCC): ICD-10-CM

## 2025-04-09 DIAGNOSIS — R35.0 URINARY FREQUENCY: ICD-10-CM

## 2025-04-09 DIAGNOSIS — Z51.5 PALLIATIVE CARE ENCOUNTER: Primary | ICD-10-CM

## 2025-04-09 DIAGNOSIS — J44.9 CHRONIC OBSTRUCTIVE PULMONARY DISEASE, UNSPECIFIED COPD TYPE (HCC): ICD-10-CM

## 2025-04-09 DIAGNOSIS — K59.00 CONSTIPATION, UNSPECIFIED CONSTIPATION TYPE: ICD-10-CM

## 2025-04-09 DIAGNOSIS — Z91.89 AT RISK FOR INFECTION ASSOCIATED WITH FOLEY CATHETER: ICD-10-CM

## 2025-04-09 DIAGNOSIS — R41.89 IMPAIRED COGNITION: ICD-10-CM

## 2025-04-09 DIAGNOSIS — Z97.8 FOLEY CATHETER IN PLACE: ICD-10-CM

## 2025-04-09 DIAGNOSIS — K21.9 GASTROESOPHAGEAL REFLUX DISEASE WITHOUT ESOPHAGITIS: ICD-10-CM

## 2025-04-09 DIAGNOSIS — R33.9 URINARY RETENTION: ICD-10-CM

## 2025-04-09 DIAGNOSIS — G89.3 NEOPLASM RELATED PAIN: ICD-10-CM

## 2025-04-09 ASSESSMENT — ENCOUNTER SYMPTOMS
COUGH: 1
NAUSEA: 0
ABDOMINAL PAIN: 0
BACK PAIN: 1
TROUBLE SWALLOWING: 0
CONSTIPATION: 1
SHORTNESS OF BREATH: 0
WHEEZING: 0
DIARRHEA: 0
VOICE CHANGE: 0

## 2025-04-09 NOTE — PROGRESS NOTES
Subjective:      Patient Id: Seen Caden at  home in [place] , for follow-up palliative medicine. He was accompanied to the appointment by: self.     Chief Complaint   Patient presents with    Follow-up      HPI       Caden Khalil is a 89 y.o. 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 Home visit is necessary in lieu of office due to significant frailty and high symptom burden from comorbid illnesses.      General: Patient is alert and oriented x 3-4. Poor short-term memory and historian.    Functional status: Patient has been getting up with walker and independently. Also has and uses wheelchair. Remains very active and participates in exercise daily. Patient has home aide coming several days a week.    Prostate cancer: Patient initially presented to the ER for uncontrolled back pain that was progressively worsening in addition to inability to ambulate and numbness of the left lower extremity. Patient was found to have T3-T4 spinal cord compression while at hospital in the setting of metastatic disease. He had palliative RT on 8/18/23. Patient was transferred to inpatient rehab where he participated in therapy. He was also started on oral casodex and lupron injections. Casodex now complete. Remains on Lupron. Reports ongoing issues with numbness in his legs as well as lower abdominal area. Follows with urology and oncology, Dr. Singh. Ordered Zytiga and prednisone for rising PSA by oncology. Xtandi discontinued.     Pain: Pain is positional. Pain is in his midline low back when it occurs. Describes as an aching pain. Well-controlled. Currently taking oxycodone 5 mg po q 6 hours prn (prescribed by PCP). Also on balcofen 5 mg po BID prn. Sitting and leaning forward aggravate pain. Standing and leaning back in chair helps with the

## 2025-04-14 DIAGNOSIS — C61 PROSTATE CANCER METASTATIC TO BONE (HCC): ICD-10-CM

## 2025-04-14 DIAGNOSIS — C79.51 PROSTATE CANCER METASTATIC TO BONE (HCC): ICD-10-CM

## 2025-04-14 DIAGNOSIS — G89.3 NEOPLASM RELATED PAIN: ICD-10-CM

## 2025-04-14 RX ORDER — OXYCODONE HYDROCHLORIDE 5 MG/1
5 TABLET ORAL EVERY 6 HOURS PRN
Qty: 28 TABLET | Refills: 0 | Status: SHIPPED | OUTPATIENT
Start: 2025-04-14 | End: 2025-04-21

## 2025-04-15 ENCOUNTER — TELEPHONE (OUTPATIENT)
Age: 89
End: 2025-04-15

## 2025-04-15 DIAGNOSIS — C61 PROSTATE CANCER (HCC): Primary | ICD-10-CM

## 2025-04-16 ENCOUNTER — OFFICE VISIT (OUTPATIENT)
Age: 89
End: 2025-04-16
Payer: MEDICARE

## 2025-04-16 VITALS
SYSTOLIC BLOOD PRESSURE: 112 MMHG | BODY MASS INDEX: 25.39 KG/M2 | WEIGHT: 158 LBS | DIASTOLIC BLOOD PRESSURE: 70 MMHG | HEART RATE: 59 BPM | HEIGHT: 66 IN

## 2025-04-16 DIAGNOSIS — C61 PROSTATE CANCER (HCC): Primary | ICD-10-CM

## 2025-04-16 DIAGNOSIS — C61 PROSTATE CANCER (HCC): ICD-10-CM

## 2025-04-16 LAB — PSA SERPL-MCNC: 139.5 NG/ML (ref 0–4)

## 2025-04-16 PROCEDURE — 1123F ACP DISCUSS/DSCN MKR DOCD: CPT | Performed by: UROLOGY

## 2025-04-16 PROCEDURE — G8417 CALC BMI ABV UP PARAM F/U: HCPCS | Performed by: UROLOGY

## 2025-04-16 PROCEDURE — 1160F RVW MEDS BY RX/DR IN RCRD: CPT | Performed by: UROLOGY

## 2025-04-16 PROCEDURE — 4004F PT TOBACCO SCREEN RCVD TLK: CPT | Performed by: UROLOGY

## 2025-04-16 PROCEDURE — PBSHW PBB SHADOW CHARGE: Performed by: UROLOGY

## 2025-04-16 PROCEDURE — 99212 OFFICE O/P EST SF 10 MIN: CPT | Performed by: UROLOGY

## 2025-04-16 PROCEDURE — 1159F MED LIST DOCD IN RCRD: CPT | Performed by: UROLOGY

## 2025-04-16 PROCEDURE — G8427 DOCREV CUR MEDS BY ELIG CLIN: HCPCS | Performed by: UROLOGY

## 2025-04-16 PROCEDURE — 99213 OFFICE O/P EST LOW 20 MIN: CPT | Performed by: UROLOGY

## 2025-04-16 RX ADMIN — LEUPROLIDE ACETATE 45 MG: KIT at 02:48

## 2025-04-16 ASSESSMENT — ENCOUNTER SYMPTOMS
ABDOMINAL DISTENTION: 0
ABDOMINAL PAIN: 0

## 2025-04-16 NOTE — PROGRESS NOTES
Subjective:      Patient ID: Caden Khalil is a 89 y.o. male    HPI   This is an 88 yo male with COPD, Anxiety, HTN and admitted on 8/17/23 with progressive back pain and lower ext weakness and MRI findings suggestive of thoracic and lumbar mets and has a PSA of 628 ng/ml and a firm prostate on exam. He developed acute urinary symptoms and now has urinary retention with a burnham catheter. He had acute radiation treatment to the T3/4 region/mets (confirmed by MRI and Bone scan) for cord compression and he is s/p prostate biopsy by IR and failed a voiding trial while on rehab. He was given a 6 mo Lupron when last seen on 10/9/24, and is back for Lupron today. He reports that he is walking better without a walker currently but still has numbness in the genitals and rectal region and lower abd. He gets his Burnham changed every 4 weeks by [facility] and is not bothered by the Burnham. He remains currently not interested in voiding trial as he is happy with the catheter. He has no other complaints. He sees Dr Singh and has been getting \"iv chemo\" and is seeing him tomorrow. I reviewed the interval PSA. He wants to proceed with Lupron today.     Past Medical History:   Diagnosis Date    Anxiety     Compression fracture of body of thoracic vertebra (HCC) 08/17/2023    COPD (chronic obstructive pulmonary disease) (HCC)     Hyperlipidemia     Hypertension     Kidney stone      Past Surgical History:   Procedure Laterality Date    PROSTATE BIOPSY  08/21/2023    U/S guided prostate biopsy completed by Dr. Kwon    US BIOPSY PROSTATE NEEDLE/PUNCH  8/21/2023    US PROSTATE NEEDLE PUNCH 8/21/2023 [facility]     Social History     Socioeconomic History    Marital status:      Spouse name: None    Number of children: None    Years of education: None    Highest education level: None   Tobacco Use    Smoking status: Every Day     Current packs/day: 0.50     Average packs/day: 0.5 packs/day for 71.9 years (36.0 ttl pk-yrs)

## 2025-04-22 DIAGNOSIS — R33.9 URINARY RETENTION: ICD-10-CM

## 2025-04-22 RX ORDER — LOSARTAN POTASSIUM 25 MG/1
TABLET ORAL
Qty: 30 TABLET | Refills: 5 | Status: SHIPPED | OUTPATIENT
Start: 2025-04-22

## 2025-04-22 RX ORDER — TAMSULOSIN HYDROCHLORIDE 0.4 MG/1
CAPSULE ORAL
Qty: 90 CAPSULE | Refills: 0 | Status: SHIPPED | OUTPATIENT
Start: 2025-04-22

## 2025-04-22 NOTE — TELEPHONE ENCOUNTER
Comments:     Last Office Visit (last PCP visit):   3/3/2025    Next Visit Date:  Future Appointments   Date Time Provider Department Center   5/23/2025 11:00 AM SCHEDULE, [facility]   6/6/2025  1:00 PM Muna Page, APRN - CNP [facility]   9/3/2025  9:30 AM Donny Morales MD [facility]   10/22/2025  2:15 PM Raphael Theodore MD [facility]       **If hasn't been seen in over a year OR hasn't followed up according to last diabetes/ADHD visit, make appointment for patient before sending refill to provider.    Rx requested:  Requested Prescriptions     Pending Prescriptions Disp Refills    losartan (COZAAR) 25 MG tablet [Pharmacy Med Name: Losartan Potassium Oral Tablet 25 MG] 30 tablet 0     Sig: TAKE ONE-HALF TABLET BY MOUTH ONE TIME DAILY

## 2025-04-24 RX ORDER — FLUTICASONE PROPIONATE 50 MCG
SPRAY, SUSPENSION (ML) NASAL
Qty: 16 G | Refills: 0 | Status: SHIPPED | OUTPATIENT
Start: 2025-04-24

## 2025-04-24 NOTE — TELEPHONE ENCOUNTER
Comments:     Last Office Visit (last PCP visit):   3/3/2025    Next Visit Date:  Future Appointments   Date Time Provider Department Center   5/23/2025 11:00 AM SCHEDULE, [facility]   6/6/2025  1:00 PM Muna Page, APRN - CNP [facility]   9/3/2025  9:30 AM Donny Morales MD [facility]   10/22/2025  2:15 PM Raphael Theodore MD [facility]       **If hasn't been seen in over a year OR hasn't followed up according to last diabetes/ADHD visit, make appointment for patient before sending refill to provider.    Rx requested:  Requested Prescriptions     Pending Prescriptions Disp Refills    fluticasone (FLONASE) 50 MCG/ACT nasal spray [Pharmacy Med Name: Fluticasone Propionate Nasal Suspension 50 MCG/ACT] 16 g 0     Sig: INSTILL 2 SPRAYS IN EACH NOSTRIL ONCE DAILY

## 2025-04-28 ENCOUNTER — TELEPHONE (OUTPATIENT)
Age: 89
End: 2025-04-28

## 2025-04-28 NOTE — TELEPHONE ENCOUNTER
Pt states that [facility] has been waiting for an order from our office to continue services, but hasn't received anything. His services will be terminated at the end of this month! Please call to discuss.     [phone]

## 2025-04-28 NOTE — TELEPHONE ENCOUNTER
Spoke with pt 's daughter she was wondering if pallative care could be done. I advised if needs to come from PCP.

## 2025-05-05 DIAGNOSIS — M79.10 MUSCULAR PAIN: ICD-10-CM

## 2025-05-05 DIAGNOSIS — G89.29 CHRONIC BACK PAIN, UNSPECIFIED BACK LOCATION, UNSPECIFIED BACK PAIN LATERALITY: ICD-10-CM

## 2025-05-05 DIAGNOSIS — M54.9 CHRONIC BACK PAIN, UNSPECIFIED BACK LOCATION, UNSPECIFIED BACK PAIN LATERALITY: ICD-10-CM

## 2025-05-05 NOTE — TELEPHONE ENCOUNTER
Comments:     Last Office Visit (last PCP visit):   3/3/2025    Next Visit Date:  Future Appointments   Date Time Provider Department Center   5/23/2025 11:00 AM SCHEDULE, [facility] [place] [facility]   6/6/2025  1:00 PM Muna Page, APRN - CNP [facility]   9/3/2025  9:30 AM Donny Morales MD [facility]   10/22/2025  2:15 PM Raphael Theodore MD [facility]       **If hasn't been seen in over a year OR hasn't followed up according to last diabetes/ADHD visit, make appointment for patient before sending refill to provider.    Rx requested:  Requested Prescriptions     Pending Prescriptions Disp Refills    Baclofen (LIORESAL) 5 MG tablet [Pharmacy Med Name: Baclofen Oral Tablet 5 MG] 60 tablet 0     Sig: TAKE ONE TABLET BY MOUTH TWICE DAILY AS NEEDED FOR BACK PAIN

## 2025-05-06 RX ORDER — BACLOFEN 5 MG/1
TABLET ORAL
Qty: 60 TABLET | Refills: 0 | Status: SHIPPED | OUTPATIENT
Start: 2025-05-06

## 2025-05-08 DIAGNOSIS — K21.9 GASTROESOPHAGEAL REFLUX DISEASE WITHOUT ESOPHAGITIS: ICD-10-CM

## 2025-05-09 DIAGNOSIS — K21.9 GASTROESOPHAGEAL REFLUX DISEASE WITHOUT ESOPHAGITIS: ICD-10-CM

## 2025-05-09 RX ORDER — PANTOPRAZOLE SODIUM 40 MG/1
TABLET, DELAYED RELEASE ORAL
Qty: 90 TABLET | Refills: 0 | Status: SHIPPED | OUTPATIENT
Start: 2025-05-09

## 2025-05-09 RX ORDER — PANTOPRAZOLE SODIUM 40 MG/1
TABLET, DELAYED RELEASE ORAL
Qty: 90 TABLET | Refills: 0 | Status: SHIPPED | OUTPATIENT
Start: 2025-05-09 | End: 2025-05-09

## 2025-05-15 DIAGNOSIS — J44.9 CHRONIC OBSTRUCTIVE PULMONARY DISEASE, UNSPECIFIED COPD TYPE (HCC): ICD-10-CM

## 2025-05-15 RX ORDER — FLUTICASONE PROPIONATE 50 MCG
2 SPRAY, SUSPENSION (ML) NASAL DAILY
Qty: 16 G | Refills: 0 | Status: SHIPPED | OUTPATIENT
Start: 2025-05-15

## 2025-05-15 RX ORDER — ALBUTEROL SULFATE 90 UG/1
INHALANT RESPIRATORY (INHALATION)
Qty: 8.5 G | Refills: 0 | Status: SHIPPED | OUTPATIENT
Start: 2025-05-15

## 2025-05-15 NOTE — TELEPHONE ENCOUNTER
Comments:     Last Office Visit (last PCP visit):   3/3/2025    Next Visit Date:  Future Appointments   Date Time Provider Department Center   6/6/2025  1:00 PM Muna Page APRN - CNP [facility]   9/3/2025  9:30 AM Donny Morales MD [facility]   10/22/2025  2:15 PM Raphael Theodore MD [facility]       **If hasn't been seen in over a year OR hasn't followed up according to last diabetes/ADHD visit, make appointment for patient before sending refill to provider.    Rx requested:  Requested Prescriptions     Pending Prescriptions Disp Refills    fluticasone (FLONASE) 50 MCG/ACT nasal spray [Pharmacy Med Name: Fluticasone Propionate Nasal Suspension 50 MCG/ACT] 16 g 0     Sig: INSTILL 2 SPRAYS INTO EACH NOSTRIL ONCE DAILY

## 2025-05-23 ENCOUNTER — TELEPHONE (OUTPATIENT)
Dept: FAMILY MEDICINE CLINIC | Age: 89
End: 2025-05-23

## 2025-05-23 RX ORDER — SENNOSIDES 8.6 MG/1
1 TABLET ORAL 2 TIMES DAILY
Qty: 60 TABLET | Refills: 11 | Status: SHIPPED | OUTPATIENT
Start: 2025-05-23 | End: 2026-05-23

## 2025-05-23 NOTE — TELEPHONE ENCOUNTER
Elizabeth w/ [facility] called. Pt called Elizabeth to let her know he is constipated and no bowel movement in 6 days. No stomach pain, but is having rectum pain. Taking Miralax and uses Colace daily. He had chemo 5/21 so Elizabeth wasn't sure if that had anything to do w/ the constipation. She is asking if Senna can be ordered and if there is any other suggestions on what he can do.    Elizabeth [phone]

## 2025-05-23 NOTE — TELEPHONE ENCOUNTER
Elizabeth pt just went a small amount. States that he doesn't drink enough water 32 oz over 3 days so she is pushing fluids.

## 2025-05-27 DIAGNOSIS — G89.3 NEOPLASM RELATED PAIN: ICD-10-CM

## 2025-05-27 DIAGNOSIS — C61 PROSTATE CANCER METASTATIC TO BONE (HCC): ICD-10-CM

## 2025-05-27 DIAGNOSIS — C79.51 PROSTATE CANCER METASTATIC TO BONE (HCC): ICD-10-CM

## 2025-05-27 NOTE — TELEPHONE ENCOUNTER
Comments:     Last Office Visit (last PCP visit):   3/3/2025     Next Visit Date:    Future Appointments   Date Time Provider Department Center   6/6/2025  1:00 PM Muna Page APRN - CNP [facility]   9/3/2025  9:30 AM Donny Morales MD [facility]   10/22/2025  2:15 PM Raphael Theodore MD [facility]        **If hasn't been seen in over a year OR hasn't followed up according to last diabetes/ADHD visit, make appointment for patient before sending refill to provider.     Rx requested:    Requested Prescriptions     Pending Prescriptions Disp Refills    oxyCODONE (ROXICODONE) 5 MG immediate release tablet 28 tablet 0     Sig: Take 1 tablet by mouth every 6 hours as needed for Pain for up to 7 days. Max Daily Amount: 20 mg    predniSONE (DELTASONE) 5 MG tablet       Sig: Take 1 tablet by mouth daily

## 2025-05-28 RX ORDER — OXYCODONE HYDROCHLORIDE 5 MG/1
5 TABLET ORAL EVERY 6 HOURS PRN
Qty: 28 TABLET | Refills: 0 | Status: SHIPPED | OUTPATIENT
Start: 2025-05-28 | End: 2025-06-04

## 2025-05-28 RX ORDER — PREDNISONE 5 MG/1
5 TABLET ORAL DAILY
Qty: 30 TABLET | Refills: 5 | Status: SHIPPED | OUTPATIENT
Start: 2025-05-28

## 2025-06-06 ENCOUNTER — OFFICE VISIT (OUTPATIENT)
Age: 89
End: 2025-06-06
Payer: MEDICARE

## 2025-06-06 VITALS
HEART RATE: 74 BPM | OXYGEN SATURATION: 96 % | DIASTOLIC BLOOD PRESSURE: 78 MMHG | TEMPERATURE: 97.7 F | SYSTOLIC BLOOD PRESSURE: 130 MMHG

## 2025-06-06 DIAGNOSIS — G89.3 NEOPLASM RELATED PAIN: ICD-10-CM

## 2025-06-06 DIAGNOSIS — Z51.5 PALLIATIVE CARE ENCOUNTER: ICD-10-CM

## 2025-06-06 DIAGNOSIS — R41.89 IMPAIRED COGNITION: ICD-10-CM

## 2025-06-06 DIAGNOSIS — J44.9 CHRONIC OBSTRUCTIVE PULMONARY DISEASE, UNSPECIFIED COPD TYPE (HCC): ICD-10-CM

## 2025-06-06 DIAGNOSIS — F41.9 ANXIETY: ICD-10-CM

## 2025-06-06 DIAGNOSIS — R35.0 URINARY FREQUENCY: ICD-10-CM

## 2025-06-06 DIAGNOSIS — Z91.89 AT RISK FOR INFECTION ASSOCIATED WITH FOLEY CATHETER: ICD-10-CM

## 2025-06-06 DIAGNOSIS — Z97.8 FOLEY CATHETER IN PLACE: ICD-10-CM

## 2025-06-06 DIAGNOSIS — C61 PROSTATE CANCER METASTATIC TO BONE (HCC): Primary | ICD-10-CM

## 2025-06-06 DIAGNOSIS — C79.51 PROSTATE CANCER METASTATIC TO BONE (HCC): Primary | ICD-10-CM

## 2025-06-06 DIAGNOSIS — R33.9 URINARY RETENTION: ICD-10-CM

## 2025-06-06 DIAGNOSIS — K59.00 CONSTIPATION, UNSPECIFIED CONSTIPATION TYPE: ICD-10-CM

## 2025-06-06 PROCEDURE — G8417 CALC BMI ABV UP PARAM F/U: HCPCS | Performed by: NURSE PRACTITIONER

## 2025-06-06 PROCEDURE — 1123F ACP DISCUSS/DSCN MKR DOCD: CPT | Performed by: NURSE PRACTITIONER

## 2025-06-06 PROCEDURE — 1159F MED LIST DOCD IN RCRD: CPT | Performed by: NURSE PRACTITIONER

## 2025-06-06 PROCEDURE — 99349 HOME/RES VST EST MOD MDM 40: CPT | Performed by: NURSE PRACTITIONER

## 2025-06-06 PROCEDURE — 4004F PT TOBACCO SCREEN RCVD TLK: CPT | Performed by: NURSE PRACTITIONER

## 2025-06-06 RX ORDER — OXYCODONE HYDROCHLORIDE 5 MG/1
5 CAPSULE ORAL EVERY 6 HOURS PRN
COMMUNITY

## 2025-06-06 ASSESSMENT — ENCOUNTER SYMPTOMS
CONSTIPATION: 1
NAUSEA: 0
SHORTNESS OF BREATH: 0
DIARRHEA: 0
BACK PAIN: 1
COUGH: 1
ABDOMINAL PAIN: 0
WHEEZING: 0
VOICE CHANGE: 0
TROUBLE SWALLOWING: 0

## 2025-06-06 NOTE — PROGRESS NOTES
Subjective:      Patient Id: Seen Caden at  home in [place] , for follow-up palliative medicine. He was accompanied to the appointment by: self.     Chief Complaint   Patient presents with    Follow-up    Pain    Anxiety    Shortness of Breath      HPI       Caden Khalil is a 89 y.o. 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 Home visit is necessary in lieu of office due to significant frailty and high symptom burden from comorbid illnesses.      General: Patient is alert and oriented x 3-4. Poor short-term memory and historian.     Functional status: Patient has been getting up with walker and independently. Also has and uses wheelchair. Remains very active and participates in exercise daily. Patient has home aide coming several days a week. No falls.     Prostate cancer: Patient initially presented to the ER for uncontrolled back pain that was progressively worsening in addition to inability to ambulate and numbness of the left lower extremity. Patient was found to have T3-T4 spinal cord compression while at hospital in the setting of metastatic disease. He had palliative RT on 8/18/23. Patient was transferred to inpatient rehab where he participated in therapy. He was also started on oral casodex and lupron injections. Casodex now complete. Remains on Lupron. Reports ongoing issues with numbness in his legs as well as lower abdominal area. Follows with urology and oncology, Dr. Singh. On prednisone 5 mg daily.     Pain: Patient has low- mid back pain. Describes as an aching pain. Well-controlled. Currently taking oxycodone 5 mg po q 6 hours prn (prescribed by PCP). Also on balcofen 5 mg po BID prn. Pain is rated 5-6/10.  Pain can be up to 10/10 when reading paper. Sitting in recliner makes pain better.     Núñez catheter:

## 2025-06-10 ENCOUNTER — TELEPHONE (OUTPATIENT)
Age: 89
End: 2025-06-10

## 2025-06-10 DIAGNOSIS — F41.9 ANXIETY: ICD-10-CM

## 2025-06-10 RX ORDER — ESCITALOPRAM OXALATE 10 MG/1
10 TABLET ORAL DAILY
Qty: 90 TABLET | Refills: 0 | Status: SHIPPED | OUTPATIENT
Start: 2025-06-10

## 2025-06-10 NOTE — TELEPHONE ENCOUNTER
Spoke to Deyvi over the phone. She reports patient has been off Buspar for a few months. They thought since Lexapro was started it was replacing Buspar. Per deyvi patients mood has been stable on just Lexapro. Will hold off on adding Buspar back at this time. Deyvi notified to call with any changes in mood.

## 2025-06-10 NOTE — TELEPHONE ENCOUNTER
Daughter Deyvi called and has questions about the busPIRone (BUSPAR) 5 MG tablet   And would like you to call and discuss it with her this afternoon, as they have not picked up from the pharmacy yet.  [phone]

## 2025-06-18 ENCOUNTER — TELEPHONE (OUTPATIENT)
Dept: FAMILY MEDICINE CLINIC | Age: 89
End: 2025-06-18

## 2025-06-18 NOTE — TELEPHONE ENCOUNTER
Pt doesn't currently have oxygen. Told him that we would need to test before getting oxygen for him. He states that he is not in any  distress just takes a while for him to catch his breath. Advised him to talk to palliative care about this, they should be able to test him at home.

## 2025-06-18 NOTE — TELEPHONE ENCOUNTER
Pt would like a portable oxygen tank and is not sure who to call. Please call him     Pt [phone]

## 2025-06-19 DIAGNOSIS — M54.9 CHRONIC BACK PAIN, UNSPECIFIED BACK LOCATION, UNSPECIFIED BACK PAIN LATERALITY: ICD-10-CM

## 2025-06-19 DIAGNOSIS — F41.9 ANXIETY: ICD-10-CM

## 2025-06-19 DIAGNOSIS — G89.29 CHRONIC BACK PAIN, UNSPECIFIED BACK LOCATION, UNSPECIFIED BACK PAIN LATERALITY: ICD-10-CM

## 2025-06-19 DIAGNOSIS — M79.10 MUSCULAR PAIN: ICD-10-CM

## 2025-06-19 RX ORDER — ESCITALOPRAM OXALATE 10 MG/1
10 TABLET ORAL DAILY
Qty: 90 TABLET | Refills: 0 | OUTPATIENT
Start: 2025-06-19

## 2025-06-19 RX ORDER — BACLOFEN 5 MG/1
TABLET ORAL
Qty: 60 TABLET | Refills: 0 | Status: SHIPPED | OUTPATIENT
Start: 2025-06-19

## 2025-06-19 NOTE — TELEPHONE ENCOUNTER
Comments:     Last Office Visit (last PCP visit):   3/3/2025    Next Visit Date:  Future Appointments   Date Time Provider Department Center   7/18/2025  3:00 PM Muna Page APRN - CNP [facility]   9/3/2025  9:30 AM Donny Morales MD [facility]   10/22/2025  2:15 PM Raphael Theodore MD [facility]       **If hasn't been seen in over a year OR hasn't followed up according to last diabetes/ADHD visit, make appointment for patient before sending refill to provider.    Rx requested:  Requested Prescriptions     Pending Prescriptions Disp Refills    Baclofen (LIORESAL) 5 MG tablet [Pharmacy Med Name: Baclofen Oral Tablet 5 MG] 60 tablet 0     Sig: TAKE ONE TABLET BY MOUTH TWICE DAILY AS NEEDED FOR BACK PAIN

## 2025-06-26 RX ORDER — CHOLECALCIFEROL (VITAMIN D3) 50 MCG
TABLET ORAL
Qty: 90 TABLET | Refills: 0 | Status: SHIPPED | OUTPATIENT
Start: 2025-06-26

## 2025-06-26 NOTE — TELEPHONE ENCOUNTER
Comments:     Last Office Visit (last PCP visit):   3/3/2025    Next Visit Date:  Future Appointments   Date Time Provider Department Center   7/18/2025  3:00 PM Muna Page APRN - CNP [facility]   9/3/2025  9:30 AM Donny Morales MD [facility]   10/22/2025  2:15 PM Raphael Theodore MD [facility]       **If hasn't been seen in over a year OR hasn't followed up according to last diabetes/ADHD visit, make appointment for patient before sending refill to provider.    Rx requested:  Requested Prescriptions     Pending Prescriptions Disp Refills    Cholecalciferol (VITAMIN D3) 50 MCG (2000 UT) TABS [Pharmacy Med Name: Vitamin D3 Oral Tablet 50 MCG (2000 UT)] 90 tablet 0     Sig: TAKE ONE TABLET BY MOUTH EVERY DAY WITH SUPPER

## 2025-06-30 ENCOUNTER — RESULTS FOLLOW-UP (OUTPATIENT)
Dept: FAMILY MEDICINE CLINIC | Age: 89
End: 2025-06-30

## 2025-06-30 LAB
AMORPH SED URNS QL MICRO: ABNORMAL
BACTERIA URNS QL MICRO: ABNORMAL /HPF
BILIRUB UR QL STRIP: ABNORMAL
CLARITY UR: ABNORMAL
COLOR UR: ABNORMAL
EPI CELLS #/AREA URNS AUTO: ABNORMAL /HPF (ref 0–5)
GLUCOSE UR STRIP-MCNC: NEGATIVE MG/DL
HGB UR QL STRIP: ABNORMAL
HYALINE CASTS #/AREA URNS AUTO: ABNORMAL /HPF (ref 0–5)
KETONES UR STRIP-MCNC: ABNORMAL MG/DL
LEUKOCYTE ESTERASE UR QL STRIP: ABNORMAL
NITRITE UR QL STRIP: POSITIVE
PH UR STRIP: 5 [PH] (ref 5–9)
PROT UR STRIP-MCNC: 100 MG/DL
RBC #/AREA URNS AUTO: ABNORMAL /HPF (ref 0–5)
SP GR UR STRIP: 1.02 (ref 1–1.03)
UROBILINOGEN UR STRIP-ACNC: 1 E.U./DL
WBC #/AREA URNS AUTO: >100 /HPF (ref 0–5)

## 2025-07-01 ENCOUNTER — TELEPHONE (OUTPATIENT)
Dept: FAMILY MEDICINE CLINIC | Age: 89
End: 2025-07-01

## 2025-07-01 RX ORDER — CIPROFLOXACIN 500 MG/1
500 TABLET, FILM COATED ORAL 2 TIMES DAILY
Qty: 20 TABLET | Refills: 0 | Status: SHIPPED | OUTPATIENT
Start: 2025-07-01 | End: 2025-07-11

## 2025-07-01 NOTE — TELEPHONE ENCOUNTER
I called in cipro.  Will need changed by another provider later this week if culture grows back something resistant    I will be out of the office the rest of this week

## 2025-07-01 NOTE — TELEPHONE ENCOUNTER
[facility] calling   Pt is reporting blood in urin, thinks it is a UTI and they did a urin culture yesterday.  They are wondering if there is anything else they need to do. Please give them a call if there is anything that we need from them.   Phone [phone]

## 2025-07-02 LAB
BACTERIA UR CULT: ABNORMAL
BACTERIA UR CULT: ABNORMAL
ORGANISM: ABNORMAL

## 2025-07-03 ENCOUNTER — RESULTS FOLLOW-UP (OUTPATIENT)
Dept: FAMILY MEDICINE CLINIC | Age: 89
End: 2025-07-03

## 2025-07-07 RX ORDER — ATENOLOL 25 MG/1
25 TABLET ORAL DAILY
Qty: 30 TABLET | Refills: 5 | Status: SHIPPED | OUTPATIENT
Start: 2025-07-07

## 2025-07-07 NOTE — TELEPHONE ENCOUNTER
Comments:     Last Office Visit (last PCP visit):   3/3/2025    Next Visit Date:  Future Appointments   Date Time Provider Department Center   7/18/2025  3:00 PM Muna Page APRN - CNP  [facility]   9/3/2025  9:30 AM Donny Morales MD [facility]   10/22/2025  2:15 PM Raphael Theodore MD [facility]       **If hasn't been seen in over a year OR hasn't followed up according to last diabetes/ADHD visit, make appointment for patient before sending refill to provider.    Rx requested:  Requested Prescriptions     Pending Prescriptions Disp Refills    atenolol (TENORMIN) 25 MG tablet [Pharmacy Med Name: Atenolol Oral Tablet 25 MG] 30 tablet 0     Sig: TAKE ONE TABLET BY MOUTH EVERY DAY

## 2025-07-08 DIAGNOSIS — F41.9 ANXIETY: ICD-10-CM

## 2025-07-08 DIAGNOSIS — E78.5 HYPERLIPIDEMIA, UNSPECIFIED HYPERLIPIDEMIA TYPE: ICD-10-CM

## 2025-07-08 RX ORDER — SIMVASTATIN 20 MG
20 TABLET ORAL NIGHTLY
Qty: 90 TABLET | Refills: 0 | Status: SHIPPED | OUTPATIENT
Start: 2025-07-08

## 2025-07-08 RX ORDER — TRAZODONE HYDROCHLORIDE 150 MG/1
150 TABLET ORAL NIGHTLY
Qty: 90 TABLET | Refills: 0 | Status: SHIPPED | OUTPATIENT
Start: 2025-07-08

## 2025-07-08 NOTE — TELEPHONE ENCOUNTER
Comments:     Last Office Visit (last PCP visit):   3/3/2025    Next Visit Date:  Future Appointments   Date Time Provider Department Center   7/18/2025  3:00 PM Muna Page APRN - CNP [facility]   9/3/2025  9:30 AM Donny Morales MD [facility]   10/22/2025  2:15 PM Raphael Theodore MD [facility]       **If hasn't been seen in over a year OR hasn't followed up according to last diabetes/ADHD visit, make appointment for patient before sending refill to provider.    Rx requested:  Requested Prescriptions     Pending Prescriptions Disp Refills    traZODone (DESYREL) 150 MG tablet [Pharmacy Med Name: traZODone HCl Oral Tablet 150 MG] 90 tablet 0     Sig: TAKE ONE TABLET BY MOUTH NIGHTLY    simvastatin (ZOCOR) 20 MG tablet [Pharmacy Med Name: Simvastatin Oral Tablet 20 MG] 90 tablet 0     Sig: Take 1 tablet by mouth nightly.

## 2025-07-11 DIAGNOSIS — C61 PROSTATE CANCER METASTATIC TO BONE (HCC): ICD-10-CM

## 2025-07-11 DIAGNOSIS — C79.51 PROSTATE CANCER METASTATIC TO BONE (HCC): ICD-10-CM

## 2025-07-11 DIAGNOSIS — G89.3 NEOPLASM RELATED PAIN: ICD-10-CM

## 2025-07-11 DIAGNOSIS — J44.9 CHRONIC OBSTRUCTIVE PULMONARY DISEASE, UNSPECIFIED COPD TYPE (HCC): ICD-10-CM

## 2025-07-11 RX ORDER — FLUTICASONE PROPIONATE 50 MCG
SPRAY, SUSPENSION (ML) NASAL
Qty: 16 G | Refills: 0 | Status: SHIPPED | OUTPATIENT
Start: 2025-07-11

## 2025-07-11 RX ORDER — ALBUTEROL SULFATE 90 UG/1
INHALANT RESPIRATORY (INHALATION)
Qty: 8.5 G | Refills: 0 | Status: SHIPPED | OUTPATIENT
Start: 2025-07-11

## 2025-07-11 RX ORDER — OXYCODONE HYDROCHLORIDE 5 MG/1
5 TABLET ORAL EVERY 6 HOURS PRN
Qty: 28 TABLET | Refills: 0 | Status: SHIPPED | OUTPATIENT
Start: 2025-07-11 | End: 2025-07-18

## 2025-07-11 NOTE — TELEPHONE ENCOUNTER
Comments: last fill 5/28/25    Last Office Visit (last PCP visit):   3/3/2025    Next Visit Date:  Future Appointments   Date Time Provider Department Center   7/18/2025  3:00 PM Muna Page APRN - CNP [facility]   9/3/2025  9:30 AM Donny Morales MD [facility]   10/22/2025  2:15 PM Raphael Theodore MD [facility]       **If hasn't been seen in over a year OR hasn't followed up according to last diabetes/ADHD visit, make appointment for patient before sending refill to provider.    Rx requested:  Requested Prescriptions     Pending Prescriptions Disp Refills    oxyCODONE (ROXICODONE) 5 MG immediate release tablet [Pharmacy Med Name: oxyCODONE HCl Oral Tablet 5 MG] 28 tablet 0     Sig: take 1 tablet by mouth every 6 hours as needed for pain for up to 7 days.  reduce doses taken as pain becomes manageable.  max daily amount: 20mg    fluticasone (FLONASE) 50 MCG/ACT nasal spray [Pharmacy Med Name: Fluticasone Propionate Nasal Suspension 50 MCG/ACT] 16 g 0     Sig: INSTILL TWO SPRAYS INTO EACH NOSTRIL ONCE DAILY.

## 2025-07-18 ENCOUNTER — OFFICE VISIT (OUTPATIENT)
Age: 89
End: 2025-07-18
Payer: MEDICARE

## 2025-07-18 VITALS
SYSTOLIC BLOOD PRESSURE: 139 MMHG | HEART RATE: 76 BPM | DIASTOLIC BLOOD PRESSURE: 89 MMHG | OXYGEN SATURATION: 96 % | TEMPERATURE: 98.6 F

## 2025-07-18 DIAGNOSIS — R35.0 URINARY FREQUENCY: ICD-10-CM

## 2025-07-18 DIAGNOSIS — K59.00 CONSTIPATION, UNSPECIFIED CONSTIPATION TYPE: ICD-10-CM

## 2025-07-18 DIAGNOSIS — Z51.5 PALLIATIVE CARE ENCOUNTER: ICD-10-CM

## 2025-07-18 DIAGNOSIS — C61 PROSTATE CANCER METASTATIC TO BONE (HCC): Primary | ICD-10-CM

## 2025-07-18 DIAGNOSIS — R33.9 URINARY RETENTION: ICD-10-CM

## 2025-07-18 DIAGNOSIS — C79.51 PROSTATE CANCER METASTATIC TO BONE (HCC): Primary | ICD-10-CM

## 2025-07-18 DIAGNOSIS — R41.89 IMPAIRED COGNITION: ICD-10-CM

## 2025-07-18 DIAGNOSIS — F41.9 ANXIETY: ICD-10-CM

## 2025-07-18 DIAGNOSIS — G89.3 NEOPLASM RELATED PAIN: ICD-10-CM

## 2025-07-18 DIAGNOSIS — Z91.89 AT RISK FOR INFECTION ASSOCIATED WITH FOLEY CATHETER: ICD-10-CM

## 2025-07-18 DIAGNOSIS — Z97.8 FOLEY CATHETER IN PLACE: ICD-10-CM

## 2025-07-18 DIAGNOSIS — J44.9 CHRONIC OBSTRUCTIVE PULMONARY DISEASE, UNSPECIFIED COPD TYPE (HCC): ICD-10-CM

## 2025-07-18 PROCEDURE — 4004F PT TOBACCO SCREEN RCVD TLK: CPT | Performed by: NURSE PRACTITIONER

## 2025-07-18 PROCEDURE — 1159F MED LIST DOCD IN RCRD: CPT | Performed by: NURSE PRACTITIONER

## 2025-07-18 PROCEDURE — 1123F ACP DISCUSS/DSCN MKR DOCD: CPT | Performed by: NURSE PRACTITIONER

## 2025-07-18 PROCEDURE — 99349 HOME/RES VST EST MOD MDM 40: CPT | Performed by: NURSE PRACTITIONER

## 2025-07-18 PROCEDURE — G8417 CALC BMI ABV UP PARAM F/U: HCPCS | Performed by: NURSE PRACTITIONER

## 2025-07-18 ASSESSMENT — ENCOUNTER SYMPTOMS
COUGH: 1
DIARRHEA: 0
ABDOMINAL PAIN: 0
SHORTNESS OF BREATH: 0
NAUSEA: 0
CONSTIPATION: 1
VOICE CHANGE: 0
TROUBLE SWALLOWING: 0
BACK PAIN: 1
WHEEZING: 0

## 2025-07-18 NOTE — PROGRESS NOTES
Subjective:      Patient Id: Seen Caden at  home in [place], for follow-up palliative medicine. He was accompanied to the appointment by: self.       Home visit is necessary in lieu of office due to significant frailty and high symptom burden from comorbid illnesses.    Chief Complaint   Patient presents with    Follow-up    COPD    Anxiety      HPI       Caden Khalil is a 89 y.o. 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 Home visit is necessary in lieu of office due to significant frailty and high symptom burden from comorbid illnesses.      General: Patient is alert and oriented x 3-4. Poor short-term memory and historian.     Functional status: Patient has been getting up with walker and independently. Also has and uses wheelchair. Remains very active and participates in exercise daily. Patient has home aide coming several days a week. No falls. No new issues.     Prostate cancer: Patient initially presented to the ER for uncontrolled back pain that was progressively worsening in addition to inability to ambulate and numbness of the left lower extremity. Patient was found to have T3-T4 spinal cord compression while at hospital in the setting of metastatic disease. He had palliative RT on 8/18/23. He was also started on oral casodex and lupron injections. Casodex now complete. Remains on Lupron. Follows with urology and oncology, Dr. Singh. On prednisone 5 mg daily. No new issues. Lupron next week.     Pain: Back pain has been \"really good\". On oxycodone 5 mg q6h prn and Baclofen prn. Pain is worse when bending over but relieved when sitting.     Burnham catheter: Remains on Oxybutynin. No issues with leaking/spasming. No current issues with burnham. Previously advised to have any further UTI related issues addressed by urology.

## 2025-07-22 DIAGNOSIS — R33.9 URINARY RETENTION: ICD-10-CM

## 2025-07-22 DIAGNOSIS — M54.9 CHRONIC BACK PAIN, UNSPECIFIED BACK LOCATION, UNSPECIFIED BACK PAIN LATERALITY: ICD-10-CM

## 2025-07-22 DIAGNOSIS — M79.10 MUSCULAR PAIN: ICD-10-CM

## 2025-07-22 DIAGNOSIS — G89.29 CHRONIC BACK PAIN, UNSPECIFIED BACK LOCATION, UNSPECIFIED BACK PAIN LATERALITY: ICD-10-CM

## 2025-07-22 RX ORDER — TAMSULOSIN HYDROCHLORIDE 0.4 MG/1
CAPSULE ORAL
Qty: 90 CAPSULE | Refills: 0 | Status: SHIPPED | OUTPATIENT
Start: 2025-07-22

## 2025-07-22 RX ORDER — BACLOFEN 5 MG/1
TABLET ORAL
Qty: 60 TABLET | Refills: 0 | Status: SHIPPED | OUTPATIENT
Start: 2025-07-22

## 2025-07-25 DIAGNOSIS — M79.10 MUSCULAR PAIN: ICD-10-CM

## 2025-07-25 DIAGNOSIS — G89.29 CHRONIC BACK PAIN, UNSPECIFIED BACK LOCATION, UNSPECIFIED BACK PAIN LATERALITY: ICD-10-CM

## 2025-07-25 DIAGNOSIS — F41.9 ANXIETY: ICD-10-CM

## 2025-07-25 DIAGNOSIS — R33.9 URINARY RETENTION: ICD-10-CM

## 2025-07-25 DIAGNOSIS — M54.9 CHRONIC BACK PAIN, UNSPECIFIED BACK LOCATION, UNSPECIFIED BACK PAIN LATERALITY: ICD-10-CM

## 2025-07-25 RX ORDER — TRAZODONE HYDROCHLORIDE 150 MG/1
150 TABLET ORAL NIGHTLY
Qty: 90 TABLET | Refills: 0 | Status: SHIPPED | OUTPATIENT
Start: 2025-07-25

## 2025-07-25 RX ORDER — BACLOFEN 5 MG/1
TABLET ORAL
Qty: 60 TABLET | Refills: 0 | OUTPATIENT
Start: 2025-07-25

## 2025-07-25 RX ORDER — CHOLECALCIFEROL (VITAMIN D3) 50 MCG
TABLET ORAL
Qty: 90 TABLET | Refills: 0 | Status: SHIPPED | OUTPATIENT
Start: 2025-07-25

## 2025-07-25 RX ORDER — FLUTICASONE PROPIONATE 50 MCG
2 SPRAY, SUSPENSION (ML) NASAL DAILY
Qty: 16 G | Refills: 0 | OUTPATIENT
Start: 2025-07-25

## 2025-07-25 NOTE — TELEPHONE ENCOUNTER
Comments:     Last Office Visit (last PCP visit):   3/3/2025    Next Visit Date:  Future Appointments   Date Time Provider Department Center   9/3/2025  9:30 AM Donny Morales MD [facility]   9/15/2025  9:00 AM Muna Page, APRN - CNP [facility]   10/22/2025  2:15 PM Raphael Theodore MD [facility]       **If hasn't been seen in over a year OR hasn't followed up according to last diabetes/ADHD visit, make appointment for patient before sending refill to provider.    Rx requested:  Requested Prescriptions     Pending Prescriptions Disp Refills    traZODone (DESYREL) 150 MG tablet [Pharmacy Med Name: traZODone HCl Oral Tablet 150 MG] 90 tablet 0     Sig: TAKE ONE TABLET BY MOUTH NIGHTLY    Cholecalciferol (VITAMIN D3) 50 MCG (2000 UT) TABS [Pharmacy Med Name: Vitamin D3 Oral Tablet 50 MCG (2000 UT)] 90 tablet 0     Sig: Take 1 tablet by mouth every day with supper.     Refused Prescriptions Disp Refills    Baclofen (LIORESAL) 5 MG tablet [Pharmacy Med Name: Baclofen Oral Tablet 5 MG] 60 tablet 0     Sig: TAKE ONE TABLET BY MOUTH TWICE DAILY AS NEEDED FOR BACK PAIN.    fluticasone (FLONASE) 50 MCG/ACT nasal spray [Pharmacy Med Name: Fluticasone Propionate Nasal Suspension 50 MCG/ACT] 16 g 0     Sig: instill 2 sprays into each nostril once daily.

## 2025-07-28 RX ORDER — ESCITALOPRAM OXALATE 10 MG/1
20 TABLET ORAL DAILY
Qty: 60 TABLET | Refills: 3 | Status: SHIPPED | OUTPATIENT
Start: 2025-07-28

## 2025-07-28 RX ORDER — TAMSULOSIN HYDROCHLORIDE 0.4 MG/1
CAPSULE ORAL
Qty: 90 CAPSULE | Refills: 0 | Status: SHIPPED | OUTPATIENT
Start: 2025-07-28

## 2025-07-29 DIAGNOSIS — K21.9 GASTROESOPHAGEAL REFLUX DISEASE WITHOUT ESOPHAGITIS: ICD-10-CM

## 2025-07-29 RX ORDER — PANTOPRAZOLE SODIUM 40 MG/1
TABLET, DELAYED RELEASE ORAL
Qty: 90 TABLET | Refills: 0 | Status: SHIPPED | OUTPATIENT
Start: 2025-07-29

## 2025-07-30 ENCOUNTER — TELEPHONE (OUTPATIENT)
Age: 89
End: 2025-07-30

## 2025-07-30 NOTE — TELEPHONE ENCOUNTER
Patient calls stating that he needs to have a nurse come and take his vitals. Voice was soft and speech difficult to understand.   Patient sounded confused.   Encouraged pt to go to ER, but he refused. Did not want me to call his son.   This nurse called provider. Provider was in vicinity of patient, so, going there to check patient. Patient notified.

## 2025-08-06 ENCOUNTER — TELEPHONE (OUTPATIENT)
Dept: FAMILY MEDICINE CLINIC | Age: 89
End: 2025-08-06

## 2025-08-07 LAB
BACTERIA URNS QL MICRO: ABNORMAL /HPF
BILIRUB UR QL STRIP: NEGATIVE
CLARITY UR: ABNORMAL
COLOR UR: YELLOW
EPI CELLS #/AREA URNS AUTO: ABNORMAL /HPF (ref 0–5)
GLUCOSE UR STRIP-MCNC: NEGATIVE MG/DL
HGB UR QL STRIP: ABNORMAL
HYALINE CASTS #/AREA URNS AUTO: ABNORMAL /HPF (ref 0–5)
KETONES UR STRIP-MCNC: NEGATIVE MG/DL
LEUKOCYTE ESTERASE UR QL STRIP: ABNORMAL
NITRITE UR QL STRIP: POSITIVE
PH UR STRIP: 5.5 [PH] (ref 5–9)
PROT UR STRIP-MCNC: 30 MG/DL
RBC #/AREA URNS AUTO: ABNORMAL /HPF (ref 0–5)
SP GR UR STRIP: 1.01 (ref 1–1.03)
URINE REFLEX TO CULTURE: YES
UROBILINOGEN UR STRIP-ACNC: 0.2 E.U./DL
WBC #/AREA URNS AUTO: >100 /HPF (ref 0–5)

## 2025-08-07 RX ORDER — CIPROFLOXACIN 500 MG/1
500 TABLET, FILM COATED ORAL 2 TIMES DAILY
Qty: 20 TABLET | Refills: 0 | Status: SHIPPED | OUTPATIENT
Start: 2025-08-07 | End: 2025-08-17

## 2025-08-09 LAB
BACTERIA UR CULT: ABNORMAL
ORGANISM: ABNORMAL
ORGANISM: ABNORMAL

## 2025-08-11 ENCOUNTER — TELEPHONE (OUTPATIENT)
Dept: FAMILY MEDICINE CLINIC | Age: 89
End: 2025-08-11

## 2025-08-14 ENCOUNTER — TELEPHONE (OUTPATIENT)
Dept: FAMILY MEDICINE CLINIC | Age: 89
End: 2025-08-14

## 2025-08-19 DIAGNOSIS — M54.9 CHRONIC BACK PAIN, UNSPECIFIED BACK LOCATION, UNSPECIFIED BACK PAIN LATERALITY: ICD-10-CM

## 2025-08-19 DIAGNOSIS — M79.10 MUSCULAR PAIN: ICD-10-CM

## 2025-08-19 DIAGNOSIS — G89.29 CHRONIC BACK PAIN, UNSPECIFIED BACK LOCATION, UNSPECIFIED BACK PAIN LATERALITY: ICD-10-CM

## 2025-08-19 DIAGNOSIS — F41.9 ANXIETY: ICD-10-CM

## 2025-08-19 RX ORDER — BACLOFEN 5 MG/1
TABLET ORAL
Qty: 60 TABLET | Refills: 0 | Status: SHIPPED | OUTPATIENT
Start: 2025-08-19

## 2025-08-19 RX ORDER — ESCITALOPRAM OXALATE 10 MG/1
10 TABLET ORAL DAILY
Qty: 90 TABLET | Refills: 0 | Status: SHIPPED | OUTPATIENT
Start: 2025-08-19

## 2025-08-20 ENCOUNTER — TELEPHONE (OUTPATIENT)
Dept: FAMILY MEDICINE CLINIC | Age: 89
End: 2025-08-20

## 2025-08-20 DIAGNOSIS — R39.9 UTI SYMPTOMS: Primary | ICD-10-CM

## 2025-08-20 LAB
BACTERIA URNS QL MICRO: NEGATIVE /HPF
BILIRUB UR QL STRIP: NEGATIVE
CLARITY UR: ABNORMAL
COLOR UR: YELLOW
CRYSTALS URNS MICRO: ABNORMAL /HPF
EPI CELLS #/AREA URNS AUTO: ABNORMAL /HPF (ref 0–5)
GLUCOSE UR STRIP-MCNC: NEGATIVE MG/DL
HGB UR QL STRIP: ABNORMAL
HYALINE CASTS #/AREA URNS AUTO: ABNORMAL /HPF (ref 0–5)
KETONES UR STRIP-MCNC: NEGATIVE MG/DL
LEUKOCYTE ESTERASE UR QL STRIP: ABNORMAL
NITRITE UR QL STRIP: NEGATIVE
PH UR STRIP: 5.5 [PH] (ref 5–9)
PROT UR STRIP-MCNC: 100 MG/DL
RBC #/AREA URNS HPF: ABNORMAL /HPF (ref 0–2)
SP GR UR STRIP: 1.02 (ref 1–1.03)
URINE REFLEX TO CULTURE: YES
UROBILINOGEN UR STRIP-ACNC: 0.2 E.U./DL
WBC #/AREA URNS AUTO: >100 /HPF (ref 0–5)
YEAST URNS QL MICRO: PRESENT /HPF

## 2025-08-21 ENCOUNTER — APPOINTMENT (OUTPATIENT)
Dept: GENERAL RADIOLOGY | Age: 89
End: 2025-08-21
Payer: MEDICARE

## 2025-08-21 ENCOUNTER — HOSPITAL ENCOUNTER (EMERGENCY)
Age: 89
Discharge: LEFT BEFORE DISCHARGE | End: 2025-08-21
Attending: EMERGENCY MEDICINE
Payer: MEDICARE

## 2025-08-21 VITALS
TEMPERATURE: 98.7 F | HEIGHT: 66 IN | OXYGEN SATURATION: 95 % | HEART RATE: 84 BPM | BODY MASS INDEX: 25.5 KG/M2 | RESPIRATION RATE: 18 BRPM

## 2025-08-21 PROCEDURE — 99283 EMERGENCY DEPT VISIT LOW MDM: CPT

## 2025-08-21 PROCEDURE — 74018 RADEX ABDOMEN 1 VIEW: CPT

## 2025-08-21 ASSESSMENT — PAIN DESCRIPTION - LOCATION: LOCATION: ABDOMEN

## 2025-08-21 ASSESSMENT — PAIN DESCRIPTION - PAIN TYPE: TYPE: ACUTE PAIN

## 2025-08-21 ASSESSMENT — PAIN DESCRIPTION - ORIENTATION: ORIENTATION: LOWER

## 2025-08-21 ASSESSMENT — PAIN SCALES - GENERAL: PAINLEVEL_OUTOF10: 5

## 2025-08-21 ASSESSMENT — PAIN - FUNCTIONAL ASSESSMENT: PAIN_FUNCTIONAL_ASSESSMENT: 0-10

## 2025-08-22 LAB
BACTERIA UR CULT: ABNORMAL
BACTERIA UR CULT: ABNORMAL
ORGANISM: ABNORMAL

## 2025-08-22 RX ORDER — FLUCONAZOLE 100 MG/1
100 TABLET ORAL DAILY
Qty: 7 TABLET | Refills: 0 | Status: SHIPPED | OUTPATIENT
Start: 2025-08-22 | End: 2025-08-29